# Patient Record
Sex: FEMALE | Race: BLACK OR AFRICAN AMERICAN | NOT HISPANIC OR LATINO | Employment: UNEMPLOYED | URBAN - METROPOLITAN AREA
[De-identification: names, ages, dates, MRNs, and addresses within clinical notes are randomized per-mention and may not be internally consistent; named-entity substitution may affect disease eponyms.]

---

## 2017-07-05 ENCOUNTER — APPOINTMENT (OUTPATIENT)
Dept: LAB | Facility: HOSPITAL | Age: 32
End: 2017-07-05
Attending: OBSTETRICS & GYNECOLOGY
Payer: COMMERCIAL

## 2017-07-05 ENCOUNTER — ALLSCRIPTS OFFICE VISIT (OUTPATIENT)
Dept: OTHER | Facility: OTHER | Age: 32
End: 2017-07-05

## 2017-07-05 DIAGNOSIS — N91.2 AMENORRHEA: ICD-10-CM

## 2017-07-05 DIAGNOSIS — Z34.90 ENCOUNTER FOR SUPERVISION OF NORMAL PREGNANCY: ICD-10-CM

## 2017-07-05 DIAGNOSIS — Z32.01 ENCOUNTER FOR PREGNANCY TEST, RESULT POSITIVE: ICD-10-CM

## 2017-07-05 DIAGNOSIS — Z33.1 PREGNANT STATE, INCIDENTAL: ICD-10-CM

## 2017-07-05 LAB
B-HCG SERPL-ACNC: 2793 MIU/ML
HCG, QUALITATIVE (HISTORICAL): POSITIVE
PROGEST SERPL-MCNC: 9.4 NG/ML
TSH SERPL DL<=0.05 MIU/L-ACNC: 1.32 UIU/ML (ref 0.36–3.74)

## 2017-07-05 PROCEDURE — 84144 ASSAY OF PROGESTERONE: CPT

## 2017-07-05 PROCEDURE — 84443 ASSAY THYROID STIM HORMONE: CPT

## 2017-07-05 PROCEDURE — 36415 COLL VENOUS BLD VENIPUNCTURE: CPT

## 2017-07-05 PROCEDURE — 84702 CHORIONIC GONADOTROPIN TEST: CPT

## 2017-07-07 ENCOUNTER — APPOINTMENT (OUTPATIENT)
Dept: LAB | Facility: HOSPITAL | Age: 32
End: 2017-07-07
Attending: OBSTETRICS & GYNECOLOGY
Payer: COMMERCIAL

## 2017-07-07 DIAGNOSIS — Z33.1 PREGNANT STATE, INCIDENTAL: ICD-10-CM

## 2017-07-07 LAB — B-HCG SERPL-ACNC: 7083.2 MIU/ML

## 2017-07-07 PROCEDURE — 36415 COLL VENOUS BLD VENIPUNCTURE: CPT

## 2017-07-07 PROCEDURE — 84702 CHORIONIC GONADOTROPIN TEST: CPT

## 2017-07-13 ENCOUNTER — ALLSCRIPTS OFFICE VISIT (OUTPATIENT)
Dept: OTHER | Facility: OTHER | Age: 32
End: 2017-07-13

## 2017-07-26 ENCOUNTER — APPOINTMENT (OUTPATIENT)
Dept: LAB | Facility: HOSPITAL | Age: 32
End: 2017-07-26
Attending: OBSTETRICS & GYNECOLOGY
Payer: COMMERCIAL

## 2017-07-26 DIAGNOSIS — Z34.90 ENCOUNTER FOR SUPERVISION OF NORMAL PREGNANCY: ICD-10-CM

## 2017-07-26 LAB
ABO GROUP BLD: NORMAL
BACTERIA UR QL AUTO: NORMAL /HPF
BASOPHILS # BLD AUTO: 0.03 THOUSANDS/ΜL (ref 0–0.1)
BASOPHILS NFR BLD AUTO: 1 % (ref 0–1)
BILIRUB UR QL STRIP: ABNORMAL
BLD GP AB SCN SERPL QL: NEGATIVE
CLARITY UR: ABNORMAL
COLOR UR: ABNORMAL
EOSINOPHIL # BLD AUTO: 0.17 THOUSAND/ΜL (ref 0–0.61)
EOSINOPHIL NFR BLD AUTO: 3 % (ref 0–6)
ERYTHROCYTE [DISTWIDTH] IN BLOOD BY AUTOMATED COUNT: 18 % (ref 11.6–15.1)
GLUCOSE UR STRIP-MCNC: NEGATIVE MG/DL
HCT VFR BLD AUTO: 33 % (ref 34.8–46.1)
HGB BLD-MCNC: 10.6 G/DL (ref 11.5–15.4)
HGB UR QL STRIP.AUTO: NEGATIVE
KETONES UR STRIP-MCNC: ABNORMAL MG/DL
LEUKOCYTE ESTERASE UR QL STRIP: NEGATIVE
LYMPHOCYTES # BLD AUTO: 1.77 THOUSANDS/ΜL (ref 0.6–4.47)
LYMPHOCYTES NFR BLD AUTO: 27 % (ref 14–44)
MCH RBC QN AUTO: 21 PG (ref 26.8–34.3)
MCHC RBC AUTO-ENTMCNC: 32.1 G/DL (ref 31.4–37.4)
MCV RBC AUTO: 66 FL (ref 82–98)
MONOCYTES # BLD AUTO: 0.4 THOUSAND/ΜL (ref 0.17–1.22)
MONOCYTES NFR BLD AUTO: 6 % (ref 4–12)
NEUTROPHILS # BLD AUTO: 4.22 THOUSANDS/ΜL (ref 1.85–7.62)
NEUTS SEG NFR BLD AUTO: 63 % (ref 43–75)
NITRITE UR QL STRIP: NEGATIVE
NON-SQ EPI CELLS URNS QL MICRO: NORMAL /HPF
NRBC BLD AUTO-RTO: 0 /100 WBCS
PH UR STRIP.AUTO: 6.5 [PH] (ref 4.5–8)
PLATELET # BLD AUTO: 417 THOUSANDS/UL (ref 149–390)
PMV BLD AUTO: 10.3 FL (ref 8.9–12.7)
PROT UR STRIP-MCNC: ABNORMAL MG/DL
RBC # BLD AUTO: 5.04 MILLION/UL (ref 3.81–5.12)
RBC #/AREA URNS AUTO: NORMAL /HPF
RH BLD: POSITIVE
RUBV IGG SERPL IA-ACNC: 154.3 IU/ML
SP GR UR STRIP.AUTO: 1.03 (ref 1–1.03)
SPECIMEN EXPIRATION DATE: NORMAL
UROBILINOGEN UR QL STRIP.AUTO: 1 E.U./DL
WBC # BLD AUTO: 6.6 THOUSAND/UL (ref 4.31–10.16)
WBC #/AREA URNS AUTO: NORMAL /HPF

## 2017-07-26 PROCEDURE — 36415 COLL VENOUS BLD VENIPUNCTURE: CPT

## 2017-07-26 PROCEDURE — 81001 URINALYSIS AUTO W/SCOPE: CPT

## 2017-07-26 PROCEDURE — 81220 CFTR GENE COM VARIANTS: CPT

## 2017-07-26 PROCEDURE — 80081 OBSTETRIC PANEL INC HIV TSTG: CPT

## 2017-07-26 PROCEDURE — 87086 URINE CULTURE/COLONY COUNT: CPT

## 2017-07-27 LAB
HBV SURFACE AG SER QL: NORMAL
RPR SER QL: NORMAL

## 2017-07-28 LAB
BACTERIA UR CULT: NORMAL
HIV 1+2 AB+HIV1 P24 AG SERPL QL IA: NORMAL

## 2017-08-01 LAB
CF COMMENT: NORMAL
CFTR MUT ANL BLD/T: NORMAL

## 2017-08-09 ENCOUNTER — LAB REQUISITION (OUTPATIENT)
Dept: LAB | Facility: HOSPITAL | Age: 32
End: 2017-08-09
Payer: COMMERCIAL

## 2017-08-09 ENCOUNTER — APPOINTMENT (OUTPATIENT)
Dept: LAB | Facility: HOSPITAL | Age: 32
End: 2017-08-09
Attending: OBSTETRICS & GYNECOLOGY
Payer: COMMERCIAL

## 2017-08-09 ENCOUNTER — ALLSCRIPTS OFFICE VISIT (OUTPATIENT)
Dept: OTHER | Facility: OTHER | Age: 32
End: 2017-08-09

## 2017-08-09 DIAGNOSIS — O99.019 ANEMIA COMPLICATING PREGNANCY: ICD-10-CM

## 2017-08-09 DIAGNOSIS — O10.919 PRE-EXISTING HYPERTENSION COMPLICATING PREGNANCY: ICD-10-CM

## 2017-08-09 DIAGNOSIS — O99.211 OBESITY COMPLICATING PREGNANCY IN FIRST TRIMESTER: ICD-10-CM

## 2017-08-09 DIAGNOSIS — Z34.90 ENCOUNTER FOR SUPERVISION OF NORMAL PREGNANCY: ICD-10-CM

## 2017-08-09 DIAGNOSIS — O99.210 OBESITY COMPLICATING PREGNANCY: ICD-10-CM

## 2017-08-09 LAB
ALBUMIN SERPL BCP-MCNC: 3.3 G/DL (ref 3.5–5)
ALP SERPL-CCNC: 63 U/L (ref 46–116)
ALT SERPL W P-5'-P-CCNC: 17 U/L (ref 12–78)
ANION GAP SERPL CALCULATED.3IONS-SCNC: 7 MMOL/L (ref 4–13)
AST SERPL W P-5'-P-CCNC: 9 U/L (ref 5–45)
BASOPHILS # BLD AUTO: 0.02 THOUSANDS/ΜL (ref 0–0.1)
BASOPHILS NFR BLD AUTO: 0 % (ref 0–1)
BILIRUB SERPL-MCNC: 0.35 MG/DL (ref 0.2–1)
BUN SERPL-MCNC: 7 MG/DL (ref 5–25)
CALCIUM SERPL-MCNC: 9.4 MG/DL (ref 8.3–10.1)
CHLORIDE SERPL-SCNC: 103 MMOL/L (ref 100–108)
CO2 SERPL-SCNC: 27 MMOL/L (ref 21–32)
CREAT SERPL-MCNC: 0.93 MG/DL (ref 0.6–1.3)
EOSINOPHIL # BLD AUTO: 0.18 THOUSAND/ΜL (ref 0–0.61)
EOSINOPHIL NFR BLD AUTO: 3 % (ref 0–6)
ERYTHROCYTE [DISTWIDTH] IN BLOOD BY AUTOMATED COUNT: 18.3 % (ref 11.6–15.1)
GFR SERPL CREATININE-BSD FRML MDRD: 94 ML/MIN/1.73SQ M
GLUCOSE SERPL-MCNC: 99 MG/DL (ref 65–140)
HCT VFR BLD AUTO: 32.1 % (ref 34.8–46.1)
HGB BLD-MCNC: 10.3 G/DL (ref 11.5–15.4)
LYMPHOCYTES # BLD AUTO: 1.33 THOUSANDS/ΜL (ref 0.6–4.47)
LYMPHOCYTES NFR BLD AUTO: 19 % (ref 14–44)
MCH RBC QN AUTO: 21.3 PG (ref 26.8–34.3)
MCHC RBC AUTO-ENTMCNC: 32.1 G/DL (ref 31.4–37.4)
MCV RBC AUTO: 67 FL (ref 82–98)
MONOCYTES # BLD AUTO: 0.45 THOUSAND/ΜL (ref 0.17–1.22)
MONOCYTES NFR BLD AUTO: 6 % (ref 4–12)
NEUTROPHILS # BLD AUTO: 5.17 THOUSANDS/ΜL (ref 1.85–7.62)
NEUTS SEG NFR BLD AUTO: 72 % (ref 43–75)
NRBC BLD AUTO-RTO: 0 /100 WBCS
PLATELET # BLD AUTO: 347 THOUSANDS/UL (ref 149–390)
PMV BLD AUTO: 9.9 FL (ref 8.9–12.7)
POTASSIUM SERPL-SCNC: 3.3 MMOL/L (ref 3.5–5.3)
PROT SERPL-MCNC: 7.3 G/DL (ref 6.4–8.2)
RBC # BLD AUTO: 4.83 MILLION/UL (ref 3.81–5.12)
SODIUM SERPL-SCNC: 137 MMOL/L (ref 136–145)
WBC # BLD AUTO: 7.15 THOUSAND/UL (ref 4.31–10.16)

## 2017-08-09 PROCEDURE — 82575 CREATININE CLEARANCE TEST: CPT

## 2017-08-09 PROCEDURE — 87491 CHLMYD TRACH DNA AMP PROBE: CPT | Performed by: OBSTETRICS & GYNECOLOGY

## 2017-08-09 PROCEDURE — 82570 ASSAY OF URINE CREATININE: CPT

## 2017-08-09 PROCEDURE — 87591 N.GONORRHOEAE DNA AMP PROB: CPT | Performed by: OBSTETRICS & GYNECOLOGY

## 2017-08-09 PROCEDURE — 82565 ASSAY OF CREATININE: CPT

## 2017-08-09 PROCEDURE — 85025 COMPLETE CBC W/AUTO DIFF WBC: CPT

## 2017-08-09 PROCEDURE — 36415 COLL VENOUS BLD VENIPUNCTURE: CPT

## 2017-08-09 PROCEDURE — 84156 ASSAY OF PROTEIN URINE: CPT

## 2017-08-09 PROCEDURE — 80053 COMPREHEN METABOLIC PANEL: CPT

## 2017-08-09 PROCEDURE — G0145 SCR C/V CYTO,THINLAYER,RESCR: HCPCS | Performed by: OBSTETRICS & GYNECOLOGY

## 2017-08-14 ENCOUNTER — TRANSCRIBE ORDERS (OUTPATIENT)
Dept: ADMINISTRATIVE | Facility: HOSPITAL | Age: 32
End: 2017-08-14

## 2017-08-14 LAB
CHLAMYDIA DNA CVX QL NAA+PROBE: NORMAL
CREAT SERPL-MCNC: 0.89 MG/DL (ref 0.6–1.3)
CREAT UR-MCNC: 563 MG/DL
N GONORRHOEA DNA GENITAL QL NAA+PROBE: NORMAL
PROT UR-MCNC: 41 MG/DL
PROT/CREAT UR: 0.07 MG/G{CREAT} (ref 0–0.1)

## 2017-08-14 PROCEDURE — 36415 COLL VENOUS BLD VENIPUNCTURE: CPT

## 2017-08-15 LAB
CREAT 24H UR-MRATE: 2.2 G/24HR (ref 0.6–1.8)
CREAT CL 24H UR+SERPL-VRATE: 128.53 ML/MIN (ref 75–115)
CREAT UR-MCNC: 292 MG/DL
PROT 24H UR-MCNC: 165 MG/24 HRS (ref 40–150)
SPECIMEN VOL UR: 750 ML
SPECIMEN VOL UR: 750 ML

## 2017-08-18 LAB
LAB AP GYN PRIMARY INTERPRETATION: NORMAL
LAB AP LMP: NORMAL
Lab: NORMAL
PATH INTERP SPEC-IMP: NORMAL

## 2017-08-28 ENCOUNTER — ALLSCRIPTS OFFICE VISIT (OUTPATIENT)
Dept: PERINATAL CARE | Facility: CLINIC | Age: 32
End: 2017-08-28
Payer: COMMERCIAL

## 2017-08-28 ENCOUNTER — GENERIC CONVERSION - ENCOUNTER (OUTPATIENT)
Dept: OTHER | Facility: OTHER | Age: 32
End: 2017-08-28

## 2017-08-28 PROCEDURE — 76813 OB US NUCHAL MEAS 1 GEST: CPT | Performed by: OBSTETRICS & GYNECOLOGY

## 2017-09-01 ENCOUNTER — LAB CONVERSION - ENCOUNTER (OUTPATIENT)
Dept: OTHER | Facility: OTHER | Age: 32
End: 2017-09-01

## 2017-09-01 LAB
AGE RISK DOWN SYNDROME (HISTORICAL): NORMAL
CALC'D GESTATIONAL AGE (HISTORICAL): 13.4
COLLECTION DATE (HISTORICAL): NORMAL
CROWN RUMP LENGTH (HISTORICAL): 73 MM
CROWN RUMP LENGTH (HISTORICAL): NORMAL MM
DATE OF BIRTH (HISTORICAL): NORMAL
DONOR AGE; EGG RETRIEVAL (HISTORICAL): NORMAL
DONOR EGG (HISTORICAL): NO
EDD DETERMINED BY (HISTORICAL): NORMAL
ESTIMATED DELIVERY DATE (EDD) (HISTORICAL): NORMAL
HCG MOM (HISTORICAL): 0.52
HCG QUANTITATIVE (HISTORICAL): 30.7 IU/ML
HX OF NEURAL TUBE DEFECTS (HISTORICAL): NO
IF TWINS (HISTORICAL): NORMAL
INSULIN DEP. DIABETIC (HISTORICAL): NO
INTERPRETATION (HISTORICAL): NORMAL
MATERNAL WEIGHT (HISTORICAL): 256 LBS
MSS DOWN SYNDROME RISK (HISTORICAL): NORMAL
MSS3 TRISOMY 18 RISK (HISTORICAL): NORMAL
NASAL BONE (HISTORICAL): NORMAL
NASAL BONE (HISTORICAL): PRESENT
NT MOM (HISTORICAL): 1.06
NTQR LOCATION ID (HISTORICAL): NORMAL
NTQR READING PHYS ID (HISTORICAL): NORMAL
NUCHAL TRANSLUCENCY (HISTORICAL): 1.7 MM
NUCHAL TRANSLUCENCY (HISTORICAL): NORMAL MM
NUMBER OF FETUSES (HISTORICAL): 1
PAPP-A (HISTORICAL): 0.64
PAPP-A (HISTORICAL): 680.7 NG/ML
PREV PREGNANCY DOWN SYND (HISTORICAL): NO
RACE/ETHNIC ORIGIN (HISTORICAL): NORMAL
REFERRING PHYSICIAN (HISTORICAL): NORMAL
REFERRING PHYSICIAN NPI (HISTORICAL): NORMAL
REFERRING PHYSICIAN PHONE (HISTORICAL): NORMAL
REPEAT SPECIMEN (HISTORICAL): NO
ULTRASONOGRAPHER ID (HISTORICAL): NORMAL
ULTRASOUND DATE (HISTORICAL): NORMAL

## 2017-09-05 ENCOUNTER — GENERIC CONVERSION - ENCOUNTER (OUTPATIENT)
Dept: OTHER | Facility: OTHER | Age: 32
End: 2017-09-05

## 2017-09-06 ENCOUNTER — GENERIC CONVERSION - ENCOUNTER (OUTPATIENT)
Dept: OTHER | Facility: OTHER | Age: 32
End: 2017-09-06

## 2017-09-13 ENCOUNTER — GENERIC CONVERSION - ENCOUNTER (OUTPATIENT)
Dept: OTHER | Facility: OTHER | Age: 32
End: 2017-09-13

## 2017-09-13 DIAGNOSIS — Z34.90 ENCOUNTER FOR SUPERVISION OF NORMAL PREGNANCY: ICD-10-CM

## 2017-09-13 DIAGNOSIS — O10.919 PRE-EXISTING HYPERTENSION COMPLICATING PREGNANCY: ICD-10-CM

## 2017-09-13 DIAGNOSIS — O99.211 OBESITY COMPLICATING PREGNANCY IN FIRST TRIMESTER: ICD-10-CM

## 2017-09-27 ENCOUNTER — GENERIC CONVERSION - ENCOUNTER (OUTPATIENT)
Dept: OTHER | Facility: OTHER | Age: 32
End: 2017-09-27

## 2017-09-28 ENCOUNTER — APPOINTMENT (OUTPATIENT)
Dept: LAB | Facility: HOSPITAL | Age: 32
End: 2017-09-28
Attending: OBSTETRICS & GYNECOLOGY
Payer: COMMERCIAL

## 2017-09-28 DIAGNOSIS — O10.919 PRE-EXISTING HYPERTENSION COMPLICATING PREGNANCY: ICD-10-CM

## 2017-09-28 LAB
ALBUMIN SERPL BCP-MCNC: 3 G/DL (ref 3.5–5)
ALP SERPL-CCNC: 56 U/L (ref 46–116)
ALT SERPL W P-5'-P-CCNC: 29 U/L (ref 12–78)
ANION GAP SERPL CALCULATED.3IONS-SCNC: 10 MMOL/L (ref 4–13)
AST SERPL W P-5'-P-CCNC: 19 U/L (ref 5–45)
BILIRUB SERPL-MCNC: 0.27 MG/DL (ref 0.2–1)
BUN SERPL-MCNC: 4 MG/DL (ref 5–25)
CALCIUM SERPL-MCNC: 9.2 MG/DL (ref 8.3–10.1)
CHLORIDE SERPL-SCNC: 105 MMOL/L (ref 100–108)
CO2 SERPL-SCNC: 23 MMOL/L (ref 21–32)
CREAT SERPL-MCNC: 0.69 MG/DL (ref 0.6–1.3)
CREAT UR-MCNC: 430 MG/DL
ERYTHROCYTE [DISTWIDTH] IN BLOOD BY AUTOMATED COUNT: 16.4 % (ref 11.6–15.1)
GFR SERPL CREATININE-BSD FRML MDRD: 133 ML/MIN/1.73SQ M
GLUCOSE P FAST SERPL-MCNC: 85 MG/DL (ref 65–99)
HCT VFR BLD AUTO: 31.2 % (ref 34.8–46.1)
HGB BLD-MCNC: 9.9 G/DL (ref 11.5–15.4)
MCH RBC QN AUTO: 22 PG (ref 26.8–34.3)
MCHC RBC AUTO-ENTMCNC: 31.7 G/DL (ref 31.4–37.4)
MCV RBC AUTO: 69 FL (ref 82–98)
PLATELET # BLD AUTO: 305 THOUSANDS/UL (ref 149–390)
PMV BLD AUTO: 10.5 FL (ref 8.9–12.7)
POTASSIUM SERPL-SCNC: 3.4 MMOL/L (ref 3.5–5.3)
PROT SERPL-MCNC: 6.8 G/DL (ref 6.4–8.2)
PROT UR-MCNC: 38 MG/DL
PROT/CREAT UR: 0.09 MG/G{CREAT} (ref 0–0.1)
RBC # BLD AUTO: 4.5 MILLION/UL (ref 3.81–5.12)
SODIUM SERPL-SCNC: 138 MMOL/L (ref 136–145)
URATE SERPL-MCNC: 2.8 MG/DL (ref 2–6.8)
WBC # BLD AUTO: 8.12 THOUSAND/UL (ref 4.31–10.16)

## 2017-09-28 PROCEDURE — 82570 ASSAY OF URINE CREATININE: CPT

## 2017-09-28 PROCEDURE — 80053 COMPREHEN METABOLIC PANEL: CPT

## 2017-09-28 PROCEDURE — 84550 ASSAY OF BLOOD/URIC ACID: CPT

## 2017-09-28 PROCEDURE — 36415 COLL VENOUS BLD VENIPUNCTURE: CPT

## 2017-09-28 PROCEDURE — 84156 ASSAY OF PROTEIN URINE: CPT

## 2017-09-28 PROCEDURE — 85027 COMPLETE CBC AUTOMATED: CPT

## 2017-09-29 ENCOUNTER — APPOINTMENT (OUTPATIENT)
Dept: LAB | Facility: HOSPITAL | Age: 32
End: 2017-09-29
Attending: OBSTETRICS & GYNECOLOGY
Payer: COMMERCIAL

## 2017-09-29 DIAGNOSIS — O10.919 PRE-EXISTING HYPERTENSION COMPLICATING PREGNANCY: ICD-10-CM

## 2017-09-29 LAB
PROT 24H UR-MCNC: 127.6 MG/24 HRS (ref 40–150)
SPECIMEN VOL UR: 580 ML

## 2017-09-29 PROCEDURE — 84156 ASSAY OF PROTEIN URINE: CPT

## 2017-10-04 ENCOUNTER — GENERIC CONVERSION - ENCOUNTER (OUTPATIENT)
Dept: OTHER | Facility: OTHER | Age: 32
End: 2017-10-04

## 2017-10-12 ENCOUNTER — APPOINTMENT (OUTPATIENT)
Dept: PERINATAL CARE | Facility: CLINIC | Age: 32
End: 2017-10-12
Payer: COMMERCIAL

## 2017-10-12 PROCEDURE — G0108 DIAB MANAGE TRN  PER INDIV: HCPCS | Performed by: OBSTETRICS & GYNECOLOGY

## 2017-10-14 ENCOUNTER — HOSPITAL ENCOUNTER (OUTPATIENT)
Facility: HOSPITAL | Age: 32
Discharge: HOME | End: 2017-10-14
Attending: OBSTETRICS & GYNECOLOGY | Admitting: OBSTETRICS & GYNECOLOGY
Payer: COMMERCIAL

## 2017-10-14 ENCOUNTER — GENERIC CONVERSION - ENCOUNTER (OUTPATIENT)
Dept: OTHER | Facility: OTHER | Age: 32
End: 2017-10-14

## 2017-10-14 VITALS
HEART RATE: 105 BPM | DIASTOLIC BLOOD PRESSURE: 66 MMHG | RESPIRATION RATE: 18 BRPM | SYSTOLIC BLOOD PRESSURE: 143 MMHG | TEMPERATURE: 98.3 F | OXYGEN SATURATION: 98 %

## 2017-10-14 PROCEDURE — 99203 OFFICE O/P NEW LOW 30 MIN: CPT

## 2017-10-15 PROBLEM — Z3A.19 19 WEEKS GESTATION OF PREGNANCY: Status: ACTIVE | Noted: 2017-10-15

## 2017-10-15 NOTE — PROGRESS NOTES
Triage Note - OB  Kaye Siemens 27 y o  female MRN: [id number]  Unit/Bed#: L&D 329-02 Encounter: [id number]    Chief Complaint:  Decreased fetal movement   NANO: Estimated Date of Delivery: 3/7/18    HPI: Patient is a [id number] at 19w4d here complaining of decreased movement x3 hours  The patient states that she had felt baby move the last week however today the baby move loss  She wanted to come in and get checked because she was worried because she had 3 prior miscarriages  On review of records patient had 3 prior elective terminations  Since being here patient has felt the baby move  She denies any cramping, contractions, loss of fluid, vaginal bleeding  Vitals:   /66   Pulse 105   Temp 98 3 °F (36 8 °C) (Oral)   Resp 18   SpO2 98%   There is no height or weight on file to calculate BMI  Physical Exam  GEN:  No acute distress   ABD:  Soft, nontender, gravid    FHT:    appreciated 150 beats per minute  TOCO:   Contraction Frequency (minutes): x2  Contraction Duration (seconds): 40-50  Contraction Quality: Mild    Labs: No results found for this or any previous visit (from the past 24 hour(s))  Transabdominal ultrasound:  Positive fetal movement, posterior placenta, boy    Lab, Imaging and other studies: I have personally reviewed pertinent reports  A/P:  27year-old G6 P 203 2 at 19 weeks and 4 days with decreased fetal movement  1) decreased fetal movement:  Discussed with patient that she is too early in her pregnancy  We do not expect her to feel regular movement until 28 weeks at which time she will begin kick counts  2) discussed with patient to follow-up as outpatient  3) Discharge instructions given to patient and labor precautions reviewed    4) D/W Dr Gunner Aldana MD  10/15/2017  1:01 AM

## 2017-10-24 ENCOUNTER — GENERIC CONVERSION - ENCOUNTER (OUTPATIENT)
Dept: OTHER | Facility: OTHER | Age: 32
End: 2017-10-24

## 2017-11-02 ENCOUNTER — ALLSCRIPTS OFFICE VISIT (OUTPATIENT)
Dept: PERINATAL CARE | Facility: CLINIC | Age: 32
End: 2017-11-02
Payer: COMMERCIAL

## 2017-11-02 ENCOUNTER — GENERIC CONVERSION - ENCOUNTER (OUTPATIENT)
Dept: OTHER | Facility: OTHER | Age: 32
End: 2017-11-02

## 2017-11-02 PROCEDURE — 76811 OB US DETAILED SNGL FETUS: CPT | Performed by: OBSTETRICS & GYNECOLOGY

## 2017-11-02 PROCEDURE — 76817 TRANSVAGINAL US OBSTETRIC: CPT | Performed by: OBSTETRICS & GYNECOLOGY

## 2017-11-07 ENCOUNTER — LAB CONVERSION - ENCOUNTER (OUTPATIENT)
Dept: OTHER | Facility: OTHER | Age: 32
End: 2017-11-07

## 2017-11-07 LAB
AFP (HISTORICAL): 1.31
AFP (HISTORICAL): 83 NG/ML
AGE RISK DOWN SYNDROME (HISTORICAL): NORMAL
CALC'D GESTATIONAL AGE (HISTORICAL): 22.6
COLLECTION DATE (HISTORICAL): NORMAL
CROWN RUMP LENGTH (HISTORICAL): 73 MM
DATE OF BIRTH (HISTORICAL): NORMAL
ESTIMATED DELIVERY DATE (EDD) (HISTORICAL): NORMAL
ESTRADIOL, FREE (HISTORICAL): 1.55 NG/ML
ESTRIOL MOM (HISTORICAL): 0.82
HCG MOM (HISTORICAL): 0.6
HCG QUANTITATIVE (HISTORICAL): 8 IU/ML
HX OF NEURAL TUBE DEFECTS (HISTORICAL): NO
INHIBIN A (HISTORICAL): 161 PG/ML
INHIBIN A MOM (HISTORICAL): 0.94
INSULIN DEP. DIABETIC (HISTORICAL): NO
INTERPRETATION (HISTORICAL): NORMAL
MATERNAL WEIGHT (HISTORICAL): 254 LBS
MSAFP RISK OPEN NTD (HISTORICAL): NORMAL
MSS DOWN SYNDROME RISK (HISTORICAL): NORMAL
MSS3 TRISOMY 18 RISK (HISTORICAL): NORMAL
NASAL BONE (HISTORICAL): NORMAL
NASAL BONE (HISTORICAL): PRESENT
NT MOM (HISTORICAL): 1.06
NUCHAL TRANSLUCENCY (HISTORICAL): 1.7 MM
NUMBER OF FETUSES (HISTORICAL): 1
PAPP-A (HISTORICAL): 0.64
PAPP-A (HISTORICAL): 680.7 NG/ML
RACE/ETHNIC ORIGIN (HISTORICAL): NORMAL
REFERRING PHYSICIAN (HISTORICAL): NORMAL
REFERRING PHYSICIAN NPI (HISTORICAL): NORMAL
REFERRING PHYSICIAN PHONE (HISTORICAL): NORMAL
REPEAT SPECIMEN (HISTORICAL): NO
SPECIMEN: (HISTORICAL): NORMAL
ULTRASOUND DATE (HISTORICAL): NORMAL

## 2017-11-08 ENCOUNTER — GENERIC CONVERSION - ENCOUNTER (OUTPATIENT)
Dept: OTHER | Facility: OTHER | Age: 32
End: 2017-11-08

## 2017-11-13 ENCOUNTER — APPOINTMENT (OUTPATIENT)
Dept: PERINATAL CARE | Facility: CLINIC | Age: 32
End: 2017-11-13
Payer: COMMERCIAL

## 2017-11-13 PROCEDURE — G0109 DIAB MANAGE TRN IND/GROUP: HCPCS | Performed by: OBSTETRICS & GYNECOLOGY

## 2017-11-14 ENCOUNTER — GENERIC CONVERSION - ENCOUNTER (OUTPATIENT)
Dept: OTHER | Facility: OTHER | Age: 32
End: 2017-11-14

## 2017-11-14 DIAGNOSIS — O99.019 ANEMIA COMPLICATING PREGNANCY: ICD-10-CM

## 2017-11-14 DIAGNOSIS — Z34.92 ENCOUNTER FOR SUPERVISION OF NORMAL PREGNANCY IN SECOND TRIMESTER: ICD-10-CM

## 2017-11-22 ENCOUNTER — GENERIC CONVERSION - ENCOUNTER (OUTPATIENT)
Dept: OTHER | Facility: OTHER | Age: 32
End: 2017-11-22

## 2017-11-29 ENCOUNTER — GENERIC CONVERSION - ENCOUNTER (OUTPATIENT)
Dept: OTHER | Facility: OTHER | Age: 32
End: 2017-11-29

## 2017-11-29 ENCOUNTER — GENERIC CONVERSION - ENCOUNTER (OUTPATIENT)
Dept: OBGYN CLINIC | Facility: CLINIC | Age: 32
End: 2017-11-29

## 2017-11-30 ENCOUNTER — LAB REQUISITION (OUTPATIENT)
Dept: LAB | Facility: HOSPITAL | Age: 32
End: 2017-11-30
Payer: COMMERCIAL

## 2017-11-30 DIAGNOSIS — Z34.90 ENCOUNTER FOR SUPERVISION OF NORMAL PREGNANCY: ICD-10-CM

## 2017-11-30 LAB
HCT VFR BLD AUTO: 31.7 % (ref 34.8–46.1)
HGB BLD-MCNC: 9.7 G/DL (ref 11.5–15.4)

## 2017-11-30 PROCEDURE — 85014 HEMATOCRIT: CPT | Performed by: OBSTETRICS & GYNECOLOGY

## 2017-11-30 PROCEDURE — 86592 SYPHILIS TEST NON-TREP QUAL: CPT | Performed by: OBSTETRICS & GYNECOLOGY

## 2017-11-30 PROCEDURE — 85018 HEMOGLOBIN: CPT | Performed by: OBSTETRICS & GYNECOLOGY

## 2017-12-01 LAB — RPR SER QL: NORMAL

## 2017-12-20 ENCOUNTER — ALLSCRIPTS OFFICE VISIT (OUTPATIENT)
Dept: OTHER | Facility: OTHER | Age: 32
End: 2017-12-20

## 2017-12-20 ENCOUNTER — ALLSCRIPTS OFFICE VISIT (OUTPATIENT)
Dept: PERINATAL CARE | Facility: CLINIC | Age: 32
End: 2017-12-20
Payer: COMMERCIAL

## 2017-12-20 ENCOUNTER — GENERIC CONVERSION - ENCOUNTER (OUTPATIENT)
Dept: OTHER | Facility: OTHER | Age: 32
End: 2017-12-20

## 2017-12-20 PROCEDURE — 76816 OB US FOLLOW-UP PER FETUS: CPT | Performed by: OBSTETRICS & GYNECOLOGY

## 2017-12-20 PROCEDURE — 76819 FETAL BIOPHYS PROFIL W/O NST: CPT | Performed by: OBSTETRICS & GYNECOLOGY

## 2018-01-02 ENCOUNTER — GENERIC CONVERSION - ENCOUNTER (OUTPATIENT)
Dept: OTHER | Facility: OTHER | Age: 33
End: 2018-01-02

## 2018-01-09 NOTE — PROGRESS NOTES
AUG 28 2017         RE: Evens Akhtar                                  To: Facundo  GYN   MR#: [id number]                                   Madai Collier   : [address] [facility]                                  [address]   ENC: [id number]:ELLIOTT Juarez, [facility]    (Exam #: [id number])                           Fax: [phone]      The LMP of this 27year old,  G6, P2-0-3-2 patient was unknown, her   working NANO is MAR 7 2018 and the current gestational age is 16 weeks 5   days by [address] [place] [address]  A sonographic examination was performed on AUG   28 2017 using real time equipment  The ultrasound examination was   performed using abdominal technique  The patient has a BMI of 42 6  Her   blood pressure today was 132/91  Earliest ultrasound found in her record:17 6w1d  NANO 3/7/18 Multiple   longitudinal and transverse sections revealed a william intrauterine   pregnancy  The placenta is posterior in implantation  Cardiac motion was observed at 158 bpm       INDICATIONS      first trimester genetic screening   morbid obesity      Exam Types      Stepwise Sequential Screen      RESULTS      Fetus # 1 of 1   Fetal growth appeared normal      MEASUREMENTS (* Included In Average GA)      CRL              7 3 cm        13 weeks 1 day *   Nuchal Trans    1 70 mm      THE AVERAGE GESTATIONAL AGE is 13 weeks 1 day +/- 7 days  ANATOMY COMMENTS      Anatomic detail is limited at this gestational age  The yolk sac was not   noted  The fetal cranium appeared normal in shape and the nuchal   translucency was normal in size at 1 7 mm  The nasal bone appears to be   present  The intracranial anatomy was unremarkable  Evaluation of the   spine revealed no obvious evidence for a neural tube defect  Anatomy of   the fetal thorax appeared within normal limits  The cardiac rhythm was   regular    Within the abdomen, stomach & bladder were visualized and the   abdominal wall appeared intact  A three vessel cord appears to be present  Active movement of the fetal body & extremities was seen  There is no   suspicion of a subchorionic bleed  The placental cord insertion was   normal    There is no suspicion of a uterine myoma  Free fluid is not seen   in the posterior cul-de-sac  ADNEXA      The left ovary appeared normal and measured 3 2 x 3 0 x 3 0 cm with a   volume of 15 1 cc  The right ovary appeared normal and measured 3 0 x 3 5   x 1 9 cm with a volume of 10 4 cc  AMNIOTIC FLUID         Largest Vertical Pocket = 2 2 cm   Amniotic Fluid: Normal      IMPRESSION      Zazueta IUP   13 weeks and 1 day by this ultrasound  (NANO=MAR 4 2018)   12 weeks and 5 days by 1st Tri Sono  (NANO=MAR 7 2018)   Fetal growth appeared normal   Regular fetal heart rate of 158 bpm   Posterior placenta      CONSULT COMMENT      Thank you very much for your kind referral of Kaye Siemens to the   [facility], [facility]  in [facility] on 2017 for first trimester   ultrasound evaluation, genetic screening, and MFM consult  Leidy Cramer is a   19-year-old -American female  6 para 203 who is currently   at 12-5/7 weeks gestation by an estimated due date of 2018 which   is based upon earlier first trimester ultrasound dating  She is referred   for genetic screening today, with consultation performed for the   indications of morbid obesity and chronic hypertension  Her prenatal   course so far has been unremarkable  Leidy Cramer has no complaints  She denies   vaginal bleeding  She has not yet been screened for gestational diabetes   during this pregnancy      Leidy Cramer has a history of 2 prior vaginal deliveries at term in  and     Her pregnancy in  was apparently complicated by gestational   hypertension  She delivered appropriately grown babies, each currently   healthy  She also has a history of 3 elective abortions  She is morbidly   obese, with a BMI of 42 6   She was apparently diagnosed with chronic   hypertension following delivery of her second baby, and is currently   treated with 200 mg of labetalol twice a day  She also takes 81 mg of   aspirin a day given his history  A baseline 24-hour urine study obtained 2   weeks ago revealed 165 mg of protein, with a creatinine clearance of 128   mL/m  A comprehensive metabolic profile on that date was unremarkable  Blaises past medical history is otherwise unremarkable with the   exception of iron deficiency anemia  Her past surgical history is   negative  She currently takes no other medication with the exception of a   prenatal vitamin and iron supplementation on a daily basis and has no   known drug allergy  She denies tobacco, alcohol, or illicit drug use   during the pregnancy  James Albright has a twin sister with mental retardation of   uncertain underlying etiology  The family genetic history is otherwise   negative with respect to genetic abnormalities, birth defects, or mental   retardation  Her dad has diabetes  Her mom has hypertension  There is no   first degree relative with a diagnosis of venous thromboembolism, or   thyroid disease  James Albright has a second cousin with sickle cell anemia  She   does not believe that she has ever been evaluated for the sickle cell   trait  Today's ultrasound findings and suggested follow up were discussed in   detail  The Stepwise Sequential Screen was discussed in detail, including   the sensitivity for detection of Down syndrome  We discussed that   definitive prenatal diagnosis is possible only through genetic   amniocentesis or chorionic villus sampling  James Albright was given a   requisition for [facility] for a venous blood sample to complete   the first trimester component of the Stepwise Sequential Screen  The   second trimester component should be obtained between 16 and 18 weeks   gestation  Level II ultrasound evaluation will be performed at 22 weeks   gestation  Morbid obesity is associated with an increased risk for adverse pregnancy   outcomes, including gestational diabetes, fetal growth abnormalities   including macrosomia, fetal structural abnormalities, preeclampsia, venous   thromboembolism, stillbirth, and increased likelihood for    section  Early screening for gestational diabetes should be considered,   with repeat evaluation between 24 and 28 weeks gestation, if initially   negative  Serial fetal growth scans are recommended during the second half of the   pregnancy for the indications of chronic hypertension and morbid obesity,   each associated with an increased risk for fetal growth abnormalities  Twice per week nonstress testing is recommended for additional pregnancy   surveillance for the indication of chronic hypertension beginning at 32   weeks gestation, sooner if otherwise clinically indicated  The goal should   be to keep Twila's blood pressures in the range between 120-160/   during this pregnancy  Continuation of low-dose aspirin therapy is   recommended, which will significantly reduce her risk for the development   of superimposed preeclampsia  Harvey Matter should have screening for sickle cell trait with the CBC and   hemoglobin electrophoresis, if not previously done  The face to face time, in addition to time spent discussing ultrasound   results, was approximately 15 minutes, greater than 50% of which was spent   during counseling and coordination of care  PATSY Kuo M D     Maternal-Fetal Medicine   Electronically signed 17 18:30

## 2018-01-10 NOTE — PROGRESS NOTES
2017         RE: Michi Schroeder                                  To: Albino Curtis GYN   MR#: [id number]                                   [address] [facility]    : [address] [facility]                                  [address]   ENC: [id number]                             [place], [address]   (Exam #: [id number])                           Fax: [phone]      The LMP of this 27year old,  G6, P2-0-3-2 patient was unknown, her   working NANO is MAR 7 2018 and the current gestational age is 25 weeks 1   day by [address] [place] [address]  A sonographic examination was performed on 2017 using real time equipment  The ultrasound examination was performed   using abdominal & vaginal techniques  The patient has a BMI of 42 1  Her   blood pressure today was 128/84  Earliest ultrasound found in her record:17 6w1d  NANO 3/7/18      Mckayla Coulter has no complaints today  She reports fetal movement and denies   problems related to vaginal bleeding or  labor  She went to the lab   yesterday for the second trimester component of the Sequential Screen  Mckayla Coulter has not yet received the influenza vaccine during this pregnancy  She could not tolerate Glucola administration for gestational diabetes   screening  She has been referred to the Diabetes and Pregnancy program in   the [facility]  where nutrition modifications were discussed and   education regarding home blood glucose monitoring  was performed  Initial   blood glucose testing revealed frequent episodes of mild fasting   hyperglycemia, though she has not reported her most recent blood glucose   values for the past approximate 2 weeks  Mckayla Coulter is currently treated with   labetalol and low-dose aspirin for the indication of chronic hypertension        Cardiac motion was observed at 160 bpm       INDICATIONS      fetal anatomical survey   morbid obesity      Exam Types      LEVEL II   Transvaginal      RESULTS      Fetus # 1 of 1   Vertex presentation   Fetal growth appeared normal   Placenta Location = Posterior   No placenta previa   Placenta Grade = I      MEASUREMENTS (* Included In Average GA)      AC              17 7 cm        22 weeks 3 days* (56%)   BPD              5 5 cm        22 weeks 6 days* (68%)   HC              20 2 cm        22 weeks 1 day * (52%)   Femur            4 1 cm        23 weeks 4 days* (74%)      Nuchal Fold      4 0 mm   NBL              7 8 mm      Humerus          3 8 cm        23 weeks 4 days  (80%)      Cerebellum       2 6 cm        24 weeks 3 days   CisternaMagna    4 3 mm      HC/AC           1 14   FL/AC           0 23   FL/BPD          0 74   EFW (Ac/Fl/Hc)   539 grams - 1 lbs 3 oz                 (52%)      THE AVERAGE GESTATIONAL AGE is 22 weeks 6 days +/- 14 days  AMNIOTIC FLUID         Largest Vertical Pocket = 5 8 cm   Amniotic Fluid: Normal      CERVICAL EVALUATION      SUPINE      Cervical Length: 4 40 cm      OTHER TEST RESULTS           Funneling?: No             Dynamic Changes?: No        Resp  To TFP?: No                      Debris?: No      ANATOMY      Head                                    Normal   Face/Neck                               Normal   Th  Cav  Normal   Heart                                   See Details   Abd  Cav  Normal   Stomach                                 Normal   Right Kidney                            Normal   Left Kidney                             Normal   Bladder                                 Normal   Abd  Wall                               Normal   Spine                                   Normal   Extrems                                 Normal   Genitalia                               Normal   Placenta                                Normal   Umbl   Cord                              Normal   Uterus                                  Normal   PCI                                     Normal      ANATOMY DETAILS      Visualized Appearing Sonographically Normal:   HEAD: (Calvarium, BPD Level, Cavum, Lateral Ventricles, Choroid Plexus,   Cerebellum, Cisterna Magna);    FACE/NECK: (Neck, Profile, Orbits,   Nose/Lips, Palate, Face);    TH  CAV  : (Lungs, Diaphragm); HEART: (Four   Chamber View, Proximal Left Outflow, Proximal Right Outflow, 3VV, Short   [place] of Greater Vessels, Ductal Arch, Aortic Arch, Interventricular   Septum, Interatrial Septum, IVC, SVC, Cardiac Axis, Cardiac Position);      ABD  CAV : (Liver);    STOMACH, RIGHT KIDNEY, LEFT KIDNEY, BLADDER, ABD  WALL, SPINE: (Cervical Spine, Thoracic Spine, Lumbar Spine, Sacrum);      EXTREMS: (Lt Humerus, Rt Humerus, Lt Forearm, Rt Forearm, Lt Hand, Rt   Hand, Lt Femur, Rt Femur, Lt Low Leg, Rt Low Leg, Lt Foot, Rt Foot);      GENITALIA (Male), PLACENTA, UMBL  CORD, UTERUS, PCI      Suboptimally Visualized:   HEART: (3 Vessel Trachea)      ADNEXA      The left ovary appeared normal and measured 3 4 x 3 3 x 1 9 cm with a   volume of 11 1 cc  The right ovary appeared normal and measured 3 8 x 2 7   x 2 1 cm with a volume of 11 3 cc  IMPRESSION      Zazueta IUP   22 weeks and 6 days by this ultrasound  (NANO=MAR 2 2018)   22 weeks and 1 day by [facility] Tri Sono  (NANO=MAR 7 2018)   Vertex presentation   Fetal growth appeared normal   Regular fetal heart rate of 160 bpm   Posterior placenta   No placenta previa      GENERAL COMMENT      No fetal structural abnormality or ultrasound marker for aneuploidy is   identified on the Level II ultrasound study today  The cardiac 3 vessel   tracheal view is suboptimally imaged secondary to the constraints related   to maternal morbid obesity and unfavorable fetal position  Fetal growth   and amniotic fluid volume are normal   The placenta is normal in   appearance  The cervix is normal in appearance by transvaginal sonography  The   cervical length is normal   Cervical debris is not present    Cervical   funneling is not present  Neither provocative nor dynamic change is   appreciated  Today's ultrasound findings and suggested follow-up were discussed in   detail with Nerissa Blanco We discussed that prenatal ultrasound cannot rule out   all congenital abnormalities  Nerissa Blanco will return to the [facility]    in 6 weeks to assess fetal interval growth for the indications of chronic   hypertension and morbid obesity  Serial growth scans are then recommended   during the third trimester  Twice per week non stress testing is   recommended for additional pregnancy surveillance beginning at 32 weeks   gestation, sooner if otherwise clinically indicated  Continuation of   labetalol and low-dose aspirin therapy is recommended  Nerissa Blanco should   maintain contact with the Diabetes and Pregnancy program on a weekly basis   for review of her blood glucose values  We also discussed the importance   of receiving the influenza vaccine during the pregnancy  The face to face time, in addition to time spent discussing ultrasound   results, was approximately 10 minutes, greater than 50% of which was spent   during counseling and coordination of care  PATSY White M D     Maternal-Fetal Medicine   Electronically signed 11/03/17 18:25

## 2018-01-10 NOTE — MISCELLANEOUS
Provider Comments  Provider Comments:   Patient is a no-show for her 7:00 appointment today      Signatures   Electronically signed by : Deni Barron, Judy Kamara; May 24 2016  7:09PM EST                       (Author)    Electronically signed by : NEETU Villanueva ; May 25 2016  3:53PM EST

## 2018-01-10 NOTE — PROGRESS NOTES
Assessment    1  Chronic hypertension in pregnancy (642 00) (O10 919)   2  Nausea and vomiting in pregnancy (643 90) (O21 9)   3  Obesity in pregnancy (649 10) (O99 210)    Plan  Chronic hypertension in pregnancy    · Aspirin 81 MG Oral Tablet Chewable; CHEW AND SWALLOW 1 TABLET DAILY   · (1) CBC/PLT/DIFF; Status:Active; Requested for:09Aug2017;    · (1) COMPREHENSIVE METABOLIC PANEL; Status:Active; Requested for:09Aug2017;    · (1) CREATININE CLEARANCE 24 HOUR; Status:Active; Requested for:09Aug2017;    · (1) PROTEIN, URINE 24HR; Status:Active; Requested for:09Aug2017;    · (1) URINE PROTEIN CREATININE RATIO; Status:Active; Requested for:09Aug2017;   Nausea and vomiting in pregnancy    · Metoclopramide HCl - 10 MG Oral Tablet; TAKE 1 TABLET EVERY 6 HOURS AS  NEEDED  Obesity in pregnancy    · (1) GLUCOSE, 1HR PG (50gm Glu Challenge Preg-Pts); Status:Active; Requested  for:09Aug2017;   Pregnancy, obstetrical care    · (1) CHLAMYDIA/GC AMPLIFIED DNA, PCR; Source:Endocervical; Status: In Progress -  Specimen/Data Collected,Retrospective By Protocol Authorization;   Done: [id number]   · (1) THIN PREP PAP WITH IMAGING; Status: In Progress - Specimen/Data  Collected,Retrospective By Protocol Authorization;   Done: [id number]  Maturation index required? : No  : 05/10/2017  HPV? : if ASCUS    Active Problems    1  Anemia complicating pregnancy (541 62,135 0) (O99 019)   2  Chronic hypertension in pregnancy (642 00) (O10 919)   3  Eczema (692 9) (L30 9)   4  Low serum progesterone (790 6) (R79 89)   5  Nausea and vomiting in pregnancy (643 90) (O21 9)   6  Obesity in pregnancy (649 10) (O99 210)   7  Pregnancy, obstetrical care (V22 1) (Z34 90)   8  Prenatal care, antepartum (V22 1) (Z34 90)    Current Meds    1  Colace 100 MG Oral Capsule; TAKE 1 CAPSULE TWICE DAILY; Therapy: 53LDM3958 to (Evaluate:44Hun2144)  Requested for: 22MGT5758; Last   Rx:[id number] Ordered   2   Ferrous Sulfate 325 (65 Fe) MG Oral Tablet; TAKE 1 TABLET DAILY AS DIRECTED; Therapy: 10XJC1474 to (Evaluate:42Vko4211)  Requested for: 92BXQ1093; Last   Rx:[id number] Ordered    3  Progesterone Micronized 200 MG Oral Capsule; take 1 tab po daily; Therapy: 64MTQ9047 to (Last Rx:90Jbv1474)  Requested for: 97TJP2264 Ordered    4  Colace 100 MG Oral Capsule; TAKE 1 CAPSULE TWICE DAILY; Therapy: 19CLO5104 to (Evaluate:54Krh5179)  Requested for: 90WTO1393; Last   Rx:[id number] Ordered    5  FerrouSul 325 (65 Fe) MG Oral Tablet; Therapy: (Recorded:00Cbt7456) to Recorded   6  Labetalol HCl - 200 MG Oral Tablet; Therapy: (Recorded:2017) to Recorded    Allergies    1  No Known Drug Allergies    2  No Known Environmental Allergies   3  TOMATO    Results/Data  [address] Transvaginal Ultrasound OB [facility]:   Procedure: [id number] pregnant uterus real time with image documentation, fetal and maternal evaluation, first trimester (<14 weeks 0 days), transvaginal approach: single or first gestation  Indication: EDC gestational age 9 weeks  Exam indication: viability  Findings:   Number of gestational sacs and fetuses: 1  Gestational sac/fetal measurements appropriate for gestation: CRL 10 4/7 weeks  Impression:  bpm  CRL 10 4/7 weeks  viable IUP  Future Appointments    Date/Time Provider Specialty Site   2017 03:00 PM  earnestine, [address] [place] [address]   2017 03:15 PM NEETU Sawant   [address] [place] [address] OB/GYN     Signatures   Electronically signed by : NEETU Kebede ; Aug  9 2017  3:35PM EST                       (Author)

## 2018-01-10 NOTE — PROGRESS NOTES
Assessment    1  Missed  ([phone]) (O02 1)    Plan  Missed     · (1) CBC/ PLT (NO DIFF); Status:Active; Requested NQR:53VHA2825;    Perform:[facility]; CPR:25ZIL5566;ZAEDLUU; For:Missed ; Ordered By:Scarlet Clark;   · (1) HCG QUANT; Status:Active; Requested DCM:11RMW3317;    Perform:[facility]; WK17FTM3210;VKXWRQO; For:Missed ; Ordered By:Scarlet Clark;   · (1) PROGESTERONE; Status:Active; Requested GHD:10YAE9170;    Perform:[facility]; VJY:69VQJ4842;QZGIGMA; For:Missed ; Ordered By:Scarlet Clark;   · (1) TYPE & SCREEN; Status:Active; Requested CQO:09TSI4460;    Perform:[facility]; YAV:69DHL1520;ZGMCMWH; For:Missed ; Ordered By:Nicole Clark Sender;  the patient been transfused in the last 3 months? : No  number of units for surgical date : 0  of Surgery : 2016  the patient pregnant? : Yes   · Follow Up After Surgery Evaluation and Treatment  Follow-up  Status: Hold For -  Scheduling  Requested for: 99DIH0988   Ordered; For: Missed ; Ordered By: Adarsh Bynum Performed:  Due: 84KRE9055   · Schedule Surgery Treatment  Procedure  Status: Hold For - Scheduling  Requested for:  38JKO5228   Ordered; For: Missed ; Ordered By: Adarsh Bynum Performed:  Due: 04JJU7296    Discussion/Summary  Discussion Summary:   Embryonic demise at 9 weeks, missed   Discussed wit patient options for treatment including expectant management, medical therapy with misoprostol or surgical evacuation  Patient elects to proceed with suction curettage/surgical completion of missed   Discussed with pt risks and benefits and possible complications with surgery and pt wishes to proceed  Counseling Documentation With Imm: The patient was counseled regarding diagnostic results, instructions for management, impressions, risks and benefits of treatment options   total time of encounter was 30 minutes and 20 minutes was spent counseling  Chief Complaint  Chief Complaint Free Text Note Form: pt here to go over ultrasound results, pt states that she had some bleeding on Friday but she called and the office was closed  Pt states she was going to watch it and then it stopped and then it started back up its like a dark brown  Pt states she doesn't know if she over did      History of Present Illness  HPI: Patient was here today for dating US and was found to have a non-viable pregnancy at 7 1/7 weeks by CRL, with no fetal movement  Patient was supposed to be 9 4/7 weeks by LMP of 4/23/16  She had an ultrasound 12 days ago that showed a viable pregnancy at 6 6/7 weeks  Patient has some light bleeding but no cramping or pelvic pain, she denies passage of tissue  Review of Systems   Female ROS: nonmenstrual bleeding, missed the most recent period and thinking she may be pregnant  Focused-Female:   Constitutional: No fever, no chills, feels well, no tiredness, no recent weight gain or loss  ENT: no ear ache, no loss of hearing, no nosebleeds or nasal discharge, no sore throat or hoarseness  Cardiovascular: no complaints of slow or fast heart rate, no chest pain, no palpitations, no leg claudication or lower extremity edema  Respiratory: no complaints of shortness of breath, no wheezing, no dyspnea on exertion, no orthopnea or PND  Breasts: no complaints of breast pain, breast lump or nipple discharge  Gastrointestinal: no complaints of abdominal pain, no constipation, no nausea or diarrhea, no vomiting, no bloody stools  Genitourinary: as noted in HPI  Musculoskeletal: no complaints of arthralgia, no myalgia, no joint swelling or stiffness, no limb pain or swelling  Integumentary: no complaints of skin rash or lesion, no itching or dry skin, no skin wounds  Neurological: no complaints of headache, no confusion, no numbness or tingling, no dizziness or fainting  ROS Reviewed:   ROS reviewed        Active Problems    1  Eczema (692 9) (L30 9)    Past Medical History    1  History of 7 weeks gestation of pregnancy (V22 2) (Z3A 01)   2  History of Anemia of pregnancy (648 20,285 9) (O99 019)   3  History of Chronic hypertension in pregnancy (642 00) (O10 919)   4  History of eczema (V13 3) (Z87 2)   5  History of pregnancy (V13 29)   6  History of Obesity complicating pregnancy, childbirth, or puerperium, antepartum   (649 13,278 00) (O99 210,E66 9)   7  History of Pre-eclampsia or eclampsia superimposed on pre-existing hypertension   (642 70)  Active Problems And Past Medical History Reviewed: The active problems and past medical history were reviewed and updated today  Surgical History    1  Denied: History Of Prior Surgery  Surgical History Reviewed: The surgical history was reviewed and updated today  Family History  Mother    1  Family history of Hypertension  Father    2  Family history of Hypertension  Family History Reviewed: The family history was reviewed and updated today  Social History    · Denied: History of Alcohol use   · Caffeine use (V49 89) (F15 90)   · Former smoker (I35 51) (H05 814)   · Two children  Social History Reviewed: The social history was reviewed and updated today  The social history was reviewed and is unchanged  Current Meds   1  Aspirin 81 MG Oral Tablet Chewable; CHEW AND SWALLOW 1 TABLET DAILY; Therapy: 18DWD2373 to (Evaluate:13Jan2017)  Requested for: 45WTQ3623; Last   Rx:17Jun2016 Ordered   2  Betamethasone Valerate 0 1 % External Ointment; APPLY SPARINGLY TO AFFECTED   AREA(S) 3 TIMES A DAY; Therapy: 76Sbe1139 to (Evaluate:14Nov2015)  Requested for: 31Hxf4118; Last   Rx:86Zyb0405 Ordered   3  Colace 100 MG Oral Capsule; TAKE 1 CAPSULE TWICE DAILY; Therapy: 82TCD0008 to (Evaluate:16Jan2017)  Requested for: 20Jun2016; Last   Rx:20Jun2016 Ordered   4  Ferrous Sulfate 325 (65 Fe) MG Oral Tablet; TAKE 1 TABLET TWICE DAILY WITH MEALS;    Therapy: 03OXN9997 to (Evaluate:16Jan2017)  Requested for: 20Jun2016; Last   Rx:20Jun2016 Ordered   5  Ibuprofen 600 MG Oral Tablet; Therapy: 35Rjw0129 to Recorded   6  Iron TABS; Therapy: (Recorded:53Tbr2145) to Recorded   7  Metoclopramide HCl - 10 MG Oral Tablet; TAKE 1 TABLET EVERY 6 HOURS AS NEEDED; Therapy: 25JMX5756 to (Last Rx:17Jun2016)  Requested for: 49TRY0389 Ordered   8  Prenatal 27-1 MG Oral Tablet; take one tablet by mouth one time daily; Therapy: 60VBG7074 to (Evaluate:13Jan2017)  Requested for: 45ITY3328; Last   Rx:17Jun2016 Ordered   9  Prenatal Vitamins 0 8 MG Oral Tablet; Therapy: (Recorded:39Yfg0544) to Recorded   10  Procardia XL 30 MG Oral Tablet Extended Release 24 Hour; Therapy: (Recorded:28Ahy7848) to Recorded   11  Progesterone Micronized 100 MG Oral Capsule; Take one tablet po daily; Therapy: 38JHI6617 to (Last Rx:20Jun2016)  Requested for: 20Jun2016 Ordered   12  Tri-Sprintec 0 18/0 215/0 25 MG-35 MCG Oral Tablet; TAKE 1 TABLET DAILY AS    DIRECTED; Therapy: 78ZBW2389 to (Evaluate:13Oct2015)  Requested for: 89XCF7334; Last    Rx:23Jun2015 Ordered   13  Tylenol Extra Strength 500 MG Oral Tablet; Therapy: (Recorded:51Tfa5344) to Recorded  Medication List Reviewed: The medication list was reviewed and updated today  Allergies    1  No Known Drug Allergies    2  No Known Environmental Allergies   3  TOMATO    Vitals  Vital Signs [Data Includes: Current Encounter]    Recorded: 14CVF5331 14:64TU   Systolic 334, RUE, Sitting   Diastolic 80, RUE, Sitting   Height 5 ft 5 in   Weight 268 lb    BMI Calculated 44 6   BSA Calculated 2 24     Physical Exam    Constitutional   General appearance: No acute distress, well appearing and well nourished  Neck   Neck: Normal, supple, trachea midline, no masses  Pulmonary   Respiratory effort: No increased work of breathing or signs of respiratory distress      Cardiovascular   Auscultation of heart: Normal rate and rhythm, normal S1 and S2, no murmurs  Genitourinary   External genitalia: Normal and no lesions appreciated  Vagina: Normal, no lesions or dryness appreciated  small amount of bleeding  Urethra: Normal     Urethral meatus: Normal     Bladder: Normal, soft, non-tender and no prolapse or masses appreciated  Cervix: Normal, no palpable masses  closed  Uterus: Normal, non-tender, not enlarged, and no palpable masses  8 weeks  Adnexa/parametria: Normal, non-tender and no fullness or masses appreciated  Abdomen   Abdomen: Normal, non-tender, and no organomegaly noted  Skin   Skin and subcutaneous tissue: Normal skin turgor and no rashes  Palpation of skin and subcutaneous tissue: Normal     Psychiatric   Orientation to person, place, and time: Normal     Mood and affect: Normal        Results/Data  19643 Transvaginal Ultrasound OB [facility]:   Procedure: The study was done today in the office  Indication: EDC gestational age 9+wks weeks  Exam indication: Dating [place]  Findings:   Number of gestational sacs and fetuses: 1/1  Gestational sac/fetal measurements appropriate for gestation: Sac=9wks  AWW91af=8p0w  Survey of visible fetal and placental anatomic structure No FHTs seen today  Qualitative assessment of amniotic fluid volume/gestational sac shape: ??    Exam of maternal uterus and adnexa: UT=106 x 0 x 75 mm  RT OV=35 x 24 x 29 mm,LT OV=37 x 30 x 32 mm  Impression: Non viable IUP  Missed AB @ 7w1d   Results   (1) HCG QUANT 41RBM3161 10:56AM Soliz Self Order Number: [id number]   Order Number: [id number] Order Number: [id number]     Test Name Result Flag Reference   HCG QUANTITATIVE 69695 6 mIU/mL H <=6   15     Expected Ranges:     Approximate               Approximate HCG  Gestation age          Concentration ( mIU/mL)  _____________          ______________________   Reeda Ponto                      HCG values  0 2-1                       5-50  1-2   2-3                         100-5000  3-4                         500-26085  4-5                         1000-75897  5-6                         73666-199020  6-8                         45433-405972  8-12                        33165-585951     (1) PROGESTERONE 17Jun2016 10:56AM Marci Byrnes    Order Number: [id number]  TW Order Number: [id number]     Test Name Result Flag Reference   PROGESTERONE 6 40 ng/mL       (1) TSH 15DAN9023 10:56AM Hansmigel Fitz    Order Number: [id number]   Order Number: [id number] Order Number: [id number]     Test Name Result Flag Reference   TSH 0 976 uIU/mL  0 358-3 740   The recommended reference ranges for TSH during pregnancy are as follows:  First trimester 0 1 to 2 5 uIU/mL  Second trimester  0 2 to 3 0 uIU/mL  Third trimester 0 3 to 3 0 uIU/m     (1) CBC/ PLT (NO DIFF) 10QQO4330 10:56AM Hansmigel Fitz    Order Number: [id number]  TW Order Number: [id number]     Test Name Result Flag Reference   HEMATOCRIT 31 8 % L 34 8-46 1   HEMOGLOBIN 9 6 g/dL L 11 5-15 4   MCHC 30 2 g/dL L 31 4-37 4   MCH 20 2 pg L 26 8-34 3   MCV 67 fL L 82-98   PLATELET COUNT 646 Thousands/uL H 149-390   RBC COUNT 4 76 Million/uL  3 81-5 12   RDW 18 1 % H 11 6-15 1   WBC COUNT 8 46 Thousand/uL  4 31-10 16   MPV 10 0 fL  8 9-12 7     Future Appointments    Date/Time Provider Specialty Site   07/13/2016 01:45 PM NEETU Avilez   Obstetrics/Gynecology  [facility]     Signatures   Electronically signed by : NEETU Bates ; Jun 29 2016 12:11PM EST                       (Author)    Electronically signed by : NEETU Bates ; Jun 29 2016 12:12PM EST                       (Author)

## 2018-01-11 ENCOUNTER — GENERIC CONVERSION - ENCOUNTER (OUTPATIENT)
Dept: OTHER | Facility: OTHER | Age: 33
End: 2018-01-11

## 2018-01-14 VITALS
HEIGHT: 65 IN | WEIGHT: 259.05 LBS | SYSTOLIC BLOOD PRESSURE: 142 MMHG | DIASTOLIC BLOOD PRESSURE: 84 MMHG | BODY MASS INDEX: 43.16 KG/M2

## 2018-01-14 VITALS
HEIGHT: 65 IN | BODY MASS INDEX: 43.24 KG/M2 | SYSTOLIC BLOOD PRESSURE: 136 MMHG | DIASTOLIC BLOOD PRESSURE: 82 MMHG | WEIGHT: 259.5 LBS

## 2018-01-14 VITALS
WEIGHT: 254 LBS | HEIGHT: 65 IN | BODY MASS INDEX: 42.32 KG/M2 | SYSTOLIC BLOOD PRESSURE: 132 MMHG | DIASTOLIC BLOOD PRESSURE: 84 MMHG

## 2018-01-14 VITALS
WEIGHT: 256 LBS | SYSTOLIC BLOOD PRESSURE: 132 MMHG | DIASTOLIC BLOOD PRESSURE: 91 MMHG | BODY MASS INDEX: 42.65 KG/M2 | HEIGHT: 65 IN

## 2018-01-15 VITALS
DIASTOLIC BLOOD PRESSURE: 84 MMHG | BODY MASS INDEX: 42.15 KG/M2 | WEIGHT: 253 LBS | HEIGHT: 65 IN | SYSTOLIC BLOOD PRESSURE: 128 MMHG

## 2018-01-15 NOTE — PROCEDURES
Active Problems   1  Eczema (692 9) (L30 9)    Current Meds   1  Betamethasone Valerate 0 1 % External Ointment; APPLY SPARINGLY TO AFFECTED   AREA(S) 3 TIMES A DAY; Therapy: 96Xla0131 to (Evaluate:14Nov2015)  Requested for: 86Lzs9501; Last   Rx:82Scp8714 Ordered   2  Prenatal 27-1 MG Oral Tablet; take one tablet by mouth one time daily; Therapy: 79JQA0641 to (Evaluate:13Jan2017)  Requested for: 34ZNH3024; Last   Rx:17Jun2016 Ordered   3  Colace 100 MG Oral Capsule; TAKE 1 CAPSULE TWICE DAILY; Therapy: 87UVZ5106 to (Evaluate:16Jan2017)  Requested for: 20Jun2016; Last   Rx:20Jun2016 Ordered  4  Ferrous Sulfate 325 (65 Fe) MG Oral Tablet; TAKE 1 TABLET TWICE DAILY WITH MEALS; Therapy: 33IXF5850 to (Evaluate:16Jan2017)  Requested for: 20Jun2016; Last   Rx:20Jun2016 Ordered   5  Tri-Sprintec 0 18/0 215/0 25 MG-35 MCG Oral Tablet; TAKE 1 TABLET DAILY AS   DIRECTED; Therapy: 13KIM5861 to (Evaluate:13Oct2015)  Requested for: 07GQS6710; Last   IH:73MYK4633 Ordered   6  Aspirin 81 MG Oral Tablet Chewable; CHEW AND SWALLOW 1 TABLET DAILY; Therapy: 63NLQ9600 to (Evaluate:13Jan2017)  Requested for: 88WSB2386; Last   Rx:17Jun2016 Ordered   7  Progesterone Micronized 100 MG Oral Capsule; Take one tablet po daily; Therapy: 12DCM8283 to (Last Rx:20Jun2016)  Requested for: 20Jun2016 Ordered   8  Metoclopramide HCl - 10 MG Oral Tablet; TAKE 1 TABLET EVERY 6 HOURS AS NEEDED; Therapy: 48EFR1218 to (Last Rx:17Jun2016)  Requested for: 09QBA2938 Ordered   9  Ibuprofen 600 MG Oral Tablet; Therapy: 84Hnb5120 to Recorded  10  Iron TABS; Therapy: (Recorded:56Bvi8143) to Recorded  11  Prenatal Vitamins 0 8 MG Oral Tablet; Therapy: (Recorded:88Hre1885) to Recorded  12  Procardia XL 30 MG Oral Tablet Extended Release 24 Hour; Therapy: (Recorded:88Too1751) to Recorded  13  Tylenol Extra Strength 500 MG Oral Tablet; Therapy: (Recorded:62Wwh7328) to Recorded   14  Azithromycin 250 MG Oral Tablet;     Therapy: 29Zts6817 to Recorded    Allergies   1  No Known Drug Allergies   2  No Known Environmental Allergies  3  TOMATO    Results/Data  12171 Transvaginal Ultrasound OB [facility]:   Procedure: The study was done today in the office  Indication: EDC gestational age 9+wks weeks  Exam indication: Dating [place]  Findings:   Number of gestational sacs and fetuses: 1/1  Gestational sac/fetal measurements appropriate for gestation: Sac=9wks  SNW37ka=4i1z  Survey of visible fetal and placental anatomic structure No FHTs seen today  Qualitative assessment of amniotic fluid volume/gestational sac shape: ??    Exam of maternal uterus and adnexa: UT=106 x 0 x 75 mm  RT OV=35 x 24 x 29 mm,LT OV=37 x 30 x 32 mm  Impression: Non viable IUP  Missed AB @ 7w1d  Future Appointments    Date/Time Provider Specialty Site   07/13/2016 01:45 PM NEETU Sheth   [address] [place] [address] OB/GYN     Signatures   Electronically signed by : Tali Marquez, ; Jun 29 2016 10:29AM EST                       (Author)    Electronically signed by : NEETU Gloria ; Jun 29 2016  1:42PM EST                       (Author)    Electronically signed by : NEETU Gloria ; Jun 29 2016  1:42PM EST                       (Author)

## 2018-01-16 NOTE — RESULT NOTES
Verified Results  (Q) STEPWISE, PART 1 04Bkn3668 02:30PM Thuy Weir     Test Name Result Flag Reference   INTERPRETATION SEE NOTE     This patient's risk does not exceed the first trimester  cut-off for Down syndrome or trisomy 18  The integrated  screen calculation is awaiting the second trimester sample  NT WAS USED IN THE RISK CALCULATIONS  Thank you for submitting this patient's Part 1 specimen  These first trimester values will be incorporated with the  second trimester values as part of the integrated testing  process  Please submit the Part 2 specimen between   09/09/2017-11/03/2017 (15 0 and 22 9 weeks gestation) with   09/09/2017-09/22/2017 (15 0 - 16 9 weeks gestation) being  optimal  When submitting Part 2, please include the  following Specimen # from Part 1:  [id number]   AGE RISK DOWN SYNDROME 1:350     MEGAN DOWN SYNDROME RISK <1:5000  <1:50   RISK FOR TRISOMY 18 <1:5000  <1:100   CALCULATED GESTATIONAL$AGE 13 4     Crown rump length (CRL) was used to calculate gestational  age  NANO, if provided, was not used for gestational age  dating  STEPHANIE-A 680 7 ng/mL     This test was performed using a kit that has not been  cleared or approved by the FDA  The analytical performance  characteristics of this test have been determined by [facility]  This test  should not be used for diagnosis without confirmation by  other medically established means  STEPHANIE-A MOM 0 64     HCG 30 7 IU/mL     HCG MOM 0 52     NT MOM 1 06     The maternal serum screening results indicate a lower risk  of trisomy 21 in this pregnancy  The nasal bone was assessed  via ultrasound and was present  The combined risk is  therefore likely to be less than the calculated risk  Other  findings later in the pregnancy may change the risk  Nasal bone assessment is best accomplished through a fetal  ultrasound performed between 11 weeks 0 days through 13  weeks 6 days   In assessing the risk for aneuploidy, the  evaluation of the maternal serum markers plus the nuchal  thickness measurement is calculated first  Any potential  change to the patient's risk for aneuploidy depends on the  nuchal thickness, crown-rump length, and the ethnic origin,  and therefore the values generated by the algorithm itself  will not change  Additional information about the assessment  of the fetal nasal bone may be found on the [facility] website at  http://www  fetalmedicine com/fmf/training-certification/cert  gaavclmg-ih-socbu  tence/11-13-week-scan/assessment-of-the-nasal-bone/  Please note that the Sequential Integrated maternal serum  screen for Down syndrome risk assessment was designed by Dr Nevaeh Albert ([address], et al J Med Screen 2003 v10 p56-104)  to provide a high detection rate and low false positive rate  when a cutoff of 1:50 is used to identify high risk  pregnancies during the first trimester  Use of any other  cutoff for determination of risk in the first trimester will  result in a higher false positive rate for the two-part  screen  All patients whose risk is lower than 1:50 should  have a second sample submitted to complete the screen  This is a screening test, not a diagnostic test      This risk assessment is based on demographic data provided  by the ordering physician  Please notify the laboratory  promptly if any data are incorrect  If you have questions concerning this report: For clinical consultation, call [phone]; For technical questions, call [phone] ext [phone]; For recalculations, fax to [phone]  For additional information, please refer to  http://STYLIGHT/faq/FAQ85  (This link is provided for informational/educational  purposes only )   REFERRING PHYSICIAN NAME Renu Faith PHYSICIAN PHONE [phone]     REFERRING PHYSICIAN NPI [id number]     DATE OF BIRTH 1985     COLLECTION DATE 08/29/2017 ULTRASOUND DATE 08/28/2017     ULTRASONOGRAPHER'S NAME FLORESITA CHRISTIE     NTQR ULTRASONOGRAPHER ID# [id number]     NTQR LOCATION ID# NOT GIVEN     NTQR READING PHYS ID# [id number]     [facility] ULTRASONOGRAPHER ID# NOT GIVEN     CROWN RUMP LENGTH 73 mm     NUCHAL TRANSLUCENCY 1 7 mm     IF TWINS NOT GIVEN     TWIN B CRL NOT GIVEN mm     TWIN B NT NOT GIVEN mm     MATERNAL WEIGHT 256 lbs     EST'D DATE OF DELIVERY 03/07/2018     NANO DETERMINED BY ULTRASOUND     MOTHER'S ETHNIC ORIGIN      NUMBER OF FETUSES 1     INSULIN DEPEND DIABETIC NO     REPEAT SPECIMEN NO     HX OF NEURAL TUBE DEFECTS NO     PREV PREG DOWN SYND NO     DONOR EGG NO     DONOR EGG; EGG RETRIEVAL NOT GIVEN     Nasal Bone PRESENT     Twin B Nasal Bone NOT GIVEN

## 2018-01-16 NOTE — MISCELLANEOUS
Provider Comments  Provider Comments:   Patient was a no show for today  I left her a message on her cell phone stressing importance of coming into the office for a follow up   am      Signatures   Electronically signed by : NEETU Damian ; Jul 13 2016  3:59PM EST                       (Author)

## 2018-01-17 NOTE — CONSULTS
I had the pleasure of evaluating your patient, Yogesh Tracy  My full evaluation follows:      Chief Complaint  Here for ultrasound study      History of Present Illness  Please refer to the ultrasound report for additional information  Active Problems    1  1st trimester screening (V28 89) (Z36 9)   2  Abnormal glucose tolerance in pregnancy (648 80) (O99 810)   3  Anemia complicating pregnancy (320 42,385 9) (O99 019)   4  Chronic hypertension in pregnancy (642 00) (O10 919)   5  Diet controlled gestational diabetes mellitus (GDM) in second trimester (648 83)   (O24 410)   6  Eczema (692 9) (L30 9)   7  Low serum progesterone (790 6) (R79 89)   8  Maternal chronic hypertension, first trimester (642 03,401 9) (O10 911)   9  Maternal morbid obesity, antepartum, first trimester (649 13,278 01) (O99 211,E66 01)   10  Nausea and vomiting in pregnancy (643 90) (O21 9)   11  Obesity in pregnancy (649 10) (O99 210)   12  Pregnancy, obstetrical care (V22 1) (Z34 90)   13  Prenatal care, antepartum (V22 1) (Z34 90)    Past Medical History    · History of Early stage of pregnancy (V22 2) (Z34 90)   · History of amenorrhea (V13 29) (Z87 42)   · History of Positive urine pregnancy test (V72 42) (Z32 01)    Surgical History    · Denied: History Of Prior Surgery   · History of Surgical Treatment Of Missed  In First Trimester    Family History    · Family history of Hypertension    · Family history of Hypertension    Social History    · Denied: History of Alcohol use   · Caffeine use (V49 89) (F15 90)   · Former smoker (V15 82) (A09 216)   · Two children    Current Meds   1  Accu-Chek FastClix Lancets Miscellaneous; test 4 times daily, 1 box; 102 miscellaneous   box; Therapy: 76TPP1555 to (Evaluate:60Bcq2010)  Requested for: 2017; Last   Rx:2017 Ordered   2  Accu-Chek Guide In Vitro Strip; test 4 times daily;    Therapy: 98FQX3526 to 775 409 915)  Requested for: 750 817 977; Last   Rx:2017 Ordered   3  Aspirin 81 MG Oral Tablet Chewable; CHEW AND SWALLOW 1 TABLET DAILY; Therapy: 96NSY6533 to (Carla Waller)  Requested for: 70Sfa5029; Last   Rx:58Zpn9105 Ordered   4  Colace 100 MG Oral Capsule; TAKE 1 CAPSULE TWICE DAILY; Therapy: 14KMR0915 to (Evaluate:36Her6045)  Requested for: 15KQK0370; Last   Rx:30Smo6062 Ordered   5  Ferrous Sulfate 325 (65 Fe) MG Oral Tablet; TAKE 1 TABLET DAILY AS DIRECTED; Therapy: 14LDR6870 to (Evaluate:34Tql3640)  Requested for: 35UGS8108; Last   Rx:39Ifq3096 Ordered   6  FerrouSul 325 (65 Fe) MG Oral Tablet; Therapy: (Recorded:60Gaw7876) to Recorded   7  Labetalol HCl - 200 MG Oral Tablet; Therapy: (Recorded:67Nte5348) to Recorded    Allergies    1  No Known Drug Allergies    2  No Known Environmental Allergies   3  TOMATO    Vitals   Recorded: 82CVJ0415 37:82YN   Systolic 920   Diastolic 84   Height 5 ft 5 in   Weight 253 lb    BMI Calculated 42 1   BSA Calculated 2 19   Pain Scale 0     Results/Data  Exam description: level II obstetrical ultrasound, transvaginal obstetrical ultrasound  Findings: Please refer to the ultrasound report for additional information  Discussion/Summary    Please refer to the ultrasound report for additional information  The patient was counseled regarding diagnostic results, instructions for management, prognosis, impressions  Thank you very much for allowing me to participate in the care of this patient  If you have any questions, please do not hesitate to contact me        Future Appointments    Signatures   Electronically signed by : NEETU Crawford ; Nov  3 2017  6:16PM EST                       (Author)

## 2018-01-17 NOTE — MISCELLANEOUS
Provider Comments  Provider Comments:   Patient was a no show to today's appointment at [phone]        Signatures   Electronically signed by : INDERJIT Mcadams; Sep 27 2016  7:21PM EST                       (Author)

## 2018-01-18 ENCOUNTER — GENERIC CONVERSION - ENCOUNTER (OUTPATIENT)
Dept: OTHER | Facility: OTHER | Age: 33
End: 2018-01-18

## 2018-01-18 NOTE — RESULT NOTES
Message  Patient was seen on L&D, arriving at 19 3 weeks gestation with decreased fetal movement for 3 hours  She was seen at the labor floor by the resident with reassuring fetal heart tones  Precautions were reviewed, and she will follow up with [facility] as recommended by them        Signatures   Electronically signed by : NEETU Aquino ; Oct 16 2017  2:33PM EST                       (Author)

## 2018-01-18 NOTE — MISCELLANEOUS
Provider Comments  Provider Comments:   Patient was a no show to today's appointment at [address] [place] [address]        Signatures   Electronically signed by : INDERJIT Israel; Sep  6 2016  4:33PM EST                       (Author)

## 2018-01-22 VITALS
BODY MASS INDEX: 42.32 KG/M2 | DIASTOLIC BLOOD PRESSURE: 84 MMHG | SYSTOLIC BLOOD PRESSURE: 138 MMHG | WEIGHT: 254 LBS | HEIGHT: 65 IN

## 2018-01-22 VITALS
BODY MASS INDEX: 42.84 KG/M2 | SYSTOLIC BLOOD PRESSURE: 154 MMHG | WEIGHT: 257.13 LBS | HEIGHT: 65 IN | DIASTOLIC BLOOD PRESSURE: 92 MMHG

## 2018-01-22 VITALS
SYSTOLIC BLOOD PRESSURE: 166 MMHG | DIASTOLIC BLOOD PRESSURE: 110 MMHG | WEIGHT: 253.38 LBS | BODY MASS INDEX: 42.16 KG/M2

## 2018-01-22 VITALS
WEIGHT: 255.25 LBS | SYSTOLIC BLOOD PRESSURE: 144 MMHG | DIASTOLIC BLOOD PRESSURE: 86 MMHG | BODY MASS INDEX: 42.48 KG/M2

## 2018-01-22 VITALS — HEART RATE: 111 BPM

## 2018-01-22 VITALS
HEIGHT: 65 IN | SYSTOLIC BLOOD PRESSURE: 160 MMHG | DIASTOLIC BLOOD PRESSURE: 85 MMHG | WEIGHT: 256 LBS | BODY MASS INDEX: 42.65 KG/M2

## 2018-01-22 VITALS
BODY MASS INDEX: 42.31 KG/M2 | WEIGHT: 254.25 LBS | SYSTOLIC BLOOD PRESSURE: 164 MMHG | DIASTOLIC BLOOD PRESSURE: 104 MMHG

## 2018-01-23 VITALS
WEIGHT: 252 LBS | BODY MASS INDEX: 41.99 KG/M2 | HEIGHT: 65 IN | SYSTOLIC BLOOD PRESSURE: 141 MMHG | DIASTOLIC BLOOD PRESSURE: 87 MMHG

## 2018-01-23 VITALS
SYSTOLIC BLOOD PRESSURE: 122 MMHG | WEIGHT: 252 LBS | HEIGHT: 65 IN | BODY MASS INDEX: 41.99 KG/M2 | DIASTOLIC BLOOD PRESSURE: 85 MMHG | HEART RATE: 100 BPM

## 2018-01-23 NOTE — PROGRESS NOTES
Active Problems    1  1st trimester screening (V28 89) (Z36 9)   2  Abnormal glucose tolerance in pregnancy (648 80) (O99 810)   3  Anemia complicating pregnancy (802 59,963 3) (O99 019)   4  Chronic hypertension in pregnancy (642 00) (O10 919)   5  Diet controlled gestational diabetes mellitus (GDM) in second trimester (648 83)   (O24 410)   6  Eczema (692 9) (L30 9)   7  Encounter for  screening for cervical length (V28 89) (Z36 86)   8  Low serum progesterone (790 6) (R79 89)   9  Maternal chronic hypertension, first trimester (642 03,401 9) (O10 911)   10  Maternal morbid obesity in second trimester, antepartum (649 13,278 01)    (O99 212,E66 01)   11  Maternal morbid obesity, antepartum, first trimester (649 13,278 01) (O99 211,E66 01)   12  Nausea and vomiting in pregnancy (643 90) (O21 9)   13  Obesity in pregnancy (649 10) (O99 210)   14  Pregnancy, obstetrical care (V22 1) (Z34 90)   15  Prenatal care, antepartum (V22 1) (Z34 90)   16  Second trimester pregnancy (V22 2) (Z34 92)    Current Meds    1  Accu-Chek FastClix Lancets Miscellaneous; test 4 times daily; Therapy: 25YNP9117 to (Donzetta Dubin)  Requested for: 24BUY2202; Last   Rx:2017 Ordered    2  Colace 100 MG Oral Capsule; TAKE 1 CAPSULE TWICE DAILY; Therapy: 30NHT5483 to (Evaluate:01Jkn0582)  Requested for: 06UOE5101; Last   Rx:12Dcx6253 Ordered   3  Ferrous Sulfate 325 (65 Fe) MG Oral Tablet; TAKE 1 TABLET DAILY AS DIRECTED; Therapy: 20AIF2813 to (Evaluate:61Erz0325)  Requested for: 98JPN5076; Last   Rx:75Cln2591 Ordered    4  Aspirin 81 MG Oral Tablet Chewable; CHEW AND SWALLOW 1 TABLET DAILY; Therapy: 68QAN1694 to (Robby Casanova)  Requested for: 13Eng0177; Last   Rx:82Oyf4536 Ordered    5  Accu-Chek Guide In Vitro Strip; test 4 times daily; Therapy: 18SQF7080 to (Evaluate:2018)  Requested for: 56NSK9504; Last   Rx:2017 Ordered    6  FerrouSul 325 (65 Fe) MG Oral Tablet;    Therapy: (Recorded:72Kgl1110) to Recorded   7  Labetalol HCl - 200 MG Oral Tablet; Therapy: (Recorded:32Ane3908) to Recorded    Allergies    1  No Known Drug Allergies    2  No Known Environmental Allergies   3  TOMATO    Results/Data  [address] Abdominal Ultrasound OB [place]:   Procedure: 71598- Ultrasound pregnant uterus real time with image documentation, limited one or more fetuses  Indication: EDC gestational age 33 weeks  Exam indication: confirm FHR  Findings:   Fetal heart beat: 144 bpm    Fetal position: vertex  Impression: 144 bpm       Future Appointments    Date/Time Provider Specialty Site   2018 04:00 PM  [place], Schedule  ST [address] [place] [address] Rd   2018 04:30 PM  [place], Schedule  ST [address] [place] [address] Rd   2018 04:00 PM  [place], Schedule  ST [address] [place] [address] Rd   2018 04:30 PM  [place], Schedule  ST [address] [place] [address] Rd   2018 02:30 PM NEETU Naik   [address] [place] [address] OB/GYN     Signatures   Electronically signed by : NEETU Nielson ; Dec 20 2017  3:15PM EST                       (Author)

## 2018-01-23 NOTE — MISCELLANEOUS
Provider Comments  Provider Comments:   Aelx Campo did not show up for her scheduled level I ultrasound appointment and NST in the [facility]  in [facility] on Thursday afternoon, January 18, 2018, at 4 o'clock p m    Additionally, she did not call to reschedule this appointment          Signatures   Electronically signed by : NEETU Morataya ; Jan 18 2018  4:45PM EST                       (Author)

## 2018-01-23 NOTE — PROGRESS NOTES
DEC 20 2017         RE: Amie Harrellr                                  To: [facility]   MR#: [id number]                                   Homer Liu    : [address] [facility]                                  [address]   ENC: [id number]                             [place], [address]   (Exam #: [id number])                           Fax: [phone]      The LMP of this 34year old,  G6, P2-0-3-2 patient was unknown, her   working NANO is MAR 7 2018 and the current gestational age is 33 weeks 0   days by  [address] [place] [address]  A sonographic examination was performed on DEC   20 2017 using real time equipment  The ultrasound examination was   performed using abdominal technique  The patient has a BMI of 41 9  Her   blood pressure today was 141/87  Earliest ultrasound found in her record:17 6w1d  NANO 3/7/18      Cardiac motion was observed at 137 bpm       INDICATIONS      morbid obesity   missed anatomy follow up   chronic hypertension      Exam Types      Level I      RESULTS      Fetus # 1 of 1   Vertex presentation   Placenta Location = Posterior   Placenta Grade = II      MEASUREMENTS (* Included In Average GA)      AC              26 3 cm        30 weeks 3 days* (76%)   BPD              7 6 cm        30 weeks 2 days* (75%)   HC              29 4 cm        32 weeks 1 day * (94%)   Femur            5 8 cm        30 weeks 2 days* (71%)      HC/AC           1 12   FL/AC           0 22   FL/BPD          0 77   Ceph Index      0 70   EFW (Ac/Fl/Hc)  1629 grams - 3 lbs 9 oz                 (80%)      THE AVERAGE GESTATIONAL AGE is 30 weeks 6 days +/- 21 days  AMNIOTIC FLUID      Q1: 8 3      Q2: 7 1      Q3: 9 6      Q4: 5 2   REGAN Total = 30 2 cm   Amniotic Fluid: POLYHYDRAMNIOS      ANATOMY COMMENTS      The prior fetal anatomic survey was limited in the area of the 3VT  which   is still limited on todays ultrasound due to fetal position    Fetal   anatomy has been previously assessed and documented in our Center and no   anomalies were identified  No fetal structural abnormality is identified   on the Level I survey today which is limited by maternal morbid obesity  Follow up intracranial anatomy (calvarium, cavum, lateral ventricles,),   diaphragm, stomach, kidneys, and bladder appear normal  The cardiac   anatomy (4chamber, LVOT, RVOT, and 3VV) could not be evaluated due to    fetal position  BIOPHYSICAL PROFILE      The Biophysical Profile score was 8/8  Breathin  Movement: 2  Tone: 2  AFV: 2      IMPRESSION      Zazueta IUP   30 weeks and 6 days by this ultrasound  (NANO=2018)   29 weeks and 0 days by 1st Tri Sono  (NANO=MAR 7 2018)   Vertex presentation   Regular fetal heart rate of 137 bpm   Polyhydramnios   Posterior placenta      GENERAL COMMENT        On exam today, Mckayla Coulter appears well, in no acute distress, and denies   any complaints  Her abdomen is non-tender  There has been appropriate interval fetal growth  Good fetal movement and   tone are seen  The amniotic fluid volume appears consistent with   polyhydramnios  The patient was informed of today's findings and all of   her questions were answered  The limitations of ultrasound were reviewed   labor precautions and fetal kick counts were reviewed  We discussed follow-up in detail and I recommend the patient return in 2   weeks to initiate twice weekly  surveillance for the indication   of chronic hypertension and polyhydramnios  A fetal growth evaluation is   recommended to be performed in 4 weeks  Thank you very much for allowing us to participate in the care of this   very nice patient  Should you have any questions, please do not hesitate   to contact our office        Please note, in addition to the time spent discussing the results of the   ultrasound, I spent approximately 10 minutes of face-to-face time with the   patient, greater than 50% of which was spent in counseling and the   coordination of care for this patient  Portions of the record may have been created with voice recognition   software  Occasional wrong word or "sound a like" substitutions may have   occurred due to the inherent limitations of voice recognition software  Read the chart carefully and recognize, using context, where substitutions   have occurred  PATSY Samaniego M D     Electronically signed 12/21/17 19:16

## 2018-01-24 VITALS
HEART RATE: 108 BPM | HEIGHT: 65 IN | BODY MASS INDEX: 41.99 KG/M2 | DIASTOLIC BLOOD PRESSURE: 80 MMHG | WEIGHT: 252 LBS | SYSTOLIC BLOOD PRESSURE: 122 MMHG

## 2018-01-24 VITALS
HEIGHT: 65 IN | WEIGHT: 252 LBS | BODY MASS INDEX: 41.99 KG/M2 | DIASTOLIC BLOOD PRESSURE: 85 MMHG | SYSTOLIC BLOOD PRESSURE: 130 MMHG

## 2018-02-04 DIAGNOSIS — Z3A.34 34 WEEKS GESTATION OF PREGNANCY: Primary | ICD-10-CM

## 2018-02-05 RX ORDER — PNV,CALCIUM 72/IRON/FOLIC ACID 27 MG-1 MG
TABLET ORAL
Qty: 30 TABLET | Refills: 0 | Status: SHIPPED | OUTPATIENT
Start: 2018-02-05 | End: 2018-03-16 | Stop reason: SDUPTHER

## 2018-02-13 ENCOUNTER — ROUTINE PRENATAL (OUTPATIENT)
Dept: PERINATAL CARE | Facility: CLINIC | Age: 33
End: 2018-02-13
Payer: COMMERCIAL

## 2018-02-13 VITALS
WEIGHT: 248 LBS | BODY MASS INDEX: 42.34 KG/M2 | DIASTOLIC BLOOD PRESSURE: 84 MMHG | HEART RATE: 85 BPM | SYSTOLIC BLOOD PRESSURE: 126 MMHG | HEIGHT: 64 IN

## 2018-02-13 DIAGNOSIS — O10.919 CHRONIC HYPERTENSION IN PREGNANCY: ICD-10-CM

## 2018-02-13 DIAGNOSIS — Z3A.36 36 WEEKS GESTATION OF PREGNANCY: ICD-10-CM

## 2018-02-13 DIAGNOSIS — O24.410 DIET CONTROLLED GESTATIONAL DIABETES MELLITUS (GDM) IN THIRD TRIMESTER: Primary | ICD-10-CM

## 2018-02-13 DIAGNOSIS — E66.01 OBESITY, MORBID, BMI 40.0-49.9 (HCC): ICD-10-CM

## 2018-02-13 DIAGNOSIS — Z91.19 MEDICALLY NONCOMPLIANT: Primary | ICD-10-CM

## 2018-02-13 DIAGNOSIS — Z91.19 MEDICALLY NONCOMPLIANT: ICD-10-CM

## 2018-02-13 DIAGNOSIS — O24.410 DIET CONTROLLED GESTATIONAL DIABETES MELLITUS (GDM) IN THIRD TRIMESTER: ICD-10-CM

## 2018-02-13 PROBLEM — Z91.199 MEDICALLY NONCOMPLIANT: Status: ACTIVE | Noted: 2018-02-13

## 2018-02-13 PROCEDURE — 59025 FETAL NON-STRESS TEST: CPT | Performed by: OBSTETRICS & GYNECOLOGY

## 2018-02-13 PROCEDURE — 99214 OFFICE O/P EST MOD 30 MIN: CPT | Performed by: OBSTETRICS & GYNECOLOGY

## 2018-02-13 PROCEDURE — 76816 OB US FOLLOW-UP PER FETUS: CPT | Performed by: OBSTETRICS & GYNECOLOGY

## 2018-02-13 RX ORDER — LABETALOL 200 MG/1
200 TABLET, FILM COATED ORAL 2 TIMES DAILY
COMMUNITY
End: 2021-02-16

## 2018-02-13 RX ORDER — BLOOD SUGAR DIAGNOSTIC
1 STRIP MISCELLANEOUS 4 TIMES DAILY
Refills: 3 | COMMUNITY
Start: 2017-11-13 | End: 2018-02-25 | Stop reason: HOSPADM

## 2018-02-13 RX ORDER — DOCUSATE SODIUM 100 MG/1
1 CAPSULE, LIQUID FILLED ORAL DAILY
Refills: 6 | COMMUNITY
Start: 2018-01-14 | End: 2018-05-23 | Stop reason: SDUPTHER

## 2018-02-13 RX ORDER — LANCETS
1 EACH MISCELLANEOUS 4 TIMES DAILY
Refills: 3 | COMMUNITY
Start: 2017-11-14 | End: 2020-12-04

## 2018-02-13 RX ORDER — ASPIRIN 81 MG/1
81 TABLET ORAL DAILY
COMMUNITY
End: 2021-02-26 | Stop reason: SDUPTHER

## 2018-02-14 PROBLEM — O99.013 ANEMIA AFFECTING PREGNANCY IN THIRD TRIMESTER: Status: ACTIVE | Noted: 2018-02-14

## 2018-02-14 PROBLEM — Z3A.37 37 WEEKS GESTATION OF PREGNANCY: Status: ACTIVE | Noted: 2017-10-15

## 2018-02-16 ENCOUNTER — ROUTINE PRENATAL (OUTPATIENT)
Dept: OBGYN CLINIC | Facility: CLINIC | Age: 33
End: 2018-02-16
Payer: COMMERCIAL

## 2018-02-16 ENCOUNTER — APPOINTMENT (OUTPATIENT)
Dept: LAB | Facility: HOSPITAL | Age: 33
End: 2018-02-16
Attending: OBSTETRICS & GYNECOLOGY
Payer: COMMERCIAL

## 2018-02-16 VITALS
DIASTOLIC BLOOD PRESSURE: 85 MMHG | SYSTOLIC BLOOD PRESSURE: 120 MMHG | WEIGHT: 245 LBS | BODY MASS INDEX: 42.05 KG/M2 | HEART RATE: 106 BPM

## 2018-02-16 DIAGNOSIS — O99.013 ANEMIA AFFECTING PREGNANCY IN THIRD TRIMESTER: ICD-10-CM

## 2018-02-16 DIAGNOSIS — E66.01 OBESITY, MORBID, BMI 40.0-49.9 (HCC): ICD-10-CM

## 2018-02-16 DIAGNOSIS — O24.410 DIET CONTROLLED GESTATIONAL DIABETES MELLITUS (GDM) IN THIRD TRIMESTER: ICD-10-CM

## 2018-02-16 DIAGNOSIS — O10.919 CHRONIC HYPERTENSION IN PREGNANCY: ICD-10-CM

## 2018-02-16 DIAGNOSIS — Z3A.37 37 WEEKS GESTATION OF PREGNANCY: ICD-10-CM

## 2018-02-16 DIAGNOSIS — Z91.19 MEDICALLY NONCOMPLIANT: ICD-10-CM

## 2018-02-16 DIAGNOSIS — O10.919 CHRONIC HYPERTENSION IN PREGNANCY: Primary | ICD-10-CM

## 2018-02-16 LAB
ALBUMIN SERPL BCP-MCNC: 2.8 G/DL (ref 3.5–5)
ALP SERPL-CCNC: 108 U/L (ref 46–116)
ALT SERPL W P-5'-P-CCNC: 55 U/L (ref 12–78)
ANION GAP SERPL CALCULATED.3IONS-SCNC: 11 MMOL/L (ref 4–13)
AST SERPL W P-5'-P-CCNC: 37 U/L (ref 5–45)
BILIRUB SERPL-MCNC: 0.33 MG/DL (ref 0.2–1)
BUN SERPL-MCNC: 8 MG/DL (ref 5–25)
CALCIUM SERPL-MCNC: 9.3 MG/DL (ref 8.3–10.1)
CHLORIDE SERPL-SCNC: 106 MMOL/L (ref 100–108)
CO2 SERPL-SCNC: 23 MMOL/L (ref 21–32)
CREAT SERPL-MCNC: 0.7 MG/DL (ref 0.6–1.3)
CREAT UR-MCNC: 467 MG/DL
ERYTHROCYTE [DISTWIDTH] IN BLOOD BY AUTOMATED COUNT: 15.8 % (ref 11.6–15.1)
EST. AVERAGE GLUCOSE BLD GHB EST-MCNC: 91 MG/DL
GFR SERPL CREATININE-BSD FRML MDRD: 132 ML/MIN/1.73SQ M
GLUCOSE SERPL-MCNC: 82 MG/DL (ref 65–140)
HBA1C MFR BLD: 4.8 % (ref 4.2–6.3)
HCT VFR BLD AUTO: 33.1 % (ref 34.8–46.1)
HGB BLD-MCNC: 10.3 G/DL (ref 11.5–15.4)
MCH RBC QN AUTO: 21.9 PG (ref 26.8–34.3)
MCHC RBC AUTO-ENTMCNC: 31.1 G/DL (ref 31.4–37.4)
MCV RBC AUTO: 70 FL (ref 82–98)
PLATELET # BLD AUTO: 274 THOUSANDS/UL (ref 149–390)
POTASSIUM SERPL-SCNC: 3.7 MMOL/L (ref 3.5–5.3)
PROT SERPL-MCNC: 6.7 G/DL (ref 6.4–8.2)
PROT UR-MCNC: 58 MG/DL
PROT/CREAT UR: 0.12 MG/G{CREAT} (ref 0–0.1)
RBC # BLD AUTO: 4.71 MILLION/UL (ref 3.81–5.12)
SODIUM SERPL-SCNC: 140 MMOL/L (ref 136–145)
URATE SERPL-MCNC: 3.2 MG/DL (ref 2–6.8)
WBC # BLD AUTO: 6.49 THOUSAND/UL (ref 4.31–10.16)

## 2018-02-16 PROCEDURE — 84156 ASSAY OF PROTEIN URINE: CPT | Performed by: OBSTETRICS & GYNECOLOGY

## 2018-02-16 PROCEDURE — 36415 COLL VENOUS BLD VENIPUNCTURE: CPT

## 2018-02-16 PROCEDURE — 87653 STREP B DNA AMP PROBE: CPT | Performed by: OBSTETRICS & GYNECOLOGY

## 2018-02-16 PROCEDURE — 76815 OB US LIMITED FETUS(S): CPT | Performed by: OBSTETRICS & GYNECOLOGY

## 2018-02-16 PROCEDURE — 83036 HEMOGLOBIN GLYCOSYLATED A1C: CPT

## 2018-02-16 PROCEDURE — 82570 ASSAY OF URINE CREATININE: CPT | Performed by: OBSTETRICS & GYNECOLOGY

## 2018-02-16 PROCEDURE — 59025 FETAL NON-STRESS TEST: CPT | Performed by: OBSTETRICS & GYNECOLOGY

## 2018-02-16 PROCEDURE — 99213 OFFICE O/P EST LOW 20 MIN: CPT | Performed by: OBSTETRICS & GYNECOLOGY

## 2018-02-16 PROCEDURE — 84550 ASSAY OF BLOOD/URIC ACID: CPT

## 2018-02-16 PROCEDURE — 80053 COMPREHEN METABOLIC PANEL: CPT

## 2018-02-16 PROCEDURE — 85027 COMPLETE CBC AUTOMATED: CPT

## 2018-02-16 NOTE — LETTER
2018     [facility],   [address]  [address]  [place] [address]    Patient: Cheril Dakins   YOB: 1985   Date of Visit: 2018       Dear Dr Maddi Dickinson: Thank you for referring Cheril Dakins to me for evaluation  Below are my notes for this consultation  If you have questions, please do not hesitate to call me  I look forward to following your patient along with you  Sincerely,        Daniel Lim MD        CC: No Recipients  Daniel Lim MD  2018 11:29 AM  Sign at close encounter  Patient has not been to the office for at least 2 weeks  She has chronic hypertension and is on labetalol 200 mg p o  B i d  She also has gestational diabetes and has not reported her blood sugars in the past 2 week/s    She is supposed to have twice weekly  testing  Her blood pressure was rechecked and was  140/90 Her last ultrasound showed an estimated fetal weight of 88th percentile with abdominal circumference greater than 95th percentile  She is scheduled for another nonstress test and REGAN at the  Center on Tuesday  They are recommending and delivery at 39 weeks as long as she is compliant with twice weekly nonstress test and weekly AFIs and blood sugar testing  Earlier delivery may be recommended if her blood sugars were poorly controlled or she is not compliant with testing  GBS was collected today  She has no penicillin allergy  CBC/CMP/PC ratio ordered  Follow-up in 1 week

## 2018-02-16 NOTE — PROGRESS NOTES
Patient has not been to the office for at least 2 weeks  She has chronic hypertension and is on labetalol 200 mg p o  B i d  She also has gestational diabetes and has not reported her blood sugars in the past 2 week/s    She is supposed to have twice weekly  testing  Her blood pressure was rechecked and was  140/90 Her last ultrasound showed an estimated fetal weight of 88th percentile with abdominal circumference greater than 95th percentile  She is scheduled for another nonstress test and REGAN at the  Center on Tuesday  They are recommending and delivery at 39 weeks as long as she is compliant with twice weekly nonstress test and weekly AFIs and blood sugar testing  Earlier delivery may be recommended if her blood sugars were poorly controlled or she is not compliant with testing  GBS was collected today  She has no penicillin allergy  CBC/CMP/PC ratio ordered including A1c  Follow-up in 1 week

## 2018-02-19 ENCOUNTER — ROUTINE PRENATAL (OUTPATIENT)
Dept: PERINATAL CARE | Facility: CLINIC | Age: 33
End: 2018-02-19
Payer: COMMERCIAL

## 2018-02-19 VITALS
WEIGHT: 248.4 LBS | HEIGHT: 64 IN | SYSTOLIC BLOOD PRESSURE: 129 MMHG | BODY MASS INDEX: 42.41 KG/M2 | DIASTOLIC BLOOD PRESSURE: 86 MMHG | HEART RATE: 91 BPM

## 2018-02-19 DIAGNOSIS — O10.919 CHRONIC HYPERTENSION IN PREGNANCY: ICD-10-CM

## 2018-02-19 DIAGNOSIS — Z3A.37 37 WEEKS GESTATION OF PREGNANCY: ICD-10-CM

## 2018-02-19 DIAGNOSIS — O24.410 DIET CONTROLLED GESTATIONAL DIABETES MELLITUS (GDM) IN THIRD TRIMESTER: Primary | ICD-10-CM

## 2018-02-19 DIAGNOSIS — O10.919 CHRONIC HYPERTENSION IN PREGNANCY: Primary | ICD-10-CM

## 2018-02-19 DIAGNOSIS — E66.01 OBESITY, MORBID, BMI 40.0-49.9 (HCC): ICD-10-CM

## 2018-02-19 DIAGNOSIS — O24.410 DIET CONTROLLED GESTATIONAL DIABETES MELLITUS (GDM) IN THIRD TRIMESTER: ICD-10-CM

## 2018-02-19 DIAGNOSIS — Z91.19 MEDICALLY NONCOMPLIANT: ICD-10-CM

## 2018-02-19 LAB — GP B STREP DNA SPEC QL NAA+PROBE: NORMAL

## 2018-02-19 PROCEDURE — 59025 FETAL NON-STRESS TEST: CPT | Performed by: OBSTETRICS & GYNECOLOGY

## 2018-02-19 PROCEDURE — 76815 OB US LIMITED FETUS(S): CPT | Performed by: OBSTETRICS & GYNECOLOGY

## 2018-02-19 NOTE — PROGRESS NOTES
PATSY Chambers [address]: Ms Marcio Stanley was seen today at 37w5d for NST (found under the pregnancy episode) which I reviewed the RN assessment and agree  Please don't hesitate to contact our office ([phone]) with any concerns or questions    Magdaleno Vazquez MD

## 2018-02-19 NOTE — PROGRESS NOTES
[address] [facility] [address]: Ms Celia Antonio was seen today at 37w5d for fluid and position check ultrasound  See ultrasound report under "OB Procedures" tab  Please don't hesitate to contact our office ([phone]) with any concerns or questions    Katiana Avila MD

## 2018-02-19 NOTE — PATIENT INSTRUCTIONS
Preeclampsia   WHAT YOU NEED TO KNOW:   What is preeclampsia? Preeclampsia is a condition that can develop during week 20 or later of your pregnancy  Preeclampsia means you have high blood pressure and may have protein in your urine  Preeclampsia can cause mild to life-threatening health problems for you and your unborn baby  What are the signs and symptoms of preeclampsia? You may not have any symptoms  Severe preeclampsia may cause any of the following symptoms:  · Swollen face and hands    · A sudden weight gain of 5 pounds or more    · Headache    · Spotted or blurred vision     · Pain in your upper abdomen  What increases my risk for preeclampsia? · First pregnancy    · Pregnant with twins or multiples    · Personal or family history of preeclampsia or eclampsia    · Overweight    · Diabetes, high blood pressure, or kidney disease    · Age older than 40 years  How is preeclampsia diagnosed? · A blood pressure  of 140/90 mmHg or more for at least 2 readings may mean you have preeclampsia  Your blood pressure will need to be checked 1 to 2 times a week until your baby is born  · Blood tests  are done to check your liver and kidney function  You may need blood tests every week while you are pregnant  · Urine tests  are used to check for protein  You may need to give healthcare providers a urine sample at each visit  You may also need to collect your urine every time you urinate for 24 hours  How will my unborn baby be monitored? You may need to keep track of how often your baby moves or kicks over a certain amount of time  Ask your healthcare provider how to do kick counts and how often to do them  You may also need the following tests at each visit until your baby is born:  · A fetal biophysical profile  combines a nonstress test and an ultrasound of your unborn baby  The nonstress test measures changes in your baby's heartbeat when he moves   The ultrasound will show your baby's movement, growth, and how his breathing muscles are working  Healthcare providers can check the amount of fluid around your baby  The ultrasound will also show how well your baby's lungs are working  · An umbilical cord Doppler  checks blood flow through the umbilical cord  How is preeclampsia treated? · Medicines  may be given to lower your blood pressure, protect your organs, or prevent seizures  Low doses of aspirin after 12 weeks of pregnancy may be recommended if you are at high risk for preeclampsia  Aspirin may help prevent preeclampsia or problems that can happen from preeclampsia  Do not take aspirin unless directed by your healthcare provider  · Rest  as directed  Your healthcare provider may tell you to rest more often if you have mild symptoms of preeclampsia  Lie on your left side as often as you can  You may need complete bedrest if you have more severe symptoms  You may need to be in the hospital if your condition worsens  · Delivery  usually stops preeclampsia  Healthcare providers may deliver your baby right away if he is full-term (37 weeks or more)  He may need to be delivered early if you or the baby has life-threatening symptoms  What are the risks of preeclampsia? Your baby may not grow as he should and may need to be delivered early  Placental abruption can occur if the placenta pulls away from the uterus too soon  This condition is life-threatening for your baby  High blood pressure that is not controlled can lead to blood clots, kidney or liver failure, or stroke  Severe preeclampsia can cause seizures or coma  This condition is called eclampsia  Eclampsia is a life-threatening condition for you and your unborn baby  Call 911 for any of the following:   · You have a seizure  · You have severe abdominal pain with nausea and vomiting  When should I seek immediate care? · You develop a severe headache that does not go away      · You have blurred or spotted vision that does not go away     · You are bleeding from your vagina  · You have new or increased swelling in your face or hands  · You are urinating little or not at all  When should I contact my healthcare provider? · You are urinating less than usual      · You do not feel your baby's movements as often as usual     · You have questions or concerns about your condition or care  CARE AGREEMENT:   You have the right to help plan your care  Learn about your health condition and how it may be treated  Discuss treatment options with your caregivers to decide what care you want to receive  You always have the right to refuse treatment  The above information is an  only  It is not intended as medical advice for individual conditions or treatments  Talk to your doctor, nurse or pharmacist before following any medical regimen to see if it is safe and effective for you  © 2017 [address] [employer] Information is for End User's use only and may not be sold, redistributed or otherwise used for commercial purposes  All illustrations and images included in CareNotes® are the copyrighted property of A D A M , Inc  or Davon Marquez

## 2018-02-20 ENCOUNTER — TELEPHONE (OUTPATIENT)
Dept: OBGYN CLINIC | Facility: CLINIC | Age: 33
End: 2018-02-20

## 2018-02-20 DIAGNOSIS — O99.019 ANEMIA AFFECTING PREGNANCY, ANTEPARTUM: Primary | ICD-10-CM

## 2018-02-20 RX ORDER — QUINIDINE GLUCONATE 324 MG
240 TABLET, EXTENDED RELEASE ORAL 2 TIMES DAILY
Qty: 180 TABLET | Refills: 2 | Status: SHIPPED | OUTPATIENT
Start: 2018-02-20 | End: 2021-02-26 | Stop reason: SDUPTHER

## 2018-02-22 ENCOUNTER — ROUTINE PRENATAL (OUTPATIENT)
Dept: OBGYN CLINIC | Facility: CLINIC | Age: 33
End: 2018-02-22
Payer: COMMERCIAL

## 2018-02-22 ENCOUNTER — HOSPITAL ENCOUNTER (INPATIENT)
Facility: HOSPITAL | Age: 33
LOS: 3 days | Discharge: HOME | DRG: 560 | End: 2018-02-25
Attending: OBSTETRICS & GYNECOLOGY | Admitting: OBSTETRICS & GYNECOLOGY
Payer: COMMERCIAL

## 2018-02-22 VITALS
DIASTOLIC BLOOD PRESSURE: 85 MMHG | SYSTOLIC BLOOD PRESSURE: 130 MMHG | WEIGHT: 248 LBS | BODY MASS INDEX: 42.57 KG/M2 | HEART RATE: 95 BPM

## 2018-02-22 DIAGNOSIS — O24.410 DIET CONTROLLED GESTATIONAL DIABETES MELLITUS (GDM) IN THIRD TRIMESTER: ICD-10-CM

## 2018-02-22 DIAGNOSIS — O99.013 ANEMIA AFFECTING PREGNANCY IN THIRD TRIMESTER: ICD-10-CM

## 2018-02-22 DIAGNOSIS — E66.01 OBESITY, MORBID, BMI 40.0-49.9 (HCC): ICD-10-CM

## 2018-02-22 DIAGNOSIS — O10.919 CHRONIC HYPERTENSION AFFECTING PREGNANCY: ICD-10-CM

## 2018-02-22 DIAGNOSIS — O99.213 OBESITY AFFECTING PREGNANCY IN THIRD TRIMESTER: ICD-10-CM

## 2018-02-22 DIAGNOSIS — O10.919 CHRONIC HYPERTENSION IN PREGNANCY: ICD-10-CM

## 2018-02-22 DIAGNOSIS — O24.419 GESTATIONAL DIABETES MELLITUS (GDM) AFFECTING PREGNANCY: ICD-10-CM

## 2018-02-22 DIAGNOSIS — Z3A.38 38 WEEKS GESTATION OF PREGNANCY: Primary | ICD-10-CM

## 2018-02-22 DIAGNOSIS — Z3A.38 38 WEEKS GESTATION OF PREGNANCY: ICD-10-CM

## 2018-02-22 LAB
ABO GROUP BLD: NORMAL
ALBUMIN SERPL BCP-MCNC: 2.7 G/DL (ref 3.5–5)
ALP SERPL-CCNC: 111 U/L (ref 46–116)
ALT SERPL W P-5'-P-CCNC: 85 U/L (ref 12–78)
ANION GAP SERPL CALCULATED.3IONS-SCNC: 10 MMOL/L (ref 4–13)
AST SERPL W P-5'-P-CCNC: 38 U/L (ref 5–45)
BACTERIA UR QL AUTO: ABNORMAL /HPF
BASOPHILS # BLD AUTO: 0.01 THOUSANDS/ΜL (ref 0–0.1)
BASOPHILS NFR BLD AUTO: 0 % (ref 0–1)
BILIRUB SERPL-MCNC: 0.31 MG/DL (ref 0.2–1)
BILIRUB UR QL STRIP: NEGATIVE
BLD GP AB SCN SERPL QL: NEGATIVE
BUN SERPL-MCNC: 4 MG/DL (ref 5–25)
CALCIUM SERPL-MCNC: 9.2 MG/DL (ref 8.3–10.1)
CHLORIDE SERPL-SCNC: 104 MMOL/L (ref 100–108)
CLARITY UR: CLEAR
CO2 SERPL-SCNC: 23 MMOL/L (ref 21–32)
COLOR UR: YELLOW
CREAT SERPL-MCNC: 0.63 MG/DL (ref 0.6–1.3)
CREAT UR-MCNC: 263 MG/DL
EOSINOPHIL # BLD AUTO: 0.12 THOUSAND/ΜL (ref 0–0.61)
EOSINOPHIL NFR BLD AUTO: 2 % (ref 0–6)
ERYTHROCYTE [DISTWIDTH] IN BLOOD BY AUTOMATED COUNT: 15.8 % (ref 11.6–15.1)
GFR SERPL CREATININE-BSD FRML MDRD: 136 ML/MIN/1.73SQ M
GLUCOSE SERPL-MCNC: 71 MG/DL (ref 65–140)
GLUCOSE SERPL-MCNC: 74 MG/DL (ref 65–140)
GLUCOSE UR STRIP-MCNC: NEGATIVE MG/DL
HCT VFR BLD AUTO: 31.9 % (ref 34.8–46.1)
HGB BLD-MCNC: 10 G/DL (ref 11.5–15.4)
HGB UR QL STRIP.AUTO: NEGATIVE
KETONES UR STRIP-MCNC: NEGATIVE MG/DL
LEUKOCYTE ESTERASE UR QL STRIP: NEGATIVE
LYMPHOCYTES # BLD AUTO: 1.2 THOUSANDS/ΜL (ref 0.6–4.47)
LYMPHOCYTES NFR BLD AUTO: 23 % (ref 14–44)
MCH RBC QN AUTO: 21.9 PG (ref 26.8–34.3)
MCHC RBC AUTO-ENTMCNC: 31.3 G/DL (ref 31.4–37.4)
MCV RBC AUTO: 70 FL (ref 82–98)
MONOCYTES # BLD AUTO: 0.34 THOUSAND/ΜL (ref 0.17–1.22)
MONOCYTES NFR BLD AUTO: 7 % (ref 4–12)
NEUTROPHILS # BLD AUTO: 3.57 THOUSANDS/ΜL (ref 1.85–7.62)
NEUTS SEG NFR BLD AUTO: 68 % (ref 43–75)
NITRITE UR QL STRIP: NEGATIVE
NON-SQ EPI CELLS URNS QL MICRO: ABNORMAL /HPF
NRBC BLD AUTO-RTO: 0 /100 WBCS
PH UR STRIP.AUTO: 6 [PH] (ref 4.5–8)
PLATELET # BLD AUTO: 233 THOUSANDS/UL (ref 149–390)
POTASSIUM SERPL-SCNC: 4 MMOL/L (ref 3.5–5.3)
PROT SERPL-MCNC: 6.5 G/DL (ref 6.4–8.2)
PROT UR STRIP-MCNC: ABNORMAL MG/DL
PROT UR-MCNC: 37 MG/DL
PROT/CREAT UR: 0.14 MG/G{CREAT} (ref 0–0.1)
RBC # BLD AUTO: 4.56 MILLION/UL (ref 3.81–5.12)
RBC #/AREA URNS AUTO: ABNORMAL /HPF
RH BLD: POSITIVE
SODIUM SERPL-SCNC: 137 MMOL/L (ref 136–145)
SP GR UR STRIP.AUTO: 1.02 (ref 1–1.03)
SPECIMEN EXPIRATION DATE: NORMAL
UROBILINOGEN UR QL STRIP.AUTO: 0.2 E.U./DL
WBC # BLD AUTO: 5.24 THOUSAND/UL (ref 4.31–10.16)
WBC #/AREA URNS AUTO: ABNORMAL /HPF

## 2018-02-22 PROCEDURE — 82570 ASSAY OF URINE CREATININE: CPT | Performed by: OBSTETRICS & GYNECOLOGY

## 2018-02-22 PROCEDURE — 85025 COMPLETE CBC W/AUTO DIFF WBC: CPT | Performed by: OBSTETRICS & GYNECOLOGY

## 2018-02-22 PROCEDURE — 99212 OFFICE O/P EST SF 10 MIN: CPT

## 2018-02-22 PROCEDURE — 84156 ASSAY OF PROTEIN URINE: CPT | Performed by: OBSTETRICS & GYNECOLOGY

## 2018-02-22 PROCEDURE — 86901 BLOOD TYPING SEROLOGIC RH(D): CPT | Performed by: OBSTETRICS & GYNECOLOGY

## 2018-02-22 PROCEDURE — 82948 REAGENT STRIP/BLOOD GLUCOSE: CPT

## 2018-02-22 PROCEDURE — 80053 COMPREHEN METABOLIC PANEL: CPT | Performed by: OBSTETRICS & GYNECOLOGY

## 2018-02-22 PROCEDURE — 86850 RBC ANTIBODY SCREEN: CPT | Performed by: OBSTETRICS & GYNECOLOGY

## 2018-02-22 PROCEDURE — 59025 FETAL NON-STRESS TEST: CPT | Performed by: OBSTETRICS & GYNECOLOGY

## 2018-02-22 PROCEDURE — 81001 URINALYSIS AUTO W/SCOPE: CPT | Performed by: OBSTETRICS & GYNECOLOGY

## 2018-02-22 PROCEDURE — 86592 SYPHILIS TEST NON-TREP QUAL: CPT | Performed by: OBSTETRICS & GYNECOLOGY

## 2018-02-22 PROCEDURE — 86900 BLOOD TYPING SEROLOGIC ABO: CPT | Performed by: OBSTETRICS & GYNECOLOGY

## 2018-02-22 PROCEDURE — 99213 OFFICE O/P EST LOW 20 MIN: CPT | Performed by: OBSTETRICS & GYNECOLOGY

## 2018-02-22 RX ORDER — ONDANSETRON 2 MG/ML
4 INJECTION INTRAMUSCULAR; INTRAVENOUS EVERY 6 HOURS PRN
Status: DISCONTINUED | OUTPATIENT
Start: 2018-02-22 | End: 2018-02-23

## 2018-02-22 RX ORDER — SODIUM CHLORIDE 9 MG/ML
125 INJECTION, SOLUTION INTRAVENOUS CONTINUOUS
Status: DISCONTINUED | OUTPATIENT
Start: 2018-02-22 | End: 2018-02-23

## 2018-02-22 RX ORDER — SODIUM CHLORIDE, SODIUM LACTATE, POTASSIUM CHLORIDE, CALCIUM CHLORIDE 600; 310; 30; 20 MG/100ML; MG/100ML; MG/100ML; MG/100ML
125 INJECTION, SOLUTION INTRAVENOUS CONTINUOUS
Status: DISCONTINUED | OUTPATIENT
Start: 2018-02-22 | End: 2018-02-22

## 2018-02-22 RX ORDER — LABETALOL HYDROCHLORIDE 5 MG/ML
20 INJECTION, SOLUTION INTRAVENOUS ONCE
Status: DISCONTINUED | OUTPATIENT
Start: 2018-02-22 | End: 2018-02-23

## 2018-02-22 RX ADMIN — SODIUM CHLORIDE 1000 ML: 0.9 INJECTION, SOLUTION INTRAVENOUS at 22:45

## 2018-02-22 RX ADMIN — MISOPROSTOL 25 MCG: 100 TABLET ORAL at 21:27

## 2018-02-22 NOTE — PROGRESS NOTES
IUP at 38/1 here for routine prenatal visit complains of pelvic pressure  Here for nonstress test history of chronic hypertension on labetalol 200 mg twice daily  Patient also with diet-controlled gestational diabetes her fasting blood sugar this morning was 84  Repeat blood pressure this afternoon was 160/100 in the sitting position  Nonstress test was reactive  Will send to labor and delivery for extended monitoring, laboratory studies and consideration for induction of labor this evening if indicated

## 2018-02-23 LAB
ALBUMIN SERPL BCP-MCNC: 2.4 G/DL (ref 3.5–5)
ALP SERPL-CCNC: 111 U/L (ref 46–116)
ALT SERPL W P-5'-P-CCNC: 68 U/L (ref 12–78)
ANION GAP SERPL CALCULATED.3IONS-SCNC: 14 MMOL/L (ref 4–13)
AST SERPL W P-5'-P-CCNC: 34 U/L (ref 5–45)
BASE EXCESS BLDCOA CALC-SCNC: -5.8 MMOL/L (ref 3–11)
BASE EXCESS BLDCOV CALC-SCNC: -3.8 MMOL/L (ref 1–9)
BASOPHILS # BLD MANUAL: 0 THOUSAND/UL (ref 0–0.1)
BASOPHILS NFR MAR MANUAL: 0 % (ref 0–1)
BILIRUB SERPL-MCNC: 0.67 MG/DL (ref 0.2–1)
BUN SERPL-MCNC: 5 MG/DL (ref 5–25)
CALCIUM SERPL-MCNC: 8.8 MG/DL (ref 8.3–10.1)
CHLORIDE SERPL-SCNC: 106 MMOL/L (ref 100–108)
CO2 SERPL-SCNC: 19 MMOL/L (ref 21–32)
CREAT SERPL-MCNC: 0.63 MG/DL (ref 0.6–1.3)
EOSINOPHIL # BLD MANUAL: 0 THOUSAND/UL (ref 0–0.4)
EOSINOPHIL NFR BLD MANUAL: 0 % (ref 0–6)
ERYTHROCYTE [DISTWIDTH] IN BLOOD BY AUTOMATED COUNT: 15.8 % (ref 11.6–15.1)
GFR SERPL CREATININE-BSD FRML MDRD: 136 ML/MIN/1.73SQ M
GLUCOSE SERPL-MCNC: 125 MG/DL (ref 65–140)
GLUCOSE SERPL-MCNC: 65 MG/DL (ref 65–140)
GLUCOSE SERPL-MCNC: 68 MG/DL (ref 65–140)
GLUCOSE SERPL-MCNC: 69 MG/DL (ref 65–140)
GLUCOSE SERPL-MCNC: 77 MG/DL (ref 65–140)
HCO3 BLDCOA-SCNC: 21.1 MMOL/L (ref 17.3–27.3)
HCO3 BLDCOV-SCNC: 22.7 MMOL/L (ref 12.2–28.6)
HCT VFR BLD AUTO: 32.9 % (ref 34.8–46.1)
HGB BLD-MCNC: 10.5 G/DL (ref 11.5–15.4)
LYMPHOCYTES # BLD AUTO: 1.04 THOUSAND/UL (ref 0.6–4.47)
LYMPHOCYTES # BLD AUTO: 7 % (ref 14–44)
MCH RBC QN AUTO: 22.4 PG (ref 26.8–34.3)
MCHC RBC AUTO-ENTMCNC: 31.9 G/DL (ref 31.4–37.4)
MCV RBC AUTO: 70 FL (ref 82–98)
MICROCYTES BLD QL AUTO: PRESENT
MONOCYTES # BLD AUTO: 0.59 THOUSAND/UL (ref 0–1.22)
MONOCYTES NFR BLD: 4 % (ref 4–12)
NEUTROPHILS # BLD MANUAL: 13.2 THOUSAND/UL (ref 1.85–7.62)
NEUTS SEG NFR BLD AUTO: 89 % (ref 43–75)
NRBC BLD AUTO-RTO: 0 /100 WBCS
O2 CT VFR BLDCOA CALC: 11.6 ML/DL
OXYHGB MFR BLDCOA: 52.7 %
OXYHGB MFR BLDCOV: 55.8 %
PCO2 BLDCOA: 46.3 MM[HG] (ref 30–60)
PCO2 BLDCOV: 46.1 MM HG (ref 27–43)
PH BLDCOA: 7.28 [PH] (ref 7.23–7.43)
PH BLDCOV: 7.31 [PH] (ref 7.19–7.49)
PLATELET # BLD AUTO: 228 THOUSANDS/UL (ref 149–390)
PLATELET BLD QL SMEAR: ADEQUATE
PO2 BLDCOA: 22.7 MM HG (ref 5–25)
PO2 BLDCOV: 23.5 MM HG (ref 15–45)
POTASSIUM SERPL-SCNC: 3.5 MMOL/L (ref 3.5–5.3)
PROT SERPL-MCNC: 6 G/DL (ref 6.4–8.2)
RBC # BLD AUTO: 4.68 MILLION/UL (ref 3.81–5.12)
RPR SER QL: NORMAL
SAO2 % BLDCOV: 12.4 ML/DL
SODIUM SERPL-SCNC: 139 MMOL/L (ref 136–145)
TOTAL CELLS COUNTED SPEC: 100
WBC # BLD AUTO: 14.83 THOUSAND/UL (ref 4.31–10.16)

## 2018-02-23 PROCEDURE — 3E0P7VZ INTRODUCTION OF HORMONE INTO FEMALE REPRODUCTIVE, VIA NATURAL OR ARTIFICIAL OPENING: ICD-10-PCS | Performed by: OBSTETRICS & GYNECOLOGY

## 2018-02-23 PROCEDURE — 85027 COMPLETE CBC AUTOMATED: CPT | Performed by: STUDENT IN AN ORGANIZED HEALTH CARE EDUCATION/TRAINING PROGRAM

## 2018-02-23 PROCEDURE — 0KQM0ZZ REPAIR PERINEUM MUSCLE, OPEN APPROACH: ICD-10-PCS | Performed by: OBSTETRICS & GYNECOLOGY

## 2018-02-23 PROCEDURE — 59409 OBSTETRICAL CARE: CPT | Performed by: OBSTETRICS & GYNECOLOGY

## 2018-02-23 PROCEDURE — 80053 COMPREHEN METABOLIC PANEL: CPT | Performed by: STUDENT IN AN ORGANIZED HEALTH CARE EDUCATION/TRAINING PROGRAM

## 2018-02-23 PROCEDURE — 82948 REAGENT STRIP/BLOOD GLUCOSE: CPT

## 2018-02-23 PROCEDURE — 85007 BL SMEAR W/DIFF WBC COUNT: CPT | Performed by: STUDENT IN AN ORGANIZED HEALTH CARE EDUCATION/TRAINING PROGRAM

## 2018-02-23 PROCEDURE — 82805 BLOOD GASES W/O2 SATURATION: CPT | Performed by: OBSTETRICS & GYNECOLOGY

## 2018-02-23 RX ORDER — LABETALOL HYDROCHLORIDE 5 MG/ML
20 INJECTION, SOLUTION INTRAVENOUS ONCE
Status: DISCONTINUED | OUTPATIENT
Start: 2018-02-23 | End: 2018-02-23

## 2018-02-23 RX ORDER — ONDANSETRON 2 MG/ML
4 INJECTION INTRAMUSCULAR; INTRAVENOUS EVERY 8 HOURS PRN
Status: DISCONTINUED | OUTPATIENT
Start: 2018-02-23 | End: 2018-02-25 | Stop reason: HOSPADM

## 2018-02-23 RX ORDER — LABETALOL HYDROCHLORIDE 5 MG/ML
80 INJECTION, SOLUTION INTRAVENOUS ONCE
Status: COMPLETED | OUTPATIENT
Start: 2018-02-23 | End: 2018-02-23

## 2018-02-23 RX ORDER — BUTORPHANOL TARTRATE 1 MG/ML
1 INJECTION, SOLUTION INTRAMUSCULAR; INTRAVENOUS
Status: DISCONTINUED | OUTPATIENT
Start: 2018-02-23 | End: 2018-02-23

## 2018-02-23 RX ORDER — LABETALOL HYDROCHLORIDE 5 MG/ML
20 INJECTION, SOLUTION INTRAVENOUS ONCE
Status: COMPLETED | OUTPATIENT
Start: 2018-02-23 | End: 2018-02-23

## 2018-02-23 RX ORDER — OXYCODONE HYDROCHLORIDE AND ACETAMINOPHEN 5; 325 MG/1; MG/1
2 TABLET ORAL EVERY 4 HOURS PRN
Status: DISCONTINUED | OUTPATIENT
Start: 2018-02-23 | End: 2018-02-25 | Stop reason: HOSPADM

## 2018-02-23 RX ORDER — DIAPER,BRIEF,INFANT-TODD,DISP
1 EACH MISCELLANEOUS AS NEEDED
Status: DISCONTINUED | OUTPATIENT
Start: 2018-02-23 | End: 2018-02-25 | Stop reason: HOSPADM

## 2018-02-23 RX ORDER — DIPHENHYDRAMINE HCL 25 MG
25 TABLET ORAL EVERY 6 HOURS PRN
Status: DISCONTINUED | OUTPATIENT
Start: 2018-02-23 | End: 2018-02-25 | Stop reason: HOSPADM

## 2018-02-23 RX ORDER — LABETALOL 200 MG/1
200 TABLET, FILM COATED ORAL EVERY 12 HOURS SCHEDULED
Status: DISCONTINUED | OUTPATIENT
Start: 2018-02-23 | End: 2018-02-25 | Stop reason: HOSPADM

## 2018-02-23 RX ORDER — LABETALOL HYDROCHLORIDE 5 MG/ML
40 INJECTION, SOLUTION INTRAVENOUS ONCE
Status: COMPLETED | OUTPATIENT
Start: 2018-02-23 | End: 2018-02-23

## 2018-02-23 RX ORDER — BUTORPHANOL TARTRATE 1 MG/ML
1 INJECTION, SOLUTION INTRAMUSCULAR; INTRAVENOUS ONCE
Status: COMPLETED | OUTPATIENT
Start: 2018-02-23 | End: 2018-02-23

## 2018-02-23 RX ORDER — BUTORPHANOL TARTRATE 1 MG/ML
1 INJECTION, SOLUTION INTRAMUSCULAR; INTRAVENOUS ONCE
Status: DISCONTINUED | OUTPATIENT
Start: 2018-02-23 | End: 2018-02-23

## 2018-02-23 RX ORDER — OXYTOCIN/RINGER'S LACTATE 30/500 ML
250 PLASTIC BAG, INJECTION (ML) INTRAVENOUS CONTINUOUS
Status: ACTIVE | OUTPATIENT
Start: 2018-02-23 | End: 2018-02-23

## 2018-02-23 RX ORDER — KETOROLAC TROMETHAMINE 30 MG/ML
15 INJECTION, SOLUTION INTRAMUSCULAR; INTRAVENOUS ONCE
Status: COMPLETED | OUTPATIENT
Start: 2018-02-23 | End: 2018-02-23

## 2018-02-23 RX ORDER — OXYCODONE HYDROCHLORIDE AND ACETAMINOPHEN 5; 325 MG/1; MG/1
1 TABLET ORAL EVERY 4 HOURS PRN
Status: DISCONTINUED | OUTPATIENT
Start: 2018-02-23 | End: 2018-02-25 | Stop reason: HOSPADM

## 2018-02-23 RX ORDER — OXYTOCIN/RINGER'S LACTATE 30/500 ML
1-30 PLASTIC BAG, INJECTION (ML) INTRAVENOUS
Status: DISCONTINUED | OUTPATIENT
Start: 2018-02-23 | End: 2018-02-23

## 2018-02-23 RX ORDER — SIMETHICONE 80 MG
80 TABLET,CHEWABLE ORAL 4 TIMES DAILY PRN
Status: DISCONTINUED | OUTPATIENT
Start: 2018-02-23 | End: 2018-02-25 | Stop reason: HOSPADM

## 2018-02-23 RX ORDER — HYDRALAZINE HYDROCHLORIDE 20 MG/ML
10 INJECTION INTRAMUSCULAR; INTRAVENOUS ONCE
Status: COMPLETED | OUTPATIENT
Start: 2018-02-23 | End: 2018-02-23

## 2018-02-23 RX ORDER — DOCUSATE SODIUM 100 MG/1
100 CAPSULE, LIQUID FILLED ORAL 2 TIMES DAILY
Status: DISCONTINUED | OUTPATIENT
Start: 2018-02-23 | End: 2018-02-25 | Stop reason: HOSPADM

## 2018-02-23 RX ORDER — MAGNESIUM HYDROXIDE/ALUMINUM HYDROXICE/SIMETHICONE 120; 1200; 1200 MG/30ML; MG/30ML; MG/30ML
15 SUSPENSION ORAL EVERY 6 HOURS PRN
Status: DISCONTINUED | OUTPATIENT
Start: 2018-02-23 | End: 2018-02-25 | Stop reason: HOSPADM

## 2018-02-23 RX ORDER — MAGNESIUM SULFATE IN WATER 40 MG/ML
6 INJECTION, SOLUTION INTRAVENOUS ONCE
Status: COMPLETED | OUTPATIENT
Start: 2018-02-23 | End: 2018-02-25

## 2018-02-23 RX ORDER — SODIUM CHLORIDE, SODIUM LACTATE, POTASSIUM CHLORIDE, CALCIUM CHLORIDE 600; 310; 30; 20 MG/100ML; MG/100ML; MG/100ML; MG/100ML
50 INJECTION, SOLUTION INTRAVENOUS CONTINUOUS
Status: DISCONTINUED | OUTPATIENT
Start: 2018-02-23 | End: 2018-02-25 | Stop reason: HOSPADM

## 2018-02-23 RX ORDER — ACETAMINOPHEN 325 MG/1
650 TABLET ORAL EVERY 4 HOURS PRN
Status: DISCONTINUED | OUTPATIENT
Start: 2018-02-23 | End: 2018-02-25 | Stop reason: HOSPADM

## 2018-02-23 RX ORDER — LABETALOL 200 MG/1
200 TABLET, FILM COATED ORAL 2 TIMES DAILY
Status: DISCONTINUED | OUTPATIENT
Start: 2018-02-23 | End: 2018-02-23

## 2018-02-23 RX ORDER — BUPIVACAINE HYDROCHLORIDE 2.5 MG/ML
INJECTION, SOLUTION EPIDURAL; INFILTRATION; INTRACAUDAL
Status: DISPENSED
Start: 2018-02-23 | End: 2018-02-24

## 2018-02-23 RX ORDER — CALCIUM CARBONATE 200(500)MG
1000 TABLET,CHEWABLE ORAL DAILY PRN
Status: DISCONTINUED | OUTPATIENT
Start: 2018-02-23 | End: 2018-02-25 | Stop reason: HOSPADM

## 2018-02-23 RX ADMIN — Medication 2 MILLI-UNITS/MIN: at 06:50

## 2018-02-23 RX ADMIN — MISOPROSTOL 25 MCG: 100 TABLET ORAL at 02:00

## 2018-02-23 RX ADMIN — BUTORPHANOL TARTRATE 1 MG: 1 INJECTION, SOLUTION INTRAMUSCULAR; INTRAVENOUS at 10:20

## 2018-02-23 RX ADMIN — HYDRALAZINE HYDROCHLORIDE 10 MG: 20 INJECTION INTRAMUSCULAR; INTRAVENOUS at 19:07

## 2018-02-23 RX ADMIN — LABETALOL HYDROCHLORIDE 200 MG: 200 TABLET, FILM COATED ORAL at 21:04

## 2018-02-23 RX ADMIN — OXYCODONE HYDROCHLORIDE AND ACETAMINOPHEN 1 TABLET: 5; 325 TABLET ORAL at 22:46

## 2018-02-23 RX ADMIN — KETOROLAC TROMETHAMINE 15 MG: 30 INJECTION, SOLUTION INTRAMUSCULAR at 15:21

## 2018-02-23 RX ADMIN — BENZOCAINE AND MENTHOL: 20; .5 SPRAY TOPICAL at 17:51

## 2018-02-23 RX ADMIN — MAGNESIUM SULFATE HEPTAHYDRATE 2 G/HR: 500 INJECTION, SOLUTION INTRAMUSCULAR; INTRAVENOUS at 20:06

## 2018-02-23 RX ADMIN — LABETALOL HYDROCHLORIDE 200 MG: 200 TABLET, FILM COATED ORAL at 09:07

## 2018-02-23 RX ADMIN — LABETALOL HYDROCHLORIDE 40 MG: 5 INJECTION, SOLUTION INTRAVENOUS at 17:34

## 2018-02-23 RX ADMIN — LABETALOL 20 MG/4 ML (5 MG/ML) INTRAVENOUS SYRINGE 20 MG: at 15:50

## 2018-02-23 RX ADMIN — DOCUSATE SODIUM 100 MG: 100 CAPSULE, LIQUID FILLED ORAL at 17:51

## 2018-02-23 RX ADMIN — SODIUM CHLORIDE 125 ML/HR: 0.9 INJECTION, SOLUTION INTRAVENOUS at 00:49

## 2018-02-23 RX ADMIN — SODIUM CHLORIDE 125 ML/HR: 0.9 INJECTION, SOLUTION INTRAVENOUS at 09:03

## 2018-02-23 RX ADMIN — LABETALOL HYDROCHLORIDE 20 MG: 5 INJECTION, SOLUTION INTRAVENOUS at 16:57

## 2018-02-23 RX ADMIN — Medication 6 G: at 19:29

## 2018-02-23 RX ADMIN — LABETALOL HYDROCHLORIDE 20 MG: 5 INJECTION, SOLUTION INTRAVENOUS at 13:57

## 2018-02-23 RX ADMIN — WITCH HAZEL 1 PAD: 500 SOLUTION RECTAL; TOPICAL at 17:51

## 2018-02-23 RX ADMIN — BUTORPHANOL TARTRATE 1 MG: 1 INJECTION, SOLUTION INTRAMUSCULAR; INTRAVENOUS at 07:17

## 2018-02-23 RX ADMIN — HYDROCORTISONE 1 APPLICATION: 1 CREAM TOPICAL at 17:51

## 2018-02-23 RX ADMIN — LABETALOL HYDROCHLORIDE 80 MG: 5 INJECTION, SOLUTION INTRAVENOUS at 18:09

## 2018-02-23 RX ADMIN — BUTORPHANOL TARTRATE 1 MG: 1 INJECTION, SOLUTION INTRAMUSCULAR; INTRAVENOUS at 02:30

## 2018-02-23 NOTE — OB LABOR/OXYTOCIN SAFETY PROGRESS
Oxytocin Safety Progress Check Note - Ayla Ruvalcaba 34 y o  female MRN: [id number]    Unit/Bed#: L&D 323-01 Encounter: [id number]    Obstetric History       T2      L2     SAB1   TAB3   Ectopic0   Multiple0   Live Births2      Gestational Age: 38w2d  Dose (adriane-units/min) Oxytocin: 8 adriane-units/min  Contraction Frequency (minutes): 2-3  Contraction Quality: Moderate  Tachysystole: No   Dilation: 4        Effacement (%): 50  Station: -2  Baseline Rate: 130 bpm  Fetal Heart Rate: 130 BPM  FHR Category: Category II           Notes/comments:   Patient is uncomfortable with contraction  FHT is category 2 with intermittent variables with moderate variability  Cervix is still unchanged  Will internalize patient with IUPC and FSE  Recheck in two hours      Will discuss with Dr La Nena Mark MD 2018 1:22 PM

## 2018-02-23 NOTE — OB LABOR/OXYTOCIN SAFETY PROGRESS
Oxytocin Safety Progress Check Note - Anahy Snowden 34 y o  female MRN: [id number]    Unit/Bed#: L&D 323-01 Encounter: [id number]    Obstetric History       T2      L2     SAB1   TAB3   Ectopic0   Multiple0   Live Births2      Gestational Age: 36w4d      Contraction Frequency (mins): 2-3  Dilation (cm): 4  Effacement (%): 50  Station: -2    Rate Oxytocin (millunits/hour): 8    Baseline Rate (bpm): 130  FHR Tracing: Category 2    ~~~ FHR tracing:    reviewed;    in the trailing 30 mins:     moderate variability    accelerations to 15 bpm x 15 secs present    no decelerations;    intermittent episodes of variable as well as late decelerations that resolved with appropriate interventions  ~~~ FHR interventions:    Maternal position change   SVE    ~~~ Labor course: Patients labor progressing ; cervical dilation continuing ;     ~~~ Pain management: patient is comfortable and does not request further intervention at this time     ~~~ A1GDM: most recent FSGs    Results from last 7 days  Lab Units 18  1133 18  0919 18  0729 18  0156 18  1813   POC GLUCOSE mg/dl 68 69 77 65 71         Vitals:    18 1003 18 1018 18 1034 18 1050   BP: 148/70 147/72 144/77 134/64   BP Location: Right arm Right arm Right arm Right arm   Pulse: 80 78 72 72   Resp:     Temp:       TempSrc:       Weight:       Height:             Labs:  Recent Results (from the past 3 hour(s))   Fingerstick Glucose (POCT)    Collection Time: 18  9:19 AM   Result Value Ref Range    POC Glucose 69 65 - 140 mg/dl   Fingerstick Glucose (POCT)    Collection Time: 18 11:33 AM   Result Value Ref Range    POC Glucose 68 65 - 140 mg/dl     Plan: will continue to monitor cervical dilation progress, FHR tracing, external tocometry, and blood pressures           [facility]

## 2018-02-23 NOTE — OB LABOR/OXYTOCIN SAFETY PROGRESS
Oxytocin Safety Progress Check Note - Lavern Londono 34 y o  female MRN: [id number]    Unit/Bed#: L&D 323-01 Encounter: [id number]    Obstetric History       T2      L2     SAB1   TAB3   Ectopic0   Multiple0   Live Births2      Gestational Age: 36w4d  Contraction Frequency (mins): 1-3  Dilation (cm): 1  Effacement (%): 50  Station: -3    Rate Oxytocin (millunits/hour): 6    Baseline Rate (bpm): 130  FHR Tracing: Category 1    ~~~ FHR tracing:    reviewed;    in the trailing 30 mins:     moderate variability    accelerations to 15 bpm x 15 secs present    no decelerations;         ~~~ Labor course: Minimal change since admission; Mechanical cervical dilation via Carrion Catheter insertion:    Description of Procedure: The 16F 30 cc balloon Carrion was advanced through the cervix past the internal os  The balloon was inflated with 60 ml of NSS  There was no bleeding or SROM  The catheter was placed under traction  ~~~ Pain management: patient is comfortable and does not request further intervention at this time     ~~~ A1GDM: most recent FSGs    Results from last 7 days  Lab Units 18  0729 18  0156 18  1813   POC GLUCOSE mg/dl 77 65 71       ~~~ CHTN:    Denies H/A, Visual changes consistent with retinal vasospasm, RUQ/epigastric pain, N&V, seizures   Will cont to monitor BPs   Started home labetalol 200 bid now    Vitals:    18 0718 18 0733 18 0748 18 0804   BP: 147/82 150/84 156/83 155/76   Pulse: 72 78 70 71   Resp:       Temp:       TempSrc:       Weight:       Height:           Labs:    Results from last 7 days  Lab Units 18  1809 18  1201   WBC Thousand/uL 5 24 6 49   HEMOGLOBIN g/dL 10 0* 10 3*   MCV fL 70* 70*   PLATELETS Thousands/uL 233 274         Plan: will continue to monitor cervical dilation progress, FHR tracing, external tocometry, and blood pressures       Discussed with Dr Harlan Gramajo

## 2018-02-23 NOTE — DISCHARGE SUMMARY
Discharge Summary - Boo Ricardo 34 y o  female MRN: [id number]    Unit/Bed#: L&D 323-01 Encounter: [id number]    Admission Date: 2018     Discharge Date: 2018    Admitting Diagnosis:   1  Pregnancy at 38w2d  2  Induction of labor  3  Chronic hypertension  4  A1GDM  5  Poor prenatal care  6  Morbid obesity  7  Single fetus    Discharge Diagnosis: same, delivered    Procedures: spontaneous vaginal delivery , repair of 2nd degree laceration    Attending: Oralia Bustillo MD    Hospital Course:     Boo Ricardo is a 34 y o  [id number] at 38w2d wks who was initially admitted for induction of labor of chronic hypertension  Her pregnancy was complicated by chronic hypertension, A1GDM, poor prenatal care, morbid obesity  On exam in triage she was noted to be fingertip/0/-3  She was given two doses of cytotec  She was then given a mixon balloon and pitocin, which fell out an hour later  She SROMed  She then made progress to complete and began to push  She delivered a viable male  on 18 at [phone]  Weight 7lbs 13oz via spontaneous vaginal delivery  Apgars were 9 (1 min) and 9 (5 min)   was transferred to  nursery  Patient tolerated the procedure well and was transferred to recovery in stable condition  Her postpartum course was complicated by elevated blood pressures with 1x in severe range- Procardia XL 30 mg PO daily was added to her regimen of Labetalol 200 mg PO BID  Preeclamptic precautions were given and she is to follow in her doctor office in 1 week for blood pressure check    Her postpartum pain was well controlled with oral analgesics  On day of discharge, she was ambulating and able to reasonably perform all ADLs  She was voiding and had appropriate bowel function  Pain was well controlled  She was discharged home on post-partum day #2 without complications   Patient was instructed to follow up with her OB as an outpatient and was given appropriate warnings to call provider if she develops signs of infection or uncontrolled pain  Complications: none apparent    Condition at discharge: stable     Discharge instructions/Information to patient and family:   See after visit summary for information provided to patient and family  Provisions for Follow-Up Care:  See after visit summary for information related to follow-up care and any pertinent home health orders        Disposition: Home    Planned Readmission: No    Isabel Spears MD

## 2018-02-23 NOTE — OB LABOR/OXYTOCIN SAFETY PROGRESS
Labor Progress Note - Geovani Flores 34 y o  female MRN: [id number]    Unit/Bed#: L&D 323-01 Encounter: [id number]    Obstetric History       T2      L2     SAB1   TAB3   Ectopic0   Multiple0   Live Births2      Gestational Age: 36w4d     Contraction Frequency (minutes): 2-3  Contraction Quality: Mild  Tachysystole: No   Dilation: 1        Effacement (%): 0  Station: -3  Baseline Rate: 130 bpm  Fetal Heart Rate: 130 BPM  FHR Category: Category I          Notes/comments:   Patient jo too often for more cytotec  Will start pitocin             Gissel Wills MD 2018 6:05 AM

## 2018-02-23 NOTE — OB LABOR/OXYTOCIN SAFETY PROGRESS
Oxytocin Safety Progress Check Note - Lavern Londono 34 y o  female MRN: [id number]    Unit/Bed#: L&D 323-01 Encounter: [id number]    Obstetric History       T2      L2     SAB1   TAB3   Ectopic0   Multiple0   Live Births2      Gestational Age: 38w2d  Dose (adriane-units/min) Oxytocin: 10 adriane-units/min  Contraction Frequency (minutes): 2-3  Contraction Quality: Moderate  Tachysystole: No   Dilation: 7        Effacement (%): 90  Station: -1  Baseline Rate: 140 bpm  Fetal Heart Rate: 130 BPM  FHR Category: Category II          Notes/comments:   Patient is very uncomfortable  FHT is category 2 with intermittent variables  She has made significant change  Will recheck when patient feels pressure    D/W Dr Sybil Rodrigues MD 2018 2:09 PM

## 2018-02-23 NOTE — PROGRESS NOTES
Patient assessed at bedside for severe range blood pressures ranging from 160s-170s/70s- 80s  Labetalol 20/40 mg IV already given  Patient denies any headaches, visual disturbance, right upper quadrant/epigastric pain, or shortness of breath  States she did have some mild chest pain before which has since subsided  PE:  General:  Appears common acute distress  Cardio:  Regular rate and rhythm, S1-S2, no murmur  Resp:  Good air entry in all lung fields, clear to auscultation bilaterally  Neuro:  DTRs +1, no clonus    Plan:   Will give 80 mg IV now with repeat blood pressure in 10 minutes  Will send repeat CBC and CMP

## 2018-02-23 NOTE — OB LABOR/OXYTOCIN SAFETY PROGRESS
Labor Progress Note - Lou Ebbing 34 y o  female MRN: [id number]     LATE ENTRY    Unit/Bed#: L&D 323-01 Encounter: [id number]    Obstetric History       T2      L2     SAB1   TAB3   Ectopic0   Multiple0   Live Births2      Gestational Age: 36w4d     Contraction Frequency (minutes): 3-4  Contraction Quality: Mild  Tachysystole: No   Dilation: 1        Effacement (%): 0  Station: -3  Baseline Rate: 125 bpm  Fetal Heart Rate: 130 BPM  FHR Category: Category I          Notes/comments:   1st dose of cytotec placed without difficulty, 25 mcg in posterior fornix          Samanta Harrison MD 2018 2:04 AM

## 2018-02-23 NOTE — H&P
H&P Exam - Obstetrics   Bernadette Clement 34 y o  female MRN: [id number]  Unit/Bed#: L&D 323-01 Encounter: [id number]      History of Present Illness     Chief Complaint: Induction of labor    HPI:  Bernadette Clement is a 34 y o  [id number] female with an NANO of 3/7/2018, by Ultrasound at 38w1d weeks gestation who is being admitted for induction of labor for chronic hypertension  Patient is on labetalol 200 b i d  She had 2 severe range blood pressures back-to-back while in the hospital today  Her blood pressures in clinic were 160/100  She was not treated with labetalol for her hospital blood pressures because she subsequently had a follow-up blood pressure that was in the 140s  Patient denies any headache, blurry vision, right upper quadrant pain      Contractions:  None  Vaginal Bleeding:  None  Loss of Fluid:  None  Fetal Movement:  Present    PREGNANCY COMPLICATIONS:   Chronic hypertension:  Currently on labetalol 200 b i d , and baby aspirin  A1 GDM:  Controlled with diet  Poor prenatal care  Morbid obesity    OB History    Para Term  AB Living   7 2 2 0 4 2   SAB TAB Ectopic Multiple Live Births   1 3     2      # Outcome Date GA Lbr Harshad/2nd Weight Sex Delivery Anes PTL Lv   7 Current            6 SAB 16 7w0d          5 Term 04/27/15   3572 g (7 lb 14 oz) F Vag-Spont None  CASI      Complications: Gestational hypertension   4 TAB            3 Term 07 39w0d  2948 g (6 lb 8 oz) F Vag-Spont None N CASI   2 TAB            1 TAB                   Baby complications/comments:   NoneBaby complications/comments:    Review of Systems   Constitutional: Negative  Respiratory: Negative  Cardiovascular: Negative  Gastrointestinal: Negative  Genitourinary: Negative          Historical Information   Past Medical History:   Diagnosis Date    Amenorrhea     17    Early stage of pregnancy 2017    Preeclampsia      Past Surgical History:   Procedure Laterality Date    INDUCED       [place] SURG RX MISSED ABORTN,1ST TRI N/A 2016    Procedure: DILATATION AND EVACUATION (D&E); Surgeon: Andre Zepeda MD;  Location: [facility];  Service: Gynecology     Social History   History   Alcohol Use No     History   Drug Use No     History   Smoking Status    Never Smoker   Smokeless Tobacco    Not on file     Comment: Former smoker per [employer]     Family History: non-contributory    Meds/Allergies    {  Prescriptions Prior to Admission   Medication    ACCU-CHEK FASTCLIX LANCETS MISC    ACCU-CHEK GUIDE test strip    aspirin (ECOTRIN LOW STRENGTH) 81 mg EC tablet     MG capsule    ferrous gluconate (FERGON) 240 (27 FE) MG tablet    labetalol (NORMODYNE) 200 mg tablet    Prenatal Vit-Fe Fumarate-FA (PREPLUS) 27-1 MG TABS        Allergies   Allergen Reactions    Tomato        OBJECTIVE:    Vitals:   /73   Pulse 86   There is no height or weight on file to calculate BMI  Physical Exam   Constitutional: She is oriented to person, place, and time  She appears well-developed and well-nourished  No distress  HENT:   Head: Normocephalic and atraumatic  Cardiovascular: Normal rate and regular rhythm  Pulmonary/Chest: Effort normal and breath sounds normal  No respiratory distress  Abdominal: Soft  She exhibits no distension  There is no tenderness  There is no rebound and no guarding  Neurological: She is alert and oriented to person, place, and time  Vitals reviewed      SVE: Dilation: Fingertip  Effacement (%): 0  Cervical Characteristics: Mid-position, Soft  Station: -3  Presentation: Vertex  Method: Manual  OB Examiner: Niño    FHT:  Baseline Rate: 120 bpm  Variability: Moderate 6-25 bpm  Accelerations: 15 x 15 or greater, With fetal movment  Decelerations: None  TOCO:   Contraction Frequency (minutes): 0      Prenatal Labs:   Blood type: A+  Antigen Screen: negative  Rubella: Immune  HIV: Negative  RPR: NR  Hep B: negative  Diabetes Screen: unable to tolerate glucose test, was dx via at home glucose monitoring  GBS: negative      Invasive Devices          No matching active lines, drains, or airways            Assessment/Plan     ASSESSMENT:  IUP at 38w1d weeks gestation here for IOL for chronic hypertension    PLAN:  1) Admit  2) CBC, RPR, Blood Type  3) Analgesia and/or epidural at patient request  4) Anticipate   5) Cytotec for induction of labor  6) Chronic hypertension: will continue to monitor blood pressures  Preeclamptic labs WNL  Will treat severe range blood pressures  7) A1GDM: Will check blood sugar at 2 AM  When titrating pitocin will check blood sugars Q2 hours  8) GBS negative  9) Plan discussed with Dr Dioni Morton        This patient will be an INPATIENT  and I certify the anticipated length of stay is >2 Midnights        Julianne Vazquez MD  2018  8:42 PM

## 2018-02-23 NOTE — L&D DELIVERY NOTE
Vaginal Delivery Summary - OB/GYN   Maggie Leiva 34 y o  female MRN: [id number]  Unit/Bed#: L&D 323-01 Encounter: [id number]          Predelivery Diagnosis:  1  Pregnancy at 38w2d    2  Induction of labor  3  Chronic hypertension on labetalol 200 bid, baby aspirin  4  A1GDM  5  Poor prenatal care  6  Morbid obesity  7  Single fetus    Postdelivery Diagnosis:  1  Same as above  2  Delivery of term     Procedure: Spontaneous Vaginal Delivery    Attending: Philip Cantu    Assistant: Jaime Beck    Anesthesia: None    EBL: 260ZG    Complications: none apparent    Specimens: cord blood, arterial and venous cord blood gasses, placenta to storage    Findings:   1  Viable male at 5, with APGARS of 9 and 9 at 1 and 5 minutes respectively,  2  Spontaneous delivery of intact placenta at 1442  3  2 degree laceration repaired with 3-0 vicryl  Left periurethral laceration which was hemostatic on exam  4  Arterial pH: 7 276, BE: -5 8; Venous pH: 7 310 , BE: -3 8    Disposition:  Patient tolerated the procedure well and was recovering in labor and delivery room     Brief history and labor course:  Ms Maggie Leiva is a 34 y o  [id number] at [id number]  She was admitted to L&D for induction of labor of chronic hypertension  Her pregnancy was complicated by chronic hypertension, A1GDM, poor prenatal care, morbid obesity  On exam in triage she was noted to be fingertip/0/-3  She was given two doses of cytotec  She was then given a mixon balloon and pitocin, which fell out an hour later  She SROMed  She then made progress to complete and began to push  Description of procedure    After pushing for 1 minutes, at 1431 patient delivered a viable male , wt pending, apgars of 9 (1 min) and 9 (5 min)  The fetal vertex delivered spontaneously  Baby was checked for nuchal  No nuchal was visualized  The anterior shoulder delivered atraumatically with maternal expulsive forces and the assistance of downward traction   The posterior shoulder delivered with maternal expulsive forces and the assistance of upward traction  The remainder of the fetus delivered spontaneously  Upon delivery, the infant was placed on the mothers abdomen and the cord was clamped and cut  Delayed cord clamping was 30 seconds  The infant was noted to cry spontaneously and was moving all extremities appropriately  There was no evidence for injury  Awaiting nurse resuscitators evaluated the   Arterial and venous cord blood gases and cord blood was collected for analysis  These were promptly sent to the lab  In the immediate post-partum, 30 units of IV pitocin was administered, and the uterus was noted to contract down well with massage and pitocin  The placenta delivered spontaneously at 1442 and was noted to have a centrally inserted 3 vessel cord  The vagina, cervix, perineum, and rectum were inspected and there was noted to be a left 2nd degree laceration which was repaired with 3-0 vicryl  There was a left periurethral laceration which was found to be hemostatic and did not require repair  At the conclusion of the procedure, all needle, sponge, and instrument counts were noted to be correct  Patient tolerated the procedure well and was allowed to recover in labor and delivery room with family and  before being transferred to the post-partum floor  The attending was present and participated in all key portions of the case      Marcus eGrman  2018  3:21 PM

## 2018-02-23 NOTE — OB LABOR/OXYTOCIN SAFETY PROGRESS
Labor Progress Note - Lavern Londono 34 y o  female MRN: [id number]    Unit/Bed#: L&D 323-01 Encounter: [id number]    Obstetric History       T2      L2     SAB1   TAB3   Ectopic0   Multiple0   Live Births2      Gestational Age: 36w4d     Contraction Frequency (minutes): 3-4  Contraction Quality: Mild  Tachysystole: No   Dilation: 1        Effacement (%): 0  Station: -3  Baseline Rate: 125 bpm  Fetal Heart Rate: 130 BPM  FHR Category: Category I          Notes/comments:   Patient requesting something for pain, will give 1 dose of stadol  2nd dose of cytotec placed in posterior fornix without difficulty          Charles Cooper MD 2018 2:03 AM

## 2018-02-23 NOTE — OB LABOR/OXYTOCIN SAFETY PROGRESS
Oxytocin Safety Progress Check Note - Laith Hidalgo 34 y o  female MRN: [id number]    Unit/Bed#: L&D 323-01 Encounter: [id number]    Obstetric History       T2      L2     SAB1   TAB3   Ectopic0   Multiple0   Live Births2      Gestational Age: 38w2d  Dose (adriane-units/min) Oxytocin: 10 adriane-units/min  Contraction Frequency (minutes): 2-3  Contraction Quality: Moderate  Tachysystole: No   Dilation: 4        Effacement (%): 50  Station: -2  Baseline Rate: 140 bpm  Fetal Heart Rate: 130 BPM  FHR Category: Category I          Notes/comments:   Patient is refusing IUPC and FSE for now; she will think about it later  She had one back-to back blood pressure, and is now being given labetalol 20  Will recheck bp in 10 minutes    Will d/w Nate Wong MD 2018 1:52 PM

## 2018-02-23 NOTE — LACTATION NOTE
This note was copied from a baby's chart  CONSULT - LACTATION  Baby Boy  Abelino Viramontes) Marcio Jaden 0 days male MRN: [id number]    [address] NURSERY Room / Bed: L&D 323(N)/L&D 323(N) Encounter: [id number]    Maternal Information     MOTHER:  Twila Bolaños  Maternal Age: 34 y o    OB History: #: 1, Date: , Sex: None, Weight: None, GA: None, Delivery: None, Apgar1: None, Apgar5: None, Living: None, Birth Comments: None    #: 2, Date: , Sex: None, Weight: None, GA: None, Delivery: None, Apgar1: None, Apgar5: None, Living: None, Birth Comments: None    #: 3, Date: 07, Sex: Female, Weight: 2948 g (6 lb 8 oz), GA: 39w0d, Delivery: Vaginal, Spontaneous Delivery, Apgar1: None, Apgar5: None, Living: Living, Birth Comments: None    #: 4, Date: , Sex: None, Weight: None, GA: None, Delivery: None, Apgar1: None, Apgar5: None, Living: None, Birth Comments: None    #: 5, Date: 04/27/15, Sex: Female, Weight: 3572 g (7 lb 14 oz), GA: None, Delivery: Vaginal, Spontaneous Delivery, Apgar1: None, Apgar5: None, Living: Living, Birth Comments: None    #: 6, Date: 16, Sex: None, Weight: None, GA: 7w0d, Delivery: None, Apgar1: None, Apgar5: None, Living: None, Birth Comments: None    #: 7, Date: 18, Sex: Male, Weight: 3544 g (7 lb 13 oz), GA: 38w2d, Delivery: Vaginal, Spontaneous Delivery, Apgar1: 9, Apgar5: 9, Living: Living, Birth Comments: None   Previouse breast reduction surgery? No    Lactation history:   Has patient previously breast fed: Yes   How long had patient previously breast fed:     Previous breast feeding complications:       Past Surgical History:   Procedure Laterality Date    INDUCED       [place] SURG RX MISSED ABORTN,1ST TRI N/A 2016    Procedure: DILATATION AND EVACUATION (D&E);   Surgeon: Sahara Miguel MD;  Location: [facility];  Service: Gynecology       Birth information:  YOB: 2019   Time of birth: 2:31 PM   Sex: male   Delivery type: Vaginal, Spontaneous Delivery   Birth Weight: 3544 g (7 lb 13 oz)   Percent of Weight Change: 0%     Gestational Age: 36w4d   [unfilled]    Assessment     Breast and nipple assessment: did not assess at this time     Assessment: did not assess at this time    Feeding assessment: feeding well  LATCH:  Latch: Audible Swallowing:     Type of Nipple:     Comfort (Breast/Nipple):     Hold (Positioning):     LATCH Score:            Feeding recommendations:  breast feed on demand     Breastfeeding booklets given and reviewed with mother  Mother verbalized breastfeeding is going well  Enc to call for assistance as needed,phone # given      Savannah Yarbrough RN 2018 6:55 PM

## 2018-02-24 RX ADMIN — DOCUSATE SODIUM 100 MG: 100 CAPSULE, LIQUID FILLED ORAL at 18:14

## 2018-02-24 RX ADMIN — OXYCODONE HYDROCHLORIDE AND ACETAMINOPHEN 1 TABLET: 5; 325 TABLET ORAL at 03:47

## 2018-02-24 RX ADMIN — OXYCODONE HYDROCHLORIDE AND ACETAMINOPHEN 1 TABLET: 5; 325 TABLET ORAL at 18:14

## 2018-02-24 RX ADMIN — OXYCODONE HYDROCHLORIDE AND ACETAMINOPHEN 2 TABLET: 5; 325 TABLET ORAL at 23:45

## 2018-02-24 RX ADMIN — LABETALOL HYDROCHLORIDE 200 MG: 200 TABLET, FILM COATED ORAL at 21:27

## 2018-02-24 RX ADMIN — OXYCODONE HYDROCHLORIDE AND ACETAMINOPHEN 1 TABLET: 5; 325 TABLET ORAL at 08:01

## 2018-02-24 RX ADMIN — SODIUM CHLORIDE, SODIUM LACTATE, POTASSIUM CHLORIDE, AND CALCIUM CHLORIDE 50 ML/HR: .6; .31; .03; .02 INJECTION, SOLUTION INTRAVENOUS at 00:09

## 2018-02-24 RX ADMIN — OXYCODONE HYDROCHLORIDE AND ACETAMINOPHEN 1 TABLET: 5; 325 TABLET ORAL at 13:37

## 2018-02-24 RX ADMIN — DOCUSATE SODIUM 100 MG: 100 CAPSULE, LIQUID FILLED ORAL at 08:00

## 2018-02-24 RX ADMIN — LABETALOL HYDROCHLORIDE 200 MG: 200 TABLET, FILM COATED ORAL at 08:00

## 2018-02-24 RX ADMIN — MAGNESIUM SULFATE HEPTAHYDRATE 2 G/HR: 500 INJECTION, SOLUTION INTRAMUSCULAR; INTRAVENOUS at 19:08

## 2018-02-24 NOTE — SOCIAL WORK
Medela breast pump requested; referral sent to [facility] and breast pump delivered to [facility] by [employer]  CM following for further d/c needs

## 2018-02-24 NOTE — PROGRESS NOTES
S: Pt seen at bedside for multiple severe range BPs    Denies H/A, Visual changes consistent with retinal vasospasm, RUQ/epigastric pain, N&V, seizures    ~~~ Monitor for hyperMg++ side effects at time of Mag start:   diaphoresis: no   flushing: no   warmth: no   nausea & vomiting: no   headache: no   muscle weakness: no   visual disturbances: no   dyspnea: no   chest pain and heart palpitations: no   Will monitor respiratory rate to ensure RR >12   Will monitor DTRs for hyporeflexia   Will monitor serum Cr to ensure <1 0 mg/dL           O:     Vitals:    02/23/18 1649 02/23/18 1713 02/23/18 1752 02/23/18 1835   BP: (!) 172/82 (!) 178/82 169/81 169/77   BP Location:  Right arm Right arm    Pulse: 70 66 64 67   Resp:  18 18    Temp:       TempSrc:       Weight:       Height:               ~~~ Pain management: patient is comfortable and does not request further intervention at this time           Vitals:    02/23/18 1649 02/23/18 1713 02/23/18 1752 02/23/18 1835   BP: (!) 172/82 (!) 178/82 169/81 169/77   BP Location:  Right arm Right arm    Pulse: 70 66 64 67   Resp:  18 18    Temp:       TempSrc:       Weight:       Height:             Assessment: 33 yo PPD #0 with CHTN and severe range BPs  Plan: Will cont to monitor blood pressures  Start mag++ infusion  Switch drug class to hydralazine and give 10 mg  Draw CBC, CMP        Discussed with Dr Reagan Dalton & Dr Blanka Sparks

## 2018-02-25 VITALS
HEART RATE: 89 BPM | WEIGHT: 248 LBS | RESPIRATION RATE: 18 BRPM | DIASTOLIC BLOOD PRESSURE: 87 MMHG | BODY MASS INDEX: 42.34 KG/M2 | HEIGHT: 64 IN | OXYGEN SATURATION: 99 % | SYSTOLIC BLOOD PRESSURE: 144 MMHG | TEMPERATURE: 98.5 F

## 2018-02-25 RX ORDER — NIFEDIPINE 30 MG/1
30 TABLET, FILM COATED, EXTENDED RELEASE ORAL DAILY
Qty: 15 TABLET | Refills: 0 | Status: SHIPPED | OUTPATIENT
Start: 2018-02-26 | End: 2020-11-04

## 2018-02-25 RX ORDER — ACETAMINOPHEN 325 MG/1
TABLET ORAL
Qty: 30 TABLET | Refills: 0 | Status: SHIPPED | OUTPATIENT
Start: 2018-02-25 | End: 2020-11-04

## 2018-02-25 RX ORDER — NIFEDIPINE 30 MG/1
30 TABLET, EXTENDED RELEASE ORAL DAILY
Status: DISCONTINUED | OUTPATIENT
Start: 2018-02-25 | End: 2018-02-25 | Stop reason: HOSPADM

## 2018-02-25 RX ADMIN — NIFEDIPINE 30 MG: 30 TABLET, FILM COATED, EXTENDED RELEASE ORAL at 11:18

## 2018-02-25 RX ADMIN — OXYCODONE HYDROCHLORIDE AND ACETAMINOPHEN 2 TABLET: 5; 325 TABLET ORAL at 07:43

## 2018-02-25 RX ADMIN — DOCUSATE SODIUM 100 MG: 100 CAPSULE, LIQUID FILLED ORAL at 08:21

## 2018-02-25 RX ADMIN — OXYCODONE HYDROCHLORIDE AND ACETAMINOPHEN 2 TABLET: 5; 325 TABLET ORAL at 17:02

## 2018-02-25 RX ADMIN — OXYCODONE HYDROCHLORIDE AND ACETAMINOPHEN 2 TABLET: 5; 325 TABLET ORAL at 13:28

## 2018-02-25 RX ADMIN — OXYCODONE HYDROCHLORIDE AND ACETAMINOPHEN 1 TABLET: 5; 325 TABLET ORAL at 03:47

## 2018-02-25 RX ADMIN — LABETALOL HYDROCHLORIDE 200 MG: 200 TABLET, FILM COATED ORAL at 08:21

## 2018-02-25 NOTE — PROGRESS NOTES
Postpartum Progress Note - OB/GYN   Fabricio Briggs 34 y o  female MRN: [id number]  Unit/Bed#: L&D 307-01 Encounter: [id number]    ASSESSMENT:  Fabricio Briggs 34 y o  [id number] s/p Spontaneous Vaginal Delivery Postpartum day  2  Pt is well appearing and with no current complaints  Chronic hypertensive with severe range blood pressures requiring labetalol 200 mg b i d  P  O  During hospital stay patient required IV labetalol 20, 20, 40, 80, 80, hydralazine 10 mg; received magnesium x 24 hours  Patient was noted to have severe range pressure at 23:30 last night with non severe repeat  PLAN:  1) Continue routine postpartum care  2) Encourage ambulation  3) Pain control as needed  4) Advance diet as tolerated  5) Dispo: stable and comfortable, possible d/c today pending stable blood pressures    Subjective/Objective     SUBJECTIVE:  Pain: no  Tolerating Oral Intake: yes   Voiding: yes  Flatus: yes  Bowel Movement: yes  Ambulating: yes  Breastfeeding: yes  Chest Pain: no  Shortness of Breath: no  Leg Pain/Discomfort: no  Lochia: minimal    OBJECTIVE:     Vitals: Blood pressure 147/88, pulse 87, temperature 98 4 °F (36 9 °C), temperature source Oral, resp  rate 18, height 5' 4" (1 626 m), weight 112 kg (248 lb), SpO2 99 %, currently breastfeeding       General: appears well, alert and oriented x 3, pleasant and cooperative  Cardiovascular: RRR, normal S1/S2, no GRM  Lungs:  clear to auscultation bilaterally; no WRR, non-labored breathing   Abdomen: normal bowel sounds, soft, no tenderness, no distention  : Uterine fundus firm: -2 cm below the umbilicus  Lower Extremities: Non-tender, peripheral pulses normal; no clubbing, cyanosis, or edema    Labs:   Lab Results   Component Value Date    WBC 14 83 (H) 02/23/2018    HGB 10 5 (L) 02/23/2018    HCT 32 9 (L) 02/23/2018    MCV 70 (L) 02/23/2018     02/23/2018       Medications:   Current Facility-Administered Medications   Medication Dose Route Frequency    acetaminophen (TYLENOL) tablet 650 mg  650 mg Oral Q4H PRN    aluminum-magnesium hydroxide-simethicone (MYLANTA) 200-200-20 mg/5 mL oral suspension 15 mL  15 mL Oral Q6H PRN    benzocaine-menthol-lanolin-aloe (DERMOPLAST) 20-0 5 % topical spray   Topical 4x Daily PRN    calcium carbonate (TUMS) chewable tablet 1,000 mg  1,000 mg Oral Daily PRN    diphenhydrAMINE (BENADRYL) tablet 25 mg  25 mg Oral Q6H PRN    docusate sodium (COLACE) capsule 100 mg  100 mg Oral BID    hydrocortisone 1 % cream 1 application  1 application Topical PRN    labetalol (NORMODYNE) tablet 200 mg  200 mg Oral Q12H ALICJA    lactated ringers infusion  50 mL/hr Intravenous Continuous    ondansetron (ZOFRAN) injection 4 mg  4 mg Intravenous Q8H PRN    oxyCODONE-acetaminophen (PERCOCET) 5-325 mg per tablet 1 tablet  1 tablet Oral Q4H PRN    oxyCODONE-acetaminophen (PERCOCET) 5-325 mg per tablet 2 tablet  2 tablet Oral Q4H PRN    simethicone (MYLICON) chewable tablet 80 mg  80 mg Oral 4x Daily PRN    witch hazel-glycerin (TUCKS) topical pad 1 pad  1 pad Topical PRN     Invasive Devices     Peripheral Intravenous Line            Peripheral IV 02/22/18 Left Wrist 2 days                     Jayshree Flores MD PGY-2   2/25/2018 6:30 AM

## 2018-02-25 NOTE — PROGRESS NOTES
Progress Note - OB/GYN   Cliff Mullen 34 y o  female MRN: [id number]  Unit/Bed#: L&D 307-01 Encounter: [id number]    Subjective/Objective   Chief Complaint: Postpartum    Subjective: The patient is not having any pain  Eating a regular diet without difficulty  Flatus yes  Bowel movements absent    Voiding without difficulty  Ambulating yes Breastfeeding yes     Objective:    Vitals: Blood pressure 150/95, pulse 89, temperature 98 5 °F (36 9 °C), temperature source Oral, resp  rate 18, height 5' 4" (1 626 m), weight 112 kg (248 lb), SpO2 99 %, currently breastfeeding  ,Body mass index is 42 57 kg/m²  Intake/Output Summary (Last 24 hours) at 02/25/18 3420  Last data filed at 02/24/18 1800   Gross per 24 hour   Intake             1400 ml   Output              825 ml   Net              575 ml       Invasive Devices     Peripheral Intravenous Line            Peripheral IV 02/22/18 Left Wrist 2 days                Physical Exam:  GEN: Cliff Mullen appears well, alert and oriented x 3, pleasant and cooperative      ABDOMEN: normal bowel sounds, soft, no tenderness, no distention, Fundus Firm and non-tender,  :Lochia minimal   EXTREMITIES: peripheral pulses normal; no clubbing, cyanosis, or edema  Negative Bernadine's sign        Labs:   Admission on 02/22/2018   Component Date Value    WBC 02/22/2018 5 24     RBC 02/22/2018 4 56     Hemoglobin 02/22/2018 10 0*    Hematocrit 02/22/2018 31 9*    MCV 02/22/2018 70*    MCH 02/22/2018 21 9*    MCHC 02/22/2018 31 3*    RDW 02/22/2018 15 8*    Platelets 20/51/6283 233     nRBC 02/22/2018 0     Neutrophils Relative 02/22/2018 68     Lymphocytes Relative 02/22/2018 23     Monocytes Relative 02/22/2018 7     Eosinophils Relative 02/22/2018 2     Basophils Relative 02/22/2018 0     Neutrophils Absolute 02/22/2018 3 57     Lymphocytes Absolute 02/22/2018 1 20     Monocytes Absolute 02/22/2018 0 34     Eosinophils Absolute 02/22/2018 0 12     Basophils Absolute 02/22/2018 0 01     Sodium 02/22/2018 137     Potassium 02/22/2018 4 0     Chloride 02/22/2018 104     CO2 02/22/2018 23     Anion Gap 02/22/2018 10     BUN 02/22/2018 4*    Creatinine 02/22/2018 0 63     Glucose 02/22/2018 74     Calcium 02/22/2018 9 2     AST 02/22/2018 38     ALT 02/22/2018 85*    Alkaline Phosphatase 02/22/2018 111     Total Protein 02/22/2018 6 5     Albumin 02/22/2018 2 7*    Total Bilirubin 02/22/2018 0 31     eGFR 02/22/2018 136     Creatinine, Ur 02/22/2018 263 0     Protein Urine Random 02/22/2018 37     Prot/Creat Ratio, Ur 02/22/2018 0 14*    Color, UA 02/22/2018 Yellow     Clarity, UA 02/22/2018 Clear     Specific Gravity, UA 02/22/2018 1 025     pH, UA 02/22/2018 6 0     Leukocytes, UA 02/22/2018 Negative     Nitrite, UA 02/22/2018 Negative     Protein, UA 02/22/2018 Trace*    Glucose, UA 02/22/2018 Negative     Ketones, UA 02/22/2018 Negative     Urobilinogen, UA 02/22/2018 0 2     Bilirubin, UA 02/22/2018 Negative     Blood, UA 02/22/2018 Negative     RBC, UA 02/22/2018 None Seen     WBC, UA 02/22/2018 None Seen     Epithelial Cells 02/22/2018 Moderate*    Bacteria, UA 02/22/2018 Occasional     POC Glucose 02/22/2018 71     RPR 02/22/2018 Non-Reactive     ABO Grouping 02/22/2018 A     Rh Factor 02/22/2018 Positive     Antibody Screen 02/22/2018 Negative     Specimen Expiration Date 02/22/2018 [id number]     POC Glucose 02/23/2018 65     POC Glucose 02/23/2018 77     POC Glucose 02/23/2018 69     POC Glucose 02/23/2018 68     pH, Cord Art 02/23/2018 7 276     pCO2, Cord Art 02/23/2018 46 3     pO2, Cord Art 02/23/2018 22 7     HCO3, Cord Art 02/23/2018 21 1     Base Exc, Cord Art 02/23/2018 -5 8*    O2 Content, Cord Art 02/23/2018 11 6     O2 Hgb, Arterial Cord 02/23/2018 52 7     pH, Cord Mic 02/23/2018 7 310     pCO2, Cord Mic 02/23/2018 46 1*    pO2, Cord Mic 02/23/2018 23 5     HCO3, Cord Mic 02/23/2018 22 7    [facility] Exc, Cord Mic 02/23/2018 -3 8*    O2 Cont, Cord Mic 02/23/2018 12 4     O2 HGB,VENOUS CORD 02/23/2018 55 8     WBC 02/23/2018 14 83*    RBC 02/23/2018 4 68     Hemoglobin 02/23/2018 10 5*    Hematocrit 02/23/2018 32 9*    MCV 02/23/2018 70*    MCH 02/23/2018 22 4*    MCHC 02/23/2018 31 9     RDW 02/23/2018 15 8*    Platelets 07/10/8503 228     nRBC 02/23/2018 0     Sodium 02/23/2018 139     Potassium 02/23/2018 3 5     Chloride 02/23/2018 106     CO2 02/23/2018 19*    Anion Gap 02/23/2018 14*    BUN 02/23/2018 5     Creatinine 02/23/2018 0 63     Glucose 02/23/2018 125     Calcium 02/23/2018 8 8     AST 02/23/2018 34     ALT 02/23/2018 68     Alkaline Phosphatase 02/23/2018 111     Total Protein 02/23/2018 6 0*    Albumin 02/23/2018 2 4*    Total Bilirubin 02/23/2018 0 67     eGFR 02/23/2018 136     Segmented % 02/23/2018 89*    Lymphocytes % 02/23/2018 7*    Monocytes % 02/23/2018 4     Eosinophils % 02/23/2018 0     Basophils % 02/23/2018 0     Absolute Neutrophils 02/23/2018 13 20*    Lymphocytes Absolute 02/23/2018 1 04     Monocytes Absolute 02/23/2018 0 59     Eosinophils Absolute 02/23/2018 0 00     Basophils Absolute 02/23/2018 0 00     Total Counted 02/23/2018 100     Microcytes 02/23/2018 Present     Platelet Estimate 29/51/9503 Adequate        Lab, Imaging and other studies: I have personally reviewed pertinent reports      MEDS:   Current Facility-Administered Medications   Medication Dose Route Frequency    acetaminophen (TYLENOL) tablet 650 mg  650 mg Oral Q4H PRN    aluminum-magnesium hydroxide-simethicone (MYLANTA) 200-200-20 mg/5 mL oral suspension 15 mL  15 mL Oral Q6H PRN    benzocaine-menthol-lanolin-aloe (DERMOPLAST) 20-0 5 % topical spray   Topical 4x Daily PRN    calcium carbonate (TUMS) chewable tablet 1,000 mg  1,000 mg Oral Daily PRN    diphenhydrAMINE (BENADRYL) tablet 25 mg  25 mg Oral Q6H PRN    docusate sodium (COLACE) capsule 100 mg  100 mg Oral BID    hydrocortisone 1 % cream 1 application  1 application Topical PRN    labetalol (NORMODYNE) tablet 200 mg  200 mg Oral Q12H ALICJA    lactated ringers infusion  50 mL/hr Intravenous Continuous    ondansetron (ZOFRAN) injection 4 mg  4 mg Intravenous Q8H PRN    oxyCODONE-acetaminophen (PERCOCET) 5-325 mg per tablet 1 tablet  1 tablet Oral Q4H PRN    oxyCODONE-acetaminophen (PERCOCET) 5-325 mg per tablet 2 tablet  2 tablet Oral Q4H PRN    simethicone (MYLICON) chewable tablet 80 mg  80 mg Oral 4x Daily PRN    witch hazel-glycerin (TUCKS) topical pad 1 pad  1 pad Topical PRN         Assessment:   (normal spontaneous vaginal delivery)     Plan:  PPD#1  normal diet as tolerated  Plan stop magnesium at 24 hours, just after dinner tonight  Follow blood pressures closely    Otherwise, routine postpartum care

## 2018-02-25 NOTE — DISCHARGE INSTRUCTIONS
Vaginal Delivery   WHAT YOU SHOULD KNOW:   A vaginal delivery is the birth of your baby through your vagina (birth canal)  AFTER YOU LEAVE:   Medicines:  · NSAIDs  help decrease swelling and pain or fever  This medicine is available with or without a doctor's order  NSAIDs can cause stomach bleeding or kidney problems in certain people  If you take blood thinner medicine, always ask your healthcare provider if NSAIDs are safe for you  Always read the medicine label and follow directions  · Take your medicine as directed  Call your healthcare provider if you think your medicine is not helping or if you have side effects  Tell him if you are allergic to any medicine  Keep a list of the medicines, vitamins, and herbs you take  Include the amounts, and when and why you take them  Bring the list or the pill bottles to follow-up visits  Carry your medicine list with you in case of an emergency  Follow up with your primary healthcare provider:  Most women need to return 6 weeks after a vaginal delivery  Ask about how to care for your wounds or stitches  Write down your questions so you remember to ask them during your visits  Activity:  Rest as much as possible  Try to keep all activities short  You may be able to do some exercise soon after you have your baby  Talk with your primary healthcare provider before you start exercising  If you work outside the home, ask when you can return to your job  Kegel exercises:  Kegel exercises may help your vaginal and rectal muscles heal faster  You can do Kegel exercises by tightening and relaxing the muscles around your vagina  Kegel exercises help make the muscles stronger  Breast care:  When your milk comes in, your breasts may feel full and hard  Ask how to care for your breasts, even if you are not breastfeeding  Constipation:  Do not try to push the bowel movement out if it is too hard   High-fiber foods, extra liquids, and regular exercise can help you prevent constipation  Examples of high-fiber foods are fruit and bran  Prune juice and water are good liquids to drink  Regular exercise helps your digestive system work  You may also be told to take over-the-counter fiber and stool softener medicines  Take these items as directed  Hemorrhoids:  Pregnancy can cause severe hemorrhoids  You may have rectal pain because of the hemorrhoids  Ask how to prevent or treat hemorrhoids  Perineum care: Your perineum is the area between your vagina and anus  Keep the area clean and dry to help it heal and to prevent infection  Wash the area gently with soap and water when you bathe or shower  Rinse your perineum with warm water when you use the toilet  Your primary healthcare provider may suggest you use a warm sitz bath to help decrease pain  A sitz bath is a bathtub or basin filled to hip level  Stay in the sitz bath for 20 to 30 minutes, or as directed  Vaginal discharge: You will have vaginal discharge, called lochia, after your delivery  The lochia is bright red the first day or two after the birth  By the fourth day, the amount decreases, and it turns red-brown  Use a sanitary pad rather than a tampon to prevent a vaginal infection  It is normal to have lochia up to 8 weeks after your baby is born  Monthly periods: Your period may start again within 7 to 12 weeks after your baby is born  If you are breastfeeding, it may take longer for your period to start again  You can still get pregnant again even though you do not have your monthly period  Talk with your primary healthcare provider about a birth control method that will be good for you if you do not want to get pregnant  Mood changes: Many new mothers have some kind of mood changes after delivery  Some of these changes occur because of lack of sleep, hormone changes, and caring for a new baby  Some mood changes can be more serious, such as postpartum depression   Talk with your primary healthcare provider if you feel unable to care for yourself or your baby  Sexual activity:  You may need to avoid sex for 6 to 7 weeks after you have your baby  You may notice you have a decreased desire for sex, or sex may be painful  You may need to use a vaginal lubricant (gel) to help make sex more comfortable  Contact your primary healthcare provider if:   · You have heavy vaginal bleeding that fills 1 or more sanitary pads in 1 hour  · You have a fever  · Your pain does not go away, or gets worse  · The skin between your vagina and rectum is swollen, warm, or red  · You have swollen, hard, or painful breasts  · You feel very sad or depressed  · You feel more tired than usual      · You have questions or concerns about your condition or care  Seek care immediately or call 911 if:   · You have pus or yellow drainage coming from your vagina or wound  · You are urinating very little, or not at all  · Your arm or leg feels warm, tender, and painful  It may look swollen and red  · You feel lightheaded, have sudden and worsening chest pain, or trouble breathing  You may have more pain when you take deep breaths or cough, or you may cough up blood  © 2014 [address] is for End User's use only and may not be sold, redistributed or otherwise used for commercial purposes  All illustrations and images included in CareNotes® are the copyrighted property of [employer]  or [address]  The above information is an  only  It is not intended as medical advice for individual conditions or treatments  Talk to your doctor, nurse or pharmacist before following any medical regimen to see if it is safe and effective for you  Labetalol (By mouth)   Labetalol (eq-NDVA-j-lol)  Treats high blood pressure  This medicine is a beta blocker  Brand Name(s): Trandate   There may be other brand names for this medicine    When This Medicine Should Not Be Used: You should not use this medicine if you have had an allergic reaction to any type of beta blocker medicine (such as atenolol, metoprolol, propranolol, Corgard®, Inderal®, Lopressor®, Toprol®, Tenormin®), or if you have asthma or certain heart problems  Talk with your doctor about what these heart problems are  How to Use This Medicine:   Tablet  · Your doctor will tell you how much of this medicine to take and how often  Do not take more medicine or take it more often than your doctor tells you to  · It is best to take this medicine with food  If a dose is missed:   · If you miss a dose or forget to take your medicine, take it as soon as you can  If your next dose is less than 8 hours away, wait until then to take the medicine and skip the missed dose  · Do not use extra medicine to make up for a missed dose  How to Store and Dispose of This Medicine:   · Store the medicine at room temperature, away from heat, moisture, and direct light  · Keep all medicine out of the reach of children and never share your medicine with anyone  Drugs and Foods to Avoid:   Ask your doctor or pharmacist before using any other medicine, including over-the-counter medicines, vitamins, and herbal products  · Some drugs should not be taken together because they can interact  A drug interaction may cause mild to very serious medical problems  It can also make one of the drugs not work properly or make it too strong  · There are many drugs that can interact with labetalol  This especially includes medicines for asthma, diabetes, chest pain (angina), or heart rhythm problems, or epinephrine to treat severe allergic reactions  Tell your doctor if you are using any of these medicines, or if you are using other medicines to treat your high blood pressure    Warnings While Using This Medicine:   · Make sure your doctor knows if you are pregnant or breastfeeding, or if you have emphysema, bronchitis, liver disease, overactive thyroid, diabetes, pheochromocytoma, or severe allergic reactions  · Do not stop using this medicine suddenly without asking your doctor, or you may develop life-threatening heart problems  You may need to slowly decrease your dose before stopping it completely  · This medicine may raise or lower your blood sugar, and it may cover up symptoms of very low blood sugar (hypoglycemia)  · This medicine may make you dizzy  Avoid driving, using machines, or doing anything else that could be dangerous if you are not alert  · Make sure any doctor or dentist who treats you knows that you are using this medicine  · If you stop using this medicine, your blood pressure may go up  High blood pressure usually has no symptoms  Even if you feel well, do not stop using the medicine without asking your doctor  Possible Side Effects While Using This Medicine:   Call your doctor right away if you notice any of these side effects:  · Chest pain (may be related to your disease and not a side effect)  · Fainting or severe dizziness  · Slow, fast, or irregular heartbeat  · Swelling of your feet or ankles  · Unusual bleeding or bruising  · Wheezing or trouble breathing  · Yellow eyes or skin  If you notice these less serious side effects, talk with your doctor:   · Cold hands and feet  · Feeling dizzy, drowsy, or depressed  · Trouble having sex  · Trouble sleeping  · Unusual tiredness or weakness  If you notice other side effects that you think are caused by this medicine, tell your doctor  Call your doctor for medical advice about side effects  You may report side effects to FDA at 0-313-FDA-1013  © 2017 [facility] Information is for End User's use only and may not be sold, redistributed or otherwise used for commercial purposes  The above information is an  only  It is not intended as medical advice for individual conditions or treatments   Talk to your doctor, nurse or pharmacist before following any medical regimen to see if it is safe and effective for you  Preeclampsia   WHAT YOU NEED TO KNOW:   Preeclampsia is a condition that can develop during week 20 or later of your pregnancy  Preeclampsia means you have high blood pressure and may have protein in your urine  Preeclampsia can cause mild to life-threatening health problems for you and your unborn baby  DISCHARGE INSTRUCTIONS:   Call 911 for any of the following:   · You have a seizure  · You have severe abdominal pain with nausea and vomiting  Seek care immediately if:   · You develop a severe headache that does not go away  · You have blurred or spotted vision that does not go away  · You are bleeding from your vagina  · You have new or increased swelling in your face or hands  · You are urinating little or not at all  Contact your obstetrician if:   · You are urinating less than usual      · You do not feel your baby's movement as often as usual      · You have questions or concerns about your condition or care  Medicines:   · Blood pressure medicine  helps lower your blood pressure and protects your heart, lungs, brain, and kidneys  Take your blood pressure medicine exactly as directed  · Low doses of aspirin  may be recommended after 12 weeks of pregnancy if you are at high risk for preeclampsia  Aspirin may help prevent preeclampsia or problems that can happen from preeclampsia  Do not take aspirin unless directed by your healthcare provider  · Take your medicine as directed  Contact your healthcare provider if you think your medicine is not helping or if you have side effects  Tell him or her if you are allergic to any medicine  Keep a list of the medicines, vitamins, and herbs you take  Include the amounts, and when and why you take them  Bring the list or the pill bottles to follow-up visits  Carry your medicine list with you in case of an emergency  Follow up with your obstetrician as directed:   You will need tests 1 to 2 times a week to check your condition  Tests include blood pressure checks, urine and blood tests, and fetal monitoring  Write down your questions so you remember to ask them during your visits  Steps to take if you have a seizure: You may have seizures if you develop eclampsia  You may feel confused, tense, or aggressive when the seizure ends  Tell your family, friends, or coworkers to do the following if you have a seizure:  · Clear the area to help prevent injuries from falls    · Place you on your left side so you do not choke    · Give oxygen if it is available    · Call 911     · Stay with you until medical help arrives  Blood pressure checks: You may need to check your blood pressure each day  Your obstetrician will teach you how to check your blood pressure at home  Measure your blood pressure on the same arm and in the same position each time  Write down the date and time you take your blood pressure, and bring your notes to your prenatal visits  Kick counts: You may need to keep track of how often your baby moves or kicks over a certain amount of time  Ask your obstetrician how to do kick counts and how often to do them  Daily weight:  Weigh yourself every day before breakfast  Weight gain can be a sign of extra fluid in your body  Call your obstetrician if you have gained 2 or more pounds in a week  Rest:  Your obstetrician may tell you to rest more often if you have mild symptoms of preeclampsia  You may need total bedrest for more severe symptoms  Try to lie on your left side  © 2017 [facility] Information is for End User's use only and may not be sold, redistributed or otherwise used for commercial purposes  All illustrations and images included in CareNotes® are the copyrighted property of [employer] , [employer]  or Davon Marquez  The above information is an  only  It is not intended as medical advice for individual conditions or treatments   Talk to your doctor, nurse or pharmacist before following any medical regimen to see if it is safe and effective for you  Nifedipine (By mouth)   Nifedipine (xxz-OLE-m-peen)  Treats high blood pressure and angina (chest pain)  This medicine is a calcium channel blocker  Brand Name(s): Adalat CC, Afeditab CR, Nifedical XL, Procardia, Procardia XL   There may be other brand names for this medicine  When This Medicine Should Not Be Used: This medicine is not right for everyone  Do not use it if you had an allergic reaction to nifedipine  How to Use This Medicine:   Capsule, Liquid Filled Capsule, Long Acting Tablet  · Take your medicine as directed  Your dose may need to be changed several times to find what works best for you  · It is best to take this medicine on an empty stomach  · Swallow the capsule or tablet whole  Do not break, crush, or chew it  · If you take the extended-release tablet, part of the tablet may pass into your stools  This is normal and is nothing to worry about  · Missed dose: Take a dose as soon as you remember  If it is almost time for your next dose, wait until then and take a regular dose  Do not take extra medicine to make up for a missed dose  · Store the medicine in a closed container at room temperature, away from heat, moisture, and direct light  Drugs and Foods to Avoid:   Ask your doctor or pharmacist before using any other medicine, including over-the-counter medicines, vitamins, and herbal products  · Some foods and medicines can affect how nifedipine works  Tell your doctor if you are using the following:   ¨ Cimetidine, clarithromycin, digoxin, erythromycin, fentanyl, fluconazole, fluoxetine, indinavir, itraconazole, nefazodone, nelfinavir, phenytoin, quinidine, ranitidine, saquinavir  ¨ Beta-blockers  ¨ Blood thinner (such as warfarin)  · Do not eat grapefruit or drink grapefruit juice while you are using this medicine    Warnings While Using This Medicine: · Tell your doctor if you are pregnant or breastfeeding, or if you have stomach problems, heart failure, coronary artery disease, or recently had a heart attack  · This medicine may cause the following problems:   ¨ Heart failure  ¨ Worsening chest pain or increased risk of heart attack  ¨ Stomach or bowel blockage or ulcers  · This medicine could lower your blood pressure too much, especially when you first use it or if you are dehydrated  Stand or sit up slowly if you feel lightheaded or dizzy  · Do not stop using this medicine without asking your doctor, even if you feel well  This medicine will not cure high blood pressure, but it will help keep it in normal range  You may have to take blood pressure medicine for the rest of your life  · Tell any doctor or dentist who treats you that you are using this medicine  You may need to stop using this medicine several days before you have surgery or medical tests  · Tell any doctor or dentist who treats you that you are using this medicine  This medicine may affect certain medical test results  · Your doctor will do lab tests at regular visits to check on the effects of this medicine  Keep all appointments  · Keep all medicine out of the reach of children  Never share your medicine with anyone    Possible Side Effects While Using This Medicine:   Call your doctor right away if you notice any of these side effects:  · Allergic reaction: Itching or hives, swelling in your face or hands, swelling or tingling in your mouth or throat, chest tightness, trouble breathing  · Blistering, peeling, red skin rash  · Chest pain that may spread, trouble breathing, nausea, unusual sweating, fainting  · Fast, pounding, or uneven heartbeat  · Lightheadedness, dizziness, or fainting  · Rapid weight gain, swelling in your hands, ankles, or feet  · Stomach pain or bloating, nausea, vomiting, weight loss  If you notice these less serious side effects, talk with your doctor: · Headache  · Muscle cramps or tremors  · Warmth or redness in your face, neck, arms, or upper chest  If you notice other side effects that you think are caused by this medicine, tell your doctor  Call your doctor for medical advice about side effects  You may report side effects to FDA at 2-493-FDA-3783  © 2017 [address] Information is for End User's use only and may not be sold, redistributed or otherwise used for commercial purposes  The above information is an  only  It is not intended as medical advice for individual conditions or treatments  Talk to your doctor, nurse or pharmacist before following any medical regimen to see if it is safe and effective for you

## 2018-02-25 NOTE — PLAN OF CARE
INFECTION - ADULT     Absence or prevention of progression during hospitalization Progressing        PAIN - ADULT     Verbalizes/displays adequate comfort level or baseline comfort level Progressing        POSTPARTUM     Experiences normal postpartum course Progressing     Appropriate maternal -  bonding Progressing     Establishment of infant feeding pattern Progressing     Incision(s), wounds(s) or drain site(s) healing without S/S of infection Progressing        SAFETY ADULT     Patient will remain free of falls Progressing

## 2018-02-26 ENCOUNTER — TELEPHONE (OUTPATIENT)
Dept: OBGYN CLINIC | Facility: CLINIC | Age: 33
End: 2018-02-26

## 2018-02-26 DIAGNOSIS — O10.919 CHRONIC HYPERTENSION IN PREGNANCY: ICD-10-CM

## 2018-02-26 RX ORDER — NIFEDIPINE 30 MG/1
TABLET, FILM COATED, EXTENDED RELEASE ORAL
Qty: 90 TABLET | Refills: 0 | OUTPATIENT
Start: 2018-02-26

## 2018-02-26 NOTE — TELEPHONE ENCOUNTER
Bill, as you know this patient delivered with induction for chronic hypertension  Ultimately, she had severe range blood pressures after delivery and was placed on magnesium for 24 hours  She was continuing to have intermittently elevated blood pressures and was ultimately discharged on her continue labetalol 200 mg twice daily along with Procardia XL 30 mg daily  She was instructed to follow up in 1-2 weeks time for blood pressure check with you

## 2018-02-26 NOTE — CASE MANAGEMENT
Notification of Maternity Inpatient Admission/Maternity Inpatient Authorization Request  This is a Notification of Maternity Inpatient Admission/Maternity Inpatient Authorization Request to our facility [facility]  Please be advised that this patient is currently in our facility under Inpatient Status  Below you will find the Birth/[place] Summary, Attending Physician and Facilitys information including NPI# and contact for the Utilization  assigned to the [facility] where the patient is receiving services  Please feel free to contact the Utilization Review Department with any questions  Mothers Information:  Damien Sterling  MRN: [id number]  YOB: 1985  Admission Date: 2018  5:20 PM  Discharge Date: 2018  5:06 PM  Disposition: Home/Self Care  Admitting Diagnosis:   O80 VAGINAL DELIVERY  Hypertension affecting pregnancy in third trimester [O16 3]  [place]z Information:  Estimated Date of Delivery: 3/7/18  Information for the patient's :  Carolyn Divya [[id number]]      Delivery Information:  Sex: male  Delivered 2018 2:31 PM by Vaginal, Spontaneous Delivery; Gestational Age: 36w4d    [place]z Measurements:  Weight: 7 lb 13 oz (3544 g); Height: 20"    APGAR 1 minute 5 minutes 10 minutes   Totals: 9 9      OB History      Para Term  AB Living    7 3 3 0 4 3    SAB TAB Ectopic Multiple Live Births    1 3   0 3        Attending Physician:  NAIF Mina    Specialty- Obstetrics and Gynecology  [facility] ID- [id number]  [address] [place] [address]  [facility] [address]   Phone 1: [phone]  Fax: [phone]    Facility: [place] [address] [address] [facility]  [phone]  Tax ID: [id number]  NPI: [id number]    [facility] in the [facility] by Davon Marquez for 2017  Network Utilization Review Department  Phone: [phone]; Fax [phone]  ATTENTION: The Network Utilization Review Department is now centralized for our 7 Facilities  Make a note that we have a new phone and fax numbers for our Department  Please call with any questions or concerns to [phone] and carefully follow the prompts so that you are directed to the right person  All voicemails are confidential  Fax any determinations, approvals, denials, and requests for initial or continue stay review clinical to [phone]  **Due to HIGH CALL volume, it would be easier if you could please send daily logs  This will expedite your requests and in part, help us provide discharge notifications faster   **

## 2018-02-26 NOTE — TELEPHONE ENCOUNTER
Patient requesting 90 day supply of Procardia XL 30 mg that was added to her regimen for chronic hypertension management in hospital  Given 2 week supply as she is to follow up in office in 1 week post discharge for blood pressure check to see if she will require to continue this medication long term or return to her previous regimen of labetalol 200mg BID      Donny Story MD  2/26/2018

## 2018-03-01 LAB — PLACENTA IN STORAGE: NORMAL

## 2018-03-07 ENCOUNTER — GENERIC CONVERSION - ENCOUNTER (OUTPATIENT)
Dept: OBGYN CLINIC | Facility: CLINIC | Age: 33
End: 2018-03-07

## 2018-03-14 ENCOUNTER — TELEPHONE (OUTPATIENT)
Dept: OBGYN CLINIC | Facility: CLINIC | Age: 33
End: 2018-03-14

## 2018-03-14 NOTE — TELEPHONE ENCOUNTER
All scheduled appointments here are important  Patient does need a follow-up visit at her earliest convenience to recheck her blood pressure

## 2018-03-14 NOTE — TELEPHONE ENCOUNTER
Patient called and cancelled appointment for today,stating she had to take her son some place, patient said her appointment here wasn't important

## 2018-03-16 DIAGNOSIS — Z3A.34 34 WEEKS GESTATION OF PREGNANCY: ICD-10-CM

## 2018-03-16 RX ORDER — PNV,CALCIUM 72/IRON/FOLIC ACID 27 MG-1 MG
TABLET ORAL
Qty: 30 TABLET | Refills: 0 | Status: SHIPPED | OUTPATIENT
Start: 2018-03-16 | End: 2018-05-23 | Stop reason: SDUPTHER

## 2018-05-23 DIAGNOSIS — Z3A.34 34 WEEKS GESTATION OF PREGNANCY: ICD-10-CM

## 2018-05-23 RX ORDER — ASPIRIN 81 MG/1
TABLET, CHEWABLE ORAL
Qty: 30 TABLET | Refills: 0 | Status: SHIPPED | OUTPATIENT
Start: 2018-05-23 | End: 2018-08-01 | Stop reason: SDUPTHER

## 2018-05-23 RX ORDER — PNV,CALCIUM 72/IRON/FOLIC ACID 27 MG-1 MG
TABLET ORAL
Qty: 30 TABLET | Refills: 0 | Status: SHIPPED | OUTPATIENT
Start: 2018-05-23 | End: 2018-08-01 | Stop reason: SDUPTHER

## 2018-05-23 RX ORDER — DOCUSATE SODIUM 100 MG/1
CAPSULE, LIQUID FILLED ORAL
Qty: 60 CAPSULE | Refills: 0 | Status: SHIPPED | OUTPATIENT
Start: 2018-05-23 | End: 2021-02-26 | Stop reason: SDUPTHER

## 2018-07-03 ENCOUNTER — APPOINTMENT (EMERGENCY)
Dept: CT IMAGING | Facility: HOSPITAL | Age: 33
End: 2018-07-03
Payer: COMMERCIAL

## 2018-07-03 ENCOUNTER — HOSPITAL ENCOUNTER (EMERGENCY)
Facility: HOSPITAL | Age: 33
Discharge: HOME | End: 2018-07-03
Attending: EMERGENCY MEDICINE | Admitting: EMERGENCY MEDICINE
Payer: COMMERCIAL

## 2018-07-03 VITALS
TEMPERATURE: 98.6 F | BODY MASS INDEX: 40.17 KG/M2 | WEIGHT: 234 LBS | SYSTOLIC BLOOD PRESSURE: 168 MMHG | HEART RATE: 82 BPM | DIASTOLIC BLOOD PRESSURE: 76 MMHG | OXYGEN SATURATION: 97 % | RESPIRATION RATE: 16 BRPM

## 2018-07-03 DIAGNOSIS — R10.12 COLICKY LUQ ABDOMINAL PAIN: Primary | ICD-10-CM

## 2018-07-03 DIAGNOSIS — R19.7 DIARRHEA: ICD-10-CM

## 2018-07-03 LAB
ALBUMIN SERPL BCP-MCNC: 3.7 G/DL (ref 3.5–5)
ALP SERPL-CCNC: 67 U/L (ref 46–116)
ALT SERPL W P-5'-P-CCNC: 20 U/L (ref 12–78)
AMORPH URATE CRY URNS QL MICRO: ABNORMAL /HPF
ANION GAP SERPL CALCULATED.3IONS-SCNC: 8 MMOL/L (ref 4–13)
AST SERPL W P-5'-P-CCNC: 12 U/L (ref 5–45)
BACTERIA UR QL AUTO: ABNORMAL /HPF
BASOPHILS # BLD AUTO: 0.03 THOUSANDS/ΜL (ref 0–0.1)
BASOPHILS NFR BLD AUTO: 0 % (ref 0–1)
BILIRUB DIRECT SERPL-MCNC: 0.09 MG/DL (ref 0–0.2)
BILIRUB SERPL-MCNC: 0.28 MG/DL (ref 0.2–1)
BILIRUB UR QL STRIP: NEGATIVE
BUN SERPL-MCNC: 8 MG/DL (ref 5–25)
CALCIUM SERPL-MCNC: 9.2 MG/DL (ref 8.3–10.1)
CHLORIDE SERPL-SCNC: 104 MMOL/L (ref 100–108)
CLARITY UR: CLEAR
CLARITY, POC: CLEAR
CO2 SERPL-SCNC: 28 MMOL/L (ref 21–32)
COLOR UR: YELLOW
COLOR, POC: YELLOW
CREAT SERPL-MCNC: 0.95 MG/DL (ref 0.6–1.3)
EOSINOPHIL # BLD AUTO: 0.28 THOUSAND/ΜL (ref 0–0.61)
EOSINOPHIL NFR BLD AUTO: 4 % (ref 0–6)
ERYTHROCYTE [DISTWIDTH] IN BLOOD BY AUTOMATED COUNT: 15.3 % (ref 11.6–15.1)
EXT PREG TEST URINE: NEGATIVE
GFR SERPL CREATININE-BSD FRML MDRD: 91 ML/MIN/1.73SQ M
GLUCOSE SERPL-MCNC: 106 MG/DL (ref 65–140)
GLUCOSE UR STRIP-MCNC: NEGATIVE MG/DL
HCT VFR BLD AUTO: 35.5 % (ref 34.8–46.1)
HGB BLD-MCNC: 10.9 G/DL (ref 11.5–15.4)
HGB UR QL STRIP.AUTO: NEGATIVE
KETONES UR STRIP-MCNC: NEGATIVE MG/DL
LACTATE SERPL-SCNC: 0.8 MMOL/L (ref 0.5–2)
LEUKOCYTE ESTERASE UR QL STRIP: ABNORMAL
LYMPHOCYTES # BLD AUTO: 1.6 THOUSANDS/ΜL (ref 0.6–4.47)
LYMPHOCYTES NFR BLD AUTO: 24 % (ref 14–44)
MCH RBC QN AUTO: 21.4 PG (ref 26.8–34.3)
MCHC RBC AUTO-ENTMCNC: 30.7 G/DL (ref 31.4–37.4)
MCV RBC AUTO: 70 FL (ref 82–98)
MONOCYTES # BLD AUTO: 0.4 THOUSAND/ΜL (ref 0.17–1.22)
MONOCYTES NFR BLD AUTO: 6 % (ref 4–12)
MUCOUS THREADS UR QL AUTO: ABNORMAL
NEUTROPHILS # BLD AUTO: 4.49 THOUSANDS/ΜL (ref 1.85–7.62)
NEUTS SEG NFR BLD AUTO: 66 % (ref 43–75)
NITRITE UR QL STRIP: NEGATIVE
NON-SQ EPI CELLS URNS QL MICRO: ABNORMAL /HPF
NRBC BLD AUTO-RTO: 0 /100 WBCS
PH UR STRIP.AUTO: 6.5 [PH] (ref 4.5–8)
PLATELET # BLD AUTO: 373 THOUSANDS/UL (ref 149–390)
PMV BLD AUTO: 10.6 FL (ref 8.9–12.7)
POTASSIUM SERPL-SCNC: 3.5 MMOL/L (ref 3.5–5.3)
PROT SERPL-MCNC: 7.1 G/DL (ref 6.4–8.2)
PROT UR STRIP-MCNC: ABNORMAL MG/DL
RBC # BLD AUTO: 5.1 MILLION/UL (ref 3.81–5.12)
RBC #/AREA URNS AUTO: ABNORMAL /HPF
SODIUM SERPL-SCNC: 140 MMOL/L (ref 136–145)
SP GR UR STRIP.AUTO: >=1.03 (ref 1–1.03)
UROBILINOGEN UR QL STRIP.AUTO: 0.2 E.U./DL
WBC # BLD AUTO: 6.8 THOUSAND/UL (ref 4.31–10.16)
WBC #/AREA URNS AUTO: ABNORMAL /HPF

## 2018-07-03 PROCEDURE — 85025 COMPLETE CBC W/AUTO DIFF WBC: CPT | Performed by: EMERGENCY MEDICINE

## 2018-07-03 PROCEDURE — 83605 ASSAY OF LACTIC ACID: CPT | Performed by: EMERGENCY MEDICINE

## 2018-07-03 PROCEDURE — 96361 HYDRATE IV INFUSION ADD-ON: CPT

## 2018-07-03 PROCEDURE — 81025 URINE PREGNANCY TEST: CPT | Performed by: EMERGENCY MEDICINE

## 2018-07-03 PROCEDURE — 81001 URINALYSIS AUTO W/SCOPE: CPT

## 2018-07-03 PROCEDURE — 99284 EMERGENCY DEPT VISIT MOD MDM: CPT

## 2018-07-03 PROCEDURE — 96374 THER/PROPH/DIAG INJ IV PUSH: CPT

## 2018-07-03 PROCEDURE — 80048 BASIC METABOLIC PNL TOTAL CA: CPT | Performed by: EMERGENCY MEDICINE

## 2018-07-03 PROCEDURE — 96375 TX/PRO/DX INJ NEW DRUG ADDON: CPT

## 2018-07-03 PROCEDURE — 80076 HEPATIC FUNCTION PANEL: CPT | Performed by: EMERGENCY MEDICINE

## 2018-07-03 PROCEDURE — 36415 COLL VENOUS BLD VENIPUNCTURE: CPT | Performed by: EMERGENCY MEDICINE

## 2018-07-03 PROCEDURE — 74177 CT ABD & PELVIS W/CONTRAST: CPT

## 2018-07-03 RX ORDER — DICYCLOMINE HCL 20 MG
20 TABLET ORAL EVERY 6 HOURS PRN
Qty: 20 TABLET | Refills: 0 | Status: SHIPPED | OUTPATIENT
Start: 2018-07-03 | End: 2020-11-04

## 2018-07-03 RX ORDER — FENTANYL CITRATE 50 UG/ML
50 INJECTION, SOLUTION INTRAMUSCULAR; INTRAVENOUS ONCE
Status: COMPLETED | OUTPATIENT
Start: 2018-07-03 | End: 2018-07-03

## 2018-07-03 RX ORDER — LABETALOL 100 MG/1
200 TABLET, FILM COATED ORAL ONCE
Status: COMPLETED | OUTPATIENT
Start: 2018-07-03 | End: 2018-07-03

## 2018-07-03 RX ORDER — KETOROLAC TROMETHAMINE 30 MG/ML
30 INJECTION, SOLUTION INTRAMUSCULAR; INTRAVENOUS ONCE
Status: COMPLETED | OUTPATIENT
Start: 2018-07-03 | End: 2018-07-03

## 2018-07-03 RX ADMIN — IOHEXOL 100 ML: 350 INJECTION, SOLUTION INTRAVENOUS at 04:42

## 2018-07-03 RX ADMIN — KETOROLAC TROMETHAMINE 30 MG: 30 INJECTION, SOLUTION INTRAMUSCULAR at 03:56

## 2018-07-03 RX ADMIN — SODIUM CHLORIDE 1000 ML: 0.9 INJECTION, SOLUTION INTRAVENOUS at 03:54

## 2018-07-03 RX ADMIN — LABETALOL HYDROCHLORIDE 200 MG: 100 TABLET, FILM COATED ORAL at 04:33

## 2018-07-03 RX ADMIN — FENTANYL CITRATE 50 MCG: 50 INJECTION, SOLUTION INTRAMUSCULAR; INTRAVENOUS at 03:57

## 2018-07-03 NOTE — DISCHARGE INSTRUCTIONS
Acute Diarrhea   WHAT YOU NEED TO KNOW:   What is acute diarrhea? Acute diarrhea starts quickly and lasts a short time, usually 1 to 3 days  It can last up to 2 weeks  What causes acute diarrhea? · Bacteria, such as E coli or salmonella    · Viruses, such as rotavirus and norovirus    · A parasite, such as giardia    · Medicines, such as laxatives, antacids, or antibiotics    · An allergy to lactose, soy, or gluten    · Eating food or drinking water that contains germs    · Medical treatments, such as chemotherapy or radiation  What other signs and symptoms might I have with acute diarrhea? You may have 3 or more episodes of diarrhea  It may be hard to control your diarrhea  You may also have any of the following:  · Fever and chills    · Headache or abdominal pain    · Nausea and vomiting    · Symptoms of dehydration such as thirst, decreased urination, dry skin, sunken eyes, or fast, pounding heartbeat  What does my healthcare provider need to know about my acute diarrhea? Your healthcare provider will ask about your symptoms  He or she will ask what you have recently eaten and if you have traveled to other countries  Tell the provider what medicines you use or if you have been around anyone who is sick  Your healthcare provider may check you for signs of dehydration  How is acute diarrhea treated? Acute diarrhea usually gets better without treatment  You may need any of the following if your diarrhea is severe or lasts longer than a few days:  · Diarrhea medicine  is an over-the-counter medicine that helps slow or stop your diarrhea  · Antibiotics  may be given to help treat an infection caused by bacteria  · Parasite medicine  may be given to treat an infection caused by parasites  How can acute diarrhea be managed? · Drink liquids as directed  Liquids will help prevent dehydration caused by diarrhea   Ask your healthcare provider how much liquid to drink each day and which liquids are best for you  You may need to drink an oral rehydration solution (ORS)  An ORS has the right amounts of water, salts, and sugar you need to replace body fluids  You can buy an ORS at most grocery stores and pharmacies  · Eat foods that are easy to digest   Examples include rice, lentils, cereal, bananas, potatoes, and bread  It also includes some fruits (bananas, melon), well-cooked vegetables, and lean meats  Avoid foods high in fiber, fat, and sugar  Also avoid caffeine, alcohol, dairy, and red meat until your diarrhea is gone  How can acute diarrhea be prevented? · Wash your hands often  Use soap and water  Wash your hands before you eat or prepare food  Also wash your hands after you use the bathroom  Use an alcohol-based hand gel when soap and water are not available  · Keep bathroom surfaces clean  This helps prevent the spread of germs that cause acute diarrhea  · Wash fruits and vegetables well before you eat them  This can help remove germs that cause diarrhea  If possible, remove the skin from fruits and vegetables, or cook them well before you eat them  · Cook meat as directed  ¨ Cook ground meat  to 160°F      ¨ Cook ground poultry, whole poultry, or cuts of poultry  to at least 165°F  Remove the meat from heat  Let it stand for 3 minutes before you eat it  ¨ Cook whole cuts of meat other than poultry  to at least 145°F  Remove the meat from heat  Let it stand for 3 minutes before you eat it  · Wash dishes that have touched raw meat with hot water and soap  This includes cutting boards, utensils, dishes, and serving containers  · Place raw or cooked meat in the refrigerator as soon as possible  Bacteria can grow in meat that is left at room temperature too long  · Do not eat raw or undercooked oysters, clams, or mussels  These foods may be contaminated and cause infection  · Drink filtered or treated water only when you travel    Do not put ice in your drinks  Drink bottled water whenever possible  When should I seek immediate care? · You feel confused  · Your heartbeat is faster than normal      · Your eyes look deeply sunken, or you have no tears when you cry  · You urinate less than usual, or your urine is dark yellow  · You have blood or mucus in your stools  · You have severe abdominal pain  · You are unable to drink any liquids  When should I contact my healthcare provider? · Your symptoms do not get better with treatment  · You have a fever higher than 101 3°F (38 5°C)  · You have trouble eating and drinking because you are vomiting  · You are thirsty or have a dry mouth  · Your diarrhea does not get better in 7 days  · You have questions or concerns about your condition or care  CARE AGREEMENT:   You have the right to help plan your care  Learn about your health condition and how it may be treated  Discuss treatment options with your caregivers to decide what care you want to receive  You always have the right to refuse treatment  The above information is an  only  It is not intended as medical advice for individual conditions or treatments  Talk to your doctor, nurse or pharmacist before following any medical regimen to see if it is safe and effective for you  © 2017 [employer]  Information is for End User's use only and may not be sold, redistributed or otherwise used for commercial purposes  All illustrations and images included in CareNotes® are the copyrighted property of [employer]  or [employer]quez  Acute Abdominal Pain   WHAT YOU NEED TO KNOW:   The cause of your abdominal pain may not be found  If a cause is found, treatment will depend on what the cause is  DISCHARGE INSTRUCTIONS:   Seek care immediately if:   · You vomit blood or cannot stop vomiting  · You have blood in your bowel movement or it looks like tar       · You have bleeding from your rectum  · Your abdomen is larger than usual, more painful, and hard  · You have severe pain in your abdomen  · You stop passing gas and having bowel movements  · You feel weak, dizzy, or faint  Contact your healthcare provider if:   · You have a fever  · You have new signs and symptoms  · Your symptoms do not get better with treatment  · You have questions or concerns about your condition or care  Medicines  may be given to decrease pain, treat an infection, and manage your symptoms  Take your medicine as directed  Call your healthcare provider if you think your medicine is not helping or if you have side effects  Tell him if you are allergic to any medicine  Keep a list of the medicines, vitamins, and herbs you take  Include the amounts, and when and why you take them  Bring the list or the pill bottles to follow-up visits  Carry your medicine list with you in case of an emergency  Manage your symptoms:   · Apply heat  on your abdomen for 20 to 30 minutes every 2 hours for as many days as directed  Heat helps decrease pain and muscle spasms  · Manage your stress  Stress may cause abdominal pain  Your healthcare provider may recommend relaxation techniques and deep breathing exercises to help decrease your stress  Your healthcare provider may recommend you talk to someone about your stress or anxiety, such as a counselor or a trusted friend  Get plenty of sleep and exercise regularly  · Limit or do not drink alcohol  Alcohol can make your abdominal pain worse  Ask your healthcare provider if it is safe for you to drink alcohol  Also ask how much is safe for you to drink  · Do not smoke  Nicotine and other chemicals in cigarettes can damage your esophagus and stomach  Ask your healthcare provider for information if you currently smoke and need help to quit  E-cigarettes or smokeless tobacco still contain nicotine   Talk to your healthcare provider before you use these products  Make changes to the food you eat as directed:  Do not eat foods that cause abdominal pain or other symptoms  Eat small meals more often  · Eat more high-fiber foods if you are constipated  High-fiber foods include fruits, vegetables, whole-grain foods, and legumes  · Do not eat foods that cause gas if you have bloating  Examples include broccoli, cabbage, and cauliflower  Do not drink soda or carbonated drinks, because these may also cause gas  · Do not eat foods or drinks that contain sorbitol or fructose if you have diarrhea and bloating  Some examples are fruit juices, candy, jelly, and sugar-free gum  · Do not eat high-fat foods, such as fried foods, cheeseburgers, hot dogs, and desserts  · Limit or do not drink caffeine  Caffeine may make symptoms, such as heart burn or nausea, worse  · Drink plenty of liquids to prevent dehydration from diarrhea or vomiting  Ask your healthcare provider how much liquid to drink each day and which liquids are best for you  Follow up with your healthcare provider as directed:  Write down your questions so you remember to ask them during your visits  © 2017 [employer]  Information is for End User's use only and may not be sold, redistributed or otherwise used for commercial purposes  All illustrations and images included in CareNotes® are the copyrighted property of [employer]  or [employer] Marquez  The above information is an  only  It is not intended as medical advice for individual conditions or treatments  Talk to your doctor, nurse or pharmacist before following any medical regimen to see if it is safe and effective for you

## 2018-07-03 NOTE — ED PROVIDER NOTES
History  Chief Complaint   Patient presents with    Abdominal Pain     Pt reports LUQ pain started 1 hour ago,reports nausea  Pt also c/o right arm pain     33 yo female who began about 1 hour PTA with LUQ abd pain  Has had some diarrhea in recent days "I think its all the BBQ food we've been eating" but denies N/V, fever or chills  No associated SOB/CP or cough  History provided by:  Patient   used: No    Abdominal Pain   Pain location:  LUQ  Pain quality: aching    Pain radiates to:  Does not radiate  Pain severity:  Severe  Onset quality:  Sudden  Duration:  1 hour  Timing:  Constant  Progression:  Unchanged  Chronicity:  New  Relieved by:  Nothing  Worsened by:  Nothing  Ineffective treatments:  None tried  Associated symptoms: diarrhea    Associated symptoms: no chest pain, no chills, no dysuria, no fatigue, no fever, no nausea, no shortness of breath, no sore throat, no vaginal bleeding, no vaginal discharge and no vomiting        Prior to Admission Medications   Prescriptions Last Dose Informant Patient Reported? Taking?    ACCU-CHEK FASTCLIX LANCETS MISC   Yes No   Si Stick 4 (four) times a day Test    MG capsule   No No   Sig: TAKE 1 CAPSULE BY MOUTH TWICE DAILY   NIFEdipine (ADALAT CC) 30 MG 24 hr tablet   No No   Sig: Take 1 tablet (30 mg total) by mouth daily   Prenatal Vit-Fe Fumarate-FA (PREPLUS) 27-1 MG TABS   No No   Sig: TAKE 1 TABLET BY MOUTH EVERY DAY   acetaminophen (TYLENOL) 325 mg tablet   No No   Sig: headache   aspirin (ECOTRIN LOW STRENGTH) 81 mg EC tablet  Self Yes No   Sig: Take 81 mg by mouth daily   aspirin 81 mg chewable tablet   No No   Sig: CHEW AND SWALLOW 1 TABLET BY MOUTH EVERY DAY   benzocaine-menthol-lanolin-aloe (DERMOPLAST) 20-0 5 % topical spray   No No   Sig: Apply 1 application topically 4 (four) times a day as needed for mild pain   ferrous gluconate (FERGON) 240 (27 FE) MG tablet   No No   Sig: Take 1 tablet (240 mg total) by mouth 2 (two) times a day for 90 days   labetalol (NORMODYNE) 200 mg tablet  Self Yes No   Sig: Take 200 mg by mouth 2 (two) times a day   witch hazel-glycerin (TUCKS) topical pad   No No   Sig: Apply 1 pad topically as needed for irritation      Facility-Administered Medications: None       Past Medical History:   Diagnosis Date    Amenorrhea     16    Early stage of pregnancy 2017    Preeclampsia        Past Surgical History:   Procedure Laterality Date    INDUCED       CA SURG RX MISSED ABORTN,1ST TRI N/A 2016    Procedure: DILATATION AND EVACUATION (D&E); Surgeon: Sam Connolly MD;  Location: [facility] OR;  Service: Gynecology       Family History   Problem Relation Age of Onset    Hypertension Mother     Hypertension Father      I have reviewed and agree with the history as documented  Social History   Substance Use Topics    Smoking status: Former Smoker     Types: Cigarettes     Quit date:     Smokeless tobacco: Never Used      Comment: Former smoker per [employer]    Alcohol use No        Review of Systems   Constitutional: Negative for appetite change, chills, fatigue and fever  HENT: Negative for congestion and sore throat  Eyes: Negative for visual disturbance  Respiratory: Negative for shortness of breath  Cardiovascular: Negative for chest pain  Gastrointestinal: Positive for abdominal pain (LUQ) and diarrhea  Negative for nausea and vomiting  Genitourinary: Negative for difficulty urinating, dysuria, frequency, vaginal bleeding and vaginal discharge  Musculoskeletal: Positive for arthralgias (R arm pain past few days - now gone)  Negative for back pain, neck pain and neck stiffness  Skin: Negative for pallor and rash  Allergic/Immunologic: Negative for immunocompromised state  Neurological: Negative for light-headedness and headaches  Psychiatric/Behavioral: Negative for confusion     All other systems reviewed and are negative  Physical Exam  Physical Exam   Constitutional: She is oriented to person, place, and time  She appears well-developed and well-nourished  No distress  HENT:   Head: Normocephalic and atraumatic  Mouth/Throat: Oropharynx is clear and moist    Eyes: EOM are normal  Pupils are equal, round, and reactive to light  Neck: Normal range of motion  Neck supple  Cardiovascular: Normal rate and regular rhythm  No murmur heard  Pulmonary/Chest: Effort normal and breath sounds normal  No respiratory distress  Abdominal: Soft  Bowel sounds are normal  There is tenderness (moderate LUQ tenderness, no fullness or masses felt, no splenomegaly)  Musculoskeletal: Normal range of motion  Neurological: She is alert and oriented to person, place, and time  Skin: Skin is warm  Capillary refill takes less than 2 seconds  No rash noted  No pallor  Psychiatric: She has a normal mood and affect  Her behavior is normal    Nursing note and vitals reviewed        Vital Signs  ED Triage Vitals [07/03/18 0326]   Temperature Pulse Respirations Blood Pressure SpO2   98 6 °F (37 °C) 87 16 (!) 187/105 100 %      Temp Source Heart Rate Source Patient Position - Orthostatic VS BP Location FiO2 (%)   Temporal Monitor Sitting Left arm --      Pain Score       8           Vitals:    07/03/18 0326 07/03/18 0429   BP: (!) 187/105 (!) 180/98   Pulse: 87 86   Patient Position - Orthostatic VS: Sitting        Visual Acuity      ED Medications  Medications   sodium chloride 0 9 % bolus 1,000 mL (0 mL Intravenous Stopped 7/3/18 0516)   ketorolac (TORADOL) injection 30 mg (30 mg Intravenous Given 7/3/18 0356)   fentanyl citrate (PF) 100 MCG/2ML 50 mcg (50 mcg Intravenous Given 7/3/18 0357)   labetalol (NORMODYNE) tablet 200 mg (200 mg Oral Given 7/3/18 1083)   iohexol (OMNIPAQUE) 350 MG/ML injection (SINGLE-DOSE) 100 mL (100 mL Intravenous Given 7/3/18 6502)       Diagnostic Studies  Results Reviewed     Procedure Component Value Units Date/Time    Lactic acid, plasma [[id number]]  (Normal) Collected:  07/03/18 0353    Lab Status:  Final result Specimen:  Blood from Arm, Left Updated:  07/03/18 0426     LACTIC ACID 0 8 mmol/L     Narrative:         Result may be elevated if tourniquet was used during collection  Basic metabolic panel [[id number]] Collected:  07/03/18 0353    Lab Status:  Final result Specimen:  Blood from Arm, Left Updated:  07/03/18 0423     Sodium 140 mmol/L      Potassium 3 5 mmol/L      Chloride 104 mmol/L      CO2 28 mmol/L      Anion Gap 8 mmol/L      BUN 8 mg/dL      Creatinine 0 95 mg/dL      Glucose 106 mg/dL      Calcium 9 2 mg/dL      eGFR 91 ml/min/1 73sq m     Narrative:         National Kidney Disease Education Program recommendations are as follows:  GFR calculation is accurate only with a steady state creatinine  Chronic Kidney disease less than 60 ml/min/1 73 sq  meters  Kidney failure less than 15 ml/min/1 73 sq  meters      Hepatic function panel [[id number]]  (Normal) Collected:  07/03/18 0353    Lab Status:  Final result Specimen:  Blood from Arm, Left Updated:  07/03/18 0423     Total Bilirubin 0 28 mg/dL      Bilirubin, Direct 0 09 mg/dL      Alkaline Phosphatase 67 U/L      AST 12 U/L      ALT 20 U/L      Total Protein 7 1 g/dL      Albumin 3 7 g/dL     Urine Microscopic [[id number]]  (Abnormal) Collected:  07/03/18 0342    Lab Status:  Final result Specimen:  Urine from Urine, Clean Catch Updated:  07/03/18 0406     RBC, UA None Seen /hpf      WBC, UA 2-4 (A) /hpf      Epithelial Cells Moderate (A) /hpf      Bacteria, UA Innumerable (A) /hpf      AMORPH URATES Moderate /hpf      MUCOUS THREADS Occasional (A)    CBC and differential [[id number]]  (Abnormal) Collected:  07/03/18 0353    Lab Status:  Final result Specimen:  Blood from Arm, Left Updated:  07/03/18 0405     WBC 6 80 Thousand/uL      RBC 5 10 Million/uL      Hemoglobin 10 9 (L) g/dL      Hematocrit 35 5 %      MCV 70 (L) fL      MCH 21 4 (L) pg      MCHC 30 7 (L) g/dL      RDW 15 3 (H) %      MPV 10 6 fL      Platelets 816 Thousands/uL      nRBC 0 /100 WBCs      Neutrophils Relative 66 %      Lymphocytes Relative 24 %      Monocytes Relative 6 %      Eosinophils Relative 4 %      Basophils Relative 0 %      Neutrophils Absolute 4 49 Thousands/µL      Lymphocytes Absolute 1 60 Thousands/µL      Monocytes Absolute 0 40 Thousand/µL      Eosinophils Absolute 0 28 Thousand/µL      Basophils Absolute 0 03 Thousands/µL     POCT pregnancy, urine [[id number]]  (Normal) Resulted:  07/03/18 0338    Lab Status:  Final result Updated:  07/03/18 0338     EXT PREG TEST UR (Ref: Negative) negative    POCT urinalysis dipstick [[id number]]  (Normal) Resulted:  07/03/18 6752    Lab Status:  Final result Specimen:  Urine from Urine, Other Updated:  07/03/18 0338     Color, UA yellow     Clarity, UA clear    ED Urine Macroscopic [[id number]]  (Abnormal) Collected:  07/03/18 0342    Lab Status:  Final result Specimen:  Urine Updated:  07/03/18 0337     Color, UA Yellow     Clarity, UA Clear     pH, UA 6 5     Leukocytes, UA Small (A)     Nitrite, UA Negative     Protein, UA 30 (1+) (A) mg/dl      Glucose, UA Negative mg/dl      Ketones, UA Negative mg/dl      Urobilinogen, UA 0 2 E U /dl      Bilirubin, UA Negative     Blood, UA Negative     Specific Gravity, UA >=1 030    Narrative:       CL[facility] RESULT                 CT abdomen pelvis with contrast   Final Result by Miller Stockton MD (07/03 7981)      No acute inflammatory process identified within the abdomen or pelvis  Workstation performed: [id number]                    Procedures  Procedures       Phone Contacts  ED Phone Contact    ED Course  ED Course as of Jul 03 0517   Tue Jul 03, 2018   0329 Pt seen and examined  35 yo female who began about 1 hour PTA with LUQ abd pain  Has had some diarrhea in recent days "I think its all the BBQ food we've been eating" but denies N/V, fever or chills    No associated SOB/CP or cough  Will dip urine and check labs, give IVF, fentanyl and toradol and CT a/p      0433 Labs all WNL  5486 CT a/p - No acute inflammatory process identified within the abdomen or pelvis  Pt feels great, likely gas related  Will d/c home for outpt follow up as needed  [facility]  CritCare Time    Disposition  Final diagnoses:   Colicky LUQ abdominal pain   Diarrhea     Time reflects when diagnosis was documented in both MDM as applicable and the Disposition within this note     Time User Action Codes Description Comment    7/3/2018  5:12 AM Dalton DE ANDA Add [P02 74] Colicky LUQ abdominal pain     7/3/2018  5:12 AM Padma Benjamin Add [R19 7] Diarrhea       ED Disposition     ED Disposition Condition Comment    Discharge  Bobetta Min discharge to home/self care  Condition at discharge: Stable        Follow-up Information     Follow up With Specialties Details Why Contact Info    Carlito Vaughan MD Internal Medicine Call As needed [address] [place] [address]  [phone]            Patient's Medications   Discharge Prescriptions    DICYCLOMINE (BENTYL) 20 MG TABLET    Take 1 tablet (20 mg total) by mouth every 6 (six) hours as needed (abd cramping) for up to 5 days       Start Date: 7/3/2018  End Date: 7/8/2018       Order Dose: 20 mg       Quantity: 20 tablet    Refills: 0     No discharge procedures on file      ED Provider  Electronically Signed by           Sheridan Jordan DO  07/03/18 9292

## 2018-08-01 DIAGNOSIS — Z3A.34 34 WEEKS GESTATION OF PREGNANCY: ICD-10-CM

## 2018-08-02 RX ORDER — PNV,CALCIUM 72/IRON/FOLIC ACID 27 MG-1 MG
TABLET ORAL
Qty: 30 TABLET | Refills: 0 | Status: SHIPPED | OUTPATIENT
Start: 2018-08-02 | End: 2020-11-04

## 2018-08-02 RX ORDER — ASPIRIN 81 MG/1
TABLET, CHEWABLE ORAL
Qty: 30 TABLET | Refills: 0 | Status: SHIPPED | OUTPATIENT
Start: 2018-08-02 | End: 2019-06-03 | Stop reason: SDUPTHER

## 2019-02-21 DIAGNOSIS — D64.9 ANEMIA, UNSPECIFIED TYPE: Primary | ICD-10-CM

## 2019-02-21 RX ORDER — FERROUS SULFATE 325(65) MG
TABLET ORAL
Qty: 60 TABLET | Refills: 0 | Status: SHIPPED | OUTPATIENT
Start: 2019-02-21 | End: 2019-11-01 | Stop reason: SDUPTHER

## 2019-06-03 RX ORDER — ASPIRIN 81 MG/1
TABLET, CHEWABLE ORAL
Qty: 30 TABLET | Refills: 0 | Status: SHIPPED | OUTPATIENT
Start: 2019-06-03 | End: 2020-11-04 | Stop reason: SDUPTHER

## 2019-08-05 DIAGNOSIS — Z3A.34 34 WEEKS GESTATION OF PREGNANCY: ICD-10-CM

## 2019-08-05 RX ORDER — PNV,CALCIUM 72/IRON/FOLIC ACID 27 MG-1 MG
TABLET ORAL
Qty: 30 TABLET | Refills: 0 | Status: SHIPPED | OUTPATIENT
Start: 2019-08-05 | End: 2019-09-19 | Stop reason: SDUPTHER

## 2019-09-19 DIAGNOSIS — Z3A.34 34 WEEKS GESTATION OF PREGNANCY: ICD-10-CM

## 2019-09-19 RX ORDER — PNV,CALCIUM 72/IRON/FOLIC ACID 27 MG-1 MG
TABLET ORAL
Qty: 30 TABLET | Refills: 0 | Status: SHIPPED | OUTPATIENT
Start: 2019-09-19 | End: 2019-11-01 | Stop reason: SDUPTHER

## 2019-10-10 PROBLEM — Z91.19 MEDICALLY NONCOMPLIANT: Status: RESOLVED | Noted: 2018-02-13 | Resolved: 2019-10-10

## 2019-10-10 PROBLEM — O99.013 ANEMIA AFFECTING PREGNANCY IN THIRD TRIMESTER: Status: RESOLVED | Noted: 2018-02-14 | Resolved: 2019-10-10

## 2019-10-10 PROBLEM — Z91.199 MEDICALLY NONCOMPLIANT: Status: RESOLVED | Noted: 2018-02-13 | Resolved: 2019-10-10

## 2019-10-10 PROBLEM — O24.410 DIET CONTROLLED GESTATIONAL DIABETES MELLITUS (GDM) IN THIRD TRIMESTER: Status: RESOLVED | Noted: 2017-10-19 | Resolved: 2019-10-10

## 2019-10-10 PROBLEM — O10.919 CHRONIC HYPERTENSION IN PREGNANCY: Status: RESOLVED | Noted: 2017-08-09 | Resolved: 2019-10-10

## 2019-10-10 PROBLEM — Z3A.38 38 WEEKS GESTATION OF PREGNANCY: Status: RESOLVED | Noted: 2017-10-15 | Resolved: 2019-10-10

## 2019-11-01 DIAGNOSIS — D64.9 ANEMIA, UNSPECIFIED TYPE: ICD-10-CM

## 2019-11-01 DIAGNOSIS — Z3A.34 34 WEEKS GESTATION OF PREGNANCY: ICD-10-CM

## 2019-11-01 RX ORDER — FERROUS SULFATE 325(65) MG
TABLET ORAL
Qty: 60 TABLET | Refills: 0 | Status: SHIPPED | OUTPATIENT
Start: 2019-11-01 | End: 2020-03-14 | Stop reason: SDUPTHER

## 2019-11-01 RX ORDER — PNV,CALCIUM 72/IRON/FOLIC ACID 27 MG-1 MG
TABLET ORAL
Qty: 30 TABLET | Refills: 0 | Status: SHIPPED | OUTPATIENT
Start: 2019-11-01 | End: 2020-03-14 | Stop reason: SDUPTHER

## 2020-01-06 DIAGNOSIS — Z3A.34 34 WEEKS GESTATION OF PREGNANCY: ICD-10-CM

## 2020-01-06 DIAGNOSIS — D64.9 ANEMIA, UNSPECIFIED TYPE: ICD-10-CM

## 2020-01-07 RX ORDER — FERROUS SULFATE 325(65) MG
TABLET ORAL
Qty: 60 TABLET | Refills: 0 | OUTPATIENT
Start: 2020-01-07

## 2020-01-07 RX ORDER — PNV,CALCIUM 72/IRON/FOLIC ACID 27 MG-1 MG
TABLET ORAL
Qty: 30 TABLET | Refills: 0 | OUTPATIENT
Start: 2020-01-07

## 2020-01-26 ENCOUNTER — HOSPITAL ENCOUNTER (EMERGENCY)
Facility: HOSPITAL | Age: 35
Discharge: HOME | End: 2020-01-26
Attending: EMERGENCY MEDICINE
Payer: COMMERCIAL

## 2020-01-26 VITALS
TEMPERATURE: 98.1 F | HEART RATE: 101 BPM | BODY MASS INDEX: 44.27 KG/M2 | OXYGEN SATURATION: 100 % | DIASTOLIC BLOOD PRESSURE: 97 MMHG | WEIGHT: 257.94 LBS | RESPIRATION RATE: 18 BRPM | SYSTOLIC BLOOD PRESSURE: 179 MMHG

## 2020-01-26 DIAGNOSIS — F41.9 ANXIETY: Primary | ICD-10-CM

## 2020-01-26 PROCEDURE — 99283 EMERGENCY DEPT VISIT LOW MDM: CPT

## 2020-01-26 PROCEDURE — 99284 EMERGENCY DEPT VISIT MOD MDM: CPT | Performed by: EMERGENCY MEDICINE

## 2020-01-26 RX ORDER — HYDROXYZINE HYDROCHLORIDE 25 MG/1
25 TABLET, FILM COATED ORAL EVERY 6 HOURS
Qty: 12 TABLET | Refills: 0 | Status: SHIPPED | OUTPATIENT
Start: 2020-01-26 | End: 2020-11-04

## 2020-01-27 NOTE — ED PROVIDER NOTES
History  Chief Complaint   Patient presents with    Headache     Headaches, anxiety, depression x 3 weeks  Denies SI     Anxiety     27 y o  F p/w anxiety  Pt states she's been under a lot of stress  Unable to sleep  Feels anxious  Went to her PCP who she reports told her to "just relax "  She reports she was not given any meds or follow up  History provided by:  Patient   used: No    Anxiety   Presenting symptoms: no agitation, no hallucinations, no self-mutilation and no suicidal thoughts    Timing:  Constant  Progression:  Unchanged  Chronicity:  New  Treatment compliance:  Untreated  Relieved by:  None tried  Worsened by:  Nothing  Ineffective treatments:  None tried  Associated symptoms: anxiety    Associated symptoms: no abdominal pain, no chest pain and no headaches        Prior to Admission Medications   Prescriptions Last Dose Informant Patient Reported? Taking?    ACCU-CHEK FASTCLIX LANCETS MISC   Yes No   Si Stick 4 (four) times a day Test    MG capsule   No No   Sig: TAKE 1 CAPSULE BY MOUTH TWICE DAILY   FEROSUL 325 (65 Fe) MG tablet   No No   Sig: TAKE 1 TABLET BY MOUTH TWICE DAILY   NIFEdipine (ADALAT CC) 30 MG 24 hr tablet   No No   Sig: Take 1 tablet (30 mg total) by mouth daily   Prenatal Vit-Fe Fumarate-FA (PREPLUS) 27-1 MG TABS   No No   Sig: TAKE 1 TABLET BY MOUTH EVERY DAY   Prenatal Vit-Fe Fumarate-FA (PREPLUS) 27-1 MG TABS   No No   Sig: TAKE 1 TABLET BY MOUTH EVERY DAY   acetaminophen (TYLENOL) 325 mg tablet   No No   Sig: headache   aspirin (ECOTRIN LOW STRENGTH) 81 mg EC tablet  Self Yes No   Sig: Take 81 mg by mouth daily   aspirin 81 mg chewable tablet   No No   Sig: CHEW AND SWALLOW 1 TABLET BY MOUTH EVERY DAY   benzocaine-menthol-lanolin-aloe (DERMOPLAST) 20-0 5 % topical spray   No No   Sig: Apply 1 application topically 4 (four) times a day as needed for mild pain   dicyclomine (BENTYL) 20 mg tablet   No No   Sig: Take 1 tablet (20 mg total) by mouth every 6 (six) hours as needed (abd cramping) for up to 5 days   ferrous gluconate (FERGON) 240 (27 FE) MG tablet   No No   Sig: Take 1 tablet (240 mg total) by mouth 2 (two) times a day for 90 days   labetalol (NORMODYNE) 200 mg tablet  Self Yes No   Sig: Take 200 mg by mouth 2 (two) times a day   witch hazel-glycerin (TUCKS) topical pad   No No   Sig: Apply 1 pad topically as needed for irritation      Facility-Administered Medications: None       Past Medical History:   Diagnosis Date    Amenorrhea     16    Early stage of pregnancy 2017    Preeclampsia        Past Surgical History:   Procedure Laterality Date    INDUCED       [facility] SURG RX MISSED ABORTN,1ST TRI N/A 2016    Procedure: DILATATION AND EVACUATION (D&E); Surgeon: Juan Antonio Yeboah MD;  Location: [facility];  Service: Gynecology       Family History   Problem Relation Age of Onset    Hypertension Mother     Hypertension Father      I have reviewed and agree with the history as documented  Social History     Tobacco Use    Smoking status: Former Smoker     Types: Cigarettes     Last attempt to quit: 2010     Years since quitting: 10 0    Smokeless tobacco: Never Used    Tobacco comment: Former smoker per [employer]   Substance Use Topics    Alcohol use: No    Drug use: No        Review of Systems   Constitutional: Negative for chills and fever  Eyes: Negative for visual disturbance  Cardiovascular: Negative for chest pain  Gastrointestinal: Negative for abdominal pain, diarrhea, nausea and vomiting  Skin: Negative for rash  Neurological: Negative for tremors, facial asymmetry, speech difficulty, weakness, numbness and headaches  Psychiatric/Behavioral: Negative for agitation, behavioral problems, confusion, decreased concentration, dysphoric mood, hallucinations, self-injury, sleep disturbance and suicidal ideas  The patient is nervous/anxious  The patient is not hyperactive      All other systems reviewed and are negative  Physical Exam  Physical Exam   Constitutional: She is oriented to person, place, and time  She appears well-developed and well-nourished  No distress  HENT:   Head: Normocephalic  Eyes: EOM are normal    Neck: Normal range of motion  Neck supple  Cardiovascular: Normal rate, regular rhythm, normal heart sounds and intact distal pulses  Exam reveals no gallop and no friction rub  No murmur heard  Pulmonary/Chest: Effort normal and breath sounds normal  No respiratory distress  She has no wheezes  She has no rales  Abdominal: Soft  Bowel sounds are normal  She exhibits no distension  There is no tenderness  There is no rebound and no guarding  Neurological: She is alert and oriented to person, place, and time  Skin: Skin is warm and dry  No pallor  Psychiatric: She has a normal mood and affect  Her speech is not rapid and/or pressured and not slurred  She is not agitated, not aggressive and not hyperactive  She expresses no homicidal and no suicidal ideation  She is communicative  Nursing note and vitals reviewed  Vital Signs  ED Triage Vitals [01/26/20 2333]   Temperature Pulse Respirations Blood Pressure SpO2   98 1 °F (36 7 °C) 101 18 (!) 179/97 100 %      Temp Source Heart Rate Source Patient Position - Orthostatic VS BP Location FiO2 (%)   Temporal Monitor Sitting Right arm --      Pain Score       6           Vitals:    01/26/20 2333   BP: (!) 179/97   Pulse: 101   Patient Position - Orthostatic VS: Sitting         Visual Acuity      ED Medications  Medications - No data to display    Diagnostic Studies  Results Reviewed     None                 No orders to display              Procedures  Procedures         ED Course                               MDM  Number of Diagnoses or Management Options  Anxiety:   Diagnosis management comments: Pt provided with outpt resource list for mental health and rx for anxiety          Disposition  Final diagnoses:   Anxiety Time reflects when diagnosis was documented in both MDM as applicable and the Disposition within this note     Time User Action Codes Description Comment    1/26/2020 11:43 PM Saeed Paidlla 47 [F41 9] Anxiety       ED Disposition     ED Disposition Condition Date/Time Comment    Discharge Stable Sun Jan 26, 2020 11:43 PM Gal Cramer discharge to home/self care              Follow-up Information    None         Discharge Medication List as of 1/26/2020 11:43 PM      START taking these medications    Details   hydrOXYzine HCL (ATARAX) 25 mg tablet Take 1 tablet (25 mg total) by mouth every 6 (six) hours, Starting Sun 1/26/2020, Print         CONTINUE these medications which have NOT CHANGED    Details   ACCU-CHEK FASTCLIX LANCETS MISC 1 Stick 4 (four) times a day Test, Starting Tue 11/14/2017, Historical Med      acetaminophen (TYLENOL) 325 mg tablet headache, Print      aspirin (ECOTRIN LOW STRENGTH) 81 mg EC tablet Take 81 mg by mouth daily, Historical Med      aspirin 81 mg chewable tablet CHEW AND SWALLOW 1 TABLET BY MOUTH EVERY DAY, Normal      benzocaine-menthol-lanolin-aloe (DERMOPLAST) 20-0 5 % topical spray Apply 1 application topically 4 (four) times a day as needed for mild pain, Starting Sun 2/25/2018, Print      dicyclomine (BENTYL) 20 mg tablet Take 1 tablet (20 mg total) by mouth every 6 (six) hours as needed (abd cramping) for up to 5 days, Starting Tue 7/3/2018, Until Sun 7/8/2018, Print       MG capsule TAKE 1 CAPSULE BY MOUTH TWICE DAILY, Normal      FEROSUL 325 (65 Fe) MG tablet TAKE 1 TABLET BY MOUTH TWICE DAILY, Normal      ferrous gluconate (FERGON) 240 (27 FE) MG tablet Take 1 tablet (240 mg total) by mouth 2 (two) times a day for 90 days, Starting Tue 2/20/2018, Until Mon 5/21/2018, Normal      labetalol (NORMODYNE) 200 mg tablet Take 200 mg by mouth 2 (two) times a day, Historical Med      NIFEdipine (ADALAT CC) 30 MG 24 hr tablet Take 1 tablet (30 mg total) by mouth daily, Starting Mon 2/26/2018, Print      !! Prenatal Vit-Fe Fumarate-FA (PREPLUS) 27-1 MG TABS TAKE 1 TABLET BY MOUTH EVERY DAY, Normal      !! Prenatal Vit-Fe Fumarate-FA (PREPLUS) 27-1 MG TABS TAKE 1 TABLET BY MOUTH EVERY DAY, Normal      witch hazel-glycerin (TUCKS) topical pad Apply 1 pad topically as needed for irritation, Starting Sun 2/25/2018, Print       !! - Potential duplicate medications found  Please discuss with provider  No discharge procedures on file      ED Provider  Electronically Signed by           DO Huy  01/26/20 5427

## 2020-03-14 DIAGNOSIS — Z3A.34 34 WEEKS GESTATION OF PREGNANCY: ICD-10-CM

## 2020-03-14 DIAGNOSIS — D64.9 ANEMIA, UNSPECIFIED TYPE: ICD-10-CM

## 2020-03-14 RX ORDER — PNV,CALCIUM 72/IRON/FOLIC ACID 27 MG-1 MG
TABLET ORAL
Qty: 30 TABLET | Refills: 0 | Status: SHIPPED | OUTPATIENT
Start: 2020-03-14 | End: 2020-11-04

## 2020-03-14 RX ORDER — FERROUS SULFATE 325(65) MG
TABLET ORAL
Qty: 60 TABLET | Refills: 0 | Status: SHIPPED | OUTPATIENT
Start: 2020-03-14 | End: 2020-06-12

## 2020-05-09 DIAGNOSIS — D64.9 ANEMIA, UNSPECIFIED TYPE: ICD-10-CM

## 2020-05-11 RX ORDER — FERROUS SULFATE 325(65) MG
TABLET ORAL
Qty: 60 TABLET | Refills: 0 | OUTPATIENT
Start: 2020-05-11

## 2020-06-12 DIAGNOSIS — D64.9 ANEMIA, UNSPECIFIED TYPE: ICD-10-CM

## 2020-06-12 RX ORDER — FERROUS SULFATE 325(65) MG
TABLET ORAL
Qty: 60 TABLET | Refills: 0 | Status: SHIPPED | OUTPATIENT
Start: 2020-06-12 | End: 2020-07-03 | Stop reason: SDUPTHER

## 2020-07-03 DIAGNOSIS — D64.9 ANEMIA, UNSPECIFIED TYPE: ICD-10-CM

## 2020-07-03 RX ORDER — FERROUS SULFATE 325(65) MG
TABLET ORAL
Qty: 60 TABLET | Refills: 0 | Status: SHIPPED | OUTPATIENT
Start: 2020-07-03 | End: 2021-02-26 | Stop reason: SDUPTHER

## 2020-10-01 DIAGNOSIS — D64.9 ANEMIA, UNSPECIFIED TYPE: ICD-10-CM

## 2020-10-01 RX ORDER — FERROUS SULFATE 325(65) MG
TABLET ORAL
Qty: 60 TABLET | Refills: 0 | OUTPATIENT
Start: 2020-10-01

## 2020-11-04 ENCOUNTER — OFFICE VISIT (OUTPATIENT)
Dept: OBGYN CLINIC | Facility: CLINIC | Age: 35
End: 2020-11-04
Payer: COMMERCIAL

## 2020-11-04 VITALS
SYSTOLIC BLOOD PRESSURE: 132 MMHG | WEIGHT: 255.6 LBS | HEIGHT: 64 IN | TEMPERATURE: 90 F | DIASTOLIC BLOOD PRESSURE: 84 MMHG | BODY MASS INDEX: 43.64 KG/M2

## 2020-11-04 DIAGNOSIS — Z32.00 ENCOUNTER FOR CONFIRMATION OF PREGNANCY TEST RESULT WITH PHYSICAL EXAMINATION: ICD-10-CM

## 2020-11-04 DIAGNOSIS — O10.919 HTN IN PREGNANCY, CHRONIC: ICD-10-CM

## 2020-11-04 DIAGNOSIS — N91.2 AMENORRHEA: Primary | ICD-10-CM

## 2020-11-04 LAB — SL AMB POCT URINE HCG: POSITIVE

## 2020-11-04 PROCEDURE — 76817 TRANSVAGINAL US OBSTETRIC: CPT | Performed by: OBSTETRICS & GYNECOLOGY

## 2020-11-04 PROCEDURE — 99214 OFFICE O/P EST MOD 30 MIN: CPT | Performed by: OBSTETRICS & GYNECOLOGY

## 2020-11-04 PROCEDURE — 81025 URINE PREGNANCY TEST: CPT | Performed by: OBSTETRICS & GYNECOLOGY

## 2020-11-04 RX ORDER — ASPIRIN 81 MG/1
81 TABLET, CHEWABLE ORAL DAILY
Qty: 60 TABLET | Refills: 6 | Status: SHIPPED | OUTPATIENT
Start: 2020-11-04 | End: 2021-02-26 | Stop reason: SDUPTHER

## 2020-11-04 RX ORDER — .ALPHA.-TOCOPHEROL ACETATE, DL-, ASCORBIC ACID, CHOLECALCIFEROL, CYANOCOBALAMIN, FOLIC ACID, FERROUS FUMARATE, CALCIUM PHOSPHATE, DIBASIC, ANHYDROUS, NIACINAMIDE, PYRIDOXINE HYDROCHLORIDE, RIBOFLAVIN, THIAMINE MONONITRATE, AND VITAMIN A ACETATE 15; 60; 400; 4.5; 1; 27; 50; 13.5; 1.05; 1.2; 1.05; 25 [IU]/1; MG/1; [IU]/1; UG/1; MG/1; MG/1; MG/1; MG/1; MG/1; MG/1; MG/1; [IU]/1
1 TABLET ORAL DAILY
Qty: 30 TABLET | Refills: 8 | Status: SHIPPED | OUTPATIENT
Start: 2020-11-04 | End: 2021-12-14

## 2020-11-05 ENCOUNTER — LAB (OUTPATIENT)
Dept: LAB | Facility: HOSPITAL | Age: 35
End: 2020-11-05
Attending: OBSTETRICS & GYNECOLOGY
Payer: COMMERCIAL

## 2020-11-05 DIAGNOSIS — O10.919 HTN IN PREGNANCY, CHRONIC: ICD-10-CM

## 2020-11-05 DIAGNOSIS — N91.2 AMENORRHEA: ICD-10-CM

## 2020-11-05 DIAGNOSIS — Z32.00 ENCOUNTER FOR CONFIRMATION OF PREGNANCY TEST RESULT WITH PHYSICAL EXAMINATION: ICD-10-CM

## 2020-11-05 LAB
ABO GROUP BLD: NORMAL
ALBUMIN SERPL BCP-MCNC: 3.4 G/DL (ref 3.5–5)
ALP SERPL-CCNC: 60 U/L (ref 46–116)
ALT SERPL W P-5'-P-CCNC: 12 U/L (ref 12–78)
ANION GAP SERPL CALCULATED.3IONS-SCNC: 8 MMOL/L (ref 4–13)
AST SERPL W P-5'-P-CCNC: 5 U/L (ref 5–45)
B-HCG SERPL-ACNC: ABNORMAL MIU/ML
BASOPHILS # BLD AUTO: 0.02 THOUSANDS/ΜL (ref 0–0.1)
BASOPHILS NFR BLD AUTO: 0 % (ref 0–1)
BILIRUB SERPL-MCNC: 0.32 MG/DL (ref 0.2–1)
BILIRUB UR QL STRIP: NEGATIVE
BLD GP AB SCN SERPL QL: NEGATIVE
BUN SERPL-MCNC: 6 MG/DL (ref 5–25)
CALCIUM ALBUM COR SERPL-MCNC: 9.6 MG/DL (ref 8.3–10.1)
CALCIUM SERPL-MCNC: 9.1 MG/DL (ref 8.3–10.1)
CHLORIDE SERPL-SCNC: 105 MMOL/L (ref 100–108)
CLARITY UR: CLEAR
CO2 SERPL-SCNC: 26 MMOL/L (ref 21–32)
COLOR UR: YELLOW
CREAT SERPL-MCNC: 0.76 MG/DL (ref 0.6–1.3)
CREAT UR-MCNC: 363.4 MG/DL
EOSINOPHIL # BLD AUTO: 0.17 THOUSAND/ΜL (ref 0–0.61)
EOSINOPHIL NFR BLD AUTO: 3 % (ref 0–6)
ERYTHROCYTE [DISTWIDTH] IN BLOOD BY AUTOMATED COUNT: 16.4 % (ref 11.6–15.1)
GFR SERPL CREATININE-BSD FRML MDRD: 118 ML/MIN/1.73SQ M
GLUCOSE SERPL-MCNC: 102 MG/DL (ref 65–140)
GLUCOSE UR STRIP-MCNC: NEGATIVE MG/DL
HBV SURFACE AG SER QL: NORMAL
HCT VFR BLD AUTO: 33.8 % (ref 34.8–46.1)
HGB BLD-MCNC: 10 G/DL (ref 11.5–15.4)
HGB UR QL STRIP.AUTO: NEGATIVE
IMM GRANULOCYTES # BLD AUTO: 0.01 THOUSAND/UL (ref 0–0.2)
IMM GRANULOCYTES NFR BLD AUTO: 0 % (ref 0–2)
KETONES UR STRIP-MCNC: NEGATIVE MG/DL
LEUKOCYTE ESTERASE UR QL STRIP: NEGATIVE
LYMPHOCYTES # BLD AUTO: 1.59 THOUSANDS/ΜL (ref 0.6–4.47)
LYMPHOCYTES NFR BLD AUTO: 24 % (ref 14–44)
MCH RBC QN AUTO: 20.9 PG (ref 26.8–34.3)
MCHC RBC AUTO-ENTMCNC: 29.6 G/DL (ref 31.4–37.4)
MCV RBC AUTO: 71 FL (ref 82–98)
MONOCYTES # BLD AUTO: 0.43 THOUSAND/ΜL (ref 0.17–1.22)
MONOCYTES NFR BLD AUTO: 6 % (ref 4–12)
NEUTROPHILS # BLD AUTO: 4.55 THOUSANDS/ΜL (ref 1.85–7.62)
NEUTS SEG NFR BLD AUTO: 67 % (ref 43–75)
NITRITE UR QL STRIP: NEGATIVE
NRBC BLD AUTO-RTO: 0 /100 WBCS
PH UR STRIP.AUTO: 6 [PH]
PLATELET # BLD AUTO: 389 THOUSANDS/UL (ref 149–390)
PMV BLD AUTO: 10.5 FL (ref 8.9–12.7)
POTASSIUM SERPL-SCNC: 3.5 MMOL/L (ref 3.5–5.3)
PROGEST SERPL-MCNC: 14 NG/ML
PROT SERPL-MCNC: 7.2 G/DL (ref 6.4–8.2)
PROT UR STRIP-MCNC: NEGATIVE MG/DL
PROT UR-MCNC: 24 MG/DL
PROT/CREAT UR: 0.07 MG/G{CREAT} (ref 0–0.1)
RBC # BLD AUTO: 4.78 MILLION/UL (ref 3.81–5.12)
RH BLD: POSITIVE
RUBV IGG SERPL IA-ACNC: >175 IU/ML
SODIUM SERPL-SCNC: 139 MMOL/L (ref 136–145)
SP GR UR STRIP.AUTO: >=1.03 (ref 1–1.03)
SPECIMEN EXPIRATION DATE: NORMAL
TSH SERPL DL<=0.05 MIU/L-ACNC: 1.24 UIU/ML (ref 0.36–3.74)
UROBILINOGEN UR QL STRIP.AUTO: 0.2 E.U./DL
WBC # BLD AUTO: 6.77 THOUSAND/UL (ref 4.31–10.16)

## 2020-11-05 PROCEDURE — 82570 ASSAY OF URINE CREATININE: CPT | Performed by: OBSTETRICS & GYNECOLOGY

## 2020-11-05 PROCEDURE — 80053 COMPREHEN METABOLIC PANEL: CPT

## 2020-11-05 PROCEDURE — 81003 URINALYSIS AUTO W/O SCOPE: CPT | Performed by: OBSTETRICS & GYNECOLOGY

## 2020-11-05 PROCEDURE — 84443 ASSAY THYROID STIM HORMONE: CPT

## 2020-11-05 PROCEDURE — 84156 ASSAY OF PROTEIN URINE: CPT | Performed by: OBSTETRICS & GYNECOLOGY

## 2020-11-05 PROCEDURE — 80081 OBSTETRIC PANEL INC HIV TSTG: CPT | Performed by: OBSTETRICS & GYNECOLOGY

## 2020-11-05 PROCEDURE — 84144 ASSAY OF PROGESTERONE: CPT

## 2020-11-05 PROCEDURE — 84702 CHORIONIC GONADOTROPIN TEST: CPT

## 2020-11-05 PROCEDURE — 87086 URINE CULTURE/COLONY COUNT: CPT | Performed by: OBSTETRICS & GYNECOLOGY

## 2020-11-05 PROCEDURE — 36415 COLL VENOUS BLD VENIPUNCTURE: CPT

## 2020-11-06 LAB
BACTERIA UR CULT: NORMAL
HIV 1+2 AB+HIV1 P24 AG SERPL QL IA: NORMAL
RPR SER QL: NORMAL

## 2020-11-09 ENCOUNTER — TELEPHONE (OUTPATIENT)
Dept: OBGYN CLINIC | Facility: CLINIC | Age: 35
End: 2020-11-09

## 2020-11-09 DIAGNOSIS — R79.89 LOW SERUM PROGESTERONE: Primary | ICD-10-CM

## 2020-11-09 DIAGNOSIS — R79.89 LOW SERUM PROGESTERONE: ICD-10-CM

## 2020-11-11 ENCOUNTER — HOSPITAL ENCOUNTER (OUTPATIENT)
Dept: ULTRASOUND IMAGING | Facility: HOSPITAL | Age: 35
Discharge: HOME | End: 2020-11-11
Attending: OBSTETRICS & GYNECOLOGY
Payer: COMMERCIAL

## 2020-11-11 DIAGNOSIS — Z32.00 ENCOUNTER FOR CONFIRMATION OF PREGNANCY TEST RESULT WITH PHYSICAL EXAMINATION: ICD-10-CM

## 2020-11-11 DIAGNOSIS — N91.2 AMENORRHEA: ICD-10-CM

## 2020-11-11 PROCEDURE — 76801 OB US < 14 WKS SINGLE FETUS: CPT

## 2020-11-19 ENCOUNTER — OB ABSTRACT (OUTPATIENT)
Dept: OBGYN CLINIC | Facility: CLINIC | Age: 35
End: 2020-11-19

## 2020-11-23 ENCOUNTER — OB ABSTRACT (OUTPATIENT)
Dept: OBGYN CLINIC | Facility: CLINIC | Age: 35
End: 2020-11-23

## 2020-11-25 PROBLEM — O26.20 PREVIOUS RECURRENT MISCARRIAGES AFFECTING PREGNANCY, ANTEPARTUM: Status: ACTIVE | Noted: 2020-11-25

## 2020-11-25 PROBLEM — O09.529 SUPERVISION OF MULTIGRAVIDA OF ADVANCED MATERNAL AGE: Status: ACTIVE | Noted: 2020-11-25

## 2020-11-25 PROBLEM — Z87.59 HISTORY OF PREGNANCY INDUCED HYPERTENSION: Status: ACTIVE | Noted: 2020-11-25

## 2020-11-25 PROBLEM — O99.210 MATERNAL MORBID OBESITY, ANTEPARTUM (HCC): Status: ACTIVE | Noted: 2020-11-25

## 2020-11-25 PROBLEM — E66.01 MATERNAL MORBID OBESITY, ANTEPARTUM (HCC): Status: ACTIVE | Noted: 2020-11-25

## 2020-11-25 PROBLEM — O16.9 HYPERTENSION COMPLICATING PREGNANCY: Status: ACTIVE | Noted: 2020-11-25

## 2020-11-30 PROBLEM — O99.019 MATERNAL ANEMIA, ANTEPARTUM: Status: ACTIVE | Noted: 2020-11-30

## 2020-12-03 PROBLEM — Z11.3 SCREENING EXAMINATION FOR STD (SEXUALLY TRANSMITTED DISEASE): Status: ACTIVE | Noted: 2020-12-03

## 2020-12-03 PROBLEM — Z12.4 CERVICAL CANCER SCREENING: Status: ACTIVE | Noted: 2020-12-03

## 2020-12-04 ENCOUNTER — INITIAL PRENATAL (OUTPATIENT)
Dept: OBGYN CLINIC | Facility: CLINIC | Age: 35
End: 2020-12-04
Payer: COMMERCIAL

## 2020-12-04 VITALS
TEMPERATURE: 97.3 F | DIASTOLIC BLOOD PRESSURE: 88 MMHG | HEART RATE: 94 BPM | BODY MASS INDEX: 43.7 KG/M2 | WEIGHT: 254.6 LBS | SYSTOLIC BLOOD PRESSURE: 142 MMHG

## 2020-12-04 DIAGNOSIS — O99.210 MATERNAL MORBID OBESITY, ANTEPARTUM (HCC): ICD-10-CM

## 2020-12-04 DIAGNOSIS — O09.529 HIGH-RISK PREGNANCY, MULTIGRAVIDA OF ADVANCED MATERNAL AGE, ANTEPARTUM: ICD-10-CM

## 2020-12-04 DIAGNOSIS — Z3A.09 9 WEEKS GESTATION OF PREGNANCY: Primary | ICD-10-CM

## 2020-12-04 DIAGNOSIS — N64.52 BLOODY DISCHARGE FROM RIGHT NIPPLE: ICD-10-CM

## 2020-12-04 DIAGNOSIS — E66.01 MATERNAL MORBID OBESITY, ANTEPARTUM (HCC): ICD-10-CM

## 2020-12-04 DIAGNOSIS — Z12.4 CERVICAL CANCER SCREENING: ICD-10-CM

## 2020-12-04 DIAGNOSIS — O26.20 PREVIOUS RECURRENT MISCARRIAGES AFFECTING PREGNANCY, ANTEPARTUM: ICD-10-CM

## 2020-12-04 DIAGNOSIS — O99.019 MATERNAL ANEMIA, ANTEPARTUM: ICD-10-CM

## 2020-12-04 DIAGNOSIS — Z86.32 HX GESTATIONAL DIABETES: ICD-10-CM

## 2020-12-04 DIAGNOSIS — Z36.89 CONFIRM FETAL CARDIAC ACTIVITY USING ULTRASOUND: ICD-10-CM

## 2020-12-04 DIAGNOSIS — O16.1 HYPERTENSION AFFECTING PREGNANCY IN FIRST TRIMESTER: ICD-10-CM

## 2020-12-04 DIAGNOSIS — O99.019 ANEMIA DURING PREGNANCY: ICD-10-CM

## 2020-12-04 DIAGNOSIS — Z11.3 SCREENING EXAMINATION FOR STD (SEXUALLY TRANSMITTED DISEASE): ICD-10-CM

## 2020-12-04 LAB
SL AMB  POCT GLUCOSE, UA: NORMAL
SL AMB POCT URINE PROTEIN: ABNORMAL

## 2020-12-04 PROCEDURE — T1001 NURSING ASSESSMENT/EVALUATN: HCPCS | Performed by: OBSTETRICS & GYNECOLOGY

## 2020-12-04 PROCEDURE — 99215 OFFICE O/P EST HI 40 MIN: CPT | Performed by: OBSTETRICS & GYNECOLOGY

## 2020-12-04 PROCEDURE — 81002 URINALYSIS NONAUTO W/O SCOPE: CPT | Performed by: OBSTETRICS & GYNECOLOGY

## 2020-12-04 PROCEDURE — G0145 SCR C/V CYTO,THINLAYER,RESCR: HCPCS | Performed by: OBSTETRICS & GYNECOLOGY

## 2020-12-04 PROCEDURE — 76817 TRANSVAGINAL US OBSTETRIC: CPT | Performed by: OBSTETRICS & GYNECOLOGY

## 2020-12-04 PROCEDURE — 87624 HPV HI-RISK TYP POOLED RSLT: CPT | Performed by: OBSTETRICS & GYNECOLOGY

## 2020-12-04 RX ORDER — ASPIRIN 81 MG/1
162 TABLET ORAL DAILY
Qty: 60 TABLET | Refills: 6 | Status: SHIPPED | OUTPATIENT
Start: 2020-12-04 | End: 2021-12-14

## 2020-12-04 RX ORDER — DOCUSATE SODIUM 100 MG/1
100 CAPSULE, LIQUID FILLED ORAL 2 TIMES DAILY
Qty: 60 CAPSULE | Refills: 6 | Status: SHIPPED | OUTPATIENT
Start: 2020-12-04 | End: 2021-06-16 | Stop reason: SDUPTHER

## 2020-12-04 RX ORDER — FERROUS SULFATE TAB EC 324 MG (65 MG FE EQUIVALENT) 324 (65 FE) MG
325 TABLET DELAYED RESPONSE ORAL
Qty: 60 TABLET | Refills: 6 | Status: SHIPPED | OUTPATIENT
Start: 2020-12-04 | End: 2022-03-09

## 2020-12-04 RX ORDER — ACETAMINOPHEN 500 MG
500 TABLET ORAL EVERY 6 HOURS PRN
COMMUNITY

## 2020-12-08 LAB
LAB AP GYN PRIMARY INTERPRETATION: NORMAL
Lab: NORMAL
PATH INTERP SPEC-IMP: NORMAL

## 2020-12-09 LAB
HPV HR 12 DNA CVX QL NAA+PROBE: NEGATIVE
HPV16 DNA CVX QL NAA+PROBE: NEGATIVE
HPV18 DNA CVX QL NAA+PROBE: NEGATIVE

## 2020-12-11 ENCOUNTER — TELEPHONE (OUTPATIENT)
Dept: PERINATAL CARE | Facility: CLINIC | Age: 35
End: 2020-12-11

## 2020-12-12 LAB
C TRACH DNA SPEC QL NAA+PROBE: ABNORMAL
N GONORRHOEA DNA SPEC QL NAA+PROBE: ABNORMAL

## 2020-12-24 ENCOUNTER — TELEPHONE (OUTPATIENT)
Dept: PERINATAL CARE | Facility: OTHER | Age: 35
End: 2020-12-24

## 2020-12-27 PROBLEM — O10.919 CHRONIC HYPERTENSION AFFECTING PREGNANCY: Status: ACTIVE | Noted: 2020-12-27

## 2020-12-28 ENCOUNTER — ROUTINE PRENATAL (OUTPATIENT)
Dept: PERINATAL CARE | Facility: OTHER | Age: 35
End: 2020-12-28
Payer: COMMERCIAL

## 2020-12-28 ENCOUNTER — TELEPHONE (OUTPATIENT)
Dept: PERINATAL CARE | Facility: CLINIC | Age: 35
End: 2020-12-28

## 2020-12-28 VITALS
SYSTOLIC BLOOD PRESSURE: 160 MMHG | BODY MASS INDEX: 44.46 KG/M2 | WEIGHT: 260.4 LBS | HEIGHT: 64 IN | HEART RATE: 91 BPM | DIASTOLIC BLOOD PRESSURE: 94 MMHG

## 2020-12-28 DIAGNOSIS — O16.1 HYPERTENSION AFFECTING PREGNANCY IN FIRST TRIMESTER: ICD-10-CM

## 2020-12-28 DIAGNOSIS — Z3A.09 9 WEEKS GESTATION OF PREGNANCY: ICD-10-CM

## 2020-12-28 DIAGNOSIS — O99.210 MATERNAL MORBID OBESITY, ANTEPARTUM (HCC): ICD-10-CM

## 2020-12-28 DIAGNOSIS — Z36.82 ENCOUNTER FOR ANTENATAL SCREENING FOR NUCHAL TRANSLUCENCY: Primary | ICD-10-CM

## 2020-12-28 DIAGNOSIS — E66.01 MATERNAL MORBID OBESITY, ANTEPARTUM (HCC): ICD-10-CM

## 2020-12-28 DIAGNOSIS — O10.919 CHRONIC HYPERTENSION AFFECTING PREGNANCY: ICD-10-CM

## 2020-12-28 DIAGNOSIS — O09.529 HIGH-RISK PREGNANCY, MULTIGRAVIDA OF ADVANCED MATERNAL AGE, ANTEPARTUM: ICD-10-CM

## 2020-12-28 PROCEDURE — 99242 OFF/OP CONSLTJ NEW/EST SF 20: CPT | Performed by: OBSTETRICS & GYNECOLOGY

## 2020-12-28 PROCEDURE — 76813 OB US NUCHAL MEAS 1 GEST: CPT | Performed by: OBSTETRICS & GYNECOLOGY

## 2020-12-29 ENCOUNTER — TELEPHONE (OUTPATIENT)
Dept: PERINATAL CARE | Facility: OTHER | Age: 35
End: 2020-12-29

## 2020-12-31 ENCOUNTER — TELEPHONE (OUTPATIENT)
Dept: PERINATAL CARE | Facility: OTHER | Age: 35
End: 2020-12-31

## 2021-01-07 ENCOUNTER — HOSPITAL ENCOUNTER (OUTPATIENT)
Dept: ULTRASOUND IMAGING | Facility: CLINIC | Age: 36
Discharge: HOME | End: 2021-01-07
Payer: COMMERCIAL

## 2021-01-07 VITALS — HEIGHT: 64 IN | WEIGHT: 260 LBS | BODY MASS INDEX: 44.39 KG/M2

## 2021-01-07 DIAGNOSIS — N64.52 BLOODY DISCHARGE FROM RIGHT NIPPLE: ICD-10-CM

## 2021-01-07 PROCEDURE — 76642 ULTRASOUND BREAST LIMITED: CPT

## 2021-01-07 NOTE — PROGRESS NOTES
Met with patient and Dr Vy Orozco regarding recommendation for;    __X___ RIGHT ______LEFT      __X___Ultrasound guided  ______Stereotactic breast biopsy  __X___Verbalized understanding        Blood thinners:  ___X__yes _____no (ASA 81mg)    Date stopped: ___N/A____    Biopsy teaching sheet given:  __X___yes ____no    Pt given contact information and adv to call with any questions/needs

## 2021-01-08 PROBLEM — N64.52 NIPPLE DISCHARGE, BLOODY: Status: ACTIVE | Noted: 2021-01-08

## 2021-01-11 ENCOUNTER — TRANSCRIBE ORDERS (OUTPATIENT)
Dept: ADMINISTRATIVE | Facility: HOSPITAL | Age: 36
End: 2021-01-11

## 2021-01-11 ENCOUNTER — LAB (OUTPATIENT)
Dept: LAB | Facility: HOSPITAL | Age: 36
End: 2021-01-11
Attending: OBSTETRICS & GYNECOLOGY
Payer: COMMERCIAL

## 2021-01-11 DIAGNOSIS — O09.521 SUPERVISION OF ELDERLY MULTIGRAVIDA IN FIRST TRIMESTER: ICD-10-CM

## 2021-01-11 DIAGNOSIS — O09.521 SUPERVISION OF ELDERLY MULTIGRAVIDA IN FIRST TRIMESTER: Primary | ICD-10-CM

## 2021-01-11 PROCEDURE — 36415 COLL VENOUS BLD VENIPUNCTURE: CPT

## 2021-01-15 LAB — MISCELLANEOUS LAB TEST RESULT: NORMAL

## 2021-01-21 ENCOUNTER — TELEPHONE (OUTPATIENT)
Dept: PERINATAL CARE | Facility: OTHER | Age: 36
End: 2021-01-21

## 2021-01-21 NOTE — TELEPHONE ENCOUNTER
----- Message from Elisha Jay MD sent at 2021 10:51 AM EST -----  Hi  RN staff, I've reviewed this NIPT result which shows low residual risk for the conditions tested (trisomies 13, 18, and 21, and sex chromosome abnormality), can you call her to inform her of this result? Her OB office is to order the MSAFP  and I sent her a [employer] message as an FYI  Thank you    Elisha Jay MD

## 2021-01-21 NOTE — TELEPHONE ENCOUNTER
Pt called office and requested we contact her  with gender results of NIPT  States she saw the results on My Chart already  I contacted Barb Morse, whom is on communication consent, and provided the results after I confirmed pts name and   Receptive to information and declines questions at this time

## 2021-01-21 NOTE — TELEPHONE ENCOUNTER
----- Message from Beverly Kilgore MD sent at 2021 10:51 AM EST -----  Hi  RN staff, I've reviewed this NIPT result which shows low residual risk for the conditions tested (trisomies 13, 18, and 21, and sex chromosome abnormality), can you call her to inform her of this result? Her OB office is to order the MSAFP  and I sent her a [employer] message as an FYI  Thank you    Beverly Kilgore MD

## 2021-01-26 ENCOUNTER — HOSPITAL ENCOUNTER (OUTPATIENT)
Dept: ULTRASOUND IMAGING | Facility: CLINIC | Age: 36
Discharge: HOME | End: 2021-01-26

## 2021-01-26 RX ORDER — LIDOCAINE HYDROCHLORIDE 10 MG/ML
5 INJECTION, SOLUTION EPIDURAL; INFILTRATION; INTRACAUDAL; PERINEURAL ONCE
Status: DISCONTINUED | OUTPATIENT
Start: 2021-01-26 | End: 2021-01-29 | Stop reason: HOSPADM

## 2021-02-10 ENCOUNTER — HOSPITAL ENCOUNTER (OUTPATIENT)
Dept: MAMMOGRAPHY | Facility: CLINIC | Age: 36
Discharge: HOME | End: 2021-02-10
Payer: COMMERCIAL

## 2021-02-10 ENCOUNTER — TELEPHONE (OUTPATIENT)
Dept: OBGYN CLINIC | Facility: CLINIC | Age: 36
End: 2021-02-10

## 2021-02-10 ENCOUNTER — HOSPITAL ENCOUNTER (OUTPATIENT)
Dept: ULTRASOUND IMAGING | Facility: CLINIC | Age: 36
Discharge: HOME | End: 2021-02-10
Payer: COMMERCIAL

## 2021-02-10 VITALS — HEART RATE: 98 BPM | SYSTOLIC BLOOD PRESSURE: 115 MMHG | DIASTOLIC BLOOD PRESSURE: 93 MMHG

## 2021-02-10 DIAGNOSIS — R92.8 ABNORMAL ULTRASOUND OF BREAST: ICD-10-CM

## 2021-02-10 DIAGNOSIS — R93.89 ABNORMAL ULTRASOUND: ICD-10-CM

## 2021-02-10 PROCEDURE — 19083 BX BREAST 1ST LESION US IMAG: CPT

## 2021-02-10 PROCEDURE — 88341 IMHCHEM/IMCYTCHM EA ADD ANTB: CPT | Performed by: PATHOLOGY

## 2021-02-10 PROCEDURE — 88305 TISSUE EXAM BY PATHOLOGIST: CPT | Performed by: PATHOLOGY

## 2021-02-10 PROCEDURE — 88342 IMHCHEM/IMCYTCHM 1ST ANTB: CPT | Performed by: PATHOLOGY

## 2021-02-10 PROCEDURE — 19084 BX BREAST ADD LESION US IMAG: CPT

## 2021-02-10 RX ORDER — LIDOCAINE HYDROCHLORIDE 10 MG/ML
5 INJECTION, SOLUTION EPIDURAL; INFILTRATION; INTRACAUDAL; PERINEURAL ONCE
Status: COMPLETED | OUTPATIENT
Start: 2021-02-10 | End: 2021-02-10

## 2021-02-10 RX ADMIN — LIDOCAINE HYDROCHLORIDE 5 ML: 10 INJECTION, SOLUTION EPIDURAL; INFILTRATION; INTRACAUDAL; PERINEURAL at 15:46

## 2021-02-10 RX ADMIN — LIDOCAINE HYDROCHLORIDE 5 ML: 10 INJECTION, SOLUTION EPIDURAL; INFILTRATION; INTRACAUDAL; PERINEURAL at 15:43

## 2021-02-10 NOTE — PROGRESS NOTES
Procedure type:    __x___ultrasound guided _____stereotactic    Breast:    _____Left __x___Right    Location: 12 oclock 5 cmfn    Needle: 14g Marquee    # of passes: 3    Clip: Ultraclip Wing    Performed by: Dr Yris Blandon held for 5 minutes by: Marc Dias    Steri Strips:    __x___yes _____no    Band aid:    ___x__yes_____no    Tape and guaze:    _____yes __x___no    Tolerated procedure:    __x___yes _____no              Procedure type:    __x___ultrasound guided _____stereotactic    Breast:    _____Left __x___Right    Location: 6 oclock retro    Needle: 14g Tabby    # of passes: 2    Clip: Ultraclip Heart    Performed by: Dr Tonia Rowland    Pressure held for 5 minutes by: Marc Dias    Steri Strips:    __x___yes _____no    Band aid:    __x___yes_____no    Tape and guaze:    _____yes __x___no    Tolerated procedure:    __x___yes _____no

## 2021-02-10 NOTE — PROGRESS NOTES
Ice pack given:    __x___yes _____no    Discharge instructions signed by patient:    ___x__yes _____no    Discharge instructions given to patient:    __x___yes _____no    Discharged via:    __x___amulatory    _____wheelchair    _____stretcher    Stable on discharge:    __x___yes ____no

## 2021-02-10 NOTE — TELEPHONE ENCOUNTER
----- Message from Ilir Wells sent at 2/10/2021  8:00 AM EST -----  Please call pt to schedule OB appointment  Has not been seen since intake on 12/4/20     Thank you

## 2021-02-12 ENCOUNTER — TELEPHONE (OUTPATIENT)
Dept: MAMMOGRAPHY | Facility: CLINIC | Age: 36
End: 2021-02-12

## 2021-02-12 ENCOUNTER — TELEPHONE (OUTPATIENT)
Dept: HEMATOLOGY ONCOLOGY | Facility: CLINIC | Age: 36
End: 2021-02-12

## 2021-02-12 NOTE — TELEPHONE ENCOUNTER
New Patient Breast Form   Patient Details:     Tai Perez     1985     [id number]     Background Information:   [address] starts by opening a telephone encounter and gathering the following information   Who is calling to schedule and relationship? [facility] - Tommie Rasmussen   Referring Provider [facility]   To which speciality is the referral? Surgical Oncology   Reason for Visit? 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 Yes   Date of Tissue Diagnosis (If done outside of [facility] please obtain report and slides) 2/10/21   Is patient aware of diagnosis, and who made them aware? yes   (If no tissue diagnosis, please stop and discuss with Navigator prior to scheduling)  na   When was the diagnosis made? 2/12/21   Were outside slides requested (If biopsy done eternally, obtain reports and slides for internal review)  na   Have you had any imaging or labs done? Yes   If YES, when and  where was the blood work done? [facility]    (If outside of [facility] obtain records)     Was the patient told to bring a disk? na   Are records in EPIC? Yes   Is there a personal history of cancer? No    (If YES please list type and YR diagnosed)     If patient has a prior history of breast cancer were old records obtained? NA   Is there a family history of cancer? Yes    (If YES please list type) Breast   Does the patient smoke or Vape? no    If yes, how many packs or cartridges per day? Scheduling Information:   [facility]   Are there any days the patient cannot be seen? Miscellaneous:   After completing the above information, please route to finance, nurse navigation and clinical trials for review

## 2021-02-12 NOTE — TELEPHONE ENCOUNTER
Epic email sent to Dr Jennifer Hogan:     I just wanted to let you know that the above patient was given her negative breast biopsy results  The patient had no questions and was advised to have a diagnostic ultrasound in 6 months  An appointment was made for 8/10/21 at 4pm   She was also recommended to follow up with a surgeon  She has chosen Dr Gemma Carlson and a call has been placed to the [facility] for an appt  Once an appt is provided, pt will be notified  If you have any questions or need further assistance please let me know      Thank you,  Carina Collier, RN  Breast Nurse Navigator

## 2021-02-12 NOTE — TELEPHONE ENCOUNTER
Patient provided breast biopsy results, Aware of recommendation for 6 month follow up and scheduled for Tuesday 8/10/21 at 4pm   Pt choose Dr Nohemi Mccann for her clinical management follow up recommendation  Adv we will reach out to schedule and call her back with appt  Pt without restrictions to her schedule  Pt agreeable and appreciative of call

## 2021-02-12 NOTE — TELEPHONE ENCOUNTER
Patient was prescreened for potential Clinical Trials, however there are no intraductal papilloma trials open

## 2021-02-13 NOTE — PROGRESS NOTES
Post procedure call completed 2/12/21 at 1444     Bleeding: _____yes __X___no    Pain: _____yes ___X___no    Redness/Swelling: ______yes ___X___no    Band aid removed: __X___yes _____no    Steri-Strips intact: ___X___yes _____no (discussed with patient to remove steri strips on day 5 if they have not come off on their own)    Pt with no questions at this time, adv to call with any questions or concerns, has name/# for contact

## 2021-02-15 ENCOUNTER — TELEPHONE (OUTPATIENT)
Dept: HEMATOLOGY ONCOLOGY | Facility: CLINIC | Age: 36
End: 2021-02-15

## 2021-02-15 ENCOUNTER — TELEPHONE (OUTPATIENT)
Dept: PERINATAL CARE | Facility: CLINIC | Age: 36
End: 2021-02-15

## 2021-02-15 NOTE — TELEPHONE ENCOUNTER
Attempted to reach patient by phone and left voicemail to confirm appointment for MFM ultrasound  1 support person ( must be over the age of 15) may accompany you for your appointment  Please wear masks ( [place] [facility])  You and your support person will be screened upon arrival   IF not feeling well- cough, fever, shortness of breath or any flu like symptoms contact your primary care physician or [address] [place] [address]  ? Please call our office prior to entering the building  Check in and rooming questions will be done via phone  Inside office # provided:  ?    [place] line: [phone]    ? [facility] does not allow cell phone use, recording device or streaming during ultrasound     Any questions with these instructions please call Maternal Fetal Medicine nurse line today @ # [phone]

## 2021-02-15 NOTE — TELEPHONE ENCOUNTER
Contacted pt to inform her of her appt with Dr Emanuel Chau scheduled for May 12, 2021at 1:00  Asked that she call us back to confirm

## 2021-02-16 ENCOUNTER — ROUTINE PRENATAL (OUTPATIENT)
Dept: PERINATAL CARE | Facility: OTHER | Age: 36
End: 2021-02-16
Payer: COMMERCIAL

## 2021-02-16 VITALS
SYSTOLIC BLOOD PRESSURE: 162 MMHG | HEIGHT: 64 IN | BODY MASS INDEX: 44.28 KG/M2 | DIASTOLIC BLOOD PRESSURE: 90 MMHG | HEART RATE: 116 BPM | WEIGHT: 259.4 LBS

## 2021-02-16 DIAGNOSIS — Z36.86 ENCOUNTER FOR ANTENATAL SCREENING FOR CERVICAL LENGTH: ICD-10-CM

## 2021-02-16 DIAGNOSIS — O10.919 CHRONIC HYPERTENSION AFFECTING PREGNANCY: Primary | ICD-10-CM

## 2021-02-16 DIAGNOSIS — Z3A.20 20 WEEKS GESTATION OF PREGNANCY: ICD-10-CM

## 2021-02-16 DIAGNOSIS — O09.529 HIGH-RISK PREGNANCY, MULTIGRAVIDA OF ADVANCED MATERNAL AGE, ANTEPARTUM: ICD-10-CM

## 2021-02-16 PROBLEM — R06.01 ORTHOPNEA: Status: ACTIVE | Noted: 2021-02-16

## 2021-02-16 PROCEDURE — 76811 OB US DETAILED SNGL FETUS: CPT | Performed by: OBSTETRICS & GYNECOLOGY

## 2021-02-16 PROCEDURE — 99213 OFFICE O/P EST LOW 20 MIN: CPT | Performed by: OBSTETRICS & GYNECOLOGY

## 2021-02-16 PROCEDURE — 76817 TRANSVAGINAL US OBSTETRIC: CPT | Performed by: OBSTETRICS & GYNECOLOGY

## 2021-02-16 RX ORDER — LABETALOL 100 MG/1
300 TABLET, FILM COATED ORAL 2 TIMES DAILY
Start: 2021-02-16 | End: 2021-06-04 | Stop reason: HOSPADM

## 2021-02-16 RX ORDER — LABETALOL 200 MG/1
TABLET, FILM COATED ORAL
COMMUNITY
Start: 2020-12-28 | End: 2021-06-04 | Stop reason: HOSPADM

## 2021-02-16 NOTE — PROGRESS NOTES
The patient was seen today for an ultrasound  Please see ultrasound report (located under Ob Procedures) for additional details  Thank you very much for allowing us to participate in the care of this very nice patient  Should you have any questions, please do not hesitate to contact me  On exam today the patient appears well, in no acute distress, and denies any complaints other than orthopnea  Her abdomen is non-tender  She does not have any significant crackles or significant edema on extremity and lung exam     Patient states she overall feels well  She did have a slight headache this morning  Her blood pressures remain elevated approaching severe range  She indicates that she is currently taking labetalol 200 mg twice a day  The patient had noninvasive prenatal testing through [employer] [facility] test   Her results were normal, placing her in a very low risk category  The sensitivity of detecting Trisomy 21 with this test is 99 1% with a specificity of 99 9%  The sensitivity of detecting Trisomy 18 is >99 9% with a specificity of 99 6%  The sensitivity of detecting Trisomy 13 is 91 7% with a specificity of 99 7%  Her negative result is very reassuring that the likelihood of her having a fetus with the aforementioned Trisomies is very low  Today's ultrasound is limited by fetal position; therefore, the fetal anatomic survey could not be completed  No significant fetal abnormalities are appreciated or suspected  Good fetal movement and tone are seen  The amniotic fluid volume appears normal   A transvaginal ultrasound was performed to assess the cervix, which was not seen well transabdominally  The cervical length was 3 69 centimeters, which is normal for the current gestational age  There was no significant funneling or dynamic changes appreciated  She was informed of today's findings and all of her questions were answered   The limitations of ultrasound were reviewed  We discussed patient's hypertension  Given her blood pressure levels, I recommend she increase her labetalol to 300 mg twice a day  I provided her with a new prescription for this  We also discussed orthopnea and I recommended and ordered an echocardiogram to evaluate for cardiac function  We discussed follow-up in detail and I recommend she return in 2weeks to our Center in order to complete the fetal anatomic survey  Please note, moderate level decision-making based upon adjustment of medications, discussion of risks, and ordering of test     Thank you very much for allowing us to participate in the care of this very nice patient  Should you have any questions, please do not hesitate to contact me  MD Mireya Berryucah  Attending Physician, Tre

## 2021-02-16 NOTE — LETTER
February 16, 2021     MD Roel Bah [address]  [address] [facility]    Patient: Venus Hernandez   YOB: 1985   Date of Visit: 2/16/2021       Dear Dr Harry : Thank you for referring Anel Castellon to me for evaluation  Below are my notes for this consultation  If you have questions, please do not hesitate to call me  I look forward to following your patient along with you  Sincerely,        Kisha Garcia MD        CC: No Recipients  Kisha Garcia MD  2/16/2021  5:44 PM  Sign when Signing Visit  The patient was seen today for an ultrasound  Please see ultrasound report (located under Ob Procedures) for additional details  Thank you very much for allowing us to participate in the care of this very nice patient  Should you have any questions, please do not hesitate to contact me  On exam today the patient appears well, in no acute distress, and denies any complaints other than orthopnea  Her abdomen is non-tender  She does not have any significant crackles or significant edema on extremity and lung exam     Patient states she overall feels well  She did have a slight headache this morning  Her blood pressures remain elevated approaching severe range  She indicates that she is currently taking labetalol 200 mg twice a day  The patient had noninvasive prenatal testing through [employer] plus test   Her results were normal, placing her in a very low risk category  The sensitivity of detecting Trisomy 21 with this test is 99 1% with a specificity of 99 9%  The sensitivity of detecting Trisomy 18 is >99 9% with a specificity of 99 6%  The sensitivity of detecting Trisomy 13 is 91 7% with a specificity of 99 7%  Her negative result is very reassuring that the likelihood of her having a fetus with the aforementioned Trisomies is very low      Today's ultrasound is limited by fetal position; therefore, the fetal anatomic survey could not be completed  No significant fetal abnormalities are appreciated or suspected  Good fetal movement and tone are seen  The amniotic fluid volume appears normal   A transvaginal ultrasound was performed to assess the cervix, which was not seen well transabdominally  The cervical length was 3 69 centimeters, which is normal for the current gestational age  There was no significant funneling or dynamic changes appreciated  She was informed of today's findings and all of her questions were answered  The limitations of ultrasound were reviewed  We discussed patient's hypertension  Given her blood pressure levels, I recommend she increase her labetalol to 300 mg twice a day  I provided her with a new prescription for this  We also discussed orthopnea and I recommended and ordered an echocardiogram to evaluate for cardiac function  We discussed follow-up in detail and I recommend she return in 2weeks to our Center in order to complete the fetal anatomic survey  Please note, moderate level decision-making based upon adjustment of medications, discussion of risks, and ordering of test     Thank you very much for allowing us to participate in the care of this very nice patient  Should you have any questions, please do not hesitate to contact me  Phoenix Serrano MD [address] [facility]  Attending Physician, Tre

## 2021-02-22 ENCOUNTER — TELEPHONE (OUTPATIENT)
Dept: OBGYN CLINIC | Facility: CLINIC | Age: 36
End: 2021-02-22

## 2021-02-22 NOTE — TELEPHONE ENCOUNTER
Please contact pt  NO show for last 2 appts and hasn't been seen for pregnancy here in 7- 8 wks  No appts scheduled that I can see    Should have appt this week sometime

## 2021-02-26 PROBLEM — Z3A.09 9 WEEKS GESTATION OF PREGNANCY: Status: RESOLVED | Noted: 2020-12-04 | Resolved: 2021-02-26

## 2021-02-26 PROBLEM — O09.299 HX OF GESTATIONAL DIABETES IN PRIOR PREGNANCY, CURRENTLY PREGNANT: Status: ACTIVE | Noted: 2020-12-04

## 2021-02-26 PROBLEM — O10.919 CHRONIC HYPERTENSION AFFECTING PREGNANCY: Status: RESOLVED | Noted: 2020-12-27 | Resolved: 2021-02-26

## 2021-02-26 PROBLEM — Z11.3 SCREENING EXAMINATION FOR STD (SEXUALLY TRANSMITTED DISEASE): Status: RESOLVED | Noted: 2020-12-03 | Resolved: 2021-02-26

## 2021-02-26 PROBLEM — Z12.4 CERVICAL CANCER SCREENING: Status: RESOLVED | Noted: 2020-12-03 | Resolved: 2021-02-26

## 2021-03-24 PROBLEM — Z3A.25 25 WEEKS GESTATION OF PREGNANCY: Status: ACTIVE | Noted: 2021-03-24

## 2021-03-24 NOTE — PROGRESS NOTES
OB/GYN  PN Visit  Rigoberto Payne  [id number]  3/25/2021  2:13 PM  Dr Claudeen Rosin, MD    S: 38 y o  [id number] 25w5d here for PN visit  Chief Complaint   Patient presents with    Routine Prenatal Visit     Labs UTD and declines FLU vaccine  C/O difficulty breathing at times  OB complaints:  Contractions: no  Leakage: no  Bleeding: no  Fetal movement: yes      O:  /84 (BP Location: Left arm, Cuff Size: Large)   Pulse (!) 111   Temp (!) 97 3 °F (36 3 °C) (Temporal)   Wt 115 kg (253 lb 6 4 oz)   LMP 09/16/2020 (Exact Date)   BMI 43 50 kg/m²       Review of Systems   Constitutional: Negative  HENT: Negative  Eyes: Negative  Respiratory: Negative  Cardiovascular: Negative  Gastrointestinal: Negative  Endocrine: Negative  Genitourinary:        As noted in HPI   Musculoskeletal: Negative  Skin: Negative  Allergic/Immunologic: Negative  Neurological: Negative  Hematological: Negative  Psychiatric/Behavioral: Negative  Physical Exam  Constitutional:       General: She is not in acute distress  Appearance: She is well-developed  Cardiovascular:      Rate and Rhythm: Normal rate and regular rhythm  Pulses: Normal pulses  Heart sounds: Normal heart sounds  No murmur  Pulmonary:      Effort: Pulmonary effort is normal  No respiratory distress  Breath sounds: Normal breath sounds  No stridor  No wheezing or rhonchi  Abdominal:      Palpations: Abdomen is soft  Tenderness: There is no abdominal tenderness  There is no guarding  Neurological:      Mental Status: She is alert and oriented to person, place, and time  Skin:     General: Skin is warm and dry     Psychiatric:         Behavior: Behavior normal                          A/P:    Problem List Items Addressed This Visit        Cardiovascular and Mediastinum    Hypertension complicating pregnancy    Relevant Orders    Comprehensive metabolic panel    Protein / creatinine ratio, urine    RPR       Other    High-risk pregnancy, multigravida of advanced maternal age, antepartum    Maternal anemia, antepartum    Hx of gestational diabetes in prior pregnancy, currently pregnant    Relevant Orders    HEMOGLOBIN A1C W/ EAG ESTIMATION    Ambulatory referral to Diabetic Education    25 weeks gestation of pregnancy - Primary    Relevant Orders    POCT urine dip    CBC    HEMOGLOBIN A1C W/ EAG ESTIMATION    Transfer to other facility      Other Visit Diagnoses     Intraductal papilloma        Shortness of breath        Relevant Orders    Transfer to other facility    Screening examination for STD (sexually transmitted disease)        Relevant Orders    Chlamydia/GC amplified DNA by PCR        Last night she had some trouble breathing, mainly in certain positions  On labetalol 300 mg BID  ASPIRIN 162 mg daily    She had headaches the other day, advised Tylenol, if headache persistent, advised to call  She requests sterilization  Sign MA tubal next visit    Patient with history childhood asthma  Patient reports shortness of breath which has been increasing in severity  Exam is normal but patient advised to go to the ER to be evaluated  Advised EKG and at least a CT PE study to rule out PE in pregnancy  Continue low-dose aspirin and labetalol 300 mg b i d  Repeat preeclampsia labs were ordered today  Patient states she is unable to take the oral glucose tolerance test due to severe nausea after every time she has had a test   Patient with history of gestational diabetes  She was referred to gestational diabetes pregnancy program for glucose monitoring  Hemoglobin A1c was ordered for patient as well as fasting blood sugar  Patient had a gonorrhea and chlamydia test with her Pap which was invalid  Advised urine GC chlamydia  Follow-up in 2 weeks for blood pressure check      Growth scan at [facility] next week    Pt was unable to come to appt due to her dad being involved in a car accident, advised importance of follow-up    Elen Plummer MD  3/25/2021  2:13 PM

## 2021-03-25 ENCOUNTER — APPOINTMENT (EMERGENCY)
Dept: CT IMAGING | Facility: HOSPITAL | Age: 36
End: 2021-03-25
Payer: COMMERCIAL

## 2021-03-25 ENCOUNTER — HOSPITAL ENCOUNTER (EMERGENCY)
Facility: HOSPITAL | Age: 36
Discharge: HOME | End: 2021-03-25
Attending: EMERGENCY MEDICINE
Payer: COMMERCIAL

## 2021-03-25 ENCOUNTER — ROUTINE PRENATAL (OUTPATIENT)
Dept: OBGYN CLINIC | Facility: CLINIC | Age: 36
End: 2021-03-25
Payer: COMMERCIAL

## 2021-03-25 VITALS
SYSTOLIC BLOOD PRESSURE: 134 MMHG | TEMPERATURE: 97.3 F | HEART RATE: 111 BPM | WEIGHT: 253.4 LBS | DIASTOLIC BLOOD PRESSURE: 84 MMHG | BODY MASS INDEX: 43.5 KG/M2

## 2021-03-25 VITALS
RESPIRATION RATE: 20 BRPM | BODY MASS INDEX: 43.63 KG/M2 | DIASTOLIC BLOOD PRESSURE: 71 MMHG | TEMPERATURE: 98.6 F | HEART RATE: 97 BPM | WEIGHT: 254.19 LBS | SYSTOLIC BLOOD PRESSURE: 159 MMHG | OXYGEN SATURATION: 99 %

## 2021-03-25 DIAGNOSIS — O99.019 MATERNAL ANEMIA, ANTEPARTUM: ICD-10-CM

## 2021-03-25 DIAGNOSIS — Z11.3 SCREENING EXAMINATION FOR STD (SEXUALLY TRANSMITTED DISEASE): ICD-10-CM

## 2021-03-25 DIAGNOSIS — R91.1 PULMONARY NODULE: ICD-10-CM

## 2021-03-25 DIAGNOSIS — Z3A.25 25 WEEKS GESTATION OF PREGNANCY: Primary | ICD-10-CM

## 2021-03-25 DIAGNOSIS — R06.02 SHORTNESS OF BREATH: ICD-10-CM

## 2021-03-25 DIAGNOSIS — O09.299 HX OF GESTATIONAL DIABETES IN PRIOR PREGNANCY, CURRENTLY PREGNANT: ICD-10-CM

## 2021-03-25 DIAGNOSIS — Z86.32 HX OF GESTATIONAL DIABETES IN PRIOR PREGNANCY, CURRENTLY PREGNANT: ICD-10-CM

## 2021-03-25 DIAGNOSIS — D36.9 INTRADUCTAL PAPILLOMA: ICD-10-CM

## 2021-03-25 DIAGNOSIS — R06.00 DYSPNEA: Primary | ICD-10-CM

## 2021-03-25 DIAGNOSIS — O16.2 HYPERTENSION AFFECTING PREGNANCY IN SECOND TRIMESTER: ICD-10-CM

## 2021-03-25 DIAGNOSIS — O09.529 HIGH-RISK PREGNANCY, MULTIGRAVIDA OF ADVANCED MATERNAL AGE, ANTEPARTUM: ICD-10-CM

## 2021-03-25 DIAGNOSIS — Z34.92 SECOND TRIMESTER PREGNANCY: ICD-10-CM

## 2021-03-25 LAB
ALBUMIN SERPL BCP-MCNC: 2.8 G/DL (ref 3.5–5)
ALP SERPL-CCNC: 70 U/L (ref 46–116)
ALT SERPL W P-5'-P-CCNC: 16 U/L (ref 12–78)
ANION GAP SERPL CALCULATED.3IONS-SCNC: 12 MMOL/L (ref 4–13)
AST SERPL W P-5'-P-CCNC: 6 U/L (ref 5–45)
ATRIAL RATE: 89 BPM
BASOPHILS # BLD AUTO: 0.02 THOUSANDS/ΜL (ref 0–0.1)
BASOPHILS NFR BLD AUTO: 0 % (ref 0–1)
BILIRUB SERPL-MCNC: 0.19 MG/DL (ref 0.2–1)
BUN SERPL-MCNC: 5 MG/DL (ref 5–25)
CALCIUM ALBUM COR SERPL-MCNC: 9.9 MG/DL (ref 8.3–10.1)
CALCIUM SERPL-MCNC: 8.9 MG/DL (ref 8.3–10.1)
CHLORIDE SERPL-SCNC: 106 MMOL/L (ref 100–108)
CO2 SERPL-SCNC: 23 MMOL/L (ref 21–32)
CREAT SERPL-MCNC: 0.67 MG/DL (ref 0.6–1.3)
EOSINOPHIL # BLD AUTO: 0.15 THOUSAND/ΜL (ref 0–0.61)
EOSINOPHIL NFR BLD AUTO: 2 % (ref 0–6)
ERYTHROCYTE [DISTWIDTH] IN BLOOD BY AUTOMATED COUNT: 15.6 % (ref 11.6–15.1)
GFR SERPL CREATININE-BSD FRML MDRD: 131 ML/MIN/1.73SQ M
GLUCOSE SERPL-MCNC: 108 MG/DL (ref 65–140)
HCT VFR BLD AUTO: 30 % (ref 34.8–46.1)
HGB BLD-MCNC: 9 G/DL (ref 11.5–15.4)
IMM GRANULOCYTES # BLD AUTO: 0.04 THOUSAND/UL (ref 0–0.2)
IMM GRANULOCYTES NFR BLD AUTO: 1 % (ref 0–2)
LYMPHOCYTES # BLD AUTO: 1.15 THOUSANDS/ΜL (ref 0.6–4.47)
LYMPHOCYTES NFR BLD AUTO: 14 % (ref 14–44)
MCH RBC QN AUTO: 21.8 PG (ref 26.8–34.3)
MCHC RBC AUTO-ENTMCNC: 30 G/DL (ref 31.4–37.4)
MCV RBC AUTO: 73 FL (ref 82–98)
MONOCYTES # BLD AUTO: 0.35 THOUSAND/ΜL (ref 0.17–1.22)
MONOCYTES NFR BLD AUTO: 4 % (ref 4–12)
NEUTROPHILS # BLD AUTO: 6.25 THOUSANDS/ΜL (ref 1.85–7.62)
NEUTS SEG NFR BLD AUTO: 79 % (ref 43–75)
NRBC BLD AUTO-RTO: 0 /100 WBCS
NT-PROBNP SERPL-MCNC: 82 PG/ML
P AXIS: 56 DEGREES
PLATELET # BLD AUTO: 312 THOUSANDS/UL (ref 149–390)
PMV BLD AUTO: 11.7 FL (ref 8.9–12.7)
POTASSIUM SERPL-SCNC: 3.5 MMOL/L (ref 3.5–5.3)
PR INTERVAL: 162 MS
PROT SERPL-MCNC: 5.8 G/DL (ref 6.4–8.2)
QRS AXIS: 76 DEGREES
QRSD INTERVAL: 98 MS
QT INTERVAL: 336 MS
QTC INTERVAL: 408 MS
RBC # BLD AUTO: 4.12 MILLION/UL (ref 3.81–5.12)
SL AMB  POCT GLUCOSE, UA: NEGATIVE
SL AMB POCT URINE PROTEIN: 30
SODIUM SERPL-SCNC: 141 MMOL/L (ref 136–145)
T WAVE AXIS: 22 DEGREES
VENTRICULAR RATE: 89 BPM
WBC # BLD AUTO: 7.96 THOUSAND/UL (ref 4.31–10.16)

## 2021-03-25 PROCEDURE — 93005 ELECTROCARDIOGRAM TRACING: CPT

## 2021-03-25 PROCEDURE — 93010 ELECTROCARDIOGRAM REPORT: CPT | Performed by: INTERNAL MEDICINE

## 2021-03-25 PROCEDURE — 99285 EMERGENCY DEPT VISIT HI MDM: CPT

## 2021-03-25 PROCEDURE — 99285 EMERGENCY DEPT VISIT HI MDM: CPT | Performed by: EMERGENCY MEDICINE

## 2021-03-25 PROCEDURE — 99214 OFFICE O/P EST MOD 30 MIN: CPT | Performed by: OBSTETRICS & GYNECOLOGY

## 2021-03-25 PROCEDURE — 81002 URINALYSIS NONAUTO W/O SCOPE: CPT | Performed by: OBSTETRICS & GYNECOLOGY

## 2021-03-25 PROCEDURE — 80053 COMPREHEN METABOLIC PANEL: CPT | Performed by: EMERGENCY MEDICINE

## 2021-03-25 PROCEDURE — 71275 CT ANGIOGRAPHY CHEST: CPT

## 2021-03-25 PROCEDURE — 36415 COLL VENOUS BLD VENIPUNCTURE: CPT | Performed by: EMERGENCY MEDICINE

## 2021-03-25 PROCEDURE — 85025 COMPLETE CBC W/AUTO DIFF WBC: CPT | Performed by: EMERGENCY MEDICINE

## 2021-03-25 PROCEDURE — 83880 ASSAY OF NATRIURETIC PEPTIDE: CPT | Performed by: EMERGENCY MEDICINE

## 2021-03-25 RX ORDER — HYDROCHLOROTHIAZIDE 25 MG/1
TABLET ORAL
COMMUNITY
Start: 2021-02-11 | End: 2021-03-25

## 2021-03-25 RX ADMIN — IOHEXOL 85 ML: 350 INJECTION, SOLUTION INTRAVENOUS at 17:25

## 2021-03-25 NOTE — PATIENT INSTRUCTIONS
Pregnancy at 23 to 26 [address]:   You are now close to or at the beginning of the third trimester  The third trimester starts at 24 weeks and ends with delivery  As your baby gets larger, you may develop certain symptoms  These may include pain in your back or down the sides of your abdomen  You may also have stretch marks on your abdomen, breasts, thighs, or buttocks  You may also have constipation  DISCHARGE INSTRUCTIONS:   Return to the emergency department if:   · You develop a severe headache that does not go away  · You have new or increased vision changes, such as blurred or spotted vision  · You have new or increased swelling in your face or hands  · You have vaginal spotting or bleeding  · Your water broke or you feel warm water gushing or trickling from your vagina  Contact your healthcare provider if:   · You have abdominal cramps, pressure, or tightening  · You have a change in vaginal discharge  · You have light bleeding  · You have chills or a fever  · You have vaginal itching, burning, or pain  · You have yellow, green, white, or foul-smelling vaginal discharge  · You have pain or burning when you urinate, less urine than usual, or pink or bloody urine  · You have questions or concerns about your condition or care  How to care for yourself at this stage of your pregnancy:   · Eat a variety of healthy foods  Healthy foods include fruits, vegetables, whole-grain breads, low-fat dairy foods, beans, lean meats, and fish  Drink liquids as directed  Ask how much liquid to drink each day and which liquids are best for you  Limit caffeine to less than 200 milligrams each day  Limit your intake of fish to 2 servings each week  Choose fish low in mercury such as canned light tuna, shrimp, salmon, cod, or tilapia  Do not  eat fish high in mercury such as swordfish, tilefish, ian mackerel, and shark  · Manage back pain    Do not stand for long periods of time or lift heavy items  Use good posture while you stand, squat, or bend  Wear low-heeled shoes with good support  Rest may also help to relieve back pain  Ask your healthcare provider about exercises you can do to strengthen your back muscles  · Take prenatal vitamins as directed  Your need for certain vitamins and minerals, such as folic acid, increases during pregnancy  Prenatal vitamins provide some of the extra vitamins and minerals you need  Prenatal vitamins may also help to decrease the risk of certain birth defects  · Talk to your healthcare provider about exercise  Moderate exercise can help you stay fit  Your healthcare provider will help you plan an exercise program that is safe for you during pregnancy  · Do not smoke  Smoking increases your risk of a miscarriage and other health problems during your pregnancy  Smoking can cause your baby to be born too early or weigh less at birth  Ask your healthcare provider for information if you need help quitting  · Do not drink alcohol  Alcohol passes from your body to your baby through the placenta  It can affect your baby's brain development and cause fetal alcohol syndrome (FAS)  FAS is a group of conditions that causes mental, behavior, and growth problems  · Talk to your healthcare provider before you take any medicines  Many medicines may harm your baby if you take them when you are pregnant  Do not take any medicines, vitamins, herbs, or supplements without first talking to your healthcare provider  Never use illegal or street drugs (such as marijuana or cocaine) while you are pregnant  Safety tips:   · Avoid hot tubs and saunas  Do not use a hot tub or sauna while you are pregnant, especially during your first trimester  Hot tubs and saunas may raise your baby's temperature and increase the risk of birth defects  · Avoid toxoplasmosis    This is an infection caused by eating raw meat or being around infected cat feces  It can cause birth defects, miscarriages, and other problems  Wash your hands after you touch raw meat  Make sure any meat is well-cooked before you eat it  Avoid raw eggs and unpasteurized milk  Use gloves or ask someone else to clean your cat's litter box while you are pregnant  Changes that are happening with your baby:  By 26 weeks, your baby will weigh about 2 pounds  Your baby will be about 10 inches long from the top of the head to the rump (baby's bottom)  Your baby's movements are much stronger now  Your baby's eyes are almost completely formed and can partially open  Your baby also sleeps and wakes up  What you need to know about prenatal care: Your healthcare provider will check your blood pressure and weight  You may also need the following:  · A urine test  may also be done to check for sugar and protein  These can be signs of gestational diabetes or infection  Protein in your urine may also be a sign of preeclampsia  Preeclampsia is a condition that can develop during week 20 or later of your pregnancy  It causes high blood pressure, and it can cause problems with your kidneys and other organs  · Fundal height  is a measurement of your uterus to check your baby's growth  This number is usually the same as the number of weeks that you have been pregnant  · Your baby's heart rate  will be checked  © Copyright [address] Information is for End User's use only and may not be sold, redistributed or otherwise used for commercial purposes  All illustrations and images included in CareNotes® are the copyrighted property of A D A M , Inc  or [facility]barra   The above information is an  only  It is not intended as medical advice for individual conditions or treatments  Talk to your doctor, nurse or pharmacist before following any medical regimen to see if it is safe and effective for you

## 2021-03-25 NOTE — DISCHARGE INSTRUCTIONS
The radiologist said the small pulmonary nodule is likely not concerning and should be rechecked in 12 months only if you are considered high risk for lung cancer, which includes smoking or family history of lung cancer  Follow up for additional lab work as ordered by Dr Jairo Perze, and follow up  Please return to ED for sudden increasing shortness of breath, chest pain, bleeding, or other emergency concerns

## 2021-03-25 NOTE — ED PROVIDER NOTES
History  Chief Complaint   Patient presents with    Shortness of Breath     Pt reports shortness of breath on and off for a few months  Referred to ED by OB  Pt is 23 weeks pregnant  Also c/o vomiting  Denies chest pain  37 yo female at 22 weeks gestation, referred to ED by Dr Rob Sifuentes for evaluation of intermittent dyspnea, asking specifically for EKG and CT, since patient is being managed for hypertension during pregnancy  She is not currently experiencing any worsening at this time  History provided by:  Patient      Prior to Admission Medications   Prescriptions Last Dose Informant Patient Reported? Taking? Prenatal Vit-Fe Fumarate-FA (PNV Folic Acid + Iron) 94-8 MG TABS  Self No No   Sig: Take 1 tablet by mouth daily   acetaminophen (TYLENOL) 500 mg tablet  Self Yes No   Sig: Take 500 mg by mouth every 6 (six) hours as needed for mild pain   aspirin (ECOTRIN LOW STRENGTH) 81 mg EC tablet  Self No No   Sig: Take 2 tablets (162 mg total) by mouth daily   docusate sodium (COLACE) 100 mg capsule  Self No No   Sig: Take 1 capsule (100 mg total) by mouth 2 (two) times a day   ferrous sulfate 324 (65 Fe) mg  Self No No   Sig: Take 1 tablet (325 mg total) by mouth 2 (two) times a day before meals   labetalol (NORMODYNE) 100 mg tablet   No No   Sig: Take 3 tablets (300 mg total) by mouth 2 (two) times a day   labetalol (NORMODYNE) 200 mg tablet   Yes No   Sig: TAKE 2 TABLETS BY MOUTH TWICE DAILY      Facility-Administered Medications: None       Past Medical History:   Diagnosis Date    History of recurrent miscarriages     see OB hx       Past Surgical History:   Procedure Laterality Date    INDUCED       ID SURG RX MISSED ABORTN,1ST TRI N/A 2016    Procedure: DILATATION AND EVACUATION (D&E);   Surgeon: Bhumi Muir MD;  Location: [facility];  Service: Gynecology    US GUIDANCE BREAST BIOPSY RIGHT EACH ADDITIONAL Right 2/10/2021    US GUIDED BREAST BIOPSY RIGHT COMPLETE Right 2/10/2021 Family History   Problem Relation Age of Onset    Hypertension Mother     Breast cancer Mother 58    BRCA1 Negative Mother     BRCA2 Negative Mother     Hypertension Father     No Known Problems Sister     No Known Problems Daughter     Stomach cancer Maternal Grandmother 80    Cancer Paternal Grandmother         unsure of type    No Known Problems Sister     No Known Problems Sister     No Known Problems Sister     No Known Problems Sister     No Known Problems Son     No Known Problems Son     Breast cancer Maternal Aunt 59    Breast cancer Maternal Aunt     Breast cancer Maternal Aunt     Breast cancer Maternal Aunt     Breast cancer Maternal Aunt     Breast cancer Maternal Aunt     No Known Problems Paternal Aunt      I have reviewed and agree with the history as documented  E-Cigarette/Vaping     E-Cigarette/Vaping Substances     Social History     Tobacco Use    Smoking status: Former Smoker     Types: Cigarettes     Quit date:      Years since quittin 2    Smokeless tobacco: Never Used    Tobacco comment: Former smoker per [employer]   Substance Use Topics    Alcohol use: No    Drug use: No       Review of Systems   Constitutional: Negative for appetite change, chills and fever  HENT: Negative for sore throat  Respiratory: Positive for shortness of breath  Negative for cough, chest tightness and wheezing  Cardiovascular: Negative for chest pain and palpitations  Gastrointestinal: Negative for abdominal pain, diarrhea, nausea and vomiting  Genitourinary: Negative for dysuria and hematuria  Musculoskeletal: Negative for neck pain  Skin: Negative for rash  Neurological: Negative for dizziness, weakness and headaches  Psychiatric/Behavioral: Negative for suicidal ideas  All other systems reviewed and are negative  Physical Exam  Physical Exam  Vitals signs and nursing note reviewed  Constitutional:       Appearance: She is well-developed  She is obese  She is not toxic-appearing or diaphoretic  HENT:      Head: Normocephalic and atraumatic  Right Ear: Tympanic membrane and external ear normal       Left Ear: Tympanic membrane and external ear normal       Nose: Nose normal    Eyes:      Conjunctiva/sclera: Conjunctivae normal       Pupils: Pupils are equal, round, and reactive to light  Neck:      Musculoskeletal: Full passive range of motion without pain, normal range of motion and neck supple  Meningeal: Brudzinski's sign and Kernig's sign absent  Cardiovascular:      Rate and Rhythm: Normal rate and regular rhythm  Pulses: Normal pulses  Heart sounds: Normal heart sounds  No murmur  Pulmonary:      Effort: Pulmonary effort is normal  No tachypnea or respiratory distress  Breath sounds: Normal breath sounds  No wheezing  Abdominal:      General: Bowel sounds are normal  There is distension (gravid)  Palpations: Abdomen is soft  Abdomen is not rigid  Tenderness: There is no abdominal tenderness  There is no guarding or rebound  Musculoskeletal: Normal range of motion  Right lower leg: She exhibits no swelling  Left lower leg: She exhibits no swelling  Lymphadenopathy:      Cervical: No cervical adenopathy  Skin:     General: Skin is warm and dry  Coloration: Skin is not pale  Findings: No rash  Neurological:      Mental Status: She is alert and oriented to person, place, and time  GCS: GCS eye subscore is 4  GCS verbal subscore is 5  GCS motor subscore is 6  Cranial Nerves: No cranial nerve deficit  Sensory: No sensory deficit  Coordination: Coordination normal       Gait: Gait normal       Deep Tendon Reflexes: Reflexes are normal and symmetric  Psychiatric:         Speech: Speech normal          Behavior: Behavior normal          Thought Content:  Thought content normal          Judgment: Judgment normal          Vital Signs  ED Triage Vitals [03/25/21 1535]   Temperature Pulse Respirations Blood Pressure SpO2   98 6 °F (37 °C) 97 20 159/71 99 %      Temp Source Heart Rate Source Patient Position - Orthostatic VS BP Location FiO2 (%)   Oral Monitor Sitting Left arm --      Pain Score       --           Vitals:    03/25/21 1535   BP: 159/71   Pulse: 97   Patient Position - Orthostatic VS: Sitting         Visual Acuity      ED Medications  Medications   iohexol (OMNIPAQUE) 350 MG/ML injection (MULTI-DOSE) 85 mL (85 mL Intravenous Given 3/25/21 1725)       Diagnostic Studies  Results Reviewed     Procedure Component Value Units Date/Time    NT-BNP PRO [[id number]]  (Normal) Collected: 03/25/21 1631    Lab Status: Final result Specimen: Blood from Arm, Left Updated: 03/25/21 1713     NT-proBNP 82 pg/mL     Comprehensive metabolic panel [[id number]]  (Abnormal) Collected: 03/25/21 1631    Lab Status: Final result Specimen: Blood from Arm, Left Updated: 03/25/21 1707     Sodium 141 mmol/L      Potassium 3 5 mmol/L      Chloride 106 mmol/L      CO2 23 mmol/L      ANION GAP 12 mmol/L      BUN 5 mg/dL      Creatinine 0 67 mg/dL      Glucose 108 mg/dL      Calcium 8 9 mg/dL      Corrected Calcium 9 9 mg/dL      AST 6 U/L      ALT 16 U/L      Alkaline Phosphatase 70 U/L      Total Protein 5 8 g/dL      Albumin 2 8 g/dL      Total Bilirubin 0 19 mg/dL      eGFR 131 ml/min/1 73sq m     Narrative:      [place] guidelines for Chronic Kidney Disease (CKD):     Stage 1 with normal or high GFR (GFR > 90 mL/min/1 73 square meters)    Stage 2 Mild CKD (GFR = 60-89 mL/min/1 73 square meters)    Stage 3A Moderate CKD (GFR = 45-59 mL/min/1 73 square meters)    Stage 3B Moderate CKD (GFR = 30-44 mL/min/1 73 square meters)    Stage 4 Severe CKD (GFR = 15-29 mL/min/1 73 square meters)    Stage 5 End Stage CKD (GFR <15 mL/min/1 73 square meters)  Note: GFR calculation is accurate only with a steady state creatinine    CBC and differential [[id number]] (Abnormal) Collected: 03/25/21 1631    Lab Status: Final result Specimen: Blood from Arm, Left Updated: 03/25/21 1648     WBC 7 96 Thousand/uL      RBC 4 12 Million/uL      Hemoglobin 9 0 g/dL      Hematocrit 30 0 %      MCV 73 fL      MCH 21 8 pg      MCHC 30 0 g/dL      RDW 15 6 %      MPV 11 7 fL      Platelets 787 Thousands/uL      nRBC 0 /100 WBCs      Neutrophils Relative 79 %      Immat GRANS % 1 %      Lymphocytes Relative 14 %      Monocytes Relative 4 %      Eosinophils Relative 2 %      Basophils Relative 0 %      Neutrophils Absolute 6 25 Thousands/µL      Immature Grans Absolute 0 04 Thousand/uL      Lymphocytes Absolute 1 15 Thousands/µL      Monocytes Absolute 0 35 Thousand/µL      Eosinophils Absolute 0 15 Thousand/µL      Basophils Absolute 0 02 Thousands/µL                  CTA ED chest PE study   Final Result by Romel Mata MD (03/25 7658)      1  No pulmonary embolism  2   4 mm pulmonary nodule  Based on current Fleischner Society 2017 Guidelines on incidental pulmonary nodule, no routine follow-up is needed if the patient is considered low risk for lung cancer  If the patient is considered high risk for lung cancer,    12 month follow-up non-contrast chest CT is recommended  Workstation performed: [id number]                    Procedures  ECG 12 Lead Documentation Only    Date/Time: 3/25/2021 3:59 PM  Performed by: Loraine Reyes MD  Authorized by: Loraine Reyes MD     Rate:     ECG rate:  89  Rhythm:     Rhythm: sinus rhythm    Ectopy:     Ectopy: none    QRS:     QRS axis:  Normal    QRS intervals:  Normal  Conduction:     Conduction: normal    ST segments:     ST segments:  Normal  T waves:     T waves: normal               ED Course                             SBIRT 22yo+      Most Recent Value   SBIRT (22 yo +)   In order to provide better care to our patients, we are screening all of our patients for alcohol and drug use   Would it be okay to ask you these screening questions? No Filed at: 03/25/2021 4773          Wells' Criteria for PE      Most Recent Value   Wells' Criteria for PE   Clinical signs and symptoms of DVT  0 Filed at: 03/25/2021 1549   PE is primary diagnosis or equally likely  3 Filed at: 03/25/2021 1549   HR >100  1 5 Filed at: 03/25/2021 1549   Immobilization at least 3 days or Surgery in the previous 4 weeks  0 Filed at: 03/25/2021 1549   Previous, objectively diagnosed PE or DVT  0 Filed at: 03/25/2021 1549   Hemoptysis  0 Filed at: 03/25/2021 1549   Malignancy with treatment within 6 months or palliative  0 Filed at: 03/25/2021 1549   Wells' Criteria Total  4 5 Filed at: 03/25/2021 1549                MDM    Disposition  Final diagnoses:   Dyspnea   Second trimester pregnancy   Pulmonary nodule     Time reflects when diagnosis was documented in both MDM as applicable and the Disposition within this note     Time User Action Codes Description Comment    3/25/2021  4:48 PM Elihue Clarity Add [R06 00] Dyspnea     3/25/2021  5:45 PM Elihue Clarity Add [Z34 92] Second trimester pregnancy     3/25/2021  5:51 PM Elihue Clarity Add [R91 1] Pulmonary nodule       ED Disposition     ED Disposition Condition Date/Time Comment    Discharge Good Thu Mar 25, 2021  5:51 PM Jud Caba discharge to home/self care              Follow-up Information     Follow up With Specialties Details Why Riley Humphrey MD Obstetrics and Gynecology, Obstetrics, Gynecology Go to  For followup Roel Hernandez [address] [place] [address]  [phone]            Discharge Medication List as of 3/25/2021  5:54 PM      CONTINUE these medications which have NOT CHANGED    Details   acetaminophen (TYLENOL) 500 mg tablet Take 500 mg by mouth every 6 (six) hours as needed for mild pain, Historical Med      aspirin (ECOTRIN LOW STRENGTH) 81 mg EC tablet Take 2 tablets (162 mg total) by mouth daily, Starting Fri 12/4/2020, Until Wed 6/2/2021, Normal      docusate sodium (COLACE) 100 mg capsule Take 1 capsule (100 mg total) by mouth 2 (two) times a day, Starting Fri 12/4/2020, Normal      ferrous sulfate 324 (65 Fe) mg Take 1 tablet (325 mg total) by mouth 2 (two) times a day before meals, Starting Fri 12/4/2020, Normal      !! labetalol (NORMODYNE) 100 mg tablet Take 3 tablets (300 mg total) by mouth 2 (two) times a day, Starting Tue 2/16/2021, No Print      !! labetalol (NORMODYNE) 200 mg tablet TAKE 2 TABLETS BY MOUTH TWICE DAILY, Historical Med      Prenatal Vit-Fe Fumarate-FA (PNV Folic Acid + Iron) 29-6 MG TABS Take 1 tablet by mouth daily, Starting Wed 11/4/2020, Normal       !! - Potential duplicate medications found  Please discuss with provider  No discharge procedures on file      PDMP Review     None          ED Provider  Electronically Signed by           Joe Alejandro MD  03/26/21 1003

## 2021-04-09 ENCOUNTER — DOCUMENTATION (OUTPATIENT)
Dept: PERINATAL CARE | Facility: CLINIC | Age: 36
End: 2021-04-09

## 2021-04-09 ENCOUNTER — TELEMEDICINE (OUTPATIENT)
Dept: PERINATAL CARE | Facility: CLINIC | Age: 36
End: 2021-04-09
Payer: COMMERCIAL

## 2021-04-09 DIAGNOSIS — Z13.1 DIABETES MELLITUS SCREENING: ICD-10-CM

## 2021-04-09 DIAGNOSIS — Z86.32 HX OF GESTATIONAL DIABETES IN PRIOR PREGNANCY, CURRENTLY PREGNANT: Primary | ICD-10-CM

## 2021-04-09 DIAGNOSIS — O09.292 HISTORY OF GESTATIONAL DIABETES MELLITUS (GDM) IN PRIOR PREGNANCY, CURRENTLY PREGNANT IN SECOND TRIMESTER: Primary | ICD-10-CM

## 2021-04-09 DIAGNOSIS — O99.210 MATERNAL MORBID OBESITY, ANTEPARTUM (HCC): ICD-10-CM

## 2021-04-09 DIAGNOSIS — E66.01 MATERNAL MORBID OBESITY, ANTEPARTUM (HCC): ICD-10-CM

## 2021-04-09 DIAGNOSIS — Z3A.27 27 WEEKS GESTATION OF PREGNANCY: ICD-10-CM

## 2021-04-09 DIAGNOSIS — Z86.32 HISTORY OF GESTATIONAL DIABETES MELLITUS (GDM) IN PRIOR PREGNANCY, CURRENTLY PREGNANT IN SECOND TRIMESTER: Primary | ICD-10-CM

## 2021-04-09 DIAGNOSIS — O09.299 HX OF GESTATIONAL DIABETES IN PRIOR PREGNANCY, CURRENTLY PREGNANT: Primary | ICD-10-CM

## 2021-04-09 PROCEDURE — G0108 DIAB MANAGE TRN  PER INDIV: HCPCS | Performed by: DIETITIAN, REGISTERED

## 2021-04-09 RX ORDER — BLOOD SUGAR DIAGNOSTIC
STRIP MISCELLANEOUS
Qty: 50 EACH | Refills: 0 | Status: SHIPPED | OUTPATIENT
Start: 2021-04-09 | End: 2021-04-27 | Stop reason: SDUPTHER

## 2021-04-09 RX ORDER — LANCETS 33 GAUGE
EACH MISCELLANEOUS
Qty: 100 EACH | Refills: 0 | Status: SHIPPED | OUTPATIENT
Start: 2021-04-09 | End: 2021-04-27 | Stop reason: SDUPTHER

## 2021-04-09 RX ORDER — BLOOD-GLUCOSE METER
EACH MISCELLANEOUS
Qty: 1 KIT | Refills: 0 | Status: SHIPPED | OUTPATIENT
Start: 2021-04-09 | End: 2021-12-14

## 2021-04-09 NOTE — PROGRESS NOTES
Demographics:  Language: [place]  Ethnicity: Black/   Country of Origin: [place]    Education/Occupation:  Highest grade completed: [employer]  Occupation: Homemaker    Personal & Family History:  Personal history of diabetes? yes Diet controlled gestational diabetes  Family members with diabetes: Father & paternal grandfather    Pregnancy History:  How many total pregnancies have you had? 6  How many children do you have? 3  Have you had a baby weighing larger than 9 lbs? no  Are you having swelling or fluid retention? no  Have you been placed on bedrest? no    Physical Activity:  Do you exercise? yes  Type of Exercise: Walking with her kids after school  Frequency of Exercise: 5 x per week    Nutrition Questionnaire: How many meals do you eat daily? 3  List times of meals: Breakfast: 10-10:30 AM/ Lunch: 3 PM/ Dinner: 7-8 PM  How many snacks do you eat daily? Other eats if get hungry  What type of diet are you following at home? Regular  Do you have special or ethnic dietary preferences? yes Loves at least 1 regular soda daily  Do you have any food allergies or intolerances? yes Avoids pizza, cheese burgers & white rice now  Learning preferences: How do you learn best? Video & reading  How do you rate your health? Veda Boateng  Who is your primary support person? Spouse  How do you cope with stress? Cleaning, walks & talks with   Do you have any cultural or Buddhism beliefs we should be aware of? no  Do you receive WIC or food stamps?  yes Gets both

## 2021-04-09 NOTE — PROGRESS NOTES
Virtual Regular Visit      Assessment/Plan:    Problem List Items Addressed This Visit     None               Reason for visit is   Chief Complaint   Patient presents with    Virtual Regular Visit        Encounter provider Claire Russo    Provider located at [address] [place] [id number] [phone]      Recent Visits  No visits were found meeting these conditions  Showing recent visits within past 7 days and meeting all other requirements     Future Appointments  No visits were found meeting these conditions  Showing future appointments within next 150 days and meeting all other requirements        The patient was identified by name and date of birth  Giacomo Covington was informed that this is a telemedicine visit and that the visit is being conducted through [employer] and patient was informed that this is a secure, HIPAA-compliant platform  She agrees to proceed     My office door was closed  No one else was in the room  She acknowledged consent and understanding of privacy and security of the video platform  The patient has agreed to participate and understands they can discontinue the visit at any time  Patient is aware this is a billable service  Subjective  Twila Tobias is a 38 y o  female pregnant patient  HPI     Past Medical History:   Diagnosis Date    History of recurrent miscarriages     see OB hx       Past Surgical History:   Procedure Laterality Date    INDUCED       [place] SURG RX MISSED ABORTN,1ST TRI N/A 2016    Procedure: DILATATION AND EVACUATION (D&E);   Surgeon: Zaira Matos MD;  Location: [facility];  Service: Gynecology    US GUIDANCE BREAST BIOPSY RIGHT EACH ADDITIONAL Right 2/10/2021    US GUIDED BREAST BIOPSY RIGHT COMPLETE Right 2/10/2021       Current Outpatient Medications   Medication Sig Dispense Refill    acetaminophen (TYLENOL) 500 mg tablet Take 500 mg by mouth every 6 (six) hours as needed for mild pain      aspirin (ECOTRIN LOW STRENGTH) 81 mg EC tablet Take 2 tablets (162 mg total) by mouth daily 60 tablet 6    Blood Glucose Monitoring Suppl (OneTouch Verio Flex System) w/Device KIT Test 4 times for 1 week 1 kit 0    docusate sodium (COLACE) 100 mg capsule Take 1 capsule (100 mg total) by mouth 2 (two) times a day 60 capsule 6    ferrous sulfate 324 (65 Fe) mg Take 1 tablet (325 mg total) by mouth 2 (two) times a day before meals 60 tablet 6    glucose blood (OneTouch Verio) test strip Test 4 rimes daily for 1 week 50 each 0    labetalol (NORMODYNE) 100 mg tablet Take 3 tablets (300 mg total) by mouth 2 (two) times a day      labetalol (NORMODYNE) 200 mg tablet TAKE 2 TABLETS BY MOUTH TWICE DAILY      OneTouch Delica Lancets 38I MISC Test 4 times daily for 1 week 100 each 0    Prenatal Vit-Fe Fumarate-FA (PNV Folic Acid + Iron) 72-1 MG TABS Take 1 tablet by mouth daily 30 tablet 8     No current facility-administered medications for this visit  Allergies   Allergen Reactions    Other      bbq sauce    Tomato - Food Allergy        Review of Systems    Video Exam    There were no vitals filed for this visit  Physical Exam --not performed  Time spent with the patient: 30 minutes  VIRTUAL VISIT DISCLAIMER    Tierra Haque acknowledges that she has consented to an online visit or consultation  She understands that the online visit is based solely on information provided by her, and that, in the absence of a face-to-face physical evaluation by the physician, the diagnosis she receives is both limited and provisional in terms of accuracy and completeness  This is not intended to replace a full medical face-to-face evaluation by the physician  Tierra Haque understands and accepts these terms  DATE: 21   RE: Tierra Haque   : 1985  NANO: Estimated Date of Delivery: 21  EGA:  19O1U    Dear Dr Alexandria Schreiber:      Thank you for referring your patient to the [address] [place] [address]  The patient's pregnancy is complicated with a history of diet controlled gestational diabetes, nausea & vomiting & morbid obesity   The patient received the following education on blood glucose monitoring during pregnancy to rule-out gestational diabetes via virtual telemedicine:       Self-monitoring of blood glucose levels: fasting (goal 60mg/dl to 90mg/dl) and two hours after the start of the meal less (goal less than 120mg/dl)  The patient was provided with a One-Touch Verio blood glucose meter and supplies   Report blood glucose levels to [address] on Monday, 4/12/21 if her BG is increased or on Monday, 4/19/21  [address] o Phone: [phone]  If no response in 24 hours, call [phone]   o Fax: [phone]  o Email: [email or web address]   Follow up: As needed    Patient Stated Goal: "I will check my blood sugar 4 times each day, as directed by diabetes and pregnancy team"    Diabetes Self Management Support Plan outside of ongoing care: Spouse/Family    Thank you for the opportunity to participate in the care of this patient  I can be reached at [phone] should you have any questions  Time spent with patient 11-11:30 AM; time spent face to face counseling greater than 50% of the appointment      Sincerely,     Dena Castaneda  Diabetes Educator  Diabetes and Pregnancy Program

## 2021-04-13 NOTE — PROGRESS NOTES
Please refer to the [facility] ultrasound report in Ob Procedures for additional information regarding today's visit

## 2021-04-14 ENCOUNTER — ROUTINE PRENATAL (OUTPATIENT)
Dept: OBGYN CLINIC | Facility: CLINIC | Age: 36
End: 2021-04-14
Payer: COMMERCIAL

## 2021-04-14 ENCOUNTER — ULTRASOUND (OUTPATIENT)
Dept: PERINATAL CARE | Facility: OTHER | Age: 36
End: 2021-04-14
Payer: COMMERCIAL

## 2021-04-14 VITALS
WEIGHT: 254 LBS | DIASTOLIC BLOOD PRESSURE: 90 MMHG | BODY MASS INDEX: 43.6 KG/M2 | HEART RATE: 103 BPM | SYSTOLIC BLOOD PRESSURE: 140 MMHG | TEMPERATURE: 97.5 F

## 2021-04-14 VITALS
DIASTOLIC BLOOD PRESSURE: 88 MMHG | HEIGHT: 64 IN | BODY MASS INDEX: 43.54 KG/M2 | WEIGHT: 255 LBS | HEART RATE: 105 BPM | SYSTOLIC BLOOD PRESSURE: 138 MMHG

## 2021-04-14 DIAGNOSIS — Z86.32 HX OF GESTATIONAL DIABETES IN PRIOR PREGNANCY, CURRENTLY PREGNANT: ICD-10-CM

## 2021-04-14 DIAGNOSIS — Z3A.28 28 WEEKS GESTATION OF PREGNANCY: Primary | ICD-10-CM

## 2021-04-14 DIAGNOSIS — Z23 NEED FOR DIPHTHERIA-TETANUS-PERTUSSIS (TDAP) VACCINE: ICD-10-CM

## 2021-04-14 DIAGNOSIS — O10.913 MATERNAL CHRONIC HYPERTENSION, THIRD TRIMESTER: Primary | ICD-10-CM

## 2021-04-14 DIAGNOSIS — E66.01 MATERNAL MORBID OBESITY IN THIRD TRIMESTER, ANTEPARTUM (HCC): ICD-10-CM

## 2021-04-14 DIAGNOSIS — O09.529 HIGH-RISK PREGNANCY, MULTIGRAVIDA OF ADVANCED MATERNAL AGE, ANTEPARTUM: ICD-10-CM

## 2021-04-14 DIAGNOSIS — O99.213 MATERNAL MORBID OBESITY IN THIRD TRIMESTER, ANTEPARTUM (HCC): ICD-10-CM

## 2021-04-14 DIAGNOSIS — R06.01 ORTHOPNEA: ICD-10-CM

## 2021-04-14 DIAGNOSIS — R06.02 SHORTNESS OF BREATH: ICD-10-CM

## 2021-04-14 DIAGNOSIS — E66.01 MATERNAL MORBID OBESITY, ANTEPARTUM (HCC): ICD-10-CM

## 2021-04-14 DIAGNOSIS — O26.20 PREVIOUS RECURRENT MISCARRIAGES AFFECTING PREGNANCY, ANTEPARTUM: ICD-10-CM

## 2021-04-14 DIAGNOSIS — O99.210 MATERNAL MORBID OBESITY, ANTEPARTUM (HCC): ICD-10-CM

## 2021-04-14 DIAGNOSIS — O09.299 HX OF GESTATIONAL DIABETES IN PRIOR PREGNANCY, CURRENTLY PREGNANT: ICD-10-CM

## 2021-04-14 DIAGNOSIS — O99.019 MATERNAL ANEMIA, ANTEPARTUM: ICD-10-CM

## 2021-04-14 DIAGNOSIS — Z3A.28 28 WEEKS GESTATION OF PREGNANCY: ICD-10-CM

## 2021-04-14 DIAGNOSIS — N64.52 NIPPLE DISCHARGE, BLOODY: ICD-10-CM

## 2021-04-14 LAB
SL AMB  POCT GLUCOSE, UA: NEGATIVE
SL AMB POCT UROBILINOGEN: 30

## 2021-04-14 PROCEDURE — 76816 OB US FOLLOW-UP PER FETUS: CPT | Performed by: OBSTETRICS & GYNECOLOGY

## 2021-04-14 PROCEDURE — 90715 TDAP VACCINE 7 YRS/> IM: CPT | Performed by: OBSTETRICS & GYNECOLOGY

## 2021-04-14 PROCEDURE — 99213 OFFICE O/P EST LOW 20 MIN: CPT | Performed by: OBSTETRICS & GYNECOLOGY

## 2021-04-14 PROCEDURE — 90471 IMMUNIZATION ADMIN: CPT | Performed by: OBSTETRICS & GYNECOLOGY

## 2021-04-14 PROCEDURE — 99214 OFFICE O/P EST MOD 30 MIN: CPT | Performed by: OBSTETRICS & GYNECOLOGY

## 2021-04-14 NOTE — PATIENT INSTRUCTIONS
Kick Counts in Pregnancy   WHAT YOU NEED TO KNOW:   Kick counts measure how much your baby is moving in your womb  A kick from your baby can be felt as a twist, turn, swish, roll, or jab  It is common to feel your baby kicking at 26 to 28 weeks of pregnancy  You may feel your baby kick as early as 20 weeks of pregnancy  You may want to start counting at 28 weeks  DISCHARGE INSTRUCTIONS:   Contact your healthcare provider immediately if:   · You feel a change in the number of kicks or movements of your baby  · You feel fewer than 10 kicks within 2 hours  · You have questions or concerns about your baby's movements  Why measure kick counts:  Your baby's movement may provide information about your baby's health  He or she may move less, or not at all, if there are problems  Your baby may move less if he or she is not getting enough oxygen or nutrition from the placenta  Do not smoke while you are pregnant  Smoking decreases the amount of oxygen that gets to your baby  Talk to your healthcare provider if you need help to quit smoking  Tell your healthcare provider as soon as you feel a change in your baby's movements  When to measure kick counts:   · Measure kick counts at the same time every day  · Measure kick counts when your baby is awake and most active  Your baby may be most active in the evening  How to measure kick counts:  Check that your baby is awake before you measure kick counts  You can wake up your baby by lightly pushing on your belly, walking, or drinking something cold  Your healthcare provider may tell you different ways to measure kick counts  You may be told to do the following:  · Use a chart or clock to keep track of the time you start and finish counting  · Sit in a chair or lie on your left side  · Place your hands on the largest part of your belly  · Count until you reach 10 kicks  Write down how much time it takes to count 10 kicks       · It may take 30 minutes to 2 hours to count 10 kicks  It should not take more than 2 hours to count 10 kicks  Follow up with your healthcare provider as directed:  Write down your questions so you remember to ask them during your visits  © Copyright [address] Information is for End User's use only and may not be sold, redistributed or otherwise used for commercial purposes  All illustrations and images included in CareNotes® are the copyrighted property of A D A M , Inc  or [facility] Aneta Ibarra   The above information is an  only  It is not intended as medical advice for individual conditions or treatments  Talk to your doctor, nurse or pharmacist before following any medical regimen to see if it is safe and effective for you

## 2021-04-14 NOTE — PATIENT INSTRUCTIONS
Kick Counts in Pregnancy   WHAT YOU NEED TO KNOW:   Kick counts measure how much your baby is moving in your womb  A kick from your baby can be felt as a twist, turn, swish, roll, or jab  It is common to feel your baby kicking at 26 to 28 weeks of pregnancy  You may feel your baby kick as early as 20 weeks of pregnancy  You may want to start counting at 28 weeks  DISCHARGE INSTRUCTIONS:   Contact your healthcare provider immediately if:   · You feel a change in the number of kicks or movements of your baby  · You feel fewer than 10 kicks within 2 hours  · You have questions or concerns about your baby's movements  Why measure kick counts:  Your baby's movement may provide information about your baby's health  He or she may move less, or not at all, if there are problems  Your baby may move less if he or she is not getting enough oxygen or nutrition from the placenta  Do not smoke while you are pregnant  Smoking decreases the amount of oxygen that gets to your baby  Talk to your healthcare provider if you need help to quit smoking  Tell your healthcare provider as soon as you feel a change in your baby's movements  When to measure kick counts:   · Measure kick counts at the same time every day  · Measure kick counts when your baby is awake and most active  Your baby may be most active in the evening  How to measure kick counts:  Check that your baby is awake before you measure kick counts  You can wake up your baby by lightly pushing on your belly, walking, or drinking something cold  Your healthcare provider may tell you different ways to measure kick counts  You may be told to do the following:  · Use a chart or clock to keep track of the time you start and finish counting  · Sit in a chair or lie on your left side  · Place your hands on the largest part of your belly  · Count until you reach 10 kicks  Write down how much time it takes to count 10 kicks       · It may take 30 minutes to 2 hours to count 10 kicks  It should not take more than 2 hours to count 10 kicks  Follow up with your healthcare provider as directed:  Write down your questions so you remember to ask them during your visits  © Copyright [address] Information is for End User's use only and may not be sold, redistributed or otherwise used for commercial purposes  All illustrations and images included in CareNotes® are the copyrighted property of A D A M , Inc  or [facility] Aneta Ibarra   The above information is an  only  It is not intended as medical advice for individual conditions or treatments  Talk to your doctor, nurse or pharmacist before following any medical regimen to see if it is safe and effective for you

## 2021-04-14 NOTE — PROGRESS NOTES
OB/GYN  PN Visit  Yasmine Macdonald  [id number]  4/14/2021  3:05 PM  Dr Kenneth Gonzáles MD    S: 38 y o  [id number] 28w3d here for PN visit  Chief Complaint   Patient presents with    Routine Prenatal Visit     Due for TDAP  Labs UTD and declines FLU vaccine  Patient reports no concerns         OB complaints:  Contractions: no  Leakage: no  Bleeding: no  Fetal movement: yes      O:  /90 (BP Location: Right arm, Cuff Size: Large)   Pulse 103   Temp 97 5 °F (36 4 °C) (Temporal)   Wt 115 kg (254 lb)   LMP 09/16/2020 (Exact Date)   BMI 43 60 kg/m²       Review of Systems   Constitutional: Negative  HENT: Negative  Eyes: Negative  Respiratory: Negative  Cardiovascular: Negative  Gastrointestinal: Negative  Endocrine: Negative  Genitourinary:        As noted in HPI   Musculoskeletal: Negative  Skin: Negative  Allergic/Immunologic: Negative  Neurological: Negative  Hematological: Negative  Psychiatric/Behavioral: Negative  Physical Exam  Constitutional:       General: She is not in acute distress  Appearance: She is well-developed  Abdominal:      Palpations: Abdomen is soft  Tenderness: There is no abdominal tenderness  There is no guarding  Neurological:      Mental Status: She is alert and oriented to person, place, and time  Skin:     General: Skin is warm and dry     Psychiatric:         Behavior: Behavior normal               Fetal Heart Rate: 136          A/P:    Problem List Items Addressed This Visit        Other    Maternal morbid obesity, antepartum ([address] )    High-risk pregnancy, multigravida of advanced maternal age, antepartum    Previous recurrent miscarriages affecting pregnancy, antepartum    Maternal anemia, antepartum    Hx of gestational diabetes in prior pregnancy, currently pregnant    Nipple discharge, bloody    Orthopnea      Other Visit Diagnoses     28 weeks gestation of pregnancy    -  Primary    Relevant Orders    POCT urine dip (Completed)    TDAP Vaccine greater than or equal to 8yo    Need for diphtheria-tetanus-pertussis (Tdap) vaccine        Relevant Orders    TDAP Vaccine greater than or equal to 8yo    Shortness of breath            Chronic HTN  Labetalol 300 mg BID  Growth scan today - follow-up growth in 4 weeks including biweekly testing   Discussed timing of delivery based on BP control  Continue low dose aspirin    Obesity  Discussed appropriate weight gain    H/o A1GDM  Patient unable to tolerate glucola and is reporting blood sugars  FBS 91  Pp 140s    Intraductal papilloma  Patient has an appt w/  Surgical oncology 5/12/2021 for intraductal papilloma - she plans on breastfeeding    SOB  Patient had some dyspnea, resolved, CT PE study was negative for PE, advised maternal echo due to h/o HTN, obesity and symptoms    Follow-up in 2 weeks for prenatal visit, BP check    Counseled on kick counts, warning signs for preeclampsia        Kim Escobar MD  4/14/2021  3:05 PM

## 2021-04-14 NOTE — LETTER
April 14, 2021     MD Roel Cardoso [address]  [facility]    Patient: Richa Marcum   YOB: 1985   Date of Visit: 4/14/2021       Dear Dr Óscar Vargas: Thank you for referring Idalmis Gustafson to me for evaluation  Below are my notes for this consultation  If you have questions, please do not hesitate to call me  I look forward to following your patient along with you  Sincerely,        Cain Goldmann, MD        CC: No Recipients  Cain Goldmann, MD  4/13/2021  5:19 PM  Sign when Signing Visit   Please refer to the [facility] ultrasound report in Ob Procedures for additional information regarding today's visit

## 2021-04-21 ENCOUNTER — TELEPHONE (OUTPATIENT)
Dept: LABOR AND DELIVERY | Facility: HOSPITAL | Age: 36
End: 2021-04-21

## 2021-04-21 DIAGNOSIS — O24.419 GESTATIONAL DIABETES MELLITUS (GDM) IN THIRD TRIMESTER, GESTATIONAL DIABETES METHOD OF CONTROL UNSPECIFIED: Primary | ICD-10-CM

## 2021-04-21 NOTE — TELEPHONE ENCOUNTER
----- Message from Trinh Jj MA sent at 4/21/2021  8:40 AM EDT -----  Regarding: DX AND REFERRAL  Good Morning,    Patient called with her blood sugars please review and enter referral for GDM      Thank you     4/10  85 129 120   4/11  81 135  93   4/12  95 110 82 120   4/13  82 122 91  4/14  141 89 103    4/15  93  83 118 121 97  4/16  118 95 106 132  4/17  141 96 106 85 130   4/18  95 112 85   4/19  105  85 132

## 2021-04-27 ENCOUNTER — TELEMEDICINE (OUTPATIENT)
Dept: PERINATAL CARE | Facility: CLINIC | Age: 36
End: 2021-04-27
Payer: COMMERCIAL

## 2021-04-27 DIAGNOSIS — Z3A.30 30 WEEKS GESTATION OF PREGNANCY: ICD-10-CM

## 2021-04-27 DIAGNOSIS — O09.292 HISTORY OF GESTATIONAL DIABETES MELLITUS (GDM) IN PRIOR PREGNANCY, CURRENTLY PREGNANT IN SECOND TRIMESTER: ICD-10-CM

## 2021-04-27 DIAGNOSIS — O24.410 DIET CONTROLLED GESTATIONAL DIABETES MELLITUS (GDM) IN THIRD TRIMESTER: Primary | ICD-10-CM

## 2021-04-27 DIAGNOSIS — O99.210 MATERNAL MORBID OBESITY, ANTEPARTUM (HCC): ICD-10-CM

## 2021-04-27 DIAGNOSIS — Z86.32 HISTORY OF GESTATIONAL DIABETES MELLITUS (GDM) IN PRIOR PREGNANCY, CURRENTLY PREGNANT IN SECOND TRIMESTER: ICD-10-CM

## 2021-04-27 DIAGNOSIS — E66.01 MATERNAL MORBID OBESITY, ANTEPARTUM (HCC): ICD-10-CM

## 2021-04-27 PROCEDURE — G0108 DIAB MANAGE TRN  PER INDIV: HCPCS | Performed by: DIETITIAN, REGISTERED

## 2021-04-27 NOTE — PROGRESS NOTES
Virtual Regular Visit      Assessment/Plan:    Problem List Items Addressed This Visit     None      Visit Diagnoses     Gestational diabetes mellitus (GDM) in third trimester, gestational diabetes method of control unspecified                   Reason for visit is   Chief Complaint   Patient presents with    Virtual Regular Visit        Encounter provider Trey Terry    Provider located at [address] [place] [id number] [phone]      Recent Visits  No visits were found meeting these conditions  Showing recent visits within past 7 days and meeting all other requirements     Future Appointments  No visits were found meeting these conditions  Showing future appointments within next 150 days and meeting all other requirements        The patient was identified by name and date of birth  Ilsa Ship was informed that this is a telemedicine visit and that the visit is being conducted through [employer] and patient was informed that this is a secure, HIPAA-compliant platform  She agrees to proceed     My office door was closed  No one else was in the room  She acknowledged consent and understanding of privacy and security of the video platform  The patient has agreed to participate and understands they can discontinue the visit at any time  Patient is aware this is a billable service  Subjective  Twila Palmer is a 38 y o  female pregnant patient  HPI     Past Medical History:   Diagnosis Date    History of recurrent miscarriages     see OB hx       Past Surgical History:   Procedure Laterality Date    INDUCED       [place] SURG RX MISSED ABORTN,1ST TRI N/A 2016    Procedure: DILATATION AND EVACUATION (D&E);   Surgeon: Sahara Miguel MD;  Location: [facility];  Service: Gynecology    US GUIDANCE BREAST BIOPSY RIGHT EACH ADDITIONAL Right 2/10/2021    US GUIDED BREAST BIOPSY RIGHT COMPLETE Right 2/10/2021       Current Outpatient Medications   Medication Sig Dispense Refill    acetaminophen (TYLENOL) 500 mg tablet Take 500 mg by mouth every 6 (six) hours as needed for mild pain      aspirin (ECOTRIN LOW STRENGTH) 81 mg EC tablet Take 2 tablets (162 mg total) by mouth daily 60 tablet 6    Blood Glucose Monitoring Suppl (OneTouch Verio Flex System) w/Device KIT Test 4 times for 1 week 1 kit 0    docusate sodium (COLACE) 100 mg capsule Take 1 capsule (100 mg total) by mouth 2 (two) times a day 60 capsule 6    ferrous sulfate 324 (65 Fe) mg Take 1 tablet (325 mg total) by mouth 2 (two) times a day before meals 60 tablet 6    glucose blood (OneTouch Verio) test strip Test 4 rimes daily for 1 week 50 each 0    labetalol (NORMODYNE) 100 mg tablet Take 3 tablets (300 mg total) by mouth 2 (two) times a day      labetalol (NORMODYNE) 200 mg tablet TAKE 2 TABLETS BY MOUTH TWICE DAILY      OneTouch Delica Lancets 34J MISC Test 4 times daily for 1 week 100 each 0    Prenatal Vit-Fe Fumarate-FA (PNV Folic Acid + Iron) 21-0 MG TABS Take 1 tablet by mouth daily 30 tablet 8     No current facility-administered medications for this visit  Allergies   Allergen Reactions    Other      bbq sauce    Tomato - Food Allergy        Review of Systems    Video Exam    There were no vitals filed for this visit  Physical Exam --not performed  Time spent with the patient: 60 minutes   [facility]  acknowledges that she has consented to an online visit or consultation  She understands that the online visit is based solely on information provided by her, and that, in the absence of a face-to-face physical evaluation by the physician, the diagnosis she receives is both limited and provisional in terms of accuracy and completeness  This is not intended to replace a full medical face-to-face evaluation by the physician   Agnieszka King understands and accepts these terms     21  Demond Katz   1985  Estimated Date of Delivery: 21   EGA: 30w2d    Dear Dr Lidya Lynn: Thank you for referring your patient to the Diabetes and Pregnancy Program at [address] [place] [address]  The  patient's pregnancy is complicated with a history of diet controlled gestational diabetes in 2017 & morbid obesity  The patient attended Individual class 1 and patient received the following education via virtual telemedicine:     Pathophysiology of diabetes and pregnancy  This includes maternal-fetal complications such as fetal macrosomia,  hypoglycemia, polyhydramnios, increased incidence of  section, pre-term labor and in severe cases, fetal demise and stillbirth   Instruction on diet and glucometer use was provided  Self-monitoring of blood glucose levels: fasting (goal 60mg/dl to 90mg/dl) and two hours after the start of the meal less (goal less than 120mg/dl)  The patient is already testing with a One-Touch Verio  blood glucose meter and supplies  Had monitored BG to rule-out gestational diabetes  Fory-six per cent of the  BG results were increased & her OB physician diagnosed her with gestational diabetes   Medical Nutrition Therapy for diabetes and pregnancy  The patient was provided with a 2200 calorie meal plan and the following was reviewed:     o Basic review of macronutrients   o Meal pattern should consist of three small meals and three snacks daily  o Carbohydrate gram amounts per meal   o Instructions on how to read a food label  o Appropriate serving sizes for carbohydrates and proteins  o Incorporating protein at each meal and snack  o Maintain a three day food diary and bring to class 2    Report blood glucose levels to the [address] [place] [address] weekly or as directed:  o Phone : [phone]  If no response in 24 hours, call [phone]   o Fax: [phone]  o Email: [email or web address]  The patient will be scheduled to attend class 2 in 2 weeks  Additionally, fetal ultrasound evaluation by the Perinatologist has been scheduled to assure continuity of care  Patient Stated Goal: "I will check my blood sugar 4 times each day, as directed by diabetes and pregnancy team"     Diabetes Self Management Support Plan outside of ongoing care: Spouse/Family    Please contact the Diabetes and Pregnancy Program at [phone] if you have any questions  Time spent with patient 9:35-10:40 AM; time spent face to face counseling greater than 50% of the appointment      Sincerely,   Trey Terry  Diabetes Educator   Diabetes and Pregnancy Program

## 2021-04-28 ENCOUNTER — TELEPHONE (OUTPATIENT)
Dept: PERINATAL CARE | Facility: OTHER | Age: 36
End: 2021-04-28

## 2021-04-28 DIAGNOSIS — Z86.32 HISTORY OF GESTATIONAL DIABETES MELLITUS (GDM) IN PRIOR PREGNANCY, CURRENTLY PREGNANT IN SECOND TRIMESTER: ICD-10-CM

## 2021-04-28 DIAGNOSIS — O09.292 HISTORY OF GESTATIONAL DIABETES MELLITUS (GDM) IN PRIOR PREGNANCY, CURRENTLY PREGNANT IN SECOND TRIMESTER: ICD-10-CM

## 2021-04-28 RX ORDER — BLOOD SUGAR DIAGNOSTIC
STRIP MISCELLANEOUS
Qty: 100 EACH | Refills: 4 | Status: SHIPPED | OUTPATIENT
Start: 2021-04-28 | End: 2021-04-28

## 2021-04-28 RX ORDER — BLOOD SUGAR DIAGNOSTIC
STRIP MISCELLANEOUS
Qty: 400 EACH | Refills: 1 | Status: SHIPPED | OUTPATIENT
Start: 2021-04-28 | End: 2021-06-04 | Stop reason: HOSPADM

## 2021-04-28 RX ORDER — LANCETS 33 GAUGE
EACH MISCELLANEOUS
Qty: 100 EACH | Refills: 4 | Status: SHIPPED | OUTPATIENT
Start: 2021-04-28 | End: 2021-07-22

## 2021-04-28 NOTE — TELEPHONE ENCOUNTER
Lvm regarding scheduling her for class 2 , 5/12 @ 2pm with [facility]  Informed if she has to reschedule, to please call our office

## 2021-04-29 ENCOUNTER — ROUTINE PRENATAL (OUTPATIENT)
Dept: OBGYN CLINIC | Facility: CLINIC | Age: 36
End: 2021-04-29
Payer: COMMERCIAL

## 2021-04-29 VITALS
WEIGHT: 255.2 LBS | DIASTOLIC BLOOD PRESSURE: 82 MMHG | SYSTOLIC BLOOD PRESSURE: 142 MMHG | HEART RATE: 111 BPM | BODY MASS INDEX: 43.8 KG/M2 | TEMPERATURE: 97.5 F

## 2021-04-29 DIAGNOSIS — O99.210 MATERNAL MORBID OBESITY, ANTEPARTUM (HCC): ICD-10-CM

## 2021-04-29 DIAGNOSIS — Z3A.30 30 WEEKS GESTATION OF PREGNANCY: ICD-10-CM

## 2021-04-29 DIAGNOSIS — O09.529 HIGH-RISK PREGNANCY, MULTIGRAVIDA OF ADVANCED MATERNAL AGE, ANTEPARTUM: ICD-10-CM

## 2021-04-29 DIAGNOSIS — E66.01 MATERNAL MORBID OBESITY, ANTEPARTUM (HCC): ICD-10-CM

## 2021-04-29 DIAGNOSIS — Z34.93 THIRD TRIMESTER PREGNANCY: Primary | ICD-10-CM

## 2021-04-29 DIAGNOSIS — O99.019 MATERNAL ANEMIA, ANTEPARTUM: ICD-10-CM

## 2021-04-29 DIAGNOSIS — O16.3 HYPERTENSION AFFECTING PREGNANCY IN THIRD TRIMESTER: ICD-10-CM

## 2021-04-29 PROCEDURE — 99214 OFFICE O/P EST MOD 30 MIN: CPT | Performed by: OBSTETRICS & GYNECOLOGY

## 2021-04-29 NOTE — PROGRESS NOTES
OB/GYN  PN Visit  Suly Jalloh  [id number]  4/30/2021  11:17 PM  Dr Skye Rogers MD    S: 38 y o  [id number] 30w3d here for PN visit  Chief Complaint   Patient presents with    Routine Prenatal Visit     Labs UTD and declines FLU vaccine, received Tdap vaccine, needs delivery consent signed  Patient states no problems  OB complaints:  Contractions: no  Leakage: no  Bleeding: no  Fetal movement: yes      O:  /82 (BP Location: Left arm, Cuff Size: Large)   Pulse (!) 111   Temp 97 5 °F (36 4 °C) (Temporal)   Wt 116 kg (255 lb 3 2 oz)   LMP 09/16/2020 (Exact Date)   BMI 43 80 kg/m²       Review of Systems   Constitutional: Negative  HENT: Negative  Eyes: Negative  Respiratory: Negative  Cardiovascular: Negative  Gastrointestinal: Negative  Endocrine: Negative  Genitourinary:        As noted in HPI   Musculoskeletal: Negative  Skin: Negative  Allergic/Immunologic: Negative  Neurological: Negative  Hematological: Negative  Psychiatric/Behavioral: Negative  Physical Exam  Constitutional:       General: She is not in acute distress  Appearance: She is well-developed  Abdominal:      Palpations: Abdomen is soft  Tenderness: There is no abdominal tenderness  There is no guarding  Neurological:      Mental Status: She is alert and oriented to person, place, and time  Skin:     General: Skin is warm and dry     Psychiatric:         Behavior: Behavior normal            Fundal Height (cm): 32 cm  Fetal Heart Rate: 148          A/P:    Problem List Items Addressed This Visit        Cardiovascular and Mediastinum    Hypertension complicating pregnancy       Other    Maternal morbid obesity, antepartum ([address] )    High-risk pregnancy, multigravida of advanced maternal age, antepartum    Maternal anemia, antepartum    30 weeks gestation of pregnancy      Other Visit Diagnoses     Third trimester pregnancy    -  Primary          Patient doing well on current regimen of labetalol 400 mg b i d   Patient has a growth scan and NST scheduled in 2 weeks  She was advised to stop low-dose aspirin at 36 weeks  Delivery is recommended at 38 weeks due to chronic hypertension is well controlled on medications  She was recently was diagnosed with gestational diabetes in for now is diet controlled  She is advised to follow-up in 2 weeks for NST and prenatal visit in the office  Patient requests permanent sterilization  MA 31 was signed in the office                Christianne Bui MD  4/30/2021  11:17 PM

## 2021-04-29 NOTE — PATIENT INSTRUCTIONS
Pregnancy at 27 to 30 [address]:   You may notice new symptoms such as shortness of breath, heartburn, or swelling of your ankles and feet  You may also have trouble sleeping or contractions  DISCHARGE INSTRUCTIONS:   Return to the emergency department if:   · You develop a severe headache that does not go away  · You have new or increased vision changes, such as blurred or spotted vision  · You have new or increased swelling in your face or hands  · You have vaginal spotting or bleeding  · Your water broke or you feel warm water gushing or trickling from your vagina  Contact your healthcare provider if:   · You have more than 5 contractions in 1 hour  · You notice any changes in your baby's movements  · You have abdominal cramps, pressure, or tightening  · You have a change in vaginal discharge  · You have chills or a fever  · You have vaginal itching, burning, or pain  · You have yellow, green, white, or foul-smelling vaginal discharge  · You have pain or burning when you urinate, less urine than usual, or pink or bloody urine  · You have questions or concerns about your condition or care  How to care for yourself at this stage of your pregnancy:   · Eat a variety of healthy foods  Healthy foods include fruits, vegetables, whole-grain breads, low-fat dairy foods, beans, lean meats, and fish  Drink liquids as directed  Ask how much liquid to drink each day and which liquids are best for you  Limit caffeine to less than 200 milligrams each day  Limit your intake of fish to 2 servings each week  Choose fish low in mercury such as canned light tuna, shrimp, salmon, cod, or tilapia  Do not  eat fish high in mercury such as swordfish, tilefish, ian mackerel, and shark  · Manage heartburn  by eating 4 or 5 small meals each day instead of large meals  Avoid spicy food  · Manage swelling  by lying down and putting your feet up           · Take prenatal vitamins as directed  Your need for certain vitamins and minerals, such as folic acid, increases during pregnancy  Prenatal vitamins provide some of the extra vitamins and minerals you need  Prenatal vitamins may also help to decrease the risk of certain birth defects  · Talk to your healthcare provider about exercise  Moderate exercise can help you stay fit  Your healthcare provider will help you plan an exercise program that is safe for you during pregnancy  · Do not smoke  Smoking increases your risk of a miscarriage and other health problems during your pregnancy  Smoking can cause your baby to be born too early or weigh less at birth  Ask your healthcare provider for information if you need help quitting  · Do not drink alcohol  Alcohol passes from your body to your baby through the placenta  It can affect your baby's brain development and cause fetal alcohol syndrome (FAS)  FAS is a group of conditions that causes mental, behavior, and growth problems  · Talk to your healthcare provider before you take any medicines  Many medicines may harm your baby if you take them when you are pregnant  Do not take any medicines, vitamins, herbs, or supplements without first talking to your healthcare provider  Never use illegal or street drugs (such as marijuana or cocaine) while you are pregnant  Safety tips during pregnancy:   · Avoid hot tubs and saunas  Do not use a hot tub or sauna while you are pregnant, especially during your first trimester  Hot tubs and saunas may raise your baby's temperature and increase the risk of birth defects  · Avoid toxoplasmosis  This is an infection caused by eating raw meat or being around infected cat feces  It can cause birth defects, miscarriages, and other problems  Wash your hands after you touch raw meat  Make sure any meat is well-cooked before you eat it  Avoid raw eggs and unpasteurized milk   Use gloves or ask someone else to clean your cat's litter box while you are pregnant  Changes that are happening with your baby:  By 30 weeks, your baby may weigh more than 3 pounds  Your baby may be about 11 inches long from the top of the head to the rump (baby's bottom)  Your baby's eyes open and close now  Your baby's kicks and movements are more forceful at this time  What you need to know about prenatal care: Your healthcare provider will check your blood pressure and weight  You may also need the following:  · Blood tests  may be done to check for anemia or blood type  · A urine test  may also be done to check for sugar and protein  These can be signs of gestational diabetes or infection  Protein in your urine may also be a sign of preeclampsia  Preeclampsia is a condition that can develop during week 20 or later of your pregnancy  It causes high blood pressure, and it can cause problems with your kidneys and other organs  · A Tdap vaccine and flu vaccine  may be recommended by your healthcare provider  · A gestational diabetes screen  will be done using an oral glucose tolerance test (OGTT)  An OGTT starts with a blood sugar level check after you have not eaten for 8 hours  You are then given a glucose drink  Your blood sugar level is checked after 1 hour, 2 hours, and sometimes 3 hours  Healthcare providers look at how much your blood sugar level increases from the first check  · Fundal height  is a measurement of your uterus to check your baby's growth  This number is usually the same as the number of weeks that you have been pregnant  Your healthcare provider may also check your baby's position  · Your baby's heart rate  will be checked  © Copyright [address] Information is for End User's use only and may not be sold, redistributed or otherwise used for commercial purposes   All illustrations and images included in CareNotes® are the copyrighted property of A D A M [employer]  or [employer]  The above information is an  only  It is not intended as medical advice for individual conditions or treatments  Talk to your doctor, nurse or pharmacist before following any medical regimen to see if it is safe and effective for you

## 2021-05-10 ENCOUNTER — OFFICE VISIT (OUTPATIENT)
Dept: URGENT CARE | Facility: MEDICAL CENTER | Age: 36
End: 2021-05-10
Payer: COMMERCIAL

## 2021-05-10 VITALS
BODY MASS INDEX: 43.54 KG/M2 | SYSTOLIC BLOOD PRESSURE: 170 MMHG | DIASTOLIC BLOOD PRESSURE: 90 MMHG | HEIGHT: 64 IN | RESPIRATION RATE: 20 BRPM | OXYGEN SATURATION: 97 % | HEART RATE: 100 BPM | TEMPERATURE: 97.8 F | WEIGHT: 255 LBS

## 2021-05-10 DIAGNOSIS — I10 ESSENTIAL HYPERTENSION: ICD-10-CM

## 2021-05-10 DIAGNOSIS — Z20.822 ENCOUNTER FOR LABORATORY TESTING FOR COVID-19 VIRUS: Primary | ICD-10-CM

## 2021-05-10 DIAGNOSIS — B34.9 VIRAL SYNDROME: ICD-10-CM

## 2021-05-10 LAB — S PYO AG THROAT QL: NEGATIVE

## 2021-05-10 PROCEDURE — 87147 CULTURE TYPE IMMUNOLOGIC: CPT | Performed by: PHYSICIAN ASSISTANT

## 2021-05-10 PROCEDURE — U0005 INFEC AGEN DETEC AMPLI PROBE: HCPCS | Performed by: PHYSICIAN ASSISTANT

## 2021-05-10 PROCEDURE — U0003 INFECTIOUS AGENT DETECTION BY NUCLEIC ACID (DNA OR RNA); SEVERE ACUTE RESPIRATORY SYNDROME CORONAVIRUS 2 (SARS-COV-2) (CORONAVIRUS DISEASE [COVID-19]), AMPLIFIED PROBE TECHNIQUE, MAKING USE OF HIGH THROUGHPUT TECHNOLOGIES AS DESCRIBED BY CMS-2020-01-R: HCPCS | Performed by: PHYSICIAN ASSISTANT

## 2021-05-10 PROCEDURE — 99203 OFFICE O/P NEW LOW 30 MIN: CPT | Performed by: PHYSICIAN ASSISTANT

## 2021-05-10 PROCEDURE — 87070 CULTURE OTHR SPECIMN AEROBIC: CPT | Performed by: PHYSICIAN ASSISTANT

## 2021-05-11 ENCOUNTER — DOCUMENTATION (OUTPATIENT)
Dept: PERINATAL CARE | Facility: CLINIC | Age: 36
End: 2021-05-11

## 2021-05-11 ENCOUNTER — TELEPHONE (OUTPATIENT)
Dept: SURGICAL ONCOLOGY | Facility: CLINIC | Age: 36
End: 2021-05-11

## 2021-05-11 LAB — SARS-COV-2 RNA RESP QL NAA+PROBE: NEGATIVE

## 2021-05-11 NOTE — PROGRESS NOTES
Date:  21  RE: Terrence Talbert    : 1985  Estimated Date of Delivery: 21  EGA: 32w3d  OB/GYN: Lisa Vega  Diet controlled gestational diabetes  History of diet controlled gestational diabetes in 2017; admits to cnon-compliance with last GDM pregnancy        Date Fasting Post-  breakfast Post-  lunch Post-  dinner Before bedtime Comments   21 88     Ate a brownie at bedtime snack   21 95 118         flowsheet & reported today at [facility]  Current regimen:  2200 calories GDM diet with 3 meals & 3 snacks  Reported many foods cause nausea & keeps eating the same few foods over & over again as can tolerate them  Self-Blood Blood Glucose Monitoring 4 times daily with a One-Touch Verio glucose meter  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

## 2021-05-11 NOTE — PROGRESS NOTES
[address]        NAME: Eve Fernández is a 38 y o  female  : 1985    MRN: [id number]  DATE: May 10, 2021  TIME: 10:12 PM    /90   Pulse 100   Temp 97 8 °F (36 6 °C)   Resp 20   Ht 5' 4" (1 626 m)   Wt 116 kg (255 lb)   LMP 2020 (Exact Date)   SpO2 97%   BMI 43 77 kg/m²     Assessment and Plan   Encounter for laboratory testing for COVID-19 virus [Z20 822]  1  Encounter for laboratory testing for COVID-19 virus  POCT rapid strepA    Novel Coronavirus (Covid-19),PCR [facility] - Office Collection   2  Viral syndrome  Throat culture   3  Essential hypertension           Patient Instructions       Follow up with PCP in 3-5 days  Proceed to  ER if symptoms worsen  Chief Complaint     Chief Complaint   Patient presents with    Cold Like Symptoms     Patient state for two days she has a runny nose and cough  patient denies any fever         History of Present Illness       Pt with cough and congestion for several days       Review of Systems   Review of Systems   Constitutional: Negative  HENT: Positive for congestion  Eyes: Negative  Respiratory: Positive for cough  Cardiovascular: Negative  Gastrointestinal: Negative  Endocrine: Negative  Genitourinary: Negative  Musculoskeletal: Negative  Skin: Negative  Allergic/Immunologic: Negative  Neurological: Negative  Hematological: Negative  Psychiatric/Behavioral: Negative  All other systems reviewed and are negative          Current Medications       Current Outpatient Medications:     acetaminophen (TYLENOL) 500 mg tablet, Take 500 mg by mouth every 6 (six) hours as needed for mild pain, Disp: , Rfl:     aspirin (ECOTRIN LOW STRENGTH) 81 mg EC tablet, Take 2 tablets (162 mg total) by mouth daily, Disp: 60 tablet, Rfl: 6    Blood Glucose Monitoring Suppl (OneTouch Verio Flex System) w/Device KIT, Test 4 times for 1 week, Disp: 1 kit, Rfl: 0    docusate sodium (COLACE) 100 mg capsule, Take 1 capsule (100 mg total) by mouth 2 (two) times a day, Disp: 60 capsule, Rfl: 6    ferrous sulfate 324 (65 Fe) mg, Take 1 tablet (325 mg total) by mouth 2 (two) times a day before meals, Disp: 60 tablet, Rfl: 6    labetalol (NORMODYNE) 100 mg tablet, Take 3 tablets (300 mg total) by mouth 2 (two) times a day, Disp: , Rfl:     OneTouch Delica Lancets 63W MISC, Test 4 times daily  , Disp: 100 each, Rfl: 4    OneTouch Verio test strip, TEST FOUR TIMES DAILY  90 day supply equals 360 test strips  , Disp: 400 each, Rfl: 1    Prenatal Vit-Fe Fumarate-FA (PNV Folic Acid + Iron) 32-6 MG TABS, Take 1 tablet by mouth daily, Disp: 30 tablet, Rfl: 8    labetalol (NORMODYNE) 200 mg tablet, TAKE 2 TABLETS BY MOUTH TWICE DAILY, Disp: , Rfl:     Current Allergies     Allergies as of 05/10/2021 - Reviewed 05/10/2021   Allergen Reaction Noted    Other  2018    Tomato - food allergy              The following portions of the patient's history were reviewed and updated as appropriate: allergies, current medications, past family history, past medical history, past social history, past surgical history and problem list      Past Medical History:   Diagnosis Date    Diabetes mellitus ([address] )     History of recurrent miscarriages     see OB hx    Hypertension        Past Surgical History:   Procedure Laterality Date    INDUCED       RI SURG RX MISSED ABORTN,1ST TRI N/A 2016    Procedure: DILATATION AND EVACUATION (D&E);   Surgeon: Dennise Larsen MD;  [facility];  Service: Gynecology    US GUIDANCE BREAST BIOPSY RIGHT EACH ADDITIONAL Right 2/10/2021    US GUIDED BREAST BIOPSY RIGHT COMPLETE Right 2/10/2021       Family History   Problem Relation Age of Onset    Hypertension Mother     Breast cancer Mother 58    BRCA1 Negative Mother     BRCA2 Negative Mother     Hypertension Father     No Known Problems Sister     No Known Problems Daughter     Stomach cancer Maternal Grandmother 80    Cancer Paternal Grandmother         unsure of type    No Known Problems Sister     No Known Problems Sister     No Known Problems Sister     No Known Problems Sister     No Known Problems Son     No Known Problems Son     Breast cancer Maternal Aunt 59    Breast cancer Maternal Aunt     Breast cancer Maternal Aunt     Breast cancer Maternal Aunt     Breast cancer Maternal Aunt     Breast cancer Maternal Aunt     No Known Problems Paternal Aunt          Medications have been verified  Objective   /90   Pulse 100   Temp 97 8 °F (36 6 °C)   Resp 20   Ht 5' 4" (1 626 m)   Wt 116 kg (255 lb)   LMP 09/16/2020 (Exact Date)   SpO2 97%   BMI 43 77 kg/m²        Physical Exam     Physical Exam  Vitals signs and nursing note reviewed  Constitutional:       Appearance: Normal appearance  She is normal weight  Comments: Pt is 33 weeks pregnant no abdomen complaints    Mother has been having blood pressure problems during this  pregnancy     Pt declines urine evaluation she has f/u appt tomorrow will do urine eval there tomorrow and recheck bp also for pre-eclampsia check    HENT:      Head: Normocephalic and atraumatic  Right Ear: Tympanic membrane, ear canal and external ear normal       Left Ear: Tympanic membrane, ear canal and external ear normal       Nose: Congestion and rhinorrhea present  Comments: Clear       Mouth/Throat:      Mouth: Mucous membranes are moist       Pharynx: Oropharynx is clear  Eyes:      Extraocular Movements: Extraocular movements intact  Conjunctiva/sclera: Conjunctivae normal       Pupils: Pupils are equal, round, and reactive to light  Neck:      Musculoskeletal: Normal range of motion and neck supple  Cardiovascular:      Rate and Rhythm: Normal rate and regular rhythm  Pulses: Normal pulses  Heart sounds: Normal heart sounds  Pulmonary:      Effort: Pulmonary effort is normal       Breath sounds: Normal breath sounds  Abdominal:      General: Abdomen is flat  Bowel sounds are normal       Palpations: Abdomen is soft  Musculoskeletal: Normal range of motion  Skin:     General: Skin is warm  Capillary Refill: Capillary refill takes less than 2 seconds  Neurological:      General: No focal deficit present  Mental Status: She is alert and oriented to person, place, and time     Psychiatric:         Mood and Affect: Mood normal          Behavior: Behavior normal

## 2021-05-11 NOTE — PATIENT INSTRUCTIONS
[address]    Your healthcare provider and/or public health staff have evaluated you and have determined that you do not need to remain in the hospital at this time  At this time you can be isolated at home where you will be monitored by staff from your local or state health department  You should carefully follow the prevention and isolation steps below until a healthcare provider or local or state health department says that you can return to your normal activities  Stay home except to get medical care    People who are mildly ill with COVID-19 are able to isolate at home during their illness  You should restrict activities outside your home, except for getting medical care  Do not go to work, school, or public areas  Avoid using public transportation, ride-sharing, or taxis  Separate yourself from other people and animals in your home    People: As much as possible, you should stay in a specific room and away from other people in your home  Also, you should use a separate bathroom, if available  Animals: You should restrict contact with pets and other animals while you are sick with COVID-19, just like you would around other people  Although there have not been reports of pets or other animals becoming sick with COVID-19, it is still recommended that people sick with COVID-19 limit contact with animals until more information is known about the virus  When possible, have another member of your household care for your animals while you are sick  If you are sick with COVID-19, avoid contact with your pet, including petting, snuggling, being kissed or licked, and sharing food  If you must care for your pet or be around animals while you are sick, wash your hands before and after you interact with pets and wear a facemask  See COVID-19 and Animals for more information      Call ahead before visiting your doctor    If you have a medical appointment, call the healthcare provider and tell them that you have or may have COVID-19  This will help the healthcare providers office take steps to keep other people from getting infected or exposed  Wear a facemask    You should wear a facemask when you are around other people (e g , sharing a room or vehicle) or pets and before you enter a healthcare providers office  If you are not able to wear a facemask (for example, because it causes trouble breathing), then people who live with you should not stay in the same room with you, or they should wear a facemask if they enter your room  Cover your coughs and sneezes    Cover your mouth and nose with a tissue when you cough or sneeze  Throw used tissues in a lined trash can  Immediately wash your hands with soap and water for at least 20 seconds or, if soap and water are not available, clean your hands with an alcohol-based hand  that contains at least 60% alcohol  Clean your hands often    Wash your hands often with soap and water for at least 20 seconds, especially after blowing your nose, coughing, or sneezing; going to the bathroom; and before eating or preparing food  If soap and water are not readily available, use an alcohol-based hand  with at least 60% alcohol, covering all surfaces of your hands and rubbing them together until they feel dry  Soap and water are the best option if hands are visibly dirty  Avoid touching your eyes, nose, and mouth with unwashed hands  Avoid sharing personal household items    You should not share dishes, drinking glasses, cups, eating utensils, towels, or bedding with other people or pets in your home  After using these items, they should be washed thoroughly with soap and water  Clean all high-touch surfaces everyday    High touch surfaces include counters, tabletops, doorknobs, bathroom fixtures, toilets, phones, keyboards, tablets, and bedside tables  Also, clean any surfaces that may have blood, stool, or body fluids on them   Use a household cleaning spray or wipe, according to the label instructions  Labels contain instructions for safe and effective use of the cleaning product including precautions you should take when applying the product, such as wearing gloves and making sure you have good ventilation during use of the product  Monitor your symptoms    Seek prompt medical attention if your illness is worsening (e g , difficulty breathing)  Before seeking care, call your healthcare provider and tell them that you have, or are being evaluated for, COVID-19  Put on a facemask before you enter the facility  These steps will help the healthcare providers office to keep other people in the office or waiting room from getting infected or exposed  Ask your healthcare provider to call the local or [facility] health department  Persons who are placed under active monitoring or facilitated self-monitoring should follow instructions provided by their local health department or occupational health professionals, as appropriate  If you have a medical emergency and need to call 911, notify the dispatch personnel that you have, or are being evaluated for COVID-19  If possible, put on a facemask before emergency medical services arrive      Discontinuing home isolation    Patients with confirmed COVID-19 should remain under home isolation precautions until the following conditions are met:   - They have had no fever for at least 24 hours (that is one full day of no fever without the use medicine that reduces fevers)  AND  - other symptoms have improved (for example, when their cough or shortness of breath have improved)  AND  - If had mild or moderate illness, at least 10 days have passed since their symptoms first appeared or if severe illness (needed oxygen) or immunosuppressed, at least 20 days have passed since symptoms first appeared  Patients with confirmed COVID-19 should also notify close contacts (including their workplace) and ask that they self-quarantine  Currently, close contact is defined as being within 6 feet for 15 minutes or more from the period 24 hours starting 48 hours before symptom onset to the time at which the patient went into isolation  Close contacts of patients diagnosed with COVID-19 should be instructed by the patient to self-quarantine for 14 days from the last time of their last contact with the patient  Source: [employer]  Viral Syndrome   WHAT YOU NEED TO KNOW:   Viral syndrome is a term used for symptoms of an infection caused by a virus  Viruses are spread easily from person to person through the air and on shared items  DISCHARGE INSTRUCTIONS:   Call your local emergency number (911 in the [address] [place] [address],[address]) or have someone else call if:   · You have a seizure  · You cannot be woken  · You have chest pain or trouble breathing  Return to the emergency department if:   · You have a stiff neck, a bad headache, and sensitivity to light  · You feel weak, dizzy, or confused  · You stop urinating or urinate a lot less than usual     · You cough up blood or thick yellow or green mucus  · You have severe abdominal pain or your abdomen is larger than usual     Call your doctor if:   · Your symptoms do not get better with treatment or get worse after 3 days  · You have a rash or ear pain  · You have burning when you urinate  · You have questions or concerns about your condition or care  Medicines: You may  need any of the following:  · Acetaminophen  decreases pain and fever  It is available without a doctor's order  Ask how much to take and how often to take it  Follow directions  Read the labels of all other medicines you are using to see if they also contain acetaminophen, or ask your doctor or pharmacist  Acetaminophen can cause liver damage if not taken correctly  Do not use more than 4 grams (4,000 milligrams) total of acetaminophen in one day  · NSAIDs , such as ibuprofen, help decrease swelling, pain, and fever  NSAIDs can cause stomach bleeding or kidney problems in certain people  If you take blood thinner medicine, always ask your healthcare provider if NSAIDs are safe for you  Always read the medicine label and follow directions  · Cold medicine  helps decrease swelling, control a cough, and relieve chest or nasal congestion  · Saline nasal spray  helps decrease nasal congestion  · Take your medicine as directed  Contact your healthcare provider if you think your medicine is not helping or if you have side effects  Tell him of her if you are allergic to any medicine  Keep a list of the medicines, vitamins, and herbs you take  Include the amounts, and when and why you take them  Bring the list or the pill bottles to follow-up visits  Carry your medicine list with you in case of an emergency  Manage your symptoms:   · Drink liquids as directed to prevent dehydration  Ask how much liquid to drink each day and which liquids are best for you  Ask if you should drink an oral rehydration solution (ORS)  An ORS has the right amounts of water, salts, and sugar you need to replace body fluids  This may help prevent dehydration caused by vomiting or diarrhea  Do not drink liquids with caffeine  Liquids with caffeine can make dehydration worse  · Get plenty of rest to help your body heal   Take naps throughout the day  Ask your healthcare provider when you can return to work and your normal activities  · Use a cool mist humidifier to help you breathe easier  Ask your healthcare provider how to use a cool mist humidifier  · Eat honey or use cough drops for a sore throat  Cough drops are available without a doctor's order  Follow directions for taking cough drops  · Do not smoke or be close to anyone who is smoking  Nicotine and other chemicals in cigarettes and cigars can cause lung damage  Smoking can also delay healing   Ask your healthcare provider for information if you currently smoke and need help to quit  E-cigarettes or smokeless tobacco still contain nicotine  Talk to your healthcare provider before you use these products  Prevent the spread of germs:       · Wash your hands often  Wash your hands several times each day  Wash after you use the bathroom, change a child's diaper, and before you prepare or eat food  Use soap and water every time  Rub your soapy hands together, lacing your fingers  Wash the front and back of your hands, and in between your fingers  Use the fingers of one hand to scrub under the fingernails of the other hand  Wash for at least 20 seconds  Rinse with warm, running water for several seconds  Then dry your hands with a clean towel or paper towel  Use hand  that contains alcohol if soap and water are not available  Do not touch your eyes, nose, or mouth without washing your hands first          · Cover a sneeze or cough  Use a tissue that covers your mouth and nose  Throw the tissue away in a trash can right away  Use the bend of your arm if a tissue is not available  Wash your hands well with soap and water or use a hand   · Stay away from others while you are sick  Avoid crowds as much as possible  · Ask about vaccines you may need  Talk to your healthcare provider about your vaccine history  He or she will tell you which vaccines you need, and when to get them  ? Get the influenza (flu) vaccine as soon as recommended each year  The flu vaccine is available starting in September or October  Flu viruses change, so it is important to get a flu vaccine every year  ? Get the pneumonia vaccine if recommended  This vaccine is usually recommended every 5 years  Your provider will tell you when to get this vaccine, if needed  Follow up with your doctor as directed:  Write down your questions so you remember to ask them during your visits    © [address] [employer] Information is for End User's use only and may not be sold, redistributed or otherwise used for commercial purposes  All illustrations and images included in CareNotes® are the copyrighted property of A D A M , Inc  or Citlaly Collier  The above information is an  only  It is not intended as medical advice for individual conditions or treatments  Talk to your doctor, nurse or pharmacist before following any medical regimen to see if it is safe and effective for you

## 2021-05-11 NOTE — TELEPHONE ENCOUNTER
Called and lvm informing pt that her covid test is still pending so we have to cancel  Told pt that someone from the [facility] will be reaching out to reschedule

## 2021-05-12 ENCOUNTER — TELEMEDICINE (OUTPATIENT)
Dept: PERINATAL CARE | Facility: CLINIC | Age: 36
End: 2021-05-12
Payer: COMMERCIAL

## 2021-05-12 ENCOUNTER — TELEPHONE (OUTPATIENT)
Dept: URGENT CARE | Facility: MEDICAL CENTER | Age: 36
End: 2021-05-12

## 2021-05-12 ENCOUNTER — TELEPHONE (OUTPATIENT)
Dept: SURGICAL ONCOLOGY | Facility: CLINIC | Age: 36
End: 2021-05-12

## 2021-05-12 DIAGNOSIS — E66.01 MATERNAL MORBID OBESITY, ANTEPARTUM (HCC): ICD-10-CM

## 2021-05-12 DIAGNOSIS — O99.210 MATERNAL MORBID OBESITY, ANTEPARTUM (HCC): ICD-10-CM

## 2021-05-12 DIAGNOSIS — O24.410 DIET CONTROLLED GESTATIONAL DIABETES MELLITUS (GDM) IN THIRD TRIMESTER: Primary | ICD-10-CM

## 2021-05-12 DIAGNOSIS — Z3A.32 32 WEEKS GESTATION OF PREGNANCY: ICD-10-CM

## 2021-05-12 DIAGNOSIS — J02.9 ACUTE PHARYNGITIS, UNSPECIFIED ETIOLOGY: Primary | ICD-10-CM

## 2021-05-12 LAB — BACTERIA THROAT CULT: ABNORMAL

## 2021-05-12 PROCEDURE — G0109 DIAB MANAGE TRN IND/GROUP: HCPCS | Performed by: DIETITIAN, REGISTERED

## 2021-05-12 RX ORDER — AMOXICILLIN 500 MG/1
500 CAPSULE ORAL EVERY 8 HOURS SCHEDULED
Qty: 30 CAPSULE | Refills: 0 | Status: SHIPPED | OUTPATIENT
Start: 2021-05-12 | End: 2021-05-22

## 2021-05-12 NOTE — PROGRESS NOTES
Virtual Regular Visit      Assessment/Plan:    Problem List Items Addressed This Visit     None               Reason for visit is   Chief Complaint   Patient presents with    Virtual Regular Visit        Encounter provider Jasmyn Núñez    Provider located at [address] [place] [id number] [phone]      Recent Visits  No visits were found meeting these conditions  Showing recent visits within past 7 days and meeting all other requirements     Future Appointments  No visits were found meeting these conditions  Showing future appointments within next 150 days and meeting all other requirements        The patient was identified by name and date of birth  Lavern Londono was informed that this is a telemedicine visit and that the visit is being conducted through [employer] and patient was informed that this is a secure, HIPAA-compliant platform  She agrees to proceed     My office door was closed  No one else was in the room  She acknowledged consent and understanding of privacy and security of the video platform  The patient has agreed to participate and understands they can discontinue the visit at any time  Patient is aware this is a billable service  Subjective  Lavern Londono is a 38 y o  female pregnant patient  HPI     Past Medical History:   Diagnosis Date    Diabetes mellitus ([facility] [address] )     History of recurrent miscarriages     see OB hx    Hypertension        Past Surgical History:   Procedure Laterality Date    INDUCED       [place] SURG RX MISSED ABORTN,1ST TRI N/A 2016    Procedure: DILATATION AND EVACUATION (D&E);   Surgeon: Carlos Jiménez MD;  Location: [facility];  Service: Gynecology    US GUIDANCE BREAST BIOPSY RIGHT EACH ADDITIONAL Right 2/10/2021    US GUIDED BREAST BIOPSY RIGHT COMPLETE Right 2/10/2021       Current Outpatient Medications   Medication Sig Dispense Refill    acetaminophen (TYLENOL) 500 mg tablet Take 500 mg by mouth every 6 (six) hours as needed for mild pain      amoxicillin (AMOXIL) 500 mg capsule Take 1 capsule (500 mg total) by mouth every 8 (eight) hours for 10 days 30 capsule 0    aspirin (ECOTRIN LOW STRENGTH) 81 mg EC tablet Take 2 tablets (162 mg total) by mouth daily 60 tablet 6    Blood Glucose Monitoring Suppl (OneTouch Verio Flex System) w/Device KIT Test 4 times for 1 week 1 kit 0    docusate sodium (COLACE) 100 mg capsule Take 1 capsule (100 mg total) by mouth 2 (two) times a day 60 capsule 6    ferrous sulfate 324 (65 Fe) mg Take 1 tablet (325 mg total) by mouth 2 (two) times a day before meals 60 tablet 6    labetalol (NORMODYNE) 100 mg tablet Take 3 tablets (300 mg total) by mouth 2 (two) times a day      labetalol (NORMODYNE) 200 mg tablet TAKE 2 TABLETS BY MOUTH TWICE DAILY      OneTouch Delica Lancets 62H MISC Test 4 times daily  100 each 4    OneTouch Verio test strip TEST FOUR TIMES DAILY  90 day supply equals 360 test strips  400 each 1    Prenatal Vit-Fe Fumarate-FA (PNV Folic Acid + Iron) 91-4 MG TABS Take 1 tablet by mouth daily 30 tablet 8     No current facility-administered medications for this visit  Allergies   Allergen Reactions    Other      bbq sauce    Tomato - Food Allergy        Review of Systems    Video Exam    There were no vitals filed for this visit  Physical Exam --not performed  Time spent with the patient: 60 minutes  VIRTUAL VISIT DISCLAIMER    David Mchugh acknowledges that she has consented to an online visit or consultation  She understands that the online visit is based solely on information provided by her, and that, in the absence of a face-to-face physical evaluation by the physician, the diagnosis she receives is both limited and provisional in terms of accuracy and completeness  This is not intended to replace a full medical face-to-face evaluation by the physician   David Mchugh understands and accepts these terms  DATE:  21  RE: Idalmis Gustafson    : 1985    NANO: Estimated Date of Delivery: 21    EGA: 22Z5J    Dear Dr Óscar Vargas: Thank you for referring your patient to the Diabetes and Pregnancy Program at [address] [place] [address]  The patient attended Group [facility] 2 received the following education via virtual telemedicine:    Weight gain during in pregnancy  Based on the patients height of 64   inches, pre-pregnancy weight of 113 kg (249 lb) pounds (BMI- 42 720, we would recommend a total weight gain of 11-20 pounds for the pregnancy   The patients current weight is 255  pounds, and her weight gain to date is 6 pounds  Based on this, we recommend a weight gain of 14 pounds for the remainder of the pregnancy   Medical Nutrition Therapy for diabetes and pregnancy  The patients 24 hour diet recall was reviewed and discussed  The patient was instructed on the following:  o Individualized meal plan  Diet recall indicates that she is following the meal plan closely  Stated she eats the same foods over & over again as many foods make her nauseous  o Use of food diary to maintain a meal plan    o Importance of protein as it relates to blood glucose control   Review of blood glucose log  Reinforcement of blood glucose goals and reporting guidelines  Has high FBS; advised patient to change bedtime sqnack to 1 CHO serving (15 gms) & increase to 2-3 oz protein   Ultrasounds every four weeks in the [address] to evaluate fetal growth   Exercise Guidelines:   o Walking up to thirty minutes daily can reduce blood glucose levels  o Monitor for greater than four contractions per hour     o The patient has been instructed not to begin physical activity if she has been instructed not to exercise by your office   Sick day guidelines and hypoglycemia with treatment     Post-partum guidelines:  o Completion of a 75 gram glucose tolerance test at 6 weeks post-partum to check for type 2 diabetes  o 20% weight loss and 30 minutes of exercise 5 times per week reduces the risk of type 2 diabetes   Breastfeeding guidelines   Report blood glucose levels to [address] weekly or as directed  o Phone: [phone]  If no response in 24 hours, call [phone]    o Fax: [phone]  o Email: [email or web address]    Diabetes Self Management Support Plan outside of ongoing care: Spouse/Family    Please contact the Diabetes and Pregnancy Program at [phone] if you have questions  Time spent with patient 2-3 PM; time spent face to face counseling greater than 50% of the appointment      Sincerely,     Mandi Moe  Diabetes Educator  Diabetes [facility] Pregnancy Program

## 2021-05-12 NOTE — TELEPHONE ENCOUNTER
Contacted pt   Discussed +strep  Amoxil 500mg tid  X 10 days called into [facility] [address]   Pt will go to drug store

## 2021-05-13 ENCOUNTER — ROUTINE PRENATAL (OUTPATIENT)
Dept: PERINATAL CARE | Facility: OTHER | Age: 36
End: 2021-05-13
Payer: COMMERCIAL

## 2021-05-13 ENCOUNTER — ULTRASOUND (OUTPATIENT)
Dept: PERINATAL CARE | Facility: OTHER | Age: 36
End: 2021-05-13
Payer: COMMERCIAL

## 2021-05-13 VITALS
WEIGHT: 250 LBS | DIASTOLIC BLOOD PRESSURE: 84 MMHG | HEART RATE: 103 BPM | BODY MASS INDEX: 42.91 KG/M2 | SYSTOLIC BLOOD PRESSURE: 150 MMHG

## 2021-05-13 DIAGNOSIS — Z3A.32 32 WEEKS GESTATION OF PREGNANCY: ICD-10-CM

## 2021-05-13 DIAGNOSIS — O16.3 HYPERTENSION AFFECTING PREGNANCY IN THIRD TRIMESTER: Primary | ICD-10-CM

## 2021-05-13 PROCEDURE — 59025 FETAL NON-STRESS TEST: CPT | Performed by: OBSTETRICS & GYNECOLOGY

## 2021-05-13 PROCEDURE — 76816 OB US FOLLOW-UP PER FETUS: CPT | Performed by: OBSTETRICS & GYNECOLOGY

## 2021-05-13 PROCEDURE — 99213 OFFICE O/P EST LOW 20 MIN: CPT | Performed by: OBSTETRICS & GYNECOLOGY

## 2021-05-13 PROCEDURE — NC001 PR NO CHARGE: Performed by: OBSTETRICS & GYNECOLOGY

## 2021-05-13 NOTE — PROGRESS NOTES
NST procedure and expected outcome explained to patient  Daily fetal kick count discussed with handout given  Patient verbalized understanding of all and was receptive      Paulina Mccarthy RN

## 2021-05-13 NOTE — PATIENT INSTRUCTIONS
Nonstress Test for Pregnancy   WHAT YOU NEED TO KNOW:   What do I need to know about a nonstress test?  A nonstress test measures your baby's heart rate and movements  Nonstress means that no stress will be placed on your baby during the test    How do I prepare for a nonstress test?  Your healthcare provider will talk to you about how to prepare for this test  He may tell you to eat and drink plenty of fluids before your test  If you smoke, you may be asked not to smoke within 2 hours before the test  He will also tell you what medicines to take or not take on the day of your test    What will happen during a nonstress test?  You may be asked to lie down or recline back for the test on a bed  One or two belts with sensors will be placed around your abdomen  Your baby's heart rate will be recorded with a machine  If your baby does not move, your baby may be asleep  Your healthcare provider may make a noise near your abdomen to try to wake your baby  The test usually takes about 20 minutes, but can take longer if your baby needs to be awakened  What do I need to know about the test results? Your baby will be expected to move at least twice for a certain amount of time  Your baby's heart rate will be expected to go up by a certain number of beats per minute during movement  If your baby does not move as expected, the test may need to be repeated or you may need other tests  CARE AGREEMENT:   You have the right to help plan your care  Learn about your health condition and how it may be treated  Discuss treatment options with your healthcare providers to decide what care you want to receive  You always have the right to refuse treatment  The above information is an  only  It is not intended as medical advice for individual conditions or treatments  Talk to your doctor, nurse or pharmacist before following any medical regimen to see if it is safe and effective for you    © Copyright [address] [place] [address] Drive Information is for End User's use only and may not be sold, redistributed or otherwise used for commercial purposes  All illustrations and images included in CareNotes® are the copyrighted property of A D A M , Inc  or [address] [facility] [address] lay COVID-19 Vaccine in Pregnancy Decision Aid, go to https://[employer] org/COVIDvacPregnancy/    The ABM (Academy of Breastfeeding Medicine) Statement on Considerations for COVID-19 Vaccination in Lactation is available at: [employer] tn  Finally, the CDC statement on Vaccination Consideration for People who are Pregnant or Breastfeeding is available at:  [employer] be      Here are the images (12/28/20) for the decision aid:

## 2021-05-20 ENCOUNTER — ULTRASOUND (OUTPATIENT)
Dept: PERINATAL CARE | Facility: OTHER | Age: 36
End: 2021-05-20
Payer: COMMERCIAL

## 2021-05-20 VITALS
DIASTOLIC BLOOD PRESSURE: 87 MMHG | BODY MASS INDEX: 43.13 KG/M2 | HEIGHT: 64 IN | HEART RATE: 104 BPM | SYSTOLIC BLOOD PRESSURE: 137 MMHG | WEIGHT: 252.6 LBS

## 2021-05-20 DIAGNOSIS — O10.913 MATERNAL CHRONIC HYPERTENSION, THIRD TRIMESTER: ICD-10-CM

## 2021-05-20 DIAGNOSIS — Z3A.33 33 WEEKS GESTATION OF PREGNANCY: Primary | ICD-10-CM

## 2021-05-20 PROCEDURE — 76815 OB US LIMITED FETUS(S): CPT | Performed by: OBSTETRICS & GYNECOLOGY

## 2021-05-20 PROCEDURE — 59025 FETAL NON-STRESS TEST: CPT | Performed by: OBSTETRICS & GYNECOLOGY

## 2021-05-20 NOTE — PATIENT INSTRUCTIONS
Kick Counts in Pregnancy   AMBULATORY CARE:   Kick counts  measure how much your baby is moving in your womb  A kick from your baby can be felt as a twist, turn, swish, roll, or jab  It is common to feel your baby kicking at 26 to 28 weeks of pregnancy  You may feel your baby kick as early as 20 weeks of pregnancy  You may want to start counting at 28 weeks  Contact your healthcare provider immediately if:   · You feel a change in the number of kicks or movements of your baby  · You feel fewer than 10 kicks within 2 hours  · You have questions or concerns about your baby's movements  Why measure kick counts:  Your baby's movement may provide information about your baby's health  He or she may move less, or not at all, if there are problems  Your baby may move less if he or she is not getting enough oxygen or nutrition from the placenta  Do not smoke while you are pregnant  Smoking decreases the amount of oxygen that gets to your baby  Talk to your healthcare provider if you need help to quit smoking  Tell your healthcare provider as soon as you feel a change in your baby's movements  When to measure kick counts:   · Measure kick counts at the same time every day  · Measure kick counts when your baby is awake and most active  Your baby may be most active in the evening  How to measure kick counts:  Check that your baby is awake before you measure kick counts  You can wake up your baby by lightly pushing on your belly, walking, or drinking something cold  Your healthcare provider may tell you different ways to measure kick counts  You may be told to do the following:  · Use a chart or clock to keep track of the time you start and finish counting  · Sit in a chair or lie on your left side  · Place your hands on the largest part of your belly  · Count until you reach 10 kicks  Write down how much time it takes to count 10 kicks  · It may take 30 minutes to 2 hours to count 10 kicks  It should not take more than 2 hours to count 10 kicks  Follow up with your healthcare provider as directed:  Write down your questions so you remember to ask them during your visits  © Copyright [address] Information is for End User's use only and may not be sold, redistributed or otherwise used for commercial purposes  All illustrations and images included in CareNotes® are the copyrighted property of A D A M , Inc  or [facility] Aneta Ibarra   The above information is an  only  It is not intended as medical advice for individual conditions or treatments  Talk to your doctor, nurse or pharmacist before following any medical regimen to see if it is safe and effective for you

## 2021-05-20 NOTE — LETTER
May 20, 2021     MD Roel Akers [id number]  [facility]    Patient: Damien Sterling   YOB: 1985   Date of Visit: 5/20/2021       Dear Dr Lidya Lynn: Thank you for referring Damien Sterling to me for evaluation  Below are my notes for this consultation  If you have questions, please do not hesitate to call me  I look forward to following your patient along with you  Sincerely,        Yuval Armenta MD        CC: No Recipients  Yuval Armenta MD  5/19/2021  4:29 PM  Sign when Signing Visit   Please refer to the [facility] ultrasound report in Ob Procedures for additional information regarding today's visit

## 2021-05-24 PROBLEM — O24.410 DIET CONTROLLED GESTATIONAL DIABETES MELLITUS (GDM) IN THIRD TRIMESTER: Status: ACTIVE | Noted: 2021-05-24

## 2021-05-25 PROBLEM — Z3A.34 34 WEEKS GESTATION OF PREGNANCY: Status: ACTIVE | Noted: 2021-03-24

## 2021-05-26 ENCOUNTER — ULTRASOUND (OUTPATIENT)
Dept: PERINATAL CARE | Facility: OTHER | Age: 36
End: 2021-05-26
Payer: COMMERCIAL

## 2021-05-26 VITALS
SYSTOLIC BLOOD PRESSURE: 150 MMHG | DIASTOLIC BLOOD PRESSURE: 86 MMHG | HEART RATE: 99 BPM | WEIGHT: 252.8 LBS | BODY MASS INDEX: 43.16 KG/M2 | HEIGHT: 64 IN

## 2021-05-26 DIAGNOSIS — Z3A.34 34 WEEKS GESTATION OF PREGNANCY: Primary | ICD-10-CM

## 2021-05-26 DIAGNOSIS — O10.913 MATERNAL CHRONIC HYPERTENSION, THIRD TRIMESTER: ICD-10-CM

## 2021-05-26 PROCEDURE — 76815 OB US LIMITED FETUS(S): CPT | Performed by: OBSTETRICS & GYNECOLOGY

## 2021-05-26 PROCEDURE — 59025 FETAL NON-STRESS TEST: CPT | Performed by: OBSTETRICS & GYNECOLOGY

## 2021-05-26 NOTE — PATIENT INSTRUCTIONS
Kick Counts in Pregnancy   AMBULATORY CARE:   Kick counts  measure how much your baby is moving in your womb  A kick from your baby can be felt as a twist, turn, swish, roll, or jab  It is common to feel your baby kicking at 26 to 28 weeks of pregnancy  You may feel your baby kick as early as 20 weeks of pregnancy  You may want to start counting at 28 weeks  Contact your healthcare provider immediately if:   · You feel a change in the number of kicks or movements of your baby  · You feel fewer than 10 kicks within 2 hours  · You have questions or concerns about your baby's movements  Why measure kick counts:  Your baby's movement may provide information about your baby's health  He or she may move less, or not at all, if there are problems  Your baby may move less if he or she is not getting enough oxygen or nutrition from the placenta  Do not smoke while you are pregnant  Smoking decreases the amount of oxygen that gets to your baby  Talk to your healthcare provider if you need help to quit smoking  Tell your healthcare provider as soon as you feel a change in your baby's movements  When to measure kick counts:   · Measure kick counts at the same time every day  · Measure kick counts when your baby is awake and most active  Your baby may be most active in the evening  How to measure kick counts:  Check that your baby is awake before you measure kick counts  You can wake up your baby by lightly pushing on your belly, walking, or drinking something cold  Your healthcare provider may tell you different ways to measure kick counts  You may be told to do the following:  · Use a chart or clock to keep track of the time you start and finish counting  · Sit in a chair or lie on your left side  · Place your hands on the largest part of your belly  · Count until you reach 10 kicks  Write down how much time it takes to count 10 kicks  · It may take 30 minutes to 2 hours to count 10 kicks  It should not take more than 2 hours to count 10 kicks  Follow up with your healthcare provider as directed:  Write down your questions so you remember to ask them during your visits  © Copyright [address] Information is for End User's use only and may not be sold, redistributed or otherwise used for commercial purposes  All illustrations and images included in CareNotes® are the copyrighted property of A D A M , Inc  or [facility] Aneta Ibarra   The above information is an  only  It is not intended as medical advice for individual conditions or treatments  Talk to your doctor, nurse or pharmacist before following any medical regimen to see if it is safe and effective for you

## 2021-05-26 NOTE — LETTER
May 26, 2021     MD Roel Rain [address] [facility]    Patient: Lavern Londono   YOB: 1985   Date of Visit: 5/26/2021       Dear Dr Melo Welch: Thank you for referring Lavern Londono to me for evaluation  Below are my notes for this consultation  If you have questions, please do not hesitate to call me  I look forward to following your patient along with you  Sincerely,        Екатерина Scott MD        CC: No Recipients  Екатерина Scott MD  5/26/2021  2:27 PM  Sign when Signing Visit   Please refer to the [facility] ultrasound report in Ob Procedures for additional information regarding today's visit

## 2021-05-26 NOTE — PROGRESS NOTES
Please refer to the [facility] ultrasound report in Ob Procedures for additional information regarding today's visit

## 2021-05-28 ENCOUNTER — HOSPITAL ENCOUNTER (INPATIENT)
Facility: HOSPITAL | Age: 36
LOS: 6 days | Discharge: HOME | DRG: 560 | End: 2021-06-04
Attending: OBSTETRICS & GYNECOLOGY | Admitting: STUDENT IN AN ORGANIZED HEALTH CARE EDUCATION/TRAINING PROGRAM
Payer: COMMERCIAL

## 2021-05-28 ENCOUNTER — ROUTINE PRENATAL (OUTPATIENT)
Dept: OBGYN CLINIC | Facility: CLINIC | Age: 36
End: 2021-05-28
Payer: COMMERCIAL

## 2021-05-28 VITALS
SYSTOLIC BLOOD PRESSURE: 148 MMHG | BODY MASS INDEX: 43.56 KG/M2 | WEIGHT: 253.8 LBS | DIASTOLIC BLOOD PRESSURE: 100 MMHG | HEART RATE: 108 BPM | TEMPERATURE: 97.3 F

## 2021-05-28 DIAGNOSIS — O11.9 CHRONIC HYPERTENSION WITH SUPERIMPOSED PREECLAMPSIA: ICD-10-CM

## 2021-05-28 DIAGNOSIS — O09.529 HIGH-RISK PREGNANCY, MULTIGRAVIDA OF ADVANCED MATERNAL AGE, ANTEPARTUM: ICD-10-CM

## 2021-05-28 DIAGNOSIS — E66.01 MATERNAL MORBID OBESITY, ANTEPARTUM (HCC): ICD-10-CM

## 2021-05-28 DIAGNOSIS — Z3A.35 35 WEEKS GESTATION OF PREGNANCY: ICD-10-CM

## 2021-05-28 DIAGNOSIS — O24.410 DIET CONTROLLED GESTATIONAL DIABETES MELLITUS (GDM) IN THIRD TRIMESTER: Primary | ICD-10-CM

## 2021-05-28 DIAGNOSIS — O99.019 MATERNAL ANEMIA, ANTEPARTUM: ICD-10-CM

## 2021-05-28 DIAGNOSIS — R35.0 URINARY FREQUENCY: ICD-10-CM

## 2021-05-28 DIAGNOSIS — Z3A.34 34 WEEKS GESTATION OF PREGNANCY: ICD-10-CM

## 2021-05-28 DIAGNOSIS — O16.3 HYPERTENSION AFFECTING PREGNANCY IN THIRD TRIMESTER: ICD-10-CM

## 2021-05-28 DIAGNOSIS — O99.210 MATERNAL MORBID OBESITY, ANTEPARTUM (HCC): ICD-10-CM

## 2021-05-28 LAB
ABO GROUP BLD: NORMAL
ALBUMIN SERPL BCP-MCNC: 2.7 G/DL (ref 3.5–5)
ALP SERPL-CCNC: 94 U/L (ref 46–116)
ALT SERPL W P-5'-P-CCNC: 20 U/L (ref 12–78)
ANION GAP SERPL CALCULATED.3IONS-SCNC: 10 MMOL/L (ref 4–13)
AST SERPL W P-5'-P-CCNC: 22 U/L (ref 5–45)
BILIRUB SERPL-MCNC: 0.3 MG/DL (ref 0.2–1)
BLD GP AB SCN SERPL QL: NEGATIVE
BUN SERPL-MCNC: 4 MG/DL (ref 5–25)
CALCIUM ALBUM COR SERPL-MCNC: 9.8 MG/DL (ref 8.3–10.1)
CALCIUM SERPL-MCNC: 8.8 MG/DL (ref 8.3–10.1)
CHLORIDE SERPL-SCNC: 106 MMOL/L (ref 100–108)
CO2 SERPL-SCNC: 24 MMOL/L (ref 21–32)
CREAT SERPL-MCNC: 0.66 MG/DL (ref 0.6–1.3)
CREAT UR-MCNC: 335.5 MG/DL
ERYTHROCYTE [DISTWIDTH] IN BLOOD BY AUTOMATED COUNT: 16.1 % (ref 11.6–15.1)
GFR SERPL CREATININE-BSD FRML MDRD: 131 ML/MIN/1.73SQ M
GLUCOSE SERPL-MCNC: 117 MG/DL (ref 65–140)
GLUCOSE SERPL-MCNC: 87 MG/DL (ref 65–140)
HCT VFR BLD AUTO: 32.7 % (ref 34.8–46.1)
HGB BLD-MCNC: 9.4 G/DL (ref 11.5–15.4)
MCH RBC QN AUTO: 21.3 PG (ref 26.8–34.3)
MCHC RBC AUTO-ENTMCNC: 28.7 G/DL (ref 31.4–37.4)
MCV RBC AUTO: 74 FL (ref 82–98)
PLATELET # BLD AUTO: 273 THOUSANDS/UL (ref 149–390)
POTASSIUM SERPL-SCNC: 3.9 MMOL/L (ref 3.5–5.3)
PROT SERPL-MCNC: 6.4 G/DL (ref 6.4–8.2)
PROT UR-MCNC: 53 MG/DL
PROT/CREAT UR: 0.16 MG/G{CREAT} (ref 0–0.1)
RBC # BLD AUTO: 4.41 MILLION/UL (ref 3.81–5.12)
RH BLD: POSITIVE
SL AMB  POCT GLUCOSE, UA: ABNORMAL
SL AMB LEUKOCYTE ESTERASE,UA: ABNORMAL
SL AMB POCT BILIRUBIN,UA: ABNORMAL
SL AMB POCT BLOOD,UA: ABNORMAL
SL AMB POCT CLARITY,UA: CLEAR
SL AMB POCT COLOR,UA: ABNORMAL
SL AMB POCT KETONES,UA: 5
SL AMB POCT NITRITE,UA: ABNORMAL
SL AMB POCT PH,UA: 6
SL AMB POCT SPECIFIC GRAVITY,UA: 1.02
SL AMB POCT URINE PROTEIN: 30
SL AMB POCT UROBILINOGEN: 0.2
SODIUM SERPL-SCNC: 140 MMOL/L (ref 136–145)
SPECIMEN EXPIRATION DATE: NORMAL
WBC # BLD AUTO: 7.5 THOUSAND/UL (ref 4.31–10.16)

## 2021-05-28 PROCEDURE — 82728 ASSAY OF FERRITIN: CPT | Performed by: OBSTETRICS & GYNECOLOGY

## 2021-05-28 PROCEDURE — 99218 PR INITIAL OBSERVATION CARE/DAY 30 MINUTES: CPT | Performed by: OBSTETRICS & GYNECOLOGY

## 2021-05-28 PROCEDURE — 86850 RBC ANTIBODY SCREEN: CPT | Performed by: OBSTETRICS & GYNECOLOGY

## 2021-05-28 PROCEDURE — 87150 DNA/RNA AMPLIFIED PROBE: CPT | Performed by: OBSTETRICS & GYNECOLOGY

## 2021-05-28 PROCEDURE — 84156 ASSAY OF PROTEIN URINE: CPT | Performed by: OBSTETRICS & GYNECOLOGY

## 2021-05-28 PROCEDURE — 86901 BLOOD TYPING SEROLOGIC RH(D): CPT | Performed by: OBSTETRICS & GYNECOLOGY

## 2021-05-28 PROCEDURE — 99214 OFFICE O/P EST MOD 30 MIN: CPT | Performed by: CLINICAL NURSE SPECIALIST

## 2021-05-28 PROCEDURE — 87086 URINE CULTURE/COLONY COUNT: CPT | Performed by: CLINICAL NURSE SPECIALIST

## 2021-05-28 PROCEDURE — G0379 DIRECT REFER HOSPITAL OBSERV: HCPCS

## 2021-05-28 PROCEDURE — 82570 ASSAY OF URINE CREATININE: CPT | Performed by: OBSTETRICS & GYNECOLOGY

## 2021-05-28 PROCEDURE — 82948 REAGENT STRIP/BLOOD GLUCOSE: CPT

## 2021-05-28 PROCEDURE — 80053 COMPREHEN METABOLIC PANEL: CPT | Performed by: OBSTETRICS & GYNECOLOGY

## 2021-05-28 PROCEDURE — NC001 PR NO CHARGE: Performed by: OBSTETRICS & GYNECOLOGY

## 2021-05-28 PROCEDURE — 85027 COMPLETE CBC AUTOMATED: CPT | Performed by: OBSTETRICS & GYNECOLOGY

## 2021-05-28 PROCEDURE — 86900 BLOOD TYPING SEROLOGIC ABO: CPT | Performed by: OBSTETRICS & GYNECOLOGY

## 2021-05-28 PROCEDURE — 81002 URINALYSIS NONAUTO W/O SCOPE: CPT | Performed by: CLINICAL NURSE SPECIALIST

## 2021-05-28 RX ORDER — METOCLOPRAMIDE HYDROCHLORIDE 5 MG/ML
10 INJECTION INTRAMUSCULAR; INTRAVENOUS ONCE
Status: COMPLETED | OUTPATIENT
Start: 2021-05-28 | End: 2021-05-28

## 2021-05-28 RX ORDER — LABETALOL 20 MG/4 ML (5 MG/ML) INTRAVENOUS SYRINGE
20 ONCE
Status: DISCONTINUED | OUTPATIENT
Start: 2021-05-28 | End: 2021-05-28

## 2021-05-28 RX ORDER — MAGNESIUM SULFATE HEPTAHYDRATE 40 MG/ML
2 INJECTION, SOLUTION INTRAVENOUS ONCE
Status: COMPLETED | OUTPATIENT
Start: 2021-05-28 | End: 2021-05-28

## 2021-05-28 RX ORDER — ACETAMINOPHEN 325 MG/1
975 TABLET ORAL EVERY 6 HOURS PRN
Status: DISCONTINUED | OUTPATIENT
Start: 2021-05-28 | End: 2021-06-01

## 2021-05-28 RX ORDER — ONDANSETRON 2 MG/ML
4 INJECTION INTRAMUSCULAR; INTRAVENOUS EVERY 8 HOURS PRN
Status: DISCONTINUED | OUTPATIENT
Start: 2021-05-28 | End: 2021-06-01

## 2021-05-28 RX ORDER — LABETALOL 20 MG/4 ML (5 MG/ML) INTRAVENOUS SYRINGE
40 ONCE
Status: COMPLETED | OUTPATIENT
Start: 2021-05-28 | End: 2021-05-28

## 2021-05-28 RX ORDER — CALCIUM CARBONATE 200(500)MG
1000 TABLET,CHEWABLE ORAL DAILY PRN
Status: DISCONTINUED | OUTPATIENT
Start: 2021-05-28 | End: 2021-06-01

## 2021-05-28 RX ORDER — SODIUM CHLORIDE 9 MG/ML
50 INJECTION, SOLUTION INTRAVENOUS CONTINUOUS
Status: DISCONTINUED | OUTPATIENT
Start: 2021-05-28 | End: 2021-06-04 | Stop reason: HOSPADM

## 2021-05-28 RX ORDER — DOCUSATE SODIUM 100 MG/1
100 CAPSULE, LIQUID FILLED ORAL 2 TIMES DAILY
Status: DISCONTINUED | OUTPATIENT
Start: 2021-05-28 | End: 2021-06-01

## 2021-05-28 RX ORDER — FERROUS SULFATE 325(65) MG
325 TABLET ORAL
Status: DISCONTINUED | OUTPATIENT
Start: 2021-05-29 | End: 2021-06-04 | Stop reason: HOSPADM

## 2021-05-28 RX ADMIN — LABETALOL 20 MG/4 ML (5 MG/ML) INTRAVENOUS SYRINGE 20 MG: at 15:05

## 2021-05-28 RX ADMIN — ACETAMINOPHEN 975 MG: 325 TABLET, FILM COATED ORAL at 14:11

## 2021-05-28 RX ADMIN — LABETALOL HYDROCHLORIDE 300 MG: 200 TABLET, FILM COATED ORAL at 14:29

## 2021-05-28 RX ADMIN — MAGNESIUM SULFATE IN WATER 2 G: 40 INJECTION, SOLUTION INTRAVENOUS at 23:19

## 2021-05-28 RX ADMIN — LABETALOL 20 MG/4 ML (5 MG/ML) INTRAVENOUS SYRINGE 40 MG: at 15:35

## 2021-05-28 RX ADMIN — SODIUM CHLORIDE 1000 ML: 0.9 INJECTION, SOLUTION INTRAVENOUS at 16:38

## 2021-05-28 RX ADMIN — LABETALOL HYDROCHLORIDE 300 MG: 200 TABLET, FILM COATED ORAL at 21:14

## 2021-05-28 RX ADMIN — ACETAMINOPHEN 975 MG: 325 TABLET, FILM COATED ORAL at 21:17

## 2021-05-28 RX ADMIN — SODIUM CHLORIDE 125 ML/HR: 0.9 INJECTION, SOLUTION INTRAVENOUS at 23:06

## 2021-05-28 RX ADMIN — DOCUSATE SODIUM 100 MG: 100 CAPSULE, LIQUID FILLED ORAL at 21:13

## 2021-05-28 RX ADMIN — METOCLOPRAMIDE 10 MG: 5 INJECTION, SOLUTION INTRAMUSCULAR; INTRAVENOUS at 23:07

## 2021-05-28 NOTE — QUICK NOTE
Late charting secondary to patient care  Hypertensive urgency ongoing, received labetalol 300 mg PO at 1429   Now with persistent hypertensive urgency, giving labetalol 20 mg IV  Plan to recheck blood pressure in 10 minutes      Sunil Been, DO

## 2021-05-28 NOTE — PROGRESS NOTES
Ross Driscoll is a 38 y o  [id number] 34w5d here for Routine Prenatal Visit (and NST  Due for labs, already had tdap vaccine  Patient with c/o of bad headaches yesterday)    Prenatal Visit  Subjective:   Denies unusual vaginal discharge, LOF, VB, or ctx  Reports Fetus is active  She does note more voiding frequency, but no dysuria  Going > 1/hr  No change in d/c    Also C/O more headaches for the past 3 days on and off  Initially was more severe and was forced to rest which helped slightly  Since has been waxing/waning  Still present today  Has not taken any OTC meds today  Denies vision changes or RUQ pain  CHTN On 300 mg BID,  Due for NST today    Also GDM and reporting BG borderline high and associated with improper food choices  Does not have log and hasn't sent log to [facility] since 5/10/21  Has not checked it today at all  Objective:    Pregravid Weight/BMI: 113 kg (249 lb) (BMI 42 72)  Current Weight: 115 kg (253 lb 12 8 oz)   Total Weight Gain: 2 177 kg (4 lb 12 8 oz)   Pre-Ana Vitals      Most Recent Value   Prenatal Assessment   Prenatal Vitals   Blood Pressure  148/100   Weight - Scale  115 kg (253 lb 12 8 oz)   Urine Albumin/Glucose   Dilation/Effacement/Station   Vaginal Drainage   Edema         OBGyn Exam  Physical Exam  General: Not in acute distress and appearing well nourished and well groomed  Genitourinary:          Pelvic exam exam deferred   Cardiovascular:   Rate and Rhythm: Normal rate  Pulmonary:    Effort: normal, not labored  Abdominal:  Abdomen is soft and gravid    Musculoskeletal: Active movement of all extremities, no gross limitations of ROM     Edema: Trace  Neurological:   Mental Status: She is alert and oriented to person, place, and time  Skin: General: Skin is warm and dry  Psychiatric:    Mood and Affect: Mood normal       Behavior: Behavior normal  Assessment & Plan:  [id number], 34w5d  Overall pt doing well    Reviewed common mood changes towards end of pregnancy and s/s Postpartum depression to be aware of  Educated on GBS culture which will be completed at the 36 wk visit  We again reviewed the S/S PTL and importance of consistent/regular FM  Reviewed process for [facility] and encouraged pt to call with any decreases in fetal movement      1  Diet controlled gestational diabetes mellitus (GDM) in third trimester  Assessment & Plan:  Non-compliant  Not checking BG regularly and what she does check is  suboptimal  Some mild elevations both fasting and PP per pt  Overdue to send log into [facility]  Pt reports elevation occurring with poor food choices  Stressed importance of good BG control as risk for adverse outcomes increased in 3rd trimester with poor control  Encouraged to send log it ASAP      2  High-risk pregnancy, multigravida of advanced maternal age, antepartum    3  Hypertension affecting pregnancy in third trimester  Assessment & Plan:  HTN not well controlled today   initially 148/100  Repeat 154/96  Also with new c/o HA- denies vision changes or RUQ pain  +1 Proteinuria  Sent to L&D triage for further w/u  Doc on call Nita Laureano) notified and unit aware of pending arrival   NST not completed today as pt being sent to triage and will be completed there          4  Maternal morbid obesity, antepartum ([facility] [address] )    5  Urinary frequency  Comments:  UC sent for CC specimen in office  Orders:  -     Urine culture    6  Maternal anemia, antepartum  Assessment & Plan:  Overdue for repeat labs to reassess anemia- being sent to triage for other purpose, but cbc can be done there       Orders:  -     POCT urine dip    7  34 weeks gestation of pregnancy      HAYDEN Boone  5/28/2021

## 2021-05-28 NOTE — QUICK NOTE
Hypertensive urgency persisting, giving labetalol 40 mg IV now  Recheck blood pressure in 10 minutes      Lashonda Fuller, DO

## 2021-05-28 NOTE — PROGRESS NOTES
Vitals:    05/28/21 1521 05/28/21 1536 05/28/21 1546 05/28/21 1551   BP: 161/77 158/76 152/71 154/77   Pulse: 89 89 92 103   Weight:       Height:         Twila reports that her headache is mildly better but has not resolved  She was able to give a urine sample  We reviewed her criteria for discharge:  Patient's is not have any further hours severe range blood pressures, urine protein to creatinine ratio should be normal, and her headache should resolve  Reviewed with patient that if any of the following do not occur, she may need to be observed in the hospital overnight verses  Possible delivery if the diagnosis of preeclampsia with severe features is made  Will continue to monitor and reassess as indicated  Discussed with Dr Rosita Priest MD  OB/GYN  5/28/2021  4:29 PM

## 2021-05-28 NOTE — H&P
History & Physical - OB/GYN   Cheril Dakins 38 y o  female MRN: [id number]  Unit/Bed#: L&D 320-01 Encounter: [id number]    Cheril Dakins is a patient of Dr David Fontaine  Chief complaint:  "headache"    HPI:  Cheril Dakins is a 38 y o  [id number] female with an NANO of 2021, by Ultrasound at 34w5d weeks gestation who is being admitted for observation due to severely elevated blood pressures in the setting of known chronic hypertension  Please see triage note for HPI  Pregnancy Complications:  Chronic hypertension  History of preeclampsia with severe features  A1 GDM  Obesity in pregnancy  AMA    PMH:  Past Medical History:   Diagnosis Date    Diabetes mellitus ([address] )     History of recurrent miscarriages     see OB hx    Hypertension        PSH:  Past Surgical History:   Procedure Laterality Date    INDUCED       [place] SURG RX MISSED ABORTN,1ST TRI N/A 2016    Procedure: DILATATION AND EVACUATION (D&E); Surgeon: Daniel Lim MD;  Location: [facility];  Service: Gynecology    US GUIDANCE BREAST BIOPSY RIGHT EACH ADDITIONAL Right 2/10/2021    US GUIDED BREAST BIOPSY RIGHT COMPLETE Right 2/10/2021       OB Hx:  OB History    Para Term  AB Living   8 3 3 0 4 3   SAB TAB Ectopic Multiple Live Births   1 3 0 0 3      # Outcome Date GA Lbr Harshad/2nd Weight Sex Delivery Anes PTL Lv   8 Current            7 Term 18 38w2d 00:19 / 00:03 3544 g (7 lb 13 oz) M Vag-Spont None N CASI      Name: Valeriano Larson  ([address])      Apgar1: 9  Apgar5: 9   6 SAB 16 7w0d          5 Term 04/27/15   3572 g (7 lb 14 oz) F Vag-Spont None  CASI      Complications: Gestational hypertension   4 TAB            3 Term 07 39w0d  2948 g (6 lb 8 oz) F Vag-Spont None N CASI   2 TAB            1 TAB                Meds:  No current facility-administered medications on file prior to encounter        Current Outpatient Medications on File Prior to Encounter   Medication Sig Dispense Refill    aspirin (ECOTRIN LOW STRENGTH) 81 mg EC tablet Take 2 tablets (162 mg total) by mouth daily 60 tablet 6    docusate sodium (COLACE) 100 mg capsule Take 1 capsule (100 mg total) by mouth 2 (two) times a day 60 capsule 6    ferrous sulfate 324 (65 Fe) mg Take 1 tablet (325 mg total) by mouth 2 (two) times a day before meals 60 tablet 6    labetalol (NORMODYNE) 100 mg tablet Take 3 tablets (300 mg total) by mouth 2 (two) times a day      labetalol (NORMODYNE) 200 mg tablet TAKE 2 TABLETS BY MOUTH TWICE DAILY      Prenatal Vit-Fe Fumarate-FA (PNV Folic Acid + Iron) 27-8 MG TABS Take 1 tablet by mouth daily 30 tablet 8    acetaminophen (TYLENOL) 500 mg tablet Take 500 mg by mouth every 6 (six) hours as needed for mild pain      Blood Glucose Monitoring Suppl (OneTouch Verio Flex System) w/Device KIT Test 4 times for 1 week 1 kit 0    OneTouch Delica Lancets 97K MISC Test 4 times daily  100 each 4    OneTouch Verio test strip TEST FOUR TIMES DAILY  90 day supply equals 360 test strips  400 each 1       Allergies: Allergies   Allergen Reactions    Other      bbq sauce    Tomato - Food Allergy        Physical Exam  Constitutional:       General: She is not in acute distress  Appearance: She is well-developed  She is not diaphoretic  HENT:      Head: Normocephalic and atraumatic  Cardiovascular:      Rate and Rhythm: Normal rate and regular rhythm  Heart sounds: Normal heart sounds  No murmur  No friction rub  No gallop  Pulmonary:      Effort: Pulmonary effort is normal  No respiratory distress  Breath sounds: Normal breath sounds  No wheezing or rales  Abdominal:      General: There is no distension  Palpations: Abdomen is soft  Tenderness: There is no abdominal tenderness  There is no guarding or rebound  Genitourinary:     Comments: Gravid uterus, nontender  Skin:     General: Skin is warm and dry     Neurological:      Mental Status: She is alert and oriented to person, place, and time  Psychiatric:         Behavior: Behavior normal          Thought Content: Thought content normal          Judgment: Judgment normal        Fetal heart rate:    130/ moderate/ accelerations/ no decelerations    [place]:    none    Labs:  Hemoglobin: 9 4  Blood type: A+  Antibody: negative  Group B strep: unknown  HIV: negative  Hepatitis B: negative  RPR: non-reactive   Rubella: Immune  Varicella Immune  1 hour Glucose: abnormal    Assessment:   38 y o  [id number] at 34w5d with exacerbation of her chronic hypertension with concern for super imposed preeclampsia with severe features, normal labs, and headache     Plan:   1  cHTN  - Systolic (53UBX), JFE:539 , Min:145 , ZLM:768   - Diastolic (43IYY), XAD:03, Min:71, Max:100  - CBC, CMP, Urine P:C wnl  - Administered her home dose of Labetalol 300mg this evening (patient did not take her AM dose); Will continue home dose of Labetalol 300mg BID  - s/p Labetalol 20/40 IV  - Will continue to monitor for severe range blood pressures and treat as necessary  2  Headache  - s/p Tylenol 975mg  - Continue IV fluids    3  IUP at 34w5d   - Intermittent FHR and tocometry monitoring   - Do not recommend BTM at this time;  However if PreE w/ SF is diagnosed, would recommend BTM   - Continue PNV    4  FEN: Diabetic diet; NST    D/w Dr Luis Armando Poon MD, PGY-3  5/28/2021  6:09 PM

## 2021-05-28 NOTE — ASSESSMENT & PLAN NOTE
Overdue for repeat labs to reassess anemia- being sent to triage for other purpose, but cbc can be done there

## 2021-05-28 NOTE — ASSESSMENT & PLAN NOTE
Non-compliant  Not checking BG regularly and what she does check is  suboptimal  Some mild elevations both fasting and PP per pt  Overdue to send log into [facility]  Pt reports elevation occurring with poor food choices  Stressed importance of good BG control as risk for adverse outcomes increased in 3rd trimester with poor control   Encouraged to send log it ASAP

## 2021-05-28 NOTE — ASSESSMENT & PLAN NOTE
HTN not well controlled today   initially 148/100  Repeat 154/96  Also with new c/o HA- denies vision changes or RUQ pain  +1 Proteinuria  Sent to L&D triage for further w/u   Doc on call George German) notified and unit aware of pending arrival   NST not completed today as pt being sent to triage and will be completed there

## 2021-05-28 NOTE — PROGRESS NOTES
L&D Triage Note - OB/GYN  Lavern Londono 38 y o  female MRN: [id number]  Unit/Bed#: L&D 320-01 Encounter: [id number]      Assessment:  38 y o  [id number] at 34w5d with chronic hypertension for evaluation to rule out preeclampsia with severe features    Plan:  1  Elevated blood pressures  - Systolic (96MDU), YDA:723 , Min:148 , LBU:155   - Diastolic (03BCV), WLI:34, Min:87, Max:100  - Administered 300mg Labetalol as patient's home dose since she did not take it this morning  - Administered 20mg IV labetalol due to SR BP    2  Headache  - Administered Tylenol 975mg and reassess  - CBC, CMP, and urine P:C ordered  Will continue to monitor      ______________________________________________________________________      Chief Compliant: headache    TIME: 1430  Subjective:  38 y o  [id number] at 34w5d who presents from the office due to headache with known chronic hypertension  Patient states that she did not take her labetalol this morning as she had an issue with the pharmacy  Patient states that she has now received a text message that verifies that her prescription is ready for pickup  She reports having a headache intermittently for the past 3 days  She reports that last night was the worst including visual changes and some right upper quadrant pain  She denies changes in her vision or right upper quadrant pain today  She denies loss of fluid, vaginal bleeding  She reports good fetal movement  She reports intermittent contractions mainly overnight  This pregnancy is complicated by Y8VYP (noncompliant), cHTN with history of SI PreE w/ SF requiring IV antihypertensives in her last pregnancy, and request of permanent sterilization       Objective:  Vitals:    05/28/21 1421   BP: 160/87   Pulse: 96       FHT:  140 / Moderate 6 - 25 bpm / accelerations, no decelerations  [place]: none      Rhona Corona MD 5/28/2021 2:36 PM

## 2021-05-29 PROBLEM — O11.9 CHRONIC HYPERTENSION WITH SUPERIMPOSED PREECLAMPSIA: Status: ACTIVE | Noted: 2020-11-25

## 2021-05-29 PROBLEM — Z86.32 HX OF GESTATIONAL DIABETES IN PRIOR PREGNANCY, CURRENTLY PREGNANT: Status: RESOLVED | Noted: 2020-12-04 | Resolved: 2021-05-29

## 2021-05-29 PROBLEM — O09.299 HX OF GESTATIONAL DIABETES IN PRIOR PREGNANCY, CURRENTLY PREGNANT: Status: RESOLVED | Noted: 2020-12-04 | Resolved: 2021-05-29

## 2021-05-29 LAB
ALBUMIN SERPL BCP-MCNC: 2.6 G/DL (ref 3.5–5)
ALBUMIN SERPL BCP-MCNC: 2.6 G/DL (ref 3.5–5)
ALBUMIN SERPL BCP-MCNC: 2.8 G/DL (ref 3.5–5)
ALP SERPL-CCNC: 89 U/L (ref 46–116)
ALP SERPL-CCNC: 91 U/L (ref 46–116)
ALP SERPL-CCNC: 95 U/L (ref 46–116)
ALT SERPL W P-5'-P-CCNC: 23 U/L (ref 12–78)
ALT SERPL W P-5'-P-CCNC: 24 U/L (ref 12–78)
ALT SERPL W P-5'-P-CCNC: 25 U/L (ref 12–78)
ANION GAP SERPL CALCULATED.3IONS-SCNC: 11 MMOL/L (ref 4–13)
ANION GAP SERPL CALCULATED.3IONS-SCNC: 12 MMOL/L (ref 4–13)
ANION GAP SERPL CALCULATED.3IONS-SCNC: 13 MMOL/L (ref 4–13)
AST SERPL W P-5'-P-CCNC: 14 U/L (ref 5–45)
AST SERPL W P-5'-P-CCNC: 15 U/L (ref 5–45)
AST SERPL W P-5'-P-CCNC: 18 U/L (ref 5–45)
BILIRUB SERPL-MCNC: 0.2 MG/DL (ref 0.2–1)
BILIRUB SERPL-MCNC: 0.25 MG/DL (ref 0.2–1)
BILIRUB SERPL-MCNC: 0.3 MG/DL (ref 0.2–1)
BUN SERPL-MCNC: 5 MG/DL (ref 5–25)
BUN SERPL-MCNC: 5 MG/DL (ref 5–25)
BUN SERPL-MCNC: 6 MG/DL (ref 5–25)
CALCIUM ALBUM COR SERPL-MCNC: 9.2 MG/DL (ref 8.3–10.1)
CALCIUM ALBUM COR SERPL-MCNC: 9.4 MG/DL (ref 8.3–10.1)
CALCIUM ALBUM COR SERPL-MCNC: 9.9 MG/DL (ref 8.3–10.1)
CALCIUM SERPL-MCNC: 8.1 MG/DL (ref 8.3–10.1)
CALCIUM SERPL-MCNC: 8.3 MG/DL (ref 8.3–10.1)
CALCIUM SERPL-MCNC: 8.9 MG/DL (ref 8.3–10.1)
CHLORIDE SERPL-SCNC: 107 MMOL/L (ref 100–108)
CO2 SERPL-SCNC: 20 MMOL/L (ref 21–32)
CO2 SERPL-SCNC: 20 MMOL/L (ref 21–32)
CO2 SERPL-SCNC: 22 MMOL/L (ref 21–32)
CREAT SERPL-MCNC: 0.55 MG/DL (ref 0.6–1.3)
CREAT SERPL-MCNC: 0.63 MG/DL (ref 0.6–1.3)
CREAT SERPL-MCNC: 0.67 MG/DL (ref 0.6–1.3)
ERYTHROCYTE [DISTWIDTH] IN BLOOD BY AUTOMATED COUNT: 15.9 % (ref 11.6–15.1)
ERYTHROCYTE [DISTWIDTH] IN BLOOD BY AUTOMATED COUNT: 16 % (ref 11.6–15.1)
ERYTHROCYTE [DISTWIDTH] IN BLOOD BY AUTOMATED COUNT: 16 % (ref 11.6–15.1)
FERRITIN SERPL-MCNC: 29 NG/ML (ref 8–388)
GFR SERPL CREATININE-BSD FRML MDRD: 131 ML/MIN/1.73SQ M
GFR SERPL CREATININE-BSD FRML MDRD: 133 ML/MIN/1.73SQ M
GFR SERPL CREATININE-BSD FRML MDRD: 140 ML/MIN/1.73SQ M
GLUCOSE SERPL-MCNC: 125 MG/DL (ref 65–140)
GLUCOSE SERPL-MCNC: 126 MG/DL (ref 65–140)
GLUCOSE SERPL-MCNC: 129 MG/DL (ref 65–140)
GLUCOSE SERPL-MCNC: 135 MG/DL (ref 65–140)
GLUCOSE SERPL-MCNC: 144 MG/DL (ref 65–140)
GLUCOSE SERPL-MCNC: 149 MG/DL (ref 65–140)
GLUCOSE SERPL-MCNC: 153 MG/DL (ref 65–140)
HCT VFR BLD AUTO: 30.1 % (ref 34.8–46.1)
HCT VFR BLD AUTO: 30.7 % (ref 34.8–46.1)
HCT VFR BLD AUTO: 30.9 % (ref 34.8–46.1)
HGB BLD-MCNC: 9.1 G/DL (ref 11.5–15.4)
HGB BLD-MCNC: 9.3 G/DL (ref 11.5–15.4)
HGB BLD-MCNC: 9.4 G/DL (ref 11.5–15.4)
MCH RBC QN AUTO: 21.4 PG (ref 26.8–34.3)
MCH RBC QN AUTO: 21.6 PG (ref 26.8–34.3)
MCH RBC QN AUTO: 22 PG (ref 26.8–34.3)
MCHC RBC AUTO-ENTMCNC: 30.1 G/DL (ref 31.4–37.4)
MCHC RBC AUTO-ENTMCNC: 30.2 G/DL (ref 31.4–37.4)
MCHC RBC AUTO-ENTMCNC: 30.6 G/DL (ref 31.4–37.4)
MCV RBC AUTO: 71 FL (ref 82–98)
MCV RBC AUTO: 71 FL (ref 82–98)
MCV RBC AUTO: 72 FL (ref 82–98)
PLATELET # BLD AUTO: 299 THOUSANDS/UL (ref 149–390)
PLATELET # BLD AUTO: 312 THOUSANDS/UL (ref 149–390)
PLATELET # BLD AUTO: 326 THOUSANDS/UL (ref 149–390)
PMV BLD AUTO: 11.4 FL (ref 8.9–12.7)
PMV BLD AUTO: 12 FL (ref 8.9–12.7)
PMV BLD AUTO: 12.4 FL (ref 8.9–12.7)
POTASSIUM SERPL-SCNC: 3.7 MMOL/L (ref 3.5–5.3)
POTASSIUM SERPL-SCNC: 3.9 MMOL/L (ref 3.5–5.3)
POTASSIUM SERPL-SCNC: 4.5 MMOL/L (ref 3.5–5.3)
PROT SERPL-MCNC: 6.3 G/DL (ref 6.4–8.2)
PROT SERPL-MCNC: 6.4 G/DL (ref 6.4–8.2)
PROT SERPL-MCNC: 6.6 G/DL (ref 6.4–8.2)
RBC # BLD AUTO: 4.22 MILLION/UL (ref 3.81–5.12)
RBC # BLD AUTO: 4.28 MILLION/UL (ref 3.81–5.12)
RBC # BLD AUTO: 4.35 MILLION/UL (ref 3.81–5.12)
SODIUM SERPL-SCNC: 139 MMOL/L (ref 136–145)
SODIUM SERPL-SCNC: 140 MMOL/L (ref 136–145)
SODIUM SERPL-SCNC: 140 MMOL/L (ref 136–145)
WBC # BLD AUTO: 10.18 THOUSAND/UL (ref 4.31–10.16)
WBC # BLD AUTO: 12.42 THOUSAND/UL (ref 4.31–10.16)
WBC # BLD AUTO: 7.33 THOUSAND/UL (ref 4.31–10.16)

## 2021-05-29 PROCEDURE — NC001 PR NO CHARGE: Performed by: OBSTETRICS & GYNECOLOGY

## 2021-05-29 PROCEDURE — 99232 SBSQ HOSP IP/OBS MODERATE 35: CPT | Performed by: OBSTETRICS & GYNECOLOGY

## 2021-05-29 PROCEDURE — 99243 OFF/OP CNSLTJ NEW/EST LOW 30: CPT | Performed by: OBSTETRICS & GYNECOLOGY

## 2021-05-29 PROCEDURE — 99204 OFFICE O/P NEW MOD 45 MIN: CPT

## 2021-05-29 PROCEDURE — 59025 FETAL NON-STRESS TEST: CPT | Performed by: OBSTETRICS & GYNECOLOGY

## 2021-05-29 PROCEDURE — 85027 COMPLETE CBC AUTOMATED: CPT | Performed by: OBSTETRICS & GYNECOLOGY

## 2021-05-29 PROCEDURE — 80053 COMPREHEN METABOLIC PANEL: CPT | Performed by: OBSTETRICS & GYNECOLOGY

## 2021-05-29 PROCEDURE — 82948 REAGENT STRIP/BLOOD GLUCOSE: CPT

## 2021-05-29 RX ORDER — MAGNESIUM SULFATE HEPTAHYDRATE 40 MG/ML
2 INJECTION, SOLUTION INTRAVENOUS CONTINUOUS
Status: DISCONTINUED | OUTPATIENT
Start: 2021-05-29 | End: 2021-06-02

## 2021-05-29 RX ORDER — BUTALBITAL, ACETAMINOPHEN AND CAFFEINE 50; 325; 40 MG/1; MG/1; MG/1
1 TABLET ORAL EVERY 4 HOURS PRN
Status: DISCONTINUED | OUTPATIENT
Start: 2021-05-29 | End: 2021-06-01

## 2021-05-29 RX ORDER — LABETALOL 20 MG/4 ML (5 MG/ML) INTRAVENOUS SYRINGE
Status: COMPLETED
Start: 2021-05-29 | End: 2021-05-29

## 2021-05-29 RX ORDER — LABETALOL 20 MG/4 ML (5 MG/ML) INTRAVENOUS SYRINGE
40 ONCE
Status: COMPLETED | OUTPATIENT
Start: 2021-05-29 | End: 2021-05-29

## 2021-05-29 RX ORDER — MAGNESIUM SULFATE HEPTAHYDRATE 40 MG/ML
4 INJECTION, SOLUTION INTRAVENOUS ONCE
Status: COMPLETED | OUTPATIENT
Start: 2021-05-29 | End: 2021-05-29

## 2021-05-29 RX ORDER — BETAMETHASONE SODIUM PHOSPHATE AND BETAMETHASONE ACETATE 3; 3 MG/ML; MG/ML
12 INJECTION, SUSPENSION INTRA-ARTICULAR; INTRALESIONAL; INTRAMUSCULAR; SOFT TISSUE EVERY 24 HOURS
Status: COMPLETED | OUTPATIENT
Start: 2021-05-29 | End: 2021-05-30

## 2021-05-29 RX ORDER — LABETALOL 20 MG/4 ML (5 MG/ML) INTRAVENOUS SYRINGE
20 ONCE
Status: COMPLETED | OUTPATIENT
Start: 2021-05-29 | End: 2021-05-29

## 2021-05-29 RX ORDER — MAGNESIUM SULFATE HEPTAHYDRATE 40 MG/ML
2 INJECTION, SOLUTION INTRAVENOUS ONCE
Status: COMPLETED | OUTPATIENT
Start: 2021-05-29 | End: 2021-05-29

## 2021-05-29 RX ORDER — LABETALOL 200 MG/1
400 TABLET, FILM COATED ORAL 2 TIMES DAILY
Status: DISCONTINUED | OUTPATIENT
Start: 2021-05-29 | End: 2021-05-31

## 2021-05-29 RX ORDER — LABETALOL 20 MG/4 ML (5 MG/ML) INTRAVENOUS SYRINGE
80 ONCE
Status: COMPLETED | OUTPATIENT
Start: 2021-05-29 | End: 2021-05-29

## 2021-05-29 RX ADMIN — MAGNESIUM SULFATE HEPTAHYDRATE 2 G/HR: 40 INJECTION, SOLUTION INTRAVENOUS at 08:17

## 2021-05-29 RX ADMIN — PRENATAL VIT W/ FE FUMARATE-FA TAB 27-0.8 MG 1 TABLET: 27-0.8 TAB at 09:08

## 2021-05-29 RX ADMIN — FERROUS SULFATE TAB 325 MG (65 MG ELEMENTAL FE) 325 MG: 325 (65 FE) TAB at 09:08

## 2021-05-29 RX ADMIN — LABETALOL HYDROCHLORIDE 400 MG: 200 TABLET, FILM COATED ORAL at 09:08

## 2021-05-29 RX ADMIN — SODIUM CHLORIDE 125 ML/HR: 0.9 INJECTION, SOLUTION INTRAVENOUS at 19:19

## 2021-05-29 RX ADMIN — BUTALBITAL, ACETAMINOPHEN, AND CAFFEINE 1 TABLET: 50; 325; 40 TABLET ORAL at 23:18

## 2021-05-29 RX ADMIN — LABETALOL 20 MG/4 ML (5 MG/ML) INTRAVENOUS SYRINGE 40 MG: at 05:42

## 2021-05-29 RX ADMIN — BETAMETHASONE SODIUM PHOSPHATE AND BETAMETHASONE ACETATE 12 MG: 3; 3 INJECTION, SUSPENSION INTRA-ARTICULAR; INTRALESIONAL; INTRAMUSCULAR at 07:16

## 2021-05-29 RX ADMIN — MAGNESIUM SULFATE HEPTAHYDRATE 2 G/HR: 40 INJECTION, SOLUTION INTRAVENOUS at 19:19

## 2021-05-29 RX ADMIN — LABETALOL 20 MG/4 ML (5 MG/ML) INTRAVENOUS SYRINGE 80 MG: at 06:48

## 2021-05-29 RX ADMIN — LABETALOL 20 MG/4 ML (5 MG/ML) INTRAVENOUS SYRINGE 20 MG: at 04:45

## 2021-05-29 RX ADMIN — DOCUSATE SODIUM 100 MG: 100 CAPSULE, LIQUID FILLED ORAL at 18:22

## 2021-05-29 RX ADMIN — DOCUSATE SODIUM 100 MG: 100 CAPSULE, LIQUID FILLED ORAL at 09:08

## 2021-05-29 RX ADMIN — MAGNESIUM SULFATE HEPTAHYDRATE 2 G: 40 INJECTION, SOLUTION INTRAVENOUS at 08:00

## 2021-05-29 RX ADMIN — IRON SUCROSE 300 MG: 20 INJECTION, SOLUTION INTRAVENOUS at 01:28

## 2021-05-29 RX ADMIN — SODIUM CHLORIDE 500 MG: 0.9 INJECTION, SOLUTION INTRAVENOUS at 00:00

## 2021-05-29 RX ADMIN — ACETAMINOPHEN 975 MG: 325 TABLET, FILM COATED ORAL at 16:09

## 2021-05-29 RX ADMIN — LABETALOL HYDROCHLORIDE 400 MG: 200 TABLET, FILM COATED ORAL at 18:21

## 2021-05-29 RX ADMIN — MAGNESIUM SULFATE HEPTAHYDRATE 4 G: 40 INJECTION, SOLUTION INTRAVENOUS at 07:34

## 2021-05-29 NOTE — CONSULTS
Consultation - Maternal Fetal Medicine   Geovani Flores 38 y o  female MRN: [id number]  Unit/Bed#: L&D 327-01 Encounter: [id number]    Assessment/Plan   Ms Graham Naik is a 38y o  year-old [id number] at [address] [facility] Day: 2, admitted for hypertension  By issue:    * Chronic hypertension with superimposed preeclampsia  Assessment & Plan  Yanet Prajapati presented with hypertension and headache that has been evolving for several days  While she had missed yesterday's medication dose, she was re-initiated on this after initial stabilization with blood pressure medications, but even after re-initiation of home dosing, she has required several doses of IV antihypertensive treatment  This overall represents a sudden increase in blood pressure which was previously well controlled on a stable dose of medication  I suspect that she is developing superimposed preeclampsia on her chronic hypertension  Additionally, she has an unremitting headache which has improved only slightly since admission  She was treated with Tylenol and subsequently combination medications including Solu-Medrol, but her headache remains  My clinical impression is that she has superimposed preeclampsia with severe features, on chronic hypertension  Given current gestational age of 34w7d, I recommend proceeding with delivery  She has been overall clinically stabilized since admission and I think that attempting betamethasone administration for fetal lung maturation is reasonable  First dose was administered this morning  This will increase her blood sugars and likely need to requirement for an insulin drip intrapartum  I discussed these recommendations with the patient  She is a little disappointed as her baby shower is planned for today but she is very accepting of this plan  If there is a change in maternal or fetal status, I would expedite delivery  For now labs should be checked every 12 hours      Maternal morbid obesity, antepartum [facility])  Assessment & Plan  SCDs intrapartum  Lovenox if  section  Diet controlled gestational diabetes mellitus (GDM) in third trimester  Assessment & Plan  Lab Results   Component Value Date    HGBA1C 4 8 2018       Recent Labs     21  0604   POCGLU 117 129       Blood Sugar Average: Last 72 hrs:  (P) 123     Solu-medrol was given last night and betamethasone was administered this morning  Aim for blood sugar control  intrapartum  Likely will need insulin drip intrapartum  34 weeks gestation of pregnancy  Assessment & Plan    Please assess fetal position via ultrasound prior to induction  Maternal anemia, antepartum  Assessment & Plan  Lab Results   Component Value Date    WBC 7 33 2021    HGB 9 3 (L) 2021    HCT 30 9 (L) 2021    MCV 71 (L) 2021     2021     For iron sucrose during latency period  She is at [address] [place] [address] for hemorrhage  She has asthma and hypertension therefore uterotonic medications would be Pitocin and misoprostol  Low threshold for TXA  History of Present Illness     Chief Complaint: hypertension, headache    Referring physician:   Md Roel Bates [address] [place] [address]    Reason for consultation:   Chief Complaint   Patient presents with    Hypertension     Dear Dr Yolande Johnson,    Thank you for referring patient Bibi Alexandre for Maternal-Fetal Medicine consultation regarding hypertension possible preeclampsia  HPI: As you know, Ms Jeimy Bailey is a 38y o  year-old [id number] with an NANO of 2021, by Ultrasound at [address] [facility] Day: 2, admitted for suspected preeclampsia  Her current obstetrical history is significant for gestational DM  Other obstetric review of symptoms:  Contractions: occasional     Leakage of fluid: None  Bleeding: None      positive for Headache    Other history is as follows:    Historical Information   OB History    Para Term  AB Living   8 3 3 0 4 3   SAB TAB Ectopic Multiple Live Births   1 3   0 3      # Outcome Date GA Lbr Harshad/2nd Weight Sex Delivery Anes PTL Lv   8 Current            7 Term 18 38w2d 00:19 / 00:03 3544 g (7 lb 13 oz) M Vag-Spont None N CASI   6 SAB 16 7w0d          5 Term 04/27/15   3572 g (7 lb 14 oz) F Vag-Spont None  CASI      Complications: Gestational hypertension   4 TAB            3 Term 07 39w0d  2948 g (6 lb 8 oz) F Vag-Spont None N CASI   2 TAB            1 TAB              Gynecologic history: Patient's last menstrual period was 2020 (exact date)  Past Medical History:   Diagnosis Date    Diabetes mellitus ([facility] [address] )     History of recurrent miscarriages     see OB hx    Hypertension      Past Surgical History:   Procedure Laterality Date    INDUCED       KS SURG RX MISSED ABORTN,1ST TRI N/A 2016    Procedure: DILATATION AND EVACUATION (D&E); Surgeon: Sotero Mark MD;  Location: [facility];  Service: Gynecology    US GUIDANCE BREAST BIOPSY RIGHT EACH ADDITIONAL Right 2/10/2021    US GUIDED BREAST BIOPSY RIGHT COMPLETE Right 2/10/2021     Social History   Social History     Substance and Sexual Activity   Alcohol Use No     Social History     Substance and Sexual Activity   Drug Use No     Social History     Tobacco Use   Smoking Status Former Smoker    Types: Cigarettes    Quit date:     Years since quittin 4   Smokeless Tobacco Never Used   Tobacco Comment    Former smoker per [employer]     Meds/Allergies   Prior to Admission Medications   Prescriptions Last Dose Informant Patient Reported? Taking? Blood Glucose Monitoring Suppl (OneTouch Verio Flex System) w/Device KIT  Self No No   Sig: Test 4 times for 1 week   OneTouch Delica Lancets 75X MISC  Self No No   Sig: Test 4 times daily  OneTouch Verio test strip  Self No No   Sig: TEST FOUR TIMES DAILY  90 day supply equals 360 test strips     Prenatal Vit-Fe Fumarate-FA (PNV Folic Acid + Iron) 27-1 MG TABS 5/28/2021 at Unknown time Self No Yes   Sig: Take 1 tablet by mouth daily   acetaminophen (TYLENOL) 500 mg tablet  Self Yes No   Sig: Take 500 mg by mouth every 6 (six) hours as needed for mild pain   aspirin (ECOTRIN LOW STRENGTH) 81 mg EC tablet 5/28/2021 at Unknown time Self No Yes   Sig: Take 2 tablets (162 mg total) by mouth daily   docusate sodium (COLACE) 100 mg capsule 5/28/2021 at Unknown time Self No Yes   Sig: Take 1 capsule (100 mg total) by mouth 2 (two) times a day   ferrous sulfate 324 (65 Fe) mg 5/28/2021 at Unknown time Self No Yes   Sig: Take 1 tablet (325 mg total) by mouth 2 (two) times a day before meals   labetalol (NORMODYNE) 100 mg tablet 5/27/2021 at Unknown time Self No Yes   Sig: Take 3 tablets (300 mg total) by mouth 2 (two) times a day   labetalol (NORMODYNE) 200 mg tablet 5/27/2021 at Unknown time Self Yes Yes   Sig: TAKE 2 TABLETS BY MOUTH TWICE DAILY      Facility-Administered Medications: None     Medication Administration - last 24 hours from 05/28/2021 0906 to 05/29/2021 3147       Date/Time Order Dose Route Action Action by     05/28/2021 2117 acetaminophen (TYLENOL) tablet 975 mg 975 mg Oral Given Dereje Devi RN     05/28/2021 1411 acetaminophen (TYLENOL) tablet 975 mg 975 mg Oral Given Breezy Rodríguez RN     05/28/2021 2114 labetalol (NORMODYNE) tablet 300 mg 300 mg Oral Given Dereje Devi RN     05/28/2021 1429 labetalol (NORMODYNE) tablet 300 mg 300 mg Oral Given Breezy Rodríguez RN     05/28/2021 1505 Labetalol HCl (NORMODYNE) injection 40 mg 20 mg Intravenous Given Jaison Loo, RAJENDRA     05/28/2021 1535 Labetalol HCl (NORMODYNE) injection 40 mg 40 mg Intravenous Given Librado Singh, RN     05/28/2021 1800 sodium chloride 0 9 % bolus 1,000 mL 0 mL Intravenous Stopped Samanta Lawson, RAJENDRA     05/28/2021 1638 sodium chloride 0 9 % bolus 1,000 mL 1,000 mL Intravenous [address] Samanta Lawson RN     05/28/2021 2118 docusate sodium (COLACE) capsule 100 mg 100 mg Oral Given Larence Bone, RN     05/28/2021 2306 sodium chloride 0 9 % infusion 125 mL/hr Intravenous New [address] [facility] [address] Larence Bone, [facility]     05/28/2021 2307 metoclopramide (REGLAN) injection 10 mg 10 mg Intravenous Given Larence Bone, RN     05/28/2021 2245 metoclopramide (REGLAN) injection 10 mg   Intravenous Canceled Entry Larence Bone, RN     05/29/2021 0100 methylPREDNISolone sodium succinate (Solu-MEDROL) 500 mg in sodium chloride 0 9 % 250 mL IVPB 0 mg Intravenous Stopped Larence Bone, RN     05/29/2021 0000 methylPREDNISolone sodium succinate (Solu-MEDROL) 500 mg in sodium chloride 0 9 % 250 mL IVPB 500 mg Intravenous New Bag Larence Bone, RN     05/28/2021 2345 magnesium sulfate 2 g/50 mL IVPB (premix) 2 g 0 g Intravenous Stopped Larence Bone, RN     05/28/2021 2319 magnesium sulfate 2 g/50 mL IVPB (premix) 2 g 2 g Intravenous [address] Larence Bone, RN     05/28/2021 2245 magnesium sulfate 2 g/50 mL IVPB (premix) 2 g   Intravenous Canceled Entry Larence Bone, RN     05/29/2021 0300 iron sucrose (VENOFER) 300 mg in sodium chloride 0 9 % 250 mL IVPB 0 mg Intravenous Stopped Larence Bone, RN     05/29/2021 0128 iron sucrose (VENOFER) 300 mg in sodium chloride 0 9 % 250 mL IVPB 300 mg Intravenous [address] Larence Bone, RN     05/29/2021 0445 Labetalol HCl (NORMODYNE) injection 20 mg 20 mg Intravenous Given Larence Bone, RN     05/29/2021 0542 Labetalol HCl (NORMODYNE) injection 40 mg 40 mg Intravenous Given Larence Bone, RN     05/29/2021 4158 Labetalol HCl (NORMODYNE) injection 80 mg 80 mg Intravenous Given Della Ivan, RN     05/29/2021 0716 betamethasone acetate-betamethasone sodium phosphate (CELESTONE) injection 12 mg 12 mg Intramuscular Given Larence Bone, RN     05/29/2021 0800 magnesium sulfate 4 g/100 mL IVPB (premix) 4 g 0 g Intravenous Stopped Larence Bone, RAJENDRA     05/29/2021 0734 magnesium sulfate 4 g/100 mL IVPB (premix) 4 g 4 g Intravenous [address] Yaneth Mead, [address] [place] [address]     05/29/2021 9933 magnesium sulfate 2 g/50 mL IVPB (premix) 2 g 0 g Intravenous Stopped Maggie Ryan RN     05/29/2021 0800 magnesium sulfate 2 g/50 mL IVPB (premix) 2 g 2 g Intravenous New Bag Yaneth Mead, [address] [place] [address]     05/29/2021 2983 magnesium sulfate 20 g/500 mL infusion (premix) 2 g/hr Intravenous New Bag Maggie Ryan RN        Current Facility-Administered Medications   Medication Dose Route Frequency    acetaminophen (TYLENOL) tablet 975 mg  975 mg Oral Q6H PRN    betamethasone acetate-betamethasone sodium phosphate (CELESTONE) injection 12 mg  12 mg Intramuscular Q24H    calcium carbonate (TUMS) chewable tablet 1,000 mg  1,000 mg Oral Daily PRN    docusate sodium (COLACE) capsule 100 mg  100 mg Oral BID    ferrous sulfate tablet 325 mg  325 mg Oral Daily With Breakfast    labetalol (NORMODYNE) tablet 400 mg  400 mg Oral BID    magnesium sulfate 20 g/500 mL infusion (premix)  2 g/hr Intravenous Continuous    ondansetron (ZOFRAN) injection 4 mg  4 mg Intravenous Q8H PRN    prenatal multivitamin tablet 1 tablet  1 tablet Oral Daily    sodium chloride 0 9 % infusion  125 mL/hr Intravenous Continuous     Allergies   Allergen Reactions    Other      bbq sauce    Tomato - Food Allergy        Objective   Patient Vitals for the past 24 hrs:   BP Temp Temp src Pulse Resp Height Weight   05/29/21 0803 158/83 98 4 °F (36 9 °C) Oral 103 18 -- --   05/29/21 0743 166/79 -- -- 94 -- -- --   05/29/21 0710 165/77 -- -- 85 -- -- --   05/29/21 0700 166/77 -- -- 90 -- -- --   05/29/21 0650 159/74 -- -- 80 -- -- --   05/29/21 0640 162/75 -- -- 86 -- -- --   05/29/21 0631 (!) 178/84 -- -- 90 -- -- --   05/29/21 0620 151/75 -- -- 93 -- -- --   05/29/21 0615 (!) 179/79 -- -- 90 -- -- --   05/29/21 0520 (!) 171/79 -- -- 86 -- -- --   05/29/21 0510 167/77 -- -- 84 -- -- --   05/29/21 0500 165/76 -- -- 85 -- -- --   05/29/21 0452 160/76 -- -- 83 -- -- --   05/29/21 0433 (!) 188/86 -- -- 88 -- -- --   05/29/21 0323 156/69 -- -- 91 -- -- --   05/29/21 0313 161/79 -- -- 93 -- -- --   05/29/21 0213 156/85 -- -- 103 -- -- --   05/29/21 0134 150/77 -- -- 93 -- -- --   05/29/21 0113 165/80 -- -- 86 -- -- --   05/29/21 0032 156/78 -- -- 93 -- -- --   05/29/21 0015 162/77 -- -- 96 -- -- --   05/28/21 2314 (!) 175/81 -- -- 93 -- -- --   05/28/21 2213 140/74 -- -- 86 -- -- --   05/28/21 2114 147/75 -- -- 95 -- -- --   05/28/21 2113 147/75 -- -- 95 -- -- --   05/28/21 2006 156/82 -- -- 95 -- -- --   05/28/21 1951 153/85 -- -- 93 -- -- --   05/28/21 1936 158/86 -- -- 100 -- -- --   05/28/21 1921 160/87 -- -- 102 -- -- --   05/28/21 1907 153/83 -- -- 104 -- -- --   05/28/21 1851 156/88 -- -- 93 -- -- --   05/28/21 1836 151/93 -- -- 92 -- -- --   05/28/21 1822 145/94 -- -- 92 -- -- --   05/28/21 1806 160/77 -- -- 103 -- -- --   05/28/21 1752 165/87 -- -- 88 -- -- --   05/28/21 1736 159/84 -- -- 90 -- -- --   05/28/21 1721 158/81 -- -- 88 -- -- --   05/28/21 1706 159/84 -- -- 88 -- -- --   05/28/21 1651 158/83 -- -- 84 -- -- --   05/28/21 1636 150/78 -- -- 90 -- -- --   05/28/21 1621 154/76 -- -- 93 -- -- --   05/28/21 1606 154/76 -- -- 87 -- -- --   05/28/21 1551 154/77 -- -- 103 -- -- --   05/28/21 1546 152/71 -- -- 92 -- -- --   05/28/21 1536 158/76 -- -- 89 -- -- --   05/28/21 1521 161/77 -- -- 89 -- -- --   05/28/21 1513 164/77 -- -- 87 -- -- --   05/28/21 1507 168/93 -- -- 88 -- -- --   05/28/21 1436 162/90 -- -- 95 -- -- --   05/28/21 1421 160/87 -- -- 96 -- -- --   05/28/21 1405 155/87 -- -- 100 -- -- --   05/28/21 1401 -- -- -- -- -- 5' 4" (1 626 m) 115 kg (253 lb)     Vitals: Blood pressure 158/83, pulse 103, temperature 98 4 °F (36 9 °C), temperature source Oral, resp  rate 18, height 5' 4" (1 626 m), weight 115 kg (253 lb), last menstrual period 09/16/2020, currently breastfeeding  Body mass index is 43 43 kg/m²    Constitutional: well-developed, well-nourished, in no acute distress, with vitals documented above  Neuro: awake, alert and oriented x3  No clonus  1+ patellar reflexes  Psychiatric: mood and affect are appropriate   Skin: skin is intact without rashes or lesions or jaundice  HEENT: Pupils are equal and round; sclera are anicteric; extraocular movements are symmetric   Pulmonary exam: symmetric air entry, clear to auscultation bilaterally, without use of accessory respiratory muscles, without rhonchi crackles or rales   Cardiovascular exam: regular rate and rhythm, with a normal S1 and S2, without S3 or S4; soft II/VI murmur  Abdominal exam: soft and nontender throughout, with no rebound or guarding, gravid, obese, with no hernias  Extremity exam: nonedematous, symmetric, and nontender bilaterally      Invasive Devices     Peripheral Intravenous Line            Peripheral IV 05/29/21 Left Wrist less than 1 day                Physical Exam    Results from last 7 days   Lab Units 05/29/21  0606 05/28/21  1418   WBC Thousand/uL 7 33 7 50   HEMOGLOBIN g/dL 9 3* 9 4*   MCV fL 71* 74*   PLATELETS Thousands/uL 299 273         Results from last 7 days   Lab Units 05/29/21  0606 05/28/21  1418   POTASSIUM mmol/L 4 5 3 9   CHLORIDE mmol/L 107 106   CO2 mmol/L 20* 24   BUN mg/dL 5 4*   CREATININE mg/dL 0 55* 0 66   EGFR ml/min/1 73sq m 140 131     Results from last 7 days   Lab Units 05/29/21  0606 05/28/21  1418   AST U/L 15 22   ALT U/L 23 20   ALK PHOS U/L 95 94     Results from last 7 days   Lab Units 05/29/21  0606 05/28/21  1418   PLATELETS Thousands/uL 299 273     Results from last 7 days   Lab Units 05/29/21  0604 05/28/21  2017   POC GLUCOSE mg/dl 129 117     Results from last 7 days   Lab Units 05/28/21  1619   PROT/CREAT RATIO UR  0 16*                   Fetal data:    NST 05/29/21 Time: 0608 - 0706  Baseline: 130  Variability: moderate  Accelerations: absent  Decelerations: absent  Contractions: present  Assessment: nonreactive; reassuring with moderate variability throughout; discontinuous  Plan: continue externally continuously      [facility] ultrasound report key findings: Ultrasound findings 5/13/21 are as follows: 84th percentile, normal fluid, no anomalies  See ultrasound report under "OB Procedures" tab  I conveyed these findings above to the patient      --------------------    At the conclusion of today's encounter, all questions were answered to her satisfaction  Thank you very much for this kind referral and please do not hesitate to contact me with any further questions or concerns      Sincerely,    Magno Trent MD  Attending Physician, Tre

## 2021-05-29 NOTE — UTILIZATION REVIEW
Initial Clinical Review  WAS OBSERVATION 5/28/21/21859 CONVERTED TO INPATIENT ADMISSION 5/29/21/21031 DUE TO CONTINUED STAY REQUIRED TO CARE FOR PATIENT WITH  SEVERE PREECLAMPSIA W/PERSISTENT HTN AND HEADACHE   Admission: Date/Time/Statement:   Admission Orders (From admission, onward)     Ordered        05/29/21 1031  Inpatient Admission  Once         05/28/21 1859  Place in Observation  Once                   Orders Placed This Encounter   Procedures    Inpatient Admission     Standing Status:   Standing     Number of Occurrences:   1     Order Specific Question:   Level of Care     Answer:   Med Surg [16]     Order Specific Question:   Estimated length of stay     Answer:   More than 2 Midnights     Order Specific Question:   Certification     Answer:   I certify that inpatient services are medically necessary for this patient for a duration of greater than two midnights  See H&P and MD Progress Notes for additional information about the patient's course of treatment  Initial Presentation: 37 yo 34w5d pregnant fem [id number], NANO 7/4/21, w/hx htn, dm, recurrent miscarriages, from home, directly admitted under observation due to severe hypertension with concern for superimposed preeclampsia with severe features  Presented initially to the OB treatment area as outpt due to 3 days of headache and high bp during which time she was given PO bb dose, then 2 doses IV labetalol for headache    Cites ran out of labetalol and her pharmacy did not have her dose in stock  BP improved; however, headache persisted and decision was made to admit under obs  Exam reveals nontender gravid uterus,  with accelerations, no decels  Analgesics, IVF, fetal monitoring in progress  Will do NST      5/28 PM Update: bp's remain under the severe range, c/o 6/10 periorbital headache  Denies blurred vision or epigastric pain  Will try IV mg, IV steroid, IV reglan for headache, collect 24 hr urine   Holding on betamethasone as delivery is not indicated at this time  GBS collected  Will repeat labs in am and check ferritin  IV venofer will be given for anemia  Date: 5/29 CONVERTED TO INPATIENT ADMISSION  uptitrating PO labetalol from 300mg to 400mg bid, starting IV mg for seizure prophy, will give 2 doses IV betamethasone for fetal lung maturity, recheck cbc/cmp, consult maternal fetal medicine  HGB 9 3 after IV venofer  Headache improved after headache cocktail  No chest pain, sob, contractions, fluid leakage, decreased fetal movement, or vaginal bleeding  Per MFM: suspect she is developing superimposed preeclampsia w/severe features on her chronic HRN  Unremitting headache improved only slightly since admission after tylenol and combination medications, which included IV steroid  Recommend proceeding with delivery due to gestational age of 34w7d  Will give the betamethasone, which will increase her sugars and likely will lead to needing insulin drip  If any changes in maternal or fetal status, will expedite deliver  For now, recheck labs q12 hrs  Need to assess fetal position via US prior to induction  Will use pitocin and misoprostol due to asthma and htn  She has hemorrhage risk  Low threshold to give TXA  NST 05/29/21 Time: 0608 - 7312  Baseline: 130  Variability: moderate  Accelerations: absent  Decelerations: absent  Contractions: present  Assessment: nonreactive; reassuring with moderate variability throughout; discontinuous  Plan: continue externally continuously      5/30 Inpatient Day 2:   During the night, c/o increased headache behind L eye, throbbing, and sharp chest pain that throbs along with the eye pain  Chest pain non reproducible  bp 163/90's  This am, headche persists, 7/10  bp slowly downtrending, was 170's/70/s-80s, then 160's-150's/70's, then  140's/70's  Remains on RA  No edema, No contractions, fetal monitor in place  No bleeding  Remains on IVF and MG gtt, using tylenol and fioricet for headaches  Temp Pulse Resp Blood Pressure SpO2   -- 100 -- 155/87 --         Pain Score       6          Wt Readings from Last 1 Encounters:   05/28/21 115 kg (253 lb)     Additional Vital Signs:   Date/Time  Temp  Pulse  Resp  BP  SpO2  O2 Device    05/30/21 1315  --  --  --  --  99 %  None (Room air)    05/30/21 1225  --  96  --  149/74  --  --    05/30/21 1123  --  98  --  149/82  --  --    05/30/21 1121  98 3 °F (36 8 °C)  --  --  --  --  --    05/30/21 1110  --  --  --  --  98 %  None (Room air)    05/30/21 1022  --  93  --  143/79  --  --    05/30/21 0922  --  100  --  146/79  --  --    05/30/21 0900  --  --  --  --  97 %  None (Room air)    05/30/21 0823  --  91  --  156/79  --  --    05/30/21 0728  --  102  --  146/81  --  --    05/30/21 0700  --  --  --  --  98 %  None (Room air)    05/30/21 0612  --  97  --  148/74  --  --    05/30/21 0512  --  92  --  152/75  --  --    05/30/21 0500  --  --  --  --  --  None (Room air)    05/30/21 0404  --  91  --  150/75  --  --    05/30/21 0401  --  96  --  --  99 %  --    05/30/21 0356  --  94  --  --  98 %  --    05/30/21 0351  --  94  --  --  98 %  --    05/30/21 0346  --  95  --  --  98 %  --    05/30/21 0341  --  95  --  --  98 %  --    05/30/21 0338  --  94  --  163/87  --  --    05/30/21 0336  --  100  --  --  98 %  --    05/30/21 0313  --  --  --  --  98 %  None (Room air)    05/30/21 0204  --  92  --  143/70  --  --    05/30/21 0100  --  --  18  --  98 %  None (Room air)    05/30/21 0004  --  100  --  138/73  --  --    05/29/21 2308  98 8 °F (37 1 °C)  --  18  --  98 %  None (Room air)    05/29/21 2204  --  98  --  138/76  --  --    05/29/21 2106  --  100  --  --  95 %  --    05/29/21 2100  --  --  --  --  --  None (Room air)    05/29/21 2004  --  101  18  142/77  --  --    05/29/21 1900  --  --  --  --  --  None (Room air)    05/29/21 1821  --  110Abnormal   --  140/69  --  --    05/29/21 1820  --  111Abnormal   --  --  97 %  --    05/29/21 1815  --  109Abnormal --  --  97 %  None (Room air)    05/29/21 1810  --  111Abnormal   --  --  97 %  --    05/29/21 1805  --  110Abnormal   --  --  97 %  --    05/29/21 1804  --  113Abnormal   --  140/69  94 %  --    05/29/21 1800  --  111Abnormal   --  --  97 %  --    05/29/21 1755  --  116Abnormal   --  --  98 %  --    05/29/21 1750  --  118Abnormal   --  --  98 %  --    05/29/21 1745  --  108Abnormal   --  --  97 %  --    05/29/21 1740  --  112Abnormal   --  --  96 %  --    05/29/21 1735  --  108Abnormal   --  --  96 %  --    05/29/21 1733  --  111Abnormal   --  --  94 %  --    05/29/21 1730  --  112Abnormal   --  --  97 %  --    05/29/21 1635  --  106Abnormal   --  --  96 %  --    05/29/21 1630  --  119Abnormal   --  --  98 %  --    05/29/21 1625  --  107Abnormal   --  --  96 %  --    05/29/21 1620  --  110Abnormal   --  --  96 %  --    05/29/21 1615  --  112Abnormal   --  138/72  96 %  None (Room air)    05/29/21 1612  --  112Abnormal   --  --  94 %  --    05/29/21 1610  --  110Abnormal   --  --  97 %  --    05/29/21 1415  --  --  --  160/79  97 %  None (Room air)    05/29/21 1204  --  114Abnormal   --  142/73  --  --    05/29/21 1200  --  --  --  --  98 %  None (Room air)    05/29/21 1158  --  108Abnormal   --  139/72  --  --    05/29/21 1055  --  112Abnormal   --  143/73  --  --    05/29/21 1026  --  112Abnormal   --  --  96 %  --    05/29/21 1022  --  111Abnormal   --  157/82  --  --    05/29/21 1011  --  113Abnormal   --  177/85Abnormal   --  --    05/29/21 1000  --  --  --  --  97 %  None (Room air)    05/29/21 0924  --  111Abnormal   --  159/87  --  --    05/29/21 0908  --  110Abnormal   --  153/75  --  --    05/29/21 0907  --  110Abnormal   --  153/75  --  --    05/29/21 0843  --  102  --  159/85  --  --    05/29/21 0823  --  103  --  145/77  --  --    05/29/21 0803  98 4 °F (36 9 °C)  103  18  158/83  --  --    05/29/21 0743  --  94  --  166/79  --  --    05/29/21 0710  --  85  --  165/77  --  --    05/29/21 0700  --  90 --  166/77  --  --        Date/Time  Pulse  BP   05/29/21 0650  80  159/74   05/29/21 0640  86  162/75   05/29/21 0631  90  178/84Abnormal    05/29/21 0620  93  151/75   05/29/21 0615  90  179/79Abnormal    05/29/21 0520  86  171/79Abnormal    05/29/21 0510  84  167/77   05/29/21 0500  85  165/76   05/29/21 0452  83  160/76   05/29/21 0433  88  188/86Abnormal    05/29/21 0323  91  156/69   05/29/21 0313  93  161/79   05/29/21 0213  103  156/85   05/29/21 0134  93  150/77   05/29/21 0113  86  165/80    BP: dr Leonarda Helm at 05/29/21 0113   05/29/21 0032  93  156/78   05/29/21 0015  96  162/77    BP: dr Leonarda Helm aware at 05/29/21 0015   05/28/21 2314  93  175/81Abnormal     BP: dr Leonarda Helm aware at 05/28/21 2314   05/28/21 2213  86  140/74   05/28/21 2114  95  147/75   05/28/21 2113  95  147/75   05/28/21 2006  95  156/82   05/28/21 1951  93  153/85   05/28/21 1936  100  158/86   05/28/21 1921  102  160/87   05/28/21 1907  104  153/83   05/28/21 1851  93  156/88   05/28/21 1836  92  151/93   05/28/21 1822  92  145/94   05/28/21 1806  103  160/77   05/28/21 1752  88  165/87   05/28/21 1736  90  159/84   05/28/21 1721  88  158/81   05/28/21 1706  88  159/84   05/28/21 1651  84  158/83   05/28/21 1636  90  150/78   05/28/21 1621  93  154/76   05/28/21 1606  87  154/76   05/28/21 1551  103  154/77   05/28/21 1546  92  152/71   05/28/21 1536  89  158/76   05/28/21 1521  89  161/77   05/28/21 1513  87  164/77   05/28/21 1507  88  168/93   05/28/21 1436  95  162/90   05/28/21 1421  96  160/87       Pertinent Labs/Diagnostic Test Results:     no rads or EKG  Results from last 7 days   Lab Units 05/30/21  0735 05/29/21 1916 05/29/21  1239 05/29/21  0606 05/28/21  1418   WBC Thousand/uL 11 22* 12 42* 10 18* 7 33 7 50   HEMOGLOBIN g/dL 8 5* 9 1* 9 4* 9 3* 9 4*   HEMATOCRIT % 28 9* 30 1* 30 7* 30 9* 32 7*   PLATELETS Thousands/uL 326 326 312 299 273         Results from last 7 days   Lab Units 05/30/21  0736 05/29/21 1916 05/29/21  1239 05/29/21  0606 05/28/21  1418   SODIUM mmol/L 138 140 140 139 140   POTASSIUM mmol/L 3 5 3 7 3 9 4 5 3 9   CHLORIDE mmol/L 106 107 107 107 106   CO2 mmol/L 22 22 20* 20* 24   ANION GAP mmol/L 10 11 13 12 10   BUN mg/dL 5 6 5 5 4*   CREATININE mg/dL 0 58* 0 67 0 63 0 55* 0 66   EGFR ml/min/1 73sq m 137 131 133 140 131   CALCIUM mg/dL 7 5* 8 1* 8 3 8 9 8 8     Results from last 7 days   Lab Units 05/30/21  0736 05/29/21  1916 05/29/21  1239 05/29/21  0606 05/28/21  1418   AST U/L 20 14 18 15 22   ALT U/L 28 24 25 23 20   ALK PHOS U/L 87 89 91 95 94   TOTAL PROTEIN g/dL 6 1* 6 3* 6 4 6 6 6 4   ALBUMIN g/dL 2 6* 2 6* 2 6* 2 8* 2 7*   TOTAL BILIRUBIN mg/dL 0 14* 0 20 0 25 0 30 0 30     Results from last 7 days   Lab Units 05/30/21  1219 05/30/21  0357 05/29/21  2320 05/29/21  1608 05/29/21  1208 05/29/21  0604 05/28/21  2017   POC GLUCOSE mg/dl 115 114 135 153* 149* 129 117     Results from last 7 days   Lab Units 05/30/21  0736 05/29/21  1916 05/29/21  1239 05/29/21  0606 05/28/21  1418   GLUCOSE RANDOM mg/dL 109 125 144* 126 87       Results from last 7 days   Lab Units 05/28/21  1619 05/28/21  1222   CLARITY UA   --  clear   COLOR UA   --  dark yellow/brian   GLUCOSE UA   --  -   KETONES UA   --  5   BLOOD UA   --  -   PROTEIN UA   --  30   NITRITE UA   --  -   BILIRUBIN UA POC   --  -   UROBILINOGEN UA   --  0 2   LEUKOCYTES UA   --  -   CREATININE UR mg/dL 335 5  --    PROTEIN UR mg/dL 53  --    PROT/CREAT RATIO UR  0 16*  --        Past Medical History:   Diagnosis Date    Diabetes mellitus ([address] )     History of recurrent miscarriages     see OB hx    Hypertension      Present on Admission:   Maternal morbid obesity, antepartum ([address] )   Chronic hypertension with superimposed preeclampsia   Maternal anemia, antepartum   Diet controlled gestational diabetes mellitus (GDM) in third trimester      Admitting Diagnosis: High blood pressure affecting pregnancy in third trimester, antepartum [O16 3]  Age/Sex: 38 y o  female  Admission Orders:  Scheduled Medications:  betamethasone acetate-betamethasone sodium phosphate, 12 mg, Intramuscular, Q24H  docusate sodium, 100 mg, Oral, BID  ferrous sulfate, 325 mg, Oral, Daily With Breakfast  labetalol, 400 mg, Oral, BID  Labetalol HCl, 80 mg, Intravenous, Once [email or web address]  magnesium sulfate, 4 g, Intravenous, [email or web address]  magnesium sulfate, 2 g, Intravenous, [email or web address]; 5/29 @0800  prenatal multivitamin, 1 tablet, Oral, Daily  iron sucrose (VENOFER) 300 mg in sodium chloride 0 9 % 250 mL IVPB   Dose: 300 mg  Freq: Once Route: IV  Last Dose: Stopped (05/29/21 0300)  Start: 05/28/21 2330 End: 05/29/21 0300    methylPREDNISolone sodium succinate (Solu-MEDROL) 500 mg in sodium chloride 0 9 % 250 mL IVPB   Dose: 500 mg  Freq: Once Route: IV  Last Dose: Stopped (05/29/21 0100)  Start: 05/28/21 2245 End: 05/29/21 0100  iron sucrose (VENOFER) 300 mg in sodium chloride 0 9 % 250 mL IVPB   Dose: 300 mg  Freq: Once Route: IV  Start: 05/30/21 0900 End: 05/30/21 1052    labetalol (NORMODYNE) tablet 300 mg   Dose: 300 mg  Freq: 2 times daily Route: PO  Indications of Use: HYPERTENSION  Start: 05/28/21 1430 End: 05/29/21 0624   (2 doses given on 5/28)    IV labetalol 20mg [email or web address]  IV labetalol 40mg [email or web address], 1535; [email or web address]  IV reglan x 1 5/28 @2307    Continuous IV Infusions:  sodium chloride 0 9 % bolus 1,000 mL   Dose: 1,000 mL  Freq:  Once Route: IV  Last Dose: 1,000 mL (05/28/21 1638)  Start: 05/28/21 1645 End: 05/28/21 1738  magnesium sulfate, 2 g/hr, Intravenous, Continuous  sodium chloride, 125 mL/hr, Intravenous, Continuous      PRN Meds:  acetaminophen, 975 mg, Oral, Q6H PRN x 2 on 5/28, x 1 5/29, x 2 5/30  calcium carbonate, 1,000 mg, Oral, Daily PRN  ondansetron, 4 mg, Intravenous, Q8H PRN  PO fioricet x 1 5/29, x 1 5/30    Gestational dm diet      Network Utilization Review Department  ATTENTION: Please call with any questions or concerns to [phone] and carefully listen to the prompts so that you are directed to the right person  All voicemails are confidential   Peri Colunga all requests for admission clinical reviews, approved or denied determinations and any other requests to dedicated fax number below belonging to the campus where the patient is receiving treatment   List of dedicated fax numbers for the Facilities:  [address] DENIALS (Administrative/Medical Necessity) [phone]   [address] (Maternity/NICU/Pediatrics) [phone] [address] [place] [address] [id number] [place] [address] [address] [facility] [place] [address] [phone]

## 2021-05-29 NOTE — PROGRESS NOTES
Progress Note - OB  Lou Ebbing 38 y o  female MRN: [id number]  Unit/Bed#: L&D 327-01 Encounter: [id number]    Assessment:  38 y o  [id number] at 34w6d admitted with chronic hypertension and headache with concerning for SI Pre-E w/SF  Hospital day 1    Plan:  1) Chronic hypertension    -BP's (140-188/74-86)   -Concern for superimposed preeclampsia due to severe range pressures requiring treatment with labetalol 20/40/80 mg IV   -Will up titrate labetalol 300 mg b i d  to labetalol 400 mg b i d    -Start magnesium for seizure prophylaxis   -Betamethasone 12mg q24hr for 2 doses for fetal lung maturity   -Follow-up CBC and CMP   -Consult MFM    2) Headache   -Improved with headache cocktail   -Denies visual disturbances    3) Diet-controlled gestational diabetes   -Continue diabetic diet and fingersticks    4) Anemia   -Hgb 9 3   -S/p IV Venofer    5) 34 weeks gestation of pregnancy   -Continuous monitoring        Subjective/Objective   Chief Complaint:     Patient reports improvement of her headache after headache cocktail overnight  Patient denies any visual disturbances, chest pain, shortness of breath, right upper quadrant pain, contractions, vaginal bleeding, leakage of fluid, decreased fetal movement, or calf pain/tenderness  Subjective:     Contractions: no  Loss of fluid: no  Vaginal bleeding: no  Fetal movement: yes    Pain: no  Tolerating PO: yes  Voiding: yes  Ambulating: yes  Headaches:  Improving after headache cocktail  Visual changes: no  Chest pain: no  Shortness of breath: no  Nausea: no  Vomiting/Diarrhea: no  Dysuria: no  Leg pain: no    Objective:     Vitals:   Temp:  [97 3 °F (36 3 °C)] 97 3 °F (36 3 °C)  HR:  [] 90  BP: (140-188)/() 178/84       Physical Exam  Constitutional:       Appearance: Normal appearance  Cardiovascular:      Rate and Rhythm: Normal rate and regular rhythm  Heart sounds: No murmur  No friction rub  No gallop      Pulmonary:      Effort: Pulmonary effort is normal  No respiratory distress  Breath sounds: Normal breath sounds  No wheezing  Abdominal:      General: There is no distension  Palpations: Abdomen is soft  Tenderness: There is no abdominal tenderness  Skin:     General: Skin is warm and dry  Capillary Refill: Capillary refill takes less than 2 seconds  Neurological:      Mental Status: She is alert  Fetal Assessment:  FHT: 130 / Moderate 6 - 25 bpm / reactive  [place]:  Quiet    Lab, Imaging and other studies: I have personally reviewed pertinent reports        Lab Results   Component Value Date    WBC 7 33 05/29/2021    HGB 9 3 (L) 05/29/2021    HCT 30 9 (L) 05/29/2021    MCV 71 (L) 05/29/2021     05/29/2021       Lab Results   Component Value Date    GLUCOSE 91 04/28/2015    CALCIUM 8 8 05/28/2021     04/28/2015    K 3 9 05/28/2021    CO2 24 05/28/2021     05/28/2021    BUN 4 (L) 05/28/2021    CREATININE 0 66 05/28/2021       Lab Results   Component Value Date    ALT 20 05/28/2021    AST 22 05/28/2021    ALKPHOS 94 05/28/2021    BILITOT 0 3 04/28/2015       Nahomi Almazan MD  OBGYN, PGY-2  5/29/2021   6:41 AM

## 2021-05-29 NOTE — ASSESSMENT & PLAN NOTE
Recent Labs     05/29/21  0604 05/29/21  1208 05/29/21  1608 05/29/21  2320 05/30/21  0357 05/30/21  1219 05/30/21  1921 05/31/21  0555   POCGLU 129 149* 153* 135 114 115 136 110     Likely for insulin gtt intrapartum

## 2021-05-29 NOTE — H&P
Progress Note - OB  Rafael Deal 38 y o  female MRN: [id number]  Unit/Bed#: L&D 327-01 Encounter: [id number]    Assessment:  38 y o  [id number] at 34w6d admitted with chronic hypertension and headache with concerning for SI Pre-E w/SF  Hospital day 1    Plan:  1) Chronic hypertension    -BP's (140-188/74-86)   -Concern for superimposed preeclampsia due to severe range pressures requiring treatment with labetalol 20/40/80 mg IV   -Will up titrate labetalol 300 mg b i d  to labetalol 400 mg b i d    -Start magnesium for seizure prophylaxis   -Betamethasone 12mg q24hr for 2 doses for fetal lung maturity   -Follow-up CBC and CMP   -Consult MFM    2) Headache   -Improved with headache cocktail   -Denies visual disturbances    3) Diet-controlled gestational diabetes   -Continue diabetic diet and fingersticks    4) Anemia   -Hgb 9 3   -S/p IV Venofer    5) 34 weeks gestation of pregnancy   -Continuous monitoring        Subjective/Objective   Chief Complaint:     Patient reports improvement of her headache after headache cocktail overnight  Patient denies any visual disturbances, chest pain, shortness of breath, right upper quadrant pain, contractions, vaginal bleeding, leakage of fluid, decreased fetal movement, or calf pain/tenderness  Subjective:     Contractions: no  Loss of fluid: no  Vaginal bleeding: no  Fetal movement: yes    Pain: no  Tolerating PO: yes  Voiding: yes  Ambulating: yes  Headaches:  Improving after headache cocktail  Visual changes: no  Chest pain: no  Shortness of breath: no  Nausea: no  Vomiting/Diarrhea: no  Dysuria: no  Leg pain: no    Objective:     Vitals:   Temp:  [97 3 °F (36 3 °C)] 97 3 °F (36 3 °C)  HR:  [] 90  BP: (140-188)/() 178/84       Physical Exam  Constitutional:       Appearance: Normal appearance  Cardiovascular:      Rate and Rhythm: Normal rate and regular rhythm  Heart sounds: No murmur  No friction rub  No gallop      Pulmonary:      Effort: Pulmonary effort is normal  No respiratory distress  Breath sounds: Normal breath sounds  No wheezing  Abdominal:      General: There is no distension  Palpations: Abdomen is soft  Tenderness: There is no abdominal tenderness  Skin:     General: Skin is warm and dry  Capillary Refill: Capillary refill takes less than 2 seconds  Neurological:      Mental Status: She is alert  Fetal Assessment:  FHT: 130 / Moderate 6 - 25 bpm / reactive  [place]:  Quiet    Lab, Imaging and other studies: I have personally reviewed pertinent reports        Lab Results   Component Value Date    WBC 7 33 05/29/2021    HGB 9 3 (L) 05/29/2021    HCT 30 9 (L) 05/29/2021    MCV 71 (L) 05/29/2021     05/29/2021       Lab Results   Component Value Date    GLUCOSE 91 04/28/2015    CALCIUM 8 8 05/28/2021     04/28/2015    K 3 9 05/28/2021    CO2 24 05/28/2021     05/28/2021    BUN 4 (L) 05/28/2021    CREATININE 0 66 05/28/2021       Lab Results   Component Value Date    ALT 20 05/28/2021    AST 22 05/28/2021    ALKPHOS 94 05/28/2021    BILITOT 0 3 04/28/2015       Manjit Keita MD  OBGYN, PGY-2  5/29/2021   6:41 AM

## 2021-05-29 NOTE — ASSESSMENT & PLAN NOTE
Lab Results   Component Value Date    WBC 7 33 05/29/2021    HGB 9 3 (L) 05/29/2021    HCT 30 9 (L) 05/29/2021    MCV 71 (L) 05/29/2021     05/29/2021     For iron sucrose during latency period  She is at [facility] for hemorrhage  She has asthma and hypertension therefore uterotonic medications would be Pitocin and misoprostol  Low threshold for TXA

## 2021-05-29 NOTE — PROGRESS NOTES
Results from last 7 days   Lab Units 05/28/21  1418   WBC Thousand/uL 7 50   HEMOGLOBIN g/dL 9 4*   PLATELETS Thousands/uL 273         Results from last 7 days   Lab Units 05/28/21  1418   POTASSIUM mmol/L 3 9   CHLORIDE mmol/L 106   CO2 mmol/L 24   BUN mg/dL 4*   CREATININE mg/dL 0 66   EGFR ml/min/1 73sq m 131   CALCIUM mg/dL 8 8   AST U/L 22   ALT U/L 20   ALK PHOS U/L 94       P:C ratio 0 16        Date/Time  Pulse  BP   05/28/21 2213  86  140/74   05/28/21 2114  95  147/75   05/28/21 2113  95  147/75   05/28/21 2006  95  156/82   05/28/21 1951  93  153/85   05/28/21 1936  100  158/86   05/28/21 1921  102  160/87   05/28/21 1907  104  153/83   05/28/21 1851  93  156/88   05/28/21 1836  92  151/93   05/28/21 1822  92  145/94   05/28/21 1806  103  160/77   05/28/21 1752  88  165/87   05/28/21 1736  90  159/84   05/28/21 1721  88  158/81   05/28/21 1706  88  159/84   05/28/21 1651  84  158/83   05/28/21 1636  90  150/78   05/28/21 1621  93  154/76   05/28/21 1606  87  154/76   05/28/21 1551  103  154/77   05/28/21 1546  92  152/71   05/28/21 1536  89  158/76   05/28/21 1521  89  161/77   05/28/21 1513  87  164/77   05/28/21 1507  88  168/93   05/28/21 1436  95  162/90   05/28/21 1421  96  160/87   05/28/21 1405  100  155/87     Took over patient care at 21:00 reviewed patient history, admission labs and hospital course  Labs have been essentially within normal range with protein creatinine ratio less than 0 3  Blood pressures have not been in severe range after being treated with 2 doses of labetalol 20 mg and 40 mg IV subsequently  She is on labetalol 300 mg p o  B i d  She is still complaining of a headache she rates as 6/10 that is periorbital   She denies any blurry vision or epigastric pain  Discussed case with M Dr Abarca Angry  She agrees to continued observation for now  Delivery currently is not recommended at this point    Discussed trial of headache cocktail with magnesium sulfate 2 mg IV, Solu-Medrol 500 mg IV and Reglan 10 mg IV to see if this resolves her headache that is currently not resolved with Tylenol 975 mg     24 hour urinary protein collection started    Will hold off on  Betamethasone for fetal lung maturity until delivery is indicated  She is diet controlled GDM    GBS collected    Repeat labs in AM  Ferritin added to labs    Will also administer Venofer 300 mg for anemia

## 2021-05-29 NOTE — ASSESSMENT & PLAN NOTE
For delivery today  Stable maternal and fetal status at this time  Continue PO meds with PRN IV antihypertensive medication

## 2021-05-30 LAB
ALBUMIN SERPL BCP-MCNC: 2.6 G/DL (ref 3.5–5)
ALBUMIN SERPL BCP-MCNC: 2.8 G/DL (ref 3.5–5)
ALP SERPL-CCNC: 81 U/L (ref 46–116)
ALP SERPL-CCNC: 87 U/L (ref 46–116)
ALT SERPL W P-5'-P-CCNC: 28 U/L (ref 12–78)
ALT SERPL W P-5'-P-CCNC: 36 U/L (ref 12–78)
ANION GAP SERPL CALCULATED.3IONS-SCNC: 10 MMOL/L (ref 4–13)
ANION GAP SERPL CALCULATED.3IONS-SCNC: 10 MMOL/L (ref 4–13)
AST SERPL W P-5'-P-CCNC: 20 U/L (ref 5–45)
AST SERPL W P-5'-P-CCNC: 29 U/L (ref 5–45)
BACTERIA UR CULT: NORMAL
BILIRUB SERPL-MCNC: 0.14 MG/DL (ref 0.2–1)
BILIRUB SERPL-MCNC: 0.16 MG/DL (ref 0.2–1)
BUN SERPL-MCNC: 5 MG/DL (ref 5–25)
BUN SERPL-MCNC: 7 MG/DL (ref 5–25)
CALCIUM ALBUM COR SERPL-MCNC: 8.6 MG/DL (ref 8.3–10.1)
CALCIUM ALBUM COR SERPL-MCNC: 8.6 MG/DL (ref 8.3–10.1)
CALCIUM SERPL-MCNC: 7.5 MG/DL (ref 8.3–10.1)
CALCIUM SERPL-MCNC: 7.6 MG/DL (ref 8.3–10.1)
CHLORIDE SERPL-SCNC: 106 MMOL/L (ref 100–108)
CHLORIDE SERPL-SCNC: 107 MMOL/L (ref 100–108)
CO2 SERPL-SCNC: 22 MMOL/L (ref 21–32)
CO2 SERPL-SCNC: 22 MMOL/L (ref 21–32)
CREAT SERPL-MCNC: 0.58 MG/DL (ref 0.6–1.3)
CREAT SERPL-MCNC: 0.67 MG/DL (ref 0.6–1.3)
ERYTHROCYTE [DISTWIDTH] IN BLOOD BY AUTOMATED COUNT: 16 % (ref 11.6–15.1)
ERYTHROCYTE [DISTWIDTH] IN BLOOD BY AUTOMATED COUNT: 16.3 % (ref 11.6–15.1)
GFR SERPL CREATININE-BSD FRML MDRD: 131 ML/MIN/1.73SQ M
GFR SERPL CREATININE-BSD FRML MDRD: 137 ML/MIN/1.73SQ M
GLUCOSE SERPL-MCNC: 109 MG/DL (ref 65–140)
GLUCOSE SERPL-MCNC: 114 MG/DL (ref 65–140)
GLUCOSE SERPL-MCNC: 115 MG/DL (ref 65–140)
GLUCOSE SERPL-MCNC: 145 MG/DL (ref 65–140)
GP B STREP DNA SPEC QL NAA+PROBE: NEGATIVE
HCT VFR BLD AUTO: 28.6 % (ref 34.8–46.1)
HCT VFR BLD AUTO: 28.9 % (ref 34.8–46.1)
HGB BLD-MCNC: 8.5 G/DL (ref 11.5–15.4)
HGB BLD-MCNC: 8.6 G/DL (ref 11.5–15.4)
MCH RBC QN AUTO: 20.9 PG (ref 26.8–34.3)
MCH RBC QN AUTO: 21.4 PG (ref 26.8–34.3)
MCHC RBC AUTO-ENTMCNC: 29.4 G/DL (ref 31.4–37.4)
MCHC RBC AUTO-ENTMCNC: 30.1 G/DL (ref 31.4–37.4)
MCV RBC AUTO: 71 FL (ref 82–98)
MCV RBC AUTO: 71 FL (ref 82–98)
PLATELET # BLD AUTO: 314 THOUSANDS/UL (ref 149–390)
PLATELET # BLD AUTO: 326 THOUSANDS/UL (ref 149–390)
PMV BLD AUTO: 11.8 FL (ref 8.9–12.7)
PMV BLD AUTO: 12 FL (ref 8.9–12.7)
POTASSIUM SERPL-SCNC: 3.5 MMOL/L (ref 3.5–5.3)
POTASSIUM SERPL-SCNC: 3.6 MMOL/L (ref 3.5–5.3)
PROT 24H UR-MCNC: 434 MG/24 HRS (ref 40–150)
PROT SERPL-MCNC: 6.1 G/DL (ref 6.4–8.2)
PROT SERPL-MCNC: 6.3 G/DL (ref 6.4–8.2)
RBC # BLD AUTO: 4.01 MILLION/UL (ref 3.81–5.12)
RBC # BLD AUTO: 4.06 MILLION/UL (ref 3.81–5.12)
SODIUM SERPL-SCNC: 138 MMOL/L (ref 136–145)
SODIUM SERPL-SCNC: 139 MMOL/L (ref 136–145)
SPECIMEN VOL UR: 1550 ML
WBC # BLD AUTO: 11.07 THOUSAND/UL (ref 4.31–10.16)
WBC # BLD AUTO: 11.22 THOUSAND/UL (ref 4.31–10.16)

## 2021-05-30 PROCEDURE — NC001 PR NO CHARGE: Performed by: OBSTETRICS & GYNECOLOGY

## 2021-05-30 PROCEDURE — 80053 COMPREHEN METABOLIC PANEL: CPT | Performed by: OBSTETRICS & GYNECOLOGY

## 2021-05-30 PROCEDURE — 84156 ASSAY OF PROTEIN URINE: CPT | Performed by: OBSTETRICS & GYNECOLOGY

## 2021-05-30 PROCEDURE — 99232 SBSQ HOSP IP/OBS MODERATE 35: CPT | Performed by: OBSTETRICS & GYNECOLOGY

## 2021-05-30 PROCEDURE — 85027 COMPLETE CBC AUTOMATED: CPT | Performed by: OBSTETRICS & GYNECOLOGY

## 2021-05-30 PROCEDURE — 59025 FETAL NON-STRESS TEST: CPT | Performed by: OBSTETRICS & GYNECOLOGY

## 2021-05-30 PROCEDURE — 82948 REAGENT STRIP/BLOOD GLUCOSE: CPT

## 2021-05-30 RX ADMIN — IRON SUCROSE 300 MG: 20 INJECTION, SOLUTION INTRAVENOUS at 09:22

## 2021-05-30 RX ADMIN — LABETALOL HYDROCHLORIDE 400 MG: 200 TABLET, FILM COATED ORAL at 09:14

## 2021-05-30 RX ADMIN — BUTALBITAL, ACETAMINOPHEN, AND CAFFEINE 1 TABLET: 50; 325; 40 TABLET ORAL at 09:56

## 2021-05-30 RX ADMIN — FERROUS SULFATE TAB 325 MG (65 MG ELEMENTAL FE) 325 MG: 325 (65 FE) TAB at 07:11

## 2021-05-30 RX ADMIN — BETAMETHASONE SODIUM PHOSPHATE AND BETAMETHASONE ACETATE 12 MG: 3; 3 INJECTION, SUSPENSION INTRA-ARTICULAR; INTRALESIONAL; INTRAMUSCULAR at 07:12

## 2021-05-30 RX ADMIN — MAGNESIUM SULFATE HEPTAHYDRATE 2 G/HR: 40 INJECTION, SOLUTION INTRAVENOUS at 15:14

## 2021-05-30 RX ADMIN — CALCIUM CARBONATE (ANTACID) CHEW TAB 500 MG 1000 MG: 500 CHEW TAB at 15:54

## 2021-05-30 RX ADMIN — DOCUSATE SODIUM 100 MG: 100 CAPSULE, LIQUID FILLED ORAL at 09:14

## 2021-05-30 RX ADMIN — PRENATAL VIT W/ FE FUMARATE-FA TAB 27-0.8 MG 1 TABLET: 27-0.8 TAB at 09:14

## 2021-05-30 RX ADMIN — ACETAMINOPHEN 975 MG: 325 TABLET, FILM COATED ORAL at 03:41

## 2021-05-30 RX ADMIN — LABETALOL HYDROCHLORIDE 400 MG: 200 TABLET, FILM COATED ORAL at 19:22

## 2021-05-30 RX ADMIN — MAGNESIUM SULFATE HEPTAHYDRATE 2 G/HR: 40 INJECTION, SOLUTION INTRAVENOUS at 04:45

## 2021-05-30 NOTE — PROGRESS NOTES
Progress Note - OB  Michi Schroeder 38 y o  female MRN: [id number]  Unit/Bed#: L&D 327-01 Encounter: [id number]    Assessment:  38 y o  [id number] at 35w0d admitted with chronic hypertension and headache with concerning for SI Pre-E w/SF  Plan:  1) Chronic hypertension with superimposed preeclampsia with severe features  - Lived in the 130s-140s/70s overnight, had one SRBP at 0345 AM along with a headache primarily behind her left eye  - S/p tx with labetalol 20/40/80 mg IV on 5/29  - labetalol increased to 400 b i d   - On magnesium for seizure prophylaxis  - Betamethasone 12mg q24hr for 2 doses for fetal lung maturity      2) Headache  - Improved with headache cocktail yesterday and got a 1x dose of fioricet in the evening  - Will continue with tylenol to avoid fioricet rebound  - Denies visual disturbances at this time    3) Diet-controlled gestational diabetes  - Continue diabetic diet   - Fingersticks in the 150s s/p betamethasone  - Postprandial after dinner normal at 135    4) Anemia  - Hgb 9 3  - Plan to get IV Venofer today, 300 mg    5) 34 weeks gestation of pregnancy  - Continuous monitoring while on magnesium        Subjective/Objective   Chief Complaint:     Patient reports headache overnight behind her eye on left side that correlated with a sharp pain in her chest   The chest pain is not reproducible with musculoskeletal maneuvers  Patient denies any visual disturbances, chest pain, shortness of breath, right upper quadrant pain, contractions, vaginal bleeding, leakage of fluid, decreased fetal movement, or calf pain/tenderness      Subjective:     Contractions: no  Loss of fluid: no  Vaginal bleeding: no  Fetal movement: yes    Pain: no  Tolerating PO: yes  Voiding: yes  Ambulating: yes  Headaches:  Improving s/p headache cocktail  Visual changes: no  Chest pain: no  Shortness of breath: no  Nausea: no  Vomiting/Diarrhea: no  Dysuria: no  Leg pain: no    Objective:     Vitals:   Temp:  [97 3 °F (36 3 °C)] 97 3 °F (36 3 °C)  HR:  [] 90  BP: (140-188)/() 178/84       Physical Exam  Constitutional:       Appearance: Normal appearance  Cardiovascular:      Rate and Rhythm: Normal rate and regular rhythm  Heart sounds: No murmur  No friction rub  No gallop  Pulmonary:      Effort: Pulmonary effort is normal  No respiratory distress  Breath sounds: Normal breath sounds  No wheezing  Abdominal:      General: There is no distension  Palpations: Abdomen is soft  Tenderness: There is no abdominal tenderness  Skin:     General: Skin is warm and dry  Capillary Refill: Capillary refill takes less than 2 seconds  Neurological:      Mental Status: She is alert  Fetal Assessment:  FHT: 130 / Moderate 6 - 25 bpm / reactive  [place]:  Quiet    Lab, Imaging and other studies: I have personally reviewed pertinent reports  Lab Results   Component Value Date    WBC 7 33 05/29/2021    HGB 9 3 (L) 05/29/2021    HCT 30 9 (L) 05/29/2021    MCV 71 (L) 05/29/2021     05/29/2021       Lab Results   Component Value Date    GLUCOSE 91 04/28/2015    CALCIUM 8 8 05/28/2021     04/28/2015    K 3 9 05/28/2021    CO2 24 05/28/2021     05/28/2021    BUN 4 (L) 05/28/2021    CREATININE 0 66 05/28/2021       Lab Results   Component Value Date    ALT 20 05/28/2021    AST 22 05/28/2021    ALKPHOS 94 05/28/2021    BILITOT 0 3 04/28/2015     Mark Briceno [address] [place] [address] Gynecology PGY-1  5/30/2021  3:52 AM

## 2021-05-30 NOTE — PLAN OF CARE
Problem: ANTEPARTUM  Goal: Maintain pregnancy as long as maternal and/or fetal condition is stable  Description: INTERVENTIONS:  - Maternal surveillance  - Fetal surveillance  - Monitor uterine activity  - Medications as ordered  - Bedrest  5/30/2021 0252 by Tiesha Lopez RN  Outcome: Progressing  5/29/2021 2120 by Tiesha Lopez RN  Outcome: Progressing

## 2021-05-30 NOTE — CONSULTS
Consulting Provider: Elizabeth Duran MD     Reason for Consult: Chronic hypertension and super imposed pre-eclampsia with severe features  HPI: Lisa Quiroga is a 38 y o  [id number] female with an NANO of 7/4/2021, by Ultrasound at 34w6d weeks gestation who is being admitted for observation due to severely elevated blood pressures in the setting of known chronic hypertension  Pregnancy is also complicated by R9AGY diet controlled  She is on labetalol, Mg and has completed the course of betamethasone today  GBS negative  Impression:     I spent 40 minutes with Ms Nik Pittman at her bedside in L&D discussing her pregnancy and typical delivery and NICU course for babies born at 27 weeks GA  Ms Nik Pittman is currently pregnant with a male fetus named Shaina Burris  She also has a 15year old, 10year old and 1year old  Plan is to start induction tomorrow at 35+0 weeks  Last fetal US on 5/13 showed an EFW of 2350 at 32+4 weeks  We discussed the following topics:    If baby did well at delivery, we would allow him the opportunity to stay with her in couplet care and encourage transitioning there  We discussed the common reasons a 35 week infant may need NICU admission that include but are not limited to the following: hypothermia/temp instability, poor feeding, hypoglycemia  We also discussed other possibilities as listed below  Delivery room assessment: including presence of neonatologist and/or NNP, supplemental oxygen if needed via CPAP or NC, and less likely intubation and need for mechanical ventilation and the possible need for surfactant  We discussed the typical NICU stay for a 35 week infant as well as criteria needed for discharge including thermoregulation, feeding, respiratory status, and ABD events  We discussed the benefits of breast milk for infants, especially premature infants  Mom does plan to breast feed (she previously  her other 3 children)   Donor breastmilk was discussed and mom is thinking about it if it were to be needed  atheters  We also talked about frequent blood tests, anemia, and the possibility of blood transfusions  I described common screening tools in the NICU, including blood work and imaging as needed  All questions were answered at the time of consult  Mom was encouraged to reach out the the NICU if she has further questions or concerns and we would be happy to help  Mom desires a circumcision prior to discharge  PCP for follow up will be [facility]  Thank you for allowing us to take part in the care of Ms Krystle Arroyo  Please do not hesitate to contact the NICU with any questions or concerns       Irina Ayoub MD  Neonatology

## 2021-05-30 NOTE — QUICK NOTE
Sydnee Black was complaining of increased headache, primarily behind her left eye which is throbbing, and a sharp chest pain that throbs along with the eye pain  The chest pain is not reproducible to musculoskeletal maneuvers  Blood pressure is 163/90s  Will repeat in 15 minutes and if another severe range will push 20 of IV labetalol  Ranjana Briceno [address], [facility] Gynecology PGY-1  5/30/2021  3:42 AM

## 2021-05-31 PROBLEM — Z3A.35 35 WEEKS GESTATION OF PREGNANCY: Status: ACTIVE | Noted: 2021-03-24

## 2021-05-31 LAB
ALBUMIN SERPL BCP-MCNC: 2.7 G/DL (ref 3.5–5)
ALBUMIN SERPL BCP-MCNC: 3 G/DL (ref 3.5–5)
ALP SERPL-CCNC: 82 U/L (ref 46–116)
ALP SERPL-CCNC: 88 U/L (ref 46–116)
ALT SERPL W P-5'-P-CCNC: 43 U/L (ref 12–78)
ALT SERPL W P-5'-P-CCNC: 48 U/L (ref 12–78)
ANION GAP SERPL CALCULATED.3IONS-SCNC: 10 MMOL/L (ref 4–13)
ANION GAP SERPL CALCULATED.3IONS-SCNC: 9 MMOL/L (ref 4–13)
AST SERPL W P-5'-P-CCNC: 32 U/L (ref 5–45)
AST SERPL W P-5'-P-CCNC: 36 U/L (ref 5–45)
BILIRUB SERPL-MCNC: 0.12 MG/DL (ref 0.2–1)
BILIRUB SERPL-MCNC: 0.19 MG/DL (ref 0.2–1)
BUN SERPL-MCNC: 5 MG/DL (ref 5–25)
BUN SERPL-MCNC: 6 MG/DL (ref 5–25)
CALCIUM ALBUM COR SERPL-MCNC: 8.2 MG/DL (ref 8.3–10.1)
CALCIUM ALBUM COR SERPL-MCNC: 8.5 MG/DL (ref 8.3–10.1)
CALCIUM SERPL-MCNC: 7.4 MG/DL (ref 8.3–10.1)
CALCIUM SERPL-MCNC: 7.5 MG/DL (ref 8.3–10.1)
CHLORIDE SERPL-SCNC: 105 MMOL/L (ref 100–108)
CHLORIDE SERPL-SCNC: 107 MMOL/L (ref 100–108)
CO2 SERPL-SCNC: 25 MMOL/L (ref 21–32)
CO2 SERPL-SCNC: 25 MMOL/L (ref 21–32)
CREAT SERPL-MCNC: 0.64 MG/DL (ref 0.6–1.3)
CREAT SERPL-MCNC: 0.67 MG/DL (ref 0.6–1.3)
ERYTHROCYTE [DISTWIDTH] IN BLOOD BY AUTOMATED COUNT: 16.3 % (ref 11.6–15.1)
ERYTHROCYTE [DISTWIDTH] IN BLOOD BY AUTOMATED COUNT: 16.4 % (ref 11.6–15.1)
GFR SERPL CREATININE-BSD FRML MDRD: 131 ML/MIN/1.73SQ M
GFR SERPL CREATININE-BSD FRML MDRD: 133 ML/MIN/1.73SQ M
GLUCOSE SERPL-MCNC: 100 MG/DL (ref 65–140)
GLUCOSE SERPL-MCNC: 110 MG/DL (ref 65–140)
GLUCOSE SERPL-MCNC: 119 MG/DL (ref 65–140)
GLUCOSE SERPL-MCNC: 136 MG/DL (ref 65–140)
GLUCOSE SERPL-MCNC: 84 MG/DL (ref 65–140)
GLUCOSE SERPL-MCNC: 91 MG/DL (ref 65–140)
GLUCOSE SERPL-MCNC: 98 MG/DL (ref 65–140)
HCT VFR BLD AUTO: 27.8 % (ref 34.8–46.1)
HCT VFR BLD AUTO: 30 % (ref 34.8–46.1)
HGB BLD-MCNC: 8.3 G/DL (ref 11.5–15.4)
HGB BLD-MCNC: 8.9 G/DL (ref 11.5–15.4)
MCH RBC QN AUTO: 21.1 PG (ref 26.8–34.3)
MCH RBC QN AUTO: 21.3 PG (ref 26.8–34.3)
MCHC RBC AUTO-ENTMCNC: 29.7 G/DL (ref 31.4–37.4)
MCHC RBC AUTO-ENTMCNC: 29.9 G/DL (ref 31.4–37.4)
MCV RBC AUTO: 71 FL (ref 82–98)
MCV RBC AUTO: 71 FL (ref 82–98)
PLATELET # BLD AUTO: 309 THOUSANDS/UL (ref 149–390)
PLATELET # BLD AUTO: 333 THOUSANDS/UL (ref 149–390)
PMV BLD AUTO: 11.6 FL (ref 8.9–12.7)
PMV BLD AUTO: 11.7 FL (ref 8.9–12.7)
POTASSIUM SERPL-SCNC: 3.2 MMOL/L (ref 3.5–5.3)
POTASSIUM SERPL-SCNC: 3.7 MMOL/L (ref 3.5–5.3)
PROT SERPL-MCNC: 6.1 G/DL (ref 6.4–8.2)
PROT SERPL-MCNC: 6.6 G/DL (ref 6.4–8.2)
RBC # BLD AUTO: 3.9 MILLION/UL (ref 3.81–5.12)
RBC # BLD AUTO: 4.21 MILLION/UL (ref 3.81–5.12)
SODIUM SERPL-SCNC: 140 MMOL/L (ref 136–145)
SODIUM SERPL-SCNC: 141 MMOL/L (ref 136–145)
WBC # BLD AUTO: 11.34 THOUSAND/UL (ref 4.31–10.16)
WBC # BLD AUTO: 12.57 THOUSAND/UL (ref 4.31–10.16)

## 2021-05-31 PROCEDURE — 59025 FETAL NON-STRESS TEST: CPT | Performed by: OBSTETRICS & GYNECOLOGY

## 2021-05-31 PROCEDURE — 82948 REAGENT STRIP/BLOOD GLUCOSE: CPT

## 2021-05-31 PROCEDURE — NC001 PR NO CHARGE: Performed by: OBSTETRICS & GYNECOLOGY

## 2021-05-31 PROCEDURE — 85027 COMPLETE CBC AUTOMATED: CPT | Performed by: OBSTETRICS & GYNECOLOGY

## 2021-05-31 PROCEDURE — 3E0DXGC INTRODUCTION OF OTHER THERAPEUTIC SUBSTANCE INTO MOUTH AND PHARYNX, EXTERNAL APPROACH: ICD-10-PCS | Performed by: OBSTETRICS & GYNECOLOGY

## 2021-05-31 PROCEDURE — 80053 COMPREHEN METABOLIC PANEL: CPT | Performed by: OBSTETRICS & GYNECOLOGY

## 2021-05-31 PROCEDURE — 99232 SBSQ HOSP IP/OBS MODERATE 35: CPT | Performed by: OBSTETRICS & GYNECOLOGY

## 2021-05-31 PROCEDURE — 3E0P7VZ INTRODUCTION OF HORMONE INTO FEMALE REPRODUCTIVE, VIA NATURAL OR ARTIFICIAL OPENING: ICD-10-PCS | Performed by: OBSTETRICS & GYNECOLOGY

## 2021-05-31 PROCEDURE — 4A1HXCZ MONITORING OF PRODUCTS OF CONCEPTION, CARDIAC RATE, EXTERNAL APPROACH: ICD-10-PCS | Performed by: OBSTETRICS & GYNECOLOGY

## 2021-05-31 RX ORDER — LABETALOL 200 MG/1
400 TABLET, FILM COATED ORAL 3 TIMES DAILY
Status: DISCONTINUED | OUTPATIENT
Start: 2021-05-31 | End: 2021-06-01

## 2021-05-31 RX ADMIN — SODIUM CHLORIDE 125 ML/HR: 0.9 INJECTION, SOLUTION INTRAVENOUS at 12:17

## 2021-05-31 RX ADMIN — MISOPROSTOL 50 MCG: 100 TABLET ORAL at 14:12

## 2021-05-31 RX ADMIN — LABETALOL HYDROCHLORIDE 400 MG: 200 TABLET, FILM COATED ORAL at 17:10

## 2021-05-31 RX ADMIN — MISOPROSTOL 50 MCG: 100 TABLET ORAL at 10:07

## 2021-05-31 RX ADMIN — MISOPROSTOL 25 MCG: 100 TABLET ORAL at 23:01

## 2021-05-31 RX ADMIN — FERROUS SULFATE TAB 325 MG (65 MG ELEMENTAL FE) 325 MG: 325 (65 FE) TAB at 07:28

## 2021-05-31 RX ADMIN — PRENATAL VIT W/ FE FUMARATE-FA TAB 27-0.8 MG 1 TABLET: 27-0.8 TAB at 09:32

## 2021-05-31 RX ADMIN — BUTALBITAL, ACETAMINOPHEN, AND CAFFEINE 1 TABLET: 50; 325; 40 TABLET ORAL at 02:36

## 2021-05-31 RX ADMIN — DOCUSATE SODIUM 100 MG: 100 CAPSULE, LIQUID FILLED ORAL at 09:33

## 2021-05-31 RX ADMIN — MAGNESIUM SULFATE HEPTAHYDRATE 2 G/HR: 40 INJECTION, SOLUTION INTRAVENOUS at 21:57

## 2021-05-31 RX ADMIN — LABETALOL HYDROCHLORIDE 400 MG: 200 TABLET, FILM COATED ORAL at 22:12

## 2021-05-31 RX ADMIN — FAMOTIDINE 20 MG: 10 INJECTION INTRAVENOUS at 09:32

## 2021-05-31 RX ADMIN — MAGNESIUM SULFATE HEPTAHYDRATE 2 G/HR: 40 INJECTION, SOLUTION INTRAVENOUS at 01:27

## 2021-05-31 RX ADMIN — MAGNESIUM SULFATE HEPTAHYDRATE 2 G/HR: 40 INJECTION, SOLUTION INTRAVENOUS at 12:16

## 2021-05-31 RX ADMIN — CALCIUM CARBONATE (ANTACID) CHEW TAB 500 MG 1000 MG: 500 CHEW TAB at 02:36

## 2021-05-31 RX ADMIN — LABETALOL HYDROCHLORIDE 400 MG: 200 TABLET, FILM COATED ORAL at 09:33

## 2021-05-31 RX ADMIN — MISOPROSTOL 25 MCG: 100 TABLET ORAL at 18:54

## 2021-05-31 RX ADMIN — DOCUSATE SODIUM 100 MG: 100 CAPSULE, LIQUID FILLED ORAL at 22:12

## 2021-05-31 RX ADMIN — FAMOTIDINE 20 MG: 10 INJECTION INTRAVENOUS at 22:53

## 2021-05-31 NOTE — OB LABOR/OXYTOCIN SAFETY PROGRESS
Labor Progress Note - David Mchugh 38 y o  female MRN: [id number]    Unit/Bed#: L&D 325-01 Encounter: [id number]       Contraction Frequency (minutes): 3-6  Contraction Quality: Mild  Tachysystole: No   Cervical Dilation: Fingertip        Cervical Effacement: 30  Fetal Station: -3  Baseline Rate: 120 bpm  Fetal Heart Rate: 120 BPM  FHR Category: Category I               Vital Signs:   Vitals:    05/31/21 1811   BP: 166/85   Pulse: 91   Resp:    Temp:    SpO2:            Notes/comments:      Third dose of Cytotec given, this dose was 25mcg placed vaginally  Cervix unchanged and FHT category I  Some elevated systolic Bps in the 418K' untreated since [facility]  Diastolic remains in the 43A  Will recheck cervix in 3-4 hour and may attempt mixon balloon at that time       Manjeet Fu MD 5/31/2021 7:02 PM

## 2021-05-31 NOTE — OB LABOR/OXYTOCIN SAFETY PROGRESS
Labor Progress Note - Lou Ebbing 38 y o  female MRN: [id number]    Unit/Bed#: L&D 325-01 Encounter: [id number]       Contraction Frequency (minutes): occasional  Contraction Quality: Moderate  Tachysystole: No   Cervical Dilation: Fingertip        Cervical Effacement: 30  Fetal Station: -3  Baseline Rate: 120 bpm  Fetal Heart Rate: 120 BPM  FHR Category: Category I               Vital Signs:   Vitals:    05/31/21 1500   BP: 150/70   Pulse: 90   Resp:    Temp:    SpO2:            Notes/comments:      Patient has received two doses of oral Cytotec  Next dose due at 1810, will check cervix at that time  FHT category I  Bps have ranged 140s-150s/60s-70s       Anthony Onofre MD 5/31/2021 3:49 PM

## 2021-05-31 NOTE — OB LABOR/OXYTOCIN SAFETY PROGRESS
Labor Progress Note - Cheril Dakins 38 y o  female MRN: [id number]    Unit/Bed#: L&D 325-01 Encounter: [id number]       Contraction Frequency (minutes): IRREGULAR  Contraction Quality: Moderate  Tachysystole: No   Cervical Dilation: Fingertip        Cervical Effacement: 30  Fetal Station: -3  Baseline Rate: 120 bpm  Fetal Heart Rate: 120 BPM  FHR Category: Category I               Vital Signs:   /82   Pulse 93   Temp 98 6 °F (37 °C) (Oral)   Resp 20   Ht 5' 4" (1 626 m)   Wt 115 kg (253 lb)   LMP 09/16/2020 (Exact Date)   SpO2 97%   BMI 43 43 kg/m²     Vitals:    05/31/21 0945   BP:    Pulse:    Resp:    Temp:    SpO2: 97%           Notes/comments:       IOL for CHTN with Super imposed PEC with severe features  She is currently 35 weeks  S/p Betamethasone  scanned yesterday to vertex    Will start Cytotec and in 4 hours no change will give mixon and Cytotec  Pt understands the plan and is in agreement with such     Colleen Mcnamara MD 5/31/2021 9:48 AM

## 2021-05-31 NOTE — PROGRESS NOTES
ADDIE Attending Progress Note:    Assessment and Plan:  Ms Jeimy Bailey is a 38 y o  [id number] at [address]: 4, admitted with preeclampsia superimposed on chronic hypertension with severe features  By issue:    * Chronic hypertension with superimposed preeclampsia  Assessment & Plan  For delivery today  Stable maternal and fetal status at this time  Continue PO meds with PRN IV antihypertensive medication  Maternal morbid obesity, antepartum ([address] )  Assessment & Plan  SCDs intrapartum  Lovenox if  section  Diet controlled gestational diabetes mellitus (GDM) in third trimester  Assessment & Plan  Recent Labs     21  0604 21  1208 21  1608 21  2320 21  0357 21  1219 21  1921 21  0555   POCGLU 129 149* 153* 135 114 115 136 110     Likely for insulin gtt intrapartum  35 weeks gestation of pregnancy  Assessment & Plan    Please assess fetal position via ultrasound prior to induction  Maternal anemia, antepartum  Assessment & Plan  Lab Results   Component Value Date    WBC 7 33 2021    HGB 9 3 (L) 2021    HCT 30 9 (L) 2021    MCV 71 (L) 2021     2021     For iron sucrose during latency period  She is at [address] for hemorrhage  She has asthma and hypertension therefore uterotonic medications would be Pitocin and misoprostol    Low threshold for TXA       ----------------    Objective:  Patient Vitals for the past 24 hrs:   BP Temp Temp src Pulse Resp SpO2   21 0745 -- 98 6 °F (37 °C) Oral -- -- 99 %   21 0739 135/73 -- -- 93 -- --   21 0555 -- -- -- -- -- 97 %   21 0553 142/77 -- -- 99 -- --   21 0551 -- -- -- (!) 108 -- 97 %   21 0348 145/80 -- -- 93 -- --   21 0330 -- -- -- -- -- 99 %   21 0130 135/69 98 7 °F (37 1 °C) Oral 92 20 98 %   21 2336 130/71 97 8 °F (36 6 °C) Oral 85 22 99 %   214 -- -- -- 90 -- 98 %   211 141/73 98 2 °F (36 8 °C) Oral 91 20 --   21 1926 142/67 98 6 °F (37 °C) Oral (!) 109 20 98 %   21 1722 152/76 -- -- 95 -- --   21 1703 167/85 98 °F (36 7 °C) Oral 90 18 98 %   21 1517 138/85 98 1 °F (36 7 °C) Oral (!) 106 20 100 %   21 1427 155/90 -- -- 90 -- --   21 1326 149/79 -- -- 86 -- --   21 1315 -- -- -- -- -- 99 %   21 1225 149/74 -- -- 96 -- --   21 1123 149/82 -- -- 98 -- --   21 1121 -- 98 3 °F (36 8 °C) Oral -- -- --   21 1110 -- -- -- -- -- 98 %   21 1022 143/79 -- -- 93 -- --   21 0922 146/79 -- -- 100 -- --   21 0900 -- -- -- -- -- 97 %   21 0823 156/79 -- -- 91 -- --       Past Medical History:   Diagnosis Date    Diabetes mellitus ([facility] [address] )     History of recurrent miscarriages     see OB hx    Hypertension      Past Surgical History:   Procedure Laterality Date    INDUCED       KS SURG RX MISSED ABORTN,1ST TRI N/A 2016    Procedure: DILATATION AND EVACUATION (D&E);   Surgeon: Sahara Miguel MD;  Location: [facility] OR;  Service: Gynecology    US GUIDANCE BREAST BIOPSY RIGHT EACH ADDITIONAL Right 2/10/2021    US GUIDED BREAST BIOPSY RIGHT COMPLETE Right 2/10/2021       OB History    Para Term  AB Living   8 3 3 0 4 3   SAB TAB Ectopic Multiple Live Births   1 3   0 3      # Outcome Date GA Lbr Harshad/2nd Weight Sex Delivery Anes PTL Lv   8 Current            7 Term 18 38w2d 00:19 / 00:03 3544 g (7 lb 13 oz) M Vag-Spont None N CASI   6 SAB 16 7w0d          5 Term 04/27/15   3572 g (7 lb 14 oz) F Vag-Spont None  CASI      Complications: Gestational hypertension   4 TAB            3 Term 07 39w0d  2948 g (6 lb 8 oz) F Vag-Spont None N CASI   2 TAB            1 TAB                Physical exam:    Objective   Constitutional: well-developed, well-nourished, in no acute distress, with vitals documented above  Neuro: awake, alert and oriented x3  Psychiatric: mood and affect are appropriate Skin: skin is intact without rashes or lesions or jaundice  HEENT: Pupils are equal and round; sclera are anicteric; extraocular movements are symmetric   Pulmonary exam: symmetric air entry, clear to auscultation bilaterally, without use of accessory respiratory muscles, without rhonchi crackles or rales   Cardiovascular exam: regular rate and rhythm, with a normal S1 and S2, without S3 or S4, with no murmurs rubs or gallops   Abdominal exam: soft and nontender throughout, with no rebound or guarding, gravid, obese, with no hernias  Extremity exam: nonedematous, symmetric, and nontender bilaterally  I/O       05/29 0701 - 05/30 0700 05/30 0701 - 05/31 0700 05/31 0701 - 06/01 0700    P  O   720 600    Total Intake(mL/kg)  720 (6 3) 600 (5 2)    Urine (mL/kg/hr) 850 (0 3)      Total Output 850      Net -850 +720 +600                 Invasive Devices     Peripheral Intravenous Line            Peripheral IV 05/29/21 Left Wrist 2 days    Peripheral IV 05/30/21 Left Antecubital less than 1 day                Results from last 7 days   Lab Units 05/31/21  0743 05/30/21 1959 05/30/21  0735 05/29/21 1916 05/29/21  1239   WBC Thousand/uL 12 57* 11 07* 11 22* 12 42* 10 18*   HEMOGLOBIN g/dL 8 3* 8 6* 8 5* 9 1* 9 4*   MCV fL 71* 71* 71* 71* 72*   PLATELETS Thousands/uL 309 314 326 326 312           Results from last 7 days   Lab Units 05/30/21 1959 05/30/21  0736 05/29/21 1916 05/29/21  1239 05/29/21  0606   POTASSIUM mmol/L 3 6 3 5 3 7 3 9 4 5   CHLORIDE mmol/L 107 106 107 107 107   CO2 mmol/L 22 22 22 20* 20*   BUN mg/dL 7 5 6 5 5   CREATININE mg/dL 0 67 0 58* 0 67 0 63 0 55*   EGFR ml/min/1 73sq m 131 137 131 133 140     Results from last 7 days   Lab Units 05/30/21 1959 05/30/21  0736 05/29/21 1916 05/29/21  1239 05/29/21  0606   AST U/L 29 20 14 18 15   ALT U/L 36 28 24 25 23   ALK PHOS U/L 81 87 89 91 95       Results from last 7 days   Lab Units 05/31/21  0743 05/30/21 1959 05/30/21  0735 05/29/21 1916 05/29/21  1239   PLATELETS Thousands/uL 309 314 326 326 312       Results from last 7 days   Lab Units 05/31/21  0555 05/30/21  1921 05/30/21  1219 05/30/21  0357 05/29/21  2320 05/29/21  1608 05/29/21  1208 05/29/21  0604 05/28/21 2017   POC GLUCOSE mg/dl 110 136 115 114 135 153* 149* 129 117       Results from last 7 days   Lab Units 05/28/21  1619   PROT/CREAT RATIO UR  0 16*         Results from last 7 days   Lab Units 05/30/21  0106   PROTEIN 24H UR mg/24 hrs 434*       Results from last 7 days   Lab Units 05/28/21  1223   URINE CULTURE  10,000-19,000 cfu/ml          NST 05/31/21 Time: 0618 - 0654    Baseline: 120  Variability: moderate  Accelerations: present, 10x10  Decelerations: absent  Contractions: absent  Assessment: nonreactive, reassuring  Plan: continue intrapartum    MEDS:   Medication Administration - last 24 hours from 05/30/2021 0751 to 05/31/2021 0751       Date/Time Order Dose Route Action Action by     05/30/2021 2100 docusate sodium (COLACE) capsule 100 mg 0 mg Oral Hold Phuong Aquino RN     05/30/2021 0914 docusate sodium (COLACE) capsule 100 mg 100 mg Oral Given Marian Hua RN     05/31/2021 4879 ferrous sulfate tablet 325 mg 325 mg Oral Given Marian Hua RN     05/30/2021 2697 prenatal multivitamin tablet 1 tablet 1 tablet Oral Given Marian Hua RN     05/31/2021 0236 calcium carbonate (TUMS) chewable tablet 1,000 mg 1,000 mg Oral Given Phuong Aquino RN     05/30/2021 1554 calcium carbonate (TUMS) chewable tablet 1,000 mg 1,000 mg Oral Given Phuong Aquino RN     05/30/2021 1922 labetalol (NORMODYNE) tablet 400 mg 400 mg Oral Given Phuong Aquino RN     05/30/2021 0914 labetalol (NORMODYNE) tablet 400 mg 400 mg Oral Given Marian Hua RN     05/31/2021 0127 magnesium sulfate 20 g/500 mL infusion (premix) 2 g/hr Intravenous [address] Phuong Aquino, RAJENDRA     05/30/2021 1514 magnesium sulfate 20 g/500 mL infusion (premix) 2 g/hr Intravenous Roel Lovell Danny Urbano, RAJENDRA     05/30/2021 6427 iron sucrose (VENOFER) 300 mg in sodium chloride 0 9 % 250 mL IVPB 300 mg Intravenous [place] Héctor Plummer, RAJENDRA     05/31/2021 0236 butalbital-acetaminophen-caffeine (FIORICET,ESGIC) -40 mg per tablet 1 tablet 1 tablet Oral Given Frances Diaz, RAJENDRA     05/30/2021 9590 butalbital-acetaminophen-caffeine (FIORICET,ESGIC) -40 mg per tablet 1 tablet 1 tablet Oral Given Samanta Lawson, RAJENDRA     05/30/2021 2233 famotidine (PEPCID) injection 20 mg 0 mg Intravenous Hold Frances Diaz RN        Current Facility-Administered Medications   Medication Dose Route Frequency    acetaminophen (TYLENOL) tablet 975 mg  975 mg Oral Q6H PRN    butalbital-acetaminophen-caffeine (FIORICET,ESGIC) -40 mg per tablet 1 tablet  1 tablet Oral Q4H PRN    calcium carbonate (TUMS) chewable tablet 1,000 mg  1,000 mg Oral Daily PRN    docusate sodium (COLACE) capsule 100 mg  100 mg Oral BID    famotidine (PEPCID) injection 20 mg  20 mg Intravenous Q12H [facility]    ferrous sulfate tablet 325 mg  325 mg Oral Daily With Breakfast    labetalol (NORMODYNE) tablet 400 mg  400 mg Oral BID    magnesium sulfate 20 g/500 mL infusion (premix)  2 g/hr Intravenous Continuous    ondansetron (ZOFRAN) injection 4 mg  4 mg Intravenous Q8H PRN    prenatal multivitamin tablet 1 tablet  1 tablet Oral Daily    sodium chloride 0 9 % infusion  125 mL/hr Intravenous Continuous     Invasive Devices     Peripheral Intravenous Line            Peripheral IV 05/29/21 Left Wrist 2 days    Peripheral IV 05/30/21 Left Antecubital less than 1 day                -------------------------------    The floor/unit time was 25 minutes  The majority of time (>50%) was spent counseling and/or coordinating care with the patient and/or family members  At the conclusion of today's encounter, all questions were answered to her satisfaction    Thank you very much for this kind referral and please do not hesitate to contact me with any further questions or concerns      Marcos Hui MD  Attending Physician, [place]

## 2021-06-01 ENCOUNTER — ANESTHESIA (INPATIENT)
Dept: ANESTHESIOLOGY | Facility: HOSPITAL | Age: 36
DRG: 560 | End: 2021-06-01
Payer: COMMERCIAL

## 2021-06-01 ENCOUNTER — ANESTHESIA EVENT (INPATIENT)
Dept: ANESTHESIOLOGY | Facility: HOSPITAL | Age: 36
DRG: 560 | End: 2021-06-01
Payer: COMMERCIAL

## 2021-06-01 LAB
ALBUMIN SERPL BCP-MCNC: 3.1 G/DL (ref 3.5–5)
ALP SERPL-CCNC: 93 U/L (ref 46–116)
ALT SERPL W P-5'-P-CCNC: 57 U/L (ref 12–78)
ANION GAP SERPL CALCULATED.3IONS-SCNC: 12 MMOL/L (ref 4–13)
AST SERPL W P-5'-P-CCNC: 38 U/L (ref 5–45)
BASE EXCESS BLDCOA CALC-SCNC: 0.7 MMOL/L (ref 3–11)
BASE EXCESS BLDCOV CALC-SCNC: 0.5 MMOL/L (ref 1–9)
BILIRUB SERPL-MCNC: 0.28 MG/DL (ref 0.2–1)
BUN SERPL-MCNC: 4 MG/DL (ref 5–25)
CALCIUM ALBUM COR SERPL-MCNC: 8.6 MG/DL (ref 8.3–10.1)
CALCIUM SERPL-MCNC: 7.9 MG/DL (ref 8.3–10.1)
CHLORIDE SERPL-SCNC: 105 MMOL/L (ref 100–108)
CO2 SERPL-SCNC: 24 MMOL/L (ref 21–32)
CREAT SERPL-MCNC: 0.66 MG/DL (ref 0.6–1.3)
ERYTHROCYTE [DISTWIDTH] IN BLOOD BY AUTOMATED COUNT: 16.5 % (ref 11.6–15.1)
GFR SERPL CREATININE-BSD FRML MDRD: 131 ML/MIN/1.73SQ M
GLUCOSE SERPL-MCNC: 83 MG/DL (ref 65–140)
GLUCOSE SERPL-MCNC: 85 MG/DL (ref 65–140)
GLUCOSE SERPL-MCNC: 86 MG/DL (ref 65–140)
GLUCOSE SERPL-MCNC: 87 MG/DL (ref 65–140)
GLUCOSE SERPL-MCNC: 88 MG/DL (ref 65–140)
GLUCOSE SERPL-MCNC: 92 MG/DL (ref 65–140)
HCO3 BLDCOA-SCNC: 29.9 MMOL/L (ref 17.3–27.3)
HCO3 BLDCOV-SCNC: 25.5 MMOL/L (ref 12.2–28.6)
HCT VFR BLD AUTO: 32 % (ref 34.8–46.1)
HGB BLD-MCNC: 9.5 G/DL (ref 11.5–15.4)
MCH RBC QN AUTO: 21.1 PG (ref 26.8–34.3)
MCHC RBC AUTO-ENTMCNC: 29.7 G/DL (ref 31.4–37.4)
MCV RBC AUTO: 71 FL (ref 82–98)
O2 CT VFR BLDCOA CALC: 4.8 ML/DL
OXYHGB MFR BLDCOA: 23.9 %
OXYHGB MFR BLDCOV: 69.7 %
PCO2 BLDCOA: 69.7 MM[HG] (ref 30–60)
PCO2 BLDCOV: 42.3 MM HG (ref 27–43)
PH BLDCOA: 7.25 [PH] (ref 7.23–7.43)
PH BLDCOV: 7.4 [PH] (ref 7.19–7.49)
PLATELET # BLD AUTO: 354 THOUSANDS/UL (ref 149–390)
PMV BLD AUTO: 12.2 FL (ref 8.9–12.7)
PO2 BLDCOA: 13.9 MM HG (ref 5–25)
PO2 BLDCOV: 27.4 MM HG (ref 15–45)
POTASSIUM SERPL-SCNC: 3.4 MMOL/L (ref 3.5–5.3)
PROT SERPL-MCNC: 6.7 G/DL (ref 6.4–8.2)
RBC # BLD AUTO: 4.51 MILLION/UL (ref 3.81–5.12)
SAO2 % BLDCOV: 13.9 ML/DL
SODIUM SERPL-SCNC: 141 MMOL/L (ref 136–145)
WBC # BLD AUTO: 12.2 THOUSAND/UL (ref 4.31–10.16)

## 2021-06-01 PROCEDURE — 59409 OBSTETRICAL CARE: CPT | Performed by: OBSTETRICS & GYNECOLOGY

## 2021-06-01 PROCEDURE — 82948 REAGENT STRIP/BLOOD GLUCOSE: CPT

## 2021-06-01 PROCEDURE — 82805 BLOOD GASES W/O2 SATURATION: CPT | Performed by: OBSTETRICS & GYNECOLOGY

## 2021-06-01 PROCEDURE — 80053 COMPREHEN METABOLIC PANEL: CPT | Performed by: OBSTETRICS & GYNECOLOGY

## 2021-06-01 PROCEDURE — NC001 PR NO CHARGE: Performed by: OBSTETRICS & GYNECOLOGY

## 2021-06-01 PROCEDURE — 0HQ9XZZ REPAIR PERINEUM SKIN, EXTERNAL APPROACH: ICD-10-PCS | Performed by: OBSTETRICS & GYNECOLOGY

## 2021-06-01 PROCEDURE — 88307 TISSUE EXAM BY PATHOLOGIST: CPT | Performed by: PATHOLOGY

## 2021-06-01 PROCEDURE — 10907ZC DRAINAGE OF AMNIOTIC FLUID, THERAPEUTIC FROM PRODUCTS OF CONCEPTION, VIA NATURAL OR ARTIFICIAL OPENING: ICD-10-PCS | Performed by: OBSTETRICS & GYNECOLOGY

## 2021-06-01 PROCEDURE — 85027 COMPLETE CBC AUTOMATED: CPT | Performed by: OBSTETRICS & GYNECOLOGY

## 2021-06-01 RX ORDER — ONDANSETRON 2 MG/ML
4 INJECTION INTRAMUSCULAR; INTRAVENOUS EVERY 6 HOURS PRN
Status: DISCONTINUED | OUTPATIENT
Start: 2021-06-01 | End: 2021-06-04 | Stop reason: HOSPADM

## 2021-06-01 RX ORDER — LABETALOL 200 MG/1
400 TABLET, FILM COATED ORAL 3 TIMES DAILY
Status: DISCONTINUED | OUTPATIENT
Start: 2021-06-01 | End: 2021-06-01

## 2021-06-01 RX ORDER — DIAPER,BRIEF,INFANT-TODD,DISP
1 EACH MISCELLANEOUS 4 TIMES DAILY PRN
Status: DISCONTINUED | OUTPATIENT
Start: 2021-06-01 | End: 2021-06-04 | Stop reason: HOSPADM

## 2021-06-01 RX ORDER — MORPHINE SULFATE 4 MG/ML
8 INJECTION, SOLUTION INTRAMUSCULAR; INTRAVENOUS ONCE
Status: COMPLETED | OUTPATIENT
Start: 2021-06-01 | End: 2021-06-01

## 2021-06-01 RX ORDER — CALCIUM CARBONATE 200(500)MG
1000 TABLET,CHEWABLE ORAL 3 TIMES DAILY PRN
Status: DISCONTINUED | OUTPATIENT
Start: 2021-06-01 | End: 2021-06-04 | Stop reason: HOSPADM

## 2021-06-01 RX ORDER — LABETALOL 20 MG/4 ML (5 MG/ML) INTRAVENOUS SYRINGE
20 ONCE
Status: COMPLETED | OUTPATIENT
Start: 2021-06-01 | End: 2021-06-01

## 2021-06-01 RX ORDER — LABETALOL 20 MG/4 ML (5 MG/ML) INTRAVENOUS SYRINGE
80 ONCE
Status: COMPLETED | OUTPATIENT
Start: 2021-06-01 | End: 2021-06-01

## 2021-06-01 RX ORDER — HYDRALAZINE HYDROCHLORIDE 20 MG/ML
5 INJECTION INTRAMUSCULAR; INTRAVENOUS ONCE
Status: COMPLETED | OUTPATIENT
Start: 2021-06-01 | End: 2021-06-01

## 2021-06-01 RX ORDER — DOCUSATE SODIUM 100 MG/1
100 CAPSULE, LIQUID FILLED ORAL 2 TIMES DAILY
Status: DISCONTINUED | OUTPATIENT
Start: 2021-06-01 | End: 2021-06-04 | Stop reason: HOSPADM

## 2021-06-01 RX ORDER — MORPHINE SULFATE 4 MG/ML
10 INJECTION, SOLUTION INTRAMUSCULAR; INTRAVENOUS ONCE
Status: DISCONTINUED | OUTPATIENT
Start: 2021-06-01 | End: 2021-06-01

## 2021-06-01 RX ORDER — BUTORPHANOL TARTRATE 1 MG/ML
1 INJECTION, SOLUTION INTRAMUSCULAR; INTRAVENOUS ONCE
Status: COMPLETED | OUTPATIENT
Start: 2021-06-01 | End: 2021-06-01

## 2021-06-01 RX ORDER — PROMETHAZINE HYDROCHLORIDE 25 MG/ML
25 INJECTION, SOLUTION INTRAMUSCULAR; INTRAVENOUS ONCE
Status: COMPLETED | OUTPATIENT
Start: 2021-06-01 | End: 2021-06-01

## 2021-06-01 RX ORDER — HYDRALAZINE HYDROCHLORIDE 20 MG/ML
10 INJECTION INTRAMUSCULAR; INTRAVENOUS ONCE
Status: COMPLETED | OUTPATIENT
Start: 2021-06-01 | End: 2021-06-01

## 2021-06-01 RX ORDER — IBUPROFEN 600 MG/1
600 TABLET ORAL EVERY 6 HOURS PRN
Status: DISCONTINUED | OUTPATIENT
Start: 2021-06-01 | End: 2021-06-04 | Stop reason: HOSPADM

## 2021-06-01 RX ORDER — LABETALOL 200 MG/1
400 TABLET, FILM COATED ORAL EVERY 12 HOURS SCHEDULED
Status: DISCONTINUED | OUTPATIENT
Start: 2021-06-01 | End: 2021-06-01

## 2021-06-01 RX ORDER — LABETALOL 20 MG/4 ML (5 MG/ML) INTRAVENOUS SYRINGE
40 ONCE
Status: COMPLETED | OUTPATIENT
Start: 2021-06-01 | End: 2021-06-01

## 2021-06-01 RX ORDER — NIFEDIPINE 10 MG/1
10 CAPSULE ORAL ONCE
Status: COMPLETED | OUTPATIENT
Start: 2021-06-01 | End: 2021-06-01

## 2021-06-01 RX ORDER — BISACODYL 10 MG
10 SUPPOSITORY, RECTAL RECTAL DAILY PRN
Status: DISCONTINUED | OUTPATIENT
Start: 2021-06-01 | End: 2021-06-04 | Stop reason: HOSPADM

## 2021-06-01 RX ORDER — METHYLERGONOVINE MALEATE 0.2 MG/ML
INJECTION INTRAVENOUS
Status: DISCONTINUED
Start: 2021-06-01 | End: 2021-06-01 | Stop reason: WASHOUT

## 2021-06-01 RX ORDER — OXYTOCIN/RINGER'S LACTATE 30/500 ML
62.5 PLASTIC BAG, INJECTION (ML) INTRAVENOUS ONCE
Status: DISCONTINUED | OUTPATIENT
Start: 2021-06-01 | End: 2021-06-04 | Stop reason: HOSPADM

## 2021-06-01 RX ORDER — ACETAMINOPHEN 325 MG/1
650 TABLET ORAL EVERY 6 HOURS PRN
Status: DISCONTINUED | OUTPATIENT
Start: 2021-06-01 | End: 2021-06-04 | Stop reason: HOSPADM

## 2021-06-01 RX ORDER — DIPHENHYDRAMINE HCL 25 MG
25 TABLET ORAL EVERY 6 HOURS PRN
Status: DISCONTINUED | OUTPATIENT
Start: 2021-06-01 | End: 2021-06-04 | Stop reason: HOSPADM

## 2021-06-01 RX ORDER — OXYTOCIN/RINGER'S LACTATE 30/500 ML
1-30 PLASTIC BAG, INJECTION (ML) INTRAVENOUS
Status: DISCONTINUED | OUTPATIENT
Start: 2021-06-01 | End: 2021-06-01

## 2021-06-01 RX ORDER — KETOROLAC TROMETHAMINE 30 MG/ML
15 INJECTION, SOLUTION INTRAMUSCULAR; INTRAVENOUS ONCE
Status: COMPLETED | OUTPATIENT
Start: 2021-06-01 | End: 2021-06-01

## 2021-06-01 RX ORDER — BUPIVACAINE HYDROCHLORIDE 2.5 MG/ML
INJECTION, SOLUTION EPIDURAL; INFILTRATION; INTRACAUDAL
Status: COMPLETED
Start: 2021-06-01 | End: 2021-06-01

## 2021-06-01 RX ORDER — NIFEDIPINE 30 MG/1
60 TABLET, EXTENDED RELEASE ORAL DAILY
Status: DISCONTINUED | OUTPATIENT
Start: 2021-06-01 | End: 2021-06-02

## 2021-06-01 RX ORDER — SIMETHICONE 80 MG
80 TABLET,CHEWABLE ORAL EVERY 6 HOURS PRN
Status: DISCONTINUED | OUTPATIENT
Start: 2021-06-01 | End: 2021-06-04 | Stop reason: HOSPADM

## 2021-06-01 RX ADMIN — PRENATAL VIT W/ FE FUMARATE-FA TAB 27-0.8 MG 1 TABLET: 27-0.8 TAB at 08:07

## 2021-06-01 RX ADMIN — NIFEDIPINE 60 MG: 30 TABLET, FILM COATED, EXTENDED RELEASE ORAL at 16:46

## 2021-06-01 RX ADMIN — IBUPROFEN 600 MG: 600 TABLET, FILM COATED ORAL at 14:24

## 2021-06-01 RX ADMIN — HYDRALAZINE HYDROCHLORIDE 5 MG: 20 INJECTION INTRAMUSCULAR; INTRAVENOUS at 10:50

## 2021-06-01 RX ADMIN — HYDRALAZINE HYDROCHLORIDE 10 MG: 20 INJECTION INTRAMUSCULAR; INTRAVENOUS at 14:57

## 2021-06-01 RX ADMIN — MAGNESIUM SULFATE HEPTAHYDRATE 2 G/HR: 40 INJECTION, SOLUTION INTRAVENOUS at 20:36

## 2021-06-01 RX ADMIN — FERROUS SULFATE TAB 325 MG (65 MG ELEMENTAL FE) 325 MG: 325 (65 FE) TAB at 08:07

## 2021-06-01 RX ADMIN — BUTORPHANOL TARTRATE 1 MG: 1 INJECTION, SOLUTION INTRAMUSCULAR; INTRAVENOUS at 10:34

## 2021-06-01 RX ADMIN — LABETALOL HYDROCHLORIDE 400 MG: 200 TABLET, FILM COATED ORAL at 08:07

## 2021-06-01 RX ADMIN — LABETALOL 20 MG/4 ML (5 MG/ML) INTRAVENOUS SYRINGE 40 MG: at 09:12

## 2021-06-01 RX ADMIN — LABETALOL 20 MG/4 ML (5 MG/ML) INTRAVENOUS SYRINGE 40 MG: at 19:29

## 2021-06-01 RX ADMIN — Medication 2 MILLI-UNITS/MIN: at 09:51

## 2021-06-01 RX ADMIN — LABETALOL 20 MG/4 ML (5 MG/ML) INTRAVENOUS SYRINGE 20 MG: at 08:07

## 2021-06-01 RX ADMIN — MISOPROSTOL 25 MCG: 100 TABLET ORAL at 03:03

## 2021-06-01 RX ADMIN — PROMETHAZINE HYDROCHLORIDE 25 MG: 25 INJECTION INTRAMUSCULAR; INTRAVENOUS at 10:34

## 2021-06-01 RX ADMIN — MAGNESIUM SULFATE HEPTAHYDRATE 2 G/HR: 40 INJECTION, SOLUTION INTRAVENOUS at 10:04

## 2021-06-01 RX ADMIN — FAMOTIDINE 20 MG: 10 INJECTION INTRAVENOUS at 08:43

## 2021-06-01 RX ADMIN — LABETALOL 20 MG/4 ML (5 MG/ML) INTRAVENOUS SYRINGE 80 MG: at 09:52

## 2021-06-01 RX ADMIN — ACETAMINOPHEN 650 MG: 325 TABLET, FILM COATED ORAL at 19:40

## 2021-06-01 RX ADMIN — KETOROLAC TROMETHAMINE 15 MG: 30 INJECTION, SOLUTION INTRAMUSCULAR; INTRAVENOUS at 12:38

## 2021-06-01 RX ADMIN — BUPIVACAINE HYDROCHLORIDE 30 ML: 2.5 INJECTION, SOLUTION EPIDURAL; INFILTRATION; INTRACAUDAL; PERINEURAL at 12:37

## 2021-06-01 RX ADMIN — DOCUSATE SODIUM 100 MG: 100 CAPSULE, LIQUID FILLED ORAL at 08:07

## 2021-06-01 RX ADMIN — LABETALOL 20 MG/4 ML (5 MG/ML) INTRAVENOUS SYRINGE 20 MG: at 15:41

## 2021-06-01 RX ADMIN — MORPHINE SULFATE 8 MG: 4 INJECTION INTRAVENOUS at 05:08

## 2021-06-01 RX ADMIN — NIFEDIPINE 10 MG: 10 CAPSULE ORAL at 11:23

## 2021-06-01 RX ADMIN — LABETALOL 20 MG/4 ML (5 MG/ML) INTRAVENOUS SYRINGE 20 MG: at 17:44

## 2021-06-01 RX ADMIN — PROMETHAZINE HYDROCHLORIDE 25 MG: 25 INJECTION INTRAMUSCULAR; INTRAVENOUS at 05:08

## 2021-06-01 NOTE — OB LABOR/OXYTOCIN SAFETY PROGRESS
Labor Progress Note - Ayla Ruvalcaba 38 y o  female MRN: [id number]    Unit/Bed#: L&D 325-01 Encounter: [id number]       Contraction Frequency (minutes): 5 (per patient; difficult to trace toco)  Contraction Quality: Mild  Tachysystole: No   Cervical Dilation: 1        Cervical Effacement: 50  Fetal Station: -2  Baseline Rate: 120 bpm  Fetal Heart Rate: 120 BPM  FHR Category: Category I               Vital Signs:   Vitals:    06/01/21 0751   BP: (!) 173/96   Pulse: 89   Resp:    Temp:    SpO2:            Notes/comments:     Pt had severe range blood pressure after 20mg of labetalol will order and administer 40mg IV  On exam pt is unchanged, uncomfortable discussed IV pain meds vs epidural  Pt would like to consider her options and discuss with her  and anesthesia  Dr Yahir Soto notified and aware, will start IV pitocin per protocol         Barrie Day DO 6/1/2021 8:28 AM

## 2021-06-01 NOTE — UTILIZATION REVIEW
Continued Stay Review    Date:     06-01-21                     Current Patient Class: inpatient  Current Level of Care: medical    HPI:35 y o  female initially admitted on  05-29-21    Assessment/Plan: 06-01-21 @ 0330   Contraction Frequency (minutes): n/a  Contraction Quality: Not applicable  Tachysystole: No   Cervical Dilation: 1  Cervical Effacement: 50  Fetal Station: -2  Baseline Rate: 125 bpm  Fetal Heart Rate: 120 BPM  FHR Category: Category I   mixon balloon inserted     @ 1225  Dose (adriane-units/min) Oxytocin: 6 adriane-units/min  Contraction Frequency (minutes): 2-3  Contraction Quality: Moderate  Tachysystole: No   Cervical Dilation: 6  Cervical Effacement: 90  Fetal Station: -3  Baseline Rate: 120 bpm  Fetal Heart Rate: 130 BPM  FHR Category: Category II        05-31-21 plan induction via cytotec q 4 hrs as needed and mixon balloon once able to insert     05-30-21 increased headache, primarily behind her left eye which is throbbing, and a sharp chest pain that throbs along with the eye pain  The chest pain is not reproducible to musculoskeletal maneuvers  Blood pressure is 163/90s  Will repeat in 15 minutes and if another severe range will push 20 of IV labetalol  Do to persistent H/a will start induction at 35 wks           Vital Signs:   Date/Time  Temp  Pulse  Resp  BP  SpO2  O2 Device  Cardiac (WDL)   06/01/21 1200  --  --  16  --  97 %  None (Room air)  --   06/01/21 1149  --  94  --  170/91   --  --  --   BP: Dr Maricarmen Paulino aware, recheck BP in 15min at 06/01/21 1149   06/01/21 1139  --  96  --  160/86  --  --  --   06/01/21 1133  --  83  --  160/88   --  --  --   BP: Dr Irving Howell notified; no new orders received  at 06/01/21 1133   06/01/21 1130  --  --  17  --  --  --  --   06/01/21 1123  --  --  --  174/84Abnormal    --  --  --   BP: Dr Irving Howell aware; procardia given as ordered   at 06/01/21 1123   06/01/21 1115  --  --  17  --  --  --  --   06/01/21 1114  --  --  --  --   --  --  --   BP: Oral procardia ordered by Dr Marcela Zhang  at 06/01/21 1114   06/01/21 1111  --  85  --  174/84Abnormal    --  --  --   BP: Dr Marcela Zhang notified  Dr Marcela Zhang at bedside speaking w/ pt at 06/01/21 1111   06/01/21 1100  98 1 °F (36 7 °C)  --  --  --  --  --  --   06/01/21 1037  --  85  --  172/75Abnormal    --  --  --   BP: Dr Marcela Zhang notified  at 06/01/21 1037   06/01/21 1027  --  83  --  173/80Abnormal    --  --  --   BP: Dr Marcela Zhang notified  Hydralazine ordered by Dr Marcela Zhang  at 06/01/21 1027   06/01/21 1017  --  84  --  169/73   --  --  --   BP: Dr Marcela Zhang notified  Awaiting further orders  at 06/01/21 1017   06/01/21 1008  --  88  --  --  --  --  --   06/01/21 1007  --  89  --  177/82Abnormal    93 %  --  --   BP: Dr Marcela Zhang notified  at 06/01/21 1007   06/01/21 1006  --  92  --  --  96 %  --  --   06/01/21 1000  --  82  16  173/81Abnormal    96 %  None (Room air)  --   BP: Dr Marcela Zhang notified  at 06/01/21 1000   06/01/21 0947  --  82  --  168/82   --  --  --   BP: Dr Marcela Zhang notified  Awaiting pharmacy to profile labetalol at 06/01/21 0947   06/01/21 0937  --  82  --  165/78   --  --  --   BP: Sid morales, orders placed for 80mg labetolol at 06/01/21 0937   06/01/21 0927  --  91  --  174/81Abnormal    --  --  --   BP: Dr Marcela Zhang notified; awaiting further orders  at 06/01/21 0927   06/01/21 0917  --  89  --  173/81Abnormal    --  --  --   BP: Dr Marcela Zhang notified; awaiting further orders  at 06/01/21 0917 06/01/21 0907  --  81  --  166/77  --  --  --   06/01/21 0900  98 4 °F (36 9 °C)  --  --  --  --  --  --   06/01/21 0857  --  91  --  167/88   --  --  --   BP: Dr Lau notified  Additional labetalol ordered  See orders at 06/01/21 0857 06/01/21 0839  --  84  --  --  97 %  --  --   06/01/21 0821  --  88  --  184/86Abnormal    --  --  --   BP: resident in room   medication given and will recheck BP at 06/01/21 0821 06/01/21 0800  --  --  16  --  98 %  None (Room air)  --   06/01/21 0751  --  89 --  173/96Abnormal    --  --  --   BP: Dr Jason Pratt notified; IV labetalol ordered  at 06/01/21 0751   06/01/21 0736  --  93  --  172/90Abnormal    --  --  --   BP: Dr Jason Pratt aware told to give scheduled labetolol at 0800 at 06/01/21 0736   06/01/21 0721  --  86  --  170/83   --  --  --   BP: Dr Jason Pratt notified   at 06/01/21 0721 06/01/21 0700  98 2 °F (36 8 °C)  --  17  --  --  --  --   06/01/21 0658  --  79  --  159/85  --  --  --   06/01/21 0628  --  76  --  164/84  --  --  --   06/01/21 0603  --  75  --  --  95 %  --  --   06/01/21 0600  --  --  --  --  96 %  None (Room air)  --   06/01/21 0558  --  90  --  164/84  92 %  --  --   06/01/21 0557  --  91  --  --  92 %  --  --   06/01/21 0553  --  84  --  --  96 %  --  --   06/01/21 0548  --  79  --  --  96 %  --  --   06/01/21 0543  --  79  --  --  96 %  --  --   06/01/21 0538  --  79  --  --  97 %  --  --   06/01/21 0533  --  77  --  --  97 %  --  --   06/01/21 0528  --  74  --  --  97 %  --  --   06/01/21 0527  --  77  --  160/84  --  --  --   06/01/21 0523  --  80  --  --  96 %  --  --   06/01/21 0518  --  82  --  --  96 %  --  --   06/01/21 0513  --  92  --  --  97 %  --  --   06/01/21 0512  --  88  --  171/89Abnormal    --  --  --   BP: dr En Aiken aware at 06/01/21 0512   06/01/21 0508  --  93  --  --  99 %  --  --   06/01/21 0457  --  81  --  167/86  --  --  --   06/01/21 0442  --  93  --  172/88Abnormal    --  --  --   BP: dr En Aiken aware at 06/01/21 0442   06/01/21 0428  --  85  --  172/91Abnormal    --  --  --   BP: dr En Aiken aware at 06/01/21 0428   06/01/21 0409  --  79  --  187/88Abnormal    --  --  --   BP: dr En Aiken aware at 06/01/21 0409   06/01/21 0400  --  --  --  --  97 %  None (Room air)  --   06/01/21 0344  --  85  --  163/92  --  --  --   06/01/21 0238  --  81  --  164/87   --  --  --   BP: physician aware at 06/01/21 0238   06/01/21 0215  98 4 °F (36 9 °C)  80  18  173/82Abnormal    96 %  --  --   BP: physician aware, 15 minute recheck at 06/01/21 0215   06/01/21 0214  --  81  --  --  --  --  --   06/01/21 0115  --  --  --  157/77  --  --  --   06/01/21 0005  --  90  --  163/86  --  --  --   05/31/21 2350  --  91  --  163/91  --  --  --   05/31/21 2335  --  81  --  157/80  --  --  --   05/31/21 2321  --  85  --  158/82  --  --  --   05/31/21 2306  --  84  --  160/83  --  --  --   05/31/21 2300  98 8 °F (37 1 °C)  --  --  --  --  --  --   05/31/21 2200  --  --  18  --  96 %  None (Room air)  --   05/31/21 2126  --  85  --  163/81  --  --  --   05/31/21 2015  --  86  --  168/83  --  --  --   05/31/21 1959  --  87  18  170/81  --  --  --   05/31/21 1950  --  83  --  --  97 %  None (Room air)  --   05/31/21 1930  98 8 °F (37 1 °C)  85  18  179/86Abnormal   --  --  --   05/31/21 1916  --  82  --  163/86  --  --  --   05/31/21 1900  --  91  18  167/89  --  --  --   05/31/21 1811  --  91  --  166/85  --  --  --   05/31/21 1707  98 8 °F (37 1 °C)  81  18  162/82   99 %  None (Room air)  --   BP: MD notified at 05/31/21 1707 05/31/21 1652  --  81  --  167/83   --  --  --   BP: MD notified at 05/31/21 1652 05/31/21 1637  --  82  --  157/86  --  --  --   05/31/21 1621  --  85  --  160/85   --  --  --   BP: MD notified at 05/31/21 1621   05/31/21 1548  --  89  --  158/73  --  --  --   05/31/21 1532  98 1 °F (36 7 °C)  91  20  157/75  98 %  None (Room air)  --   05/31/21 1517  --  87  --  136/61  --  --  --   05/31/21 1500  --  90  --  150/70  --  --  --   05/31/21 1447  --  90  --  149/70  --  --  --   05/31/21 1432  --  86  --  154/77  --  --  --   05/31/21 1329  --  89  --  --  97 %  None (Room air)  --   05/31/21 1328  --  85  --  158/80  --  --  --   05/31/21 1230  --  --  --  153/82  --  --  --   05/31/21 1226  --  99  --  --  --  --  --   05/31/21 1145  --  --  --  --  99 %  None (Room air)  --   05/31/21 1125  --  93  --  144/79  --  --  --   05/31/21 1110  --  82  --  146/79  --  --  --   05/31/21 1055  --  82  --  146/75  --  --  --   05/31/21 1040  --  82  --  151/82  --  --  --   05/31/21 1025  --  88  --  154/81  --  --  --   05/31/21 0945  --  --  --  --  97 %  None (Room air)  --   05/31/21 0942  --  93  --  155/82  --  --  --   05/31/21 0745  98 6 °F (37 °C)  --  --  --  99 %  None (Room air)  --   05/31/21 0739  --  93  --  135/73  --  --  --   05/31/21 0555  --  --  --  --  97 %  None (Room air)  --   05/31/21 0553  --  99  --  142/77  --  --  --   05/31/21 0551  --  108Abnormal   --  --  97 %  --  --   05/31/21 0348  --  93  --  145/80  --  --  --   05/31/21 0330  --  --  --  --  99 %  None (Room air)  --   05/31/21 0130  98 7 °F (37 1 °C)  92  20  135/69  98 %  --  --   05/30/21 2336  97 8 °F (36 6 °C)  85  22  130/71  99 %  --  --   05/30/21 2134  --  90  --  --  98 %  --  --   05/30/21 2131  98 2 °F (36 8 °C)  91  20  141/73  --  --  --   05/30/21 1926  98 6 °F (37 °C)  109Abnormal   20  142/67  98 %  None (Room air)  --   05/30/21 1722  --  95  --  152/76  --  --  --   05/30/21 1703  98 °F (36 7 °C)  90  18  167/85  98 %  None (Room air)  --   05/30/21 1517  98 1 °F (36 7 °C)  106Abnormal   20  138/85  100 %  None (Room air)  WDL   05/30/21 1427  --  90  --  155/90  --  --  --   05/30/21 1326  --  86  --  149/79  --  --  --   05/30/21 1315  --  --  --  --  99 %  None (Room air)  --   05/30/21 1225  --  96  --  149/74  --  --  --   05/30/21 1123  --  98  --  149/82  --  --  --   05/30/21 1121  98 3 °F (36 8 °C)  --  --  --  --  --  --   05/30/21 1110  --  --  --  --  98 %  None (Room air)  --   05/30/21 1022  --  93  --  143/79  --  --  --   05/30/21 0922  --  100  --  146/79  --  --  --   05/30/21 0900  --  --  --  --  97 %  None (Room air)  --   05/30/21 0823  --  91  --  156/79  --  --  --   05/30/21 0728  --  102  --  146/81  --  --  --   05/30/21 0700  --  --  --  --  98 %  None (Room air)  --   05/30/21 0612  --  97  --  148/74  --  --  --   05/30/21 0512  --  92  --  152/75  --  --  --   05/30/21 0500  --  --  --  --  --  None (Room air)  --   05/30/21 0404  --  91  --  150/75  --  --  --   05/30/21 0401  --  96  --  --  99 %  --  --   05/30/21 0356  --  94  --  --  98 %  --  --   05/30/21 0351  --  94  --  --  98 %  --  --   05/30/21 0346  --  95  --  --  98 %  --  --   05/30/21 0341  --  95  --  --  98 %  --  --   05/30/21 0338  --  94  --  163/87  --  --  --   05/30/21 0336  --  100  --  --  98 %  --  --   05/30/21 0313  --  --  --  --  98 %  None (Room air)  --   05/30/21 0204  --  92  --  143/70  --  --  --   05/30/21 0100  --  --  18  --  98 %  None (Room air)  --   05/30/21 0004  --  100  --  138/73  --  --  --            Pertinent Labs/Diagnostic Results:       Results from last 7 days   Lab Units 06/01/21  0847 05/31/21 1948 05/31/21 0743 05/30/21 1959 05/30/21  0735   WBC Thousand/uL 12 20* 11 34* 12 57* 11 07* 11 22*   HEMOGLOBIN g/dL 9 5* 8 9* 8 3* 8 6* 8 5*   HEMATOCRIT % 32 0* 30 0* 27 8* 28 6* 28 9*   PLATELETS Thousands/uL 354 333 309 314 326         Results from last 7 days   Lab Units 06/01/21  0847 05/31/21 1948 05/31/21  0743 05/30/21 1959 05/30/21  0736   SODIUM mmol/L 141 140 141 139 138   POTASSIUM mmol/L 3 4* 3 2* 3 7 3 6 3 5   CHLORIDE mmol/L 105 105 107 107 106   CO2 mmol/L 24 25 25 22 22   ANION GAP mmol/L 12 10 9 10 10   BUN mg/dL 4* 5 6 7 5   CREATININE mg/dL 0 66 0 67 0 64 0 67 0 58*   EGFR ml/min/1 73sq m 131 131 133 131 137   CALCIUM mg/dL 7 9* 7 4* 7 5* 7 6* 7 5*     Results from last 7 days   Lab Units 06/01/21  0847 05/31/21  1948 05/31/21  0743 05/30/21  1959 05/30/21  0736   AST U/L 38 36 32 29 20   ALT U/L 57 48 43 36 28   ALK PHOS U/L 93 88 82 81 87   TOTAL PROTEIN g/dL 6 7 6 6 6 1* 6 3* 6 1*   ALBUMIN g/dL 3 1* 3 0* 2 7* 2 8* 2 6*   TOTAL BILIRUBIN mg/dL 0 28 0 19* 0 12* 0 16* 0 14*     Results from last 7 days   Lab Units 06/01/21  1201 06/01/21  1026 06/01/21  0806 06/01/21  4260 05/31/21  2122 05/31/21  1618 05/31/21  1035 05/31/21  1070 05/30/21  1921 05/30/21  1219 05/30/21  0357 05/29/21  2320 POC GLUCOSE mg/dl 88 86 85 83 98 84 91 110 136 115 114 135     Results from last 7 days   Lab Units 06/01/21  0847 05/31/21  1948 05/31/21  0743 05/30/21  1959 05/30/21  0736 05/29/21  1916 05/29/21  1239 05/29/21  0606 05/28/21  1418   GLUCOSE RANDOM mg/dL 87 119 100 145* 109 125 144* 126 87     Results from last 7 days   Lab Units 05/28/21  1418   FERRITIN ng/mL 29       Results from last 7 days   Lab Units 05/28/21  1619 05/28/21  1222   CLARITY UA   --  clear   COLOR UA   --  dark yellow/brian   GLUCOSE UA   --  -   KETONES UA   --  5   BLOOD UA   --  -   PROTEIN UA   --  30   NITRITE UA   --  -   BILIRUBIN UA POC   --  -   UROBILINOGEN UA   --  0 2   LEUKOCYTES UA   --  -   CREATININE UR mg/dL 335 5  --    PROTEIN UR mg/dL 53  --    PROT/CREAT RATIO UR  0 16*  --      Results from last 7 days   Lab Units 05/28/21  1223   URINE CULTURE  10,000-19,000 cfu/ml        Medications:   Scheduled Medications:  docusate sodium, 100 mg, Oral, BID  famotidine, 20 mg, Intravenous, Q12H ALICJA  ferrous sulfate, 325 mg, Oral, Daily With Breakfast  labetalol, 400 mg, Oral, TID  prenatal multivitamin, 1 tablet, Oral, Daily      Continuous IV Infusions:  magnesium sulfate, 2 g/hr, Intravenous, Continuous  oxytocin, 1-30 adriane-units/min, Intravenous, Titrated  sodium chloride, 50 mL/hr, Intravenous, Continuous      PRN Meds:  acetaminophen, 975 mg, Oral, Q6H PRN  butalbital-acetaminophen-caffeine, 1 tablet, Oral, Q4H PRN  calcium carbonate, 1,000 mg, Oral, Daily PRN  ondansetron, 4 mg, Intravenous, Q8H PRN        Discharge Plan: Home once medically stable after delivery  Network Utilization Review Department  ATTENTION: Please call with any questions or concerns to [phone] and carefully listen to the prompts so that you are directed to the right person   All voicemails are confidential   Lee No all requests for admission clinical reviews, approved or denied determinations and any other requests to dedicated fax number below belonging to the campus where the patient is receiving treatment   List of dedicated fax numbers for the Facilities:  [address] [place] [address] DENIALS (Administrative/Medical Necessity) [phone]   [address]  (Maternity/NICU/Pediatrics) [phone] [address] Hitesh Sykes [address] [address] [place] [address] [address] [place] [address] [phone]

## 2021-06-01 NOTE — QUICK NOTE
Vitals:    06/01/21 1419 06/01/21 1423 06/01/21 1430 06/01/21 1438   BP: 167/78 168/74  (!) 175/79   BP Location:       Pulse: 84 85  85   Resp:   18    Temp:       TempSrc:       SpO2:       Weight:       Height:           Evaluated patient after noted to have severe range blood pressures postpartum  No new symptoms  Will give 10mg IV hydralazine at this time and continue to monitor      Discussed with Dr Jaky Terry MD  Ob/Gyn R-2  06/01/21 2:44 PM

## 2021-06-01 NOTE — QUICK NOTE
Pt's blood pressure still elevated after 40mg of IV labetalol  80mg given, severe persistent blood pressure  D/w Dr Phil Argueta  5mg IV hydralazine ordered now, will continue to treat and monitor Bps very closely  D/w patient and she is aware of the treatment plan      Fawn Go DO  PGY-4 OB/GYN  6/1/2021 10:33 AM

## 2021-06-01 NOTE — ANESTHESIA PREPROCEDURE EVALUATION
Procedure:  LABOR ANALGESIA    Relevant Problems   ANESTHESIA (within normal limits)      CARDIO   (+) Chronic hypertension with superimposed preeclampsia   (+) Orthopnea      ENDO  BMI 43      /RENAL (within normal limits)      GYN   (+) 35 weeks gestation of pregnancy   (+) High-risk pregnancy, multigravida of advanced maternal age, antepartum      HEMATOLOGY   (+) Maternal anemia, antepartum      MUSCULOSKELETAL (within normal limits)      PULMONARY   (+) Orthopnea        Physical Exam    Airway    Mallampati score: II  TM Distance: >3 FB  Neck ROM: full     Dental   No notable dental hx     Cardiovascular  Rhythm: regular, Rate: normal, Cardiovascular exam normal    Pulmonary  Pulmonary exam normal Breath sounds clear to auscultation,     Other Findings        Anesthesia Plan  ASA Score- 2     Anesthesia Type- epidural with ASA Monitors  Additional Monitors:   Airway Plan:           Plan Factors-    Chart reviewed  Existing labs reviewed  Patient summary reviewed  Induction- intravenous  Postoperative Plan-     Informed Consent- Anesthetic plan and risks discussed with patient

## 2021-06-01 NOTE — OB LABOR/OXYTOCIN SAFETY PROGRESS
Oxytocin Safety Progress Check Note - Terrence Talbert 38 y o  female MRN: [id number]    Unit/Bed#: L&D 325-01 Encounter: [id number]    Dose (adriane-units/min) Oxytocin: 6 adriane-units/min  Contraction Frequency (minutes): 2-3  Contraction Quality: Moderate  Tachysystole: No   Cervical Dilation: 6        Cervical Effacement: 90  Fetal Station: -3  Baseline Rate: 120 bpm  Fetal Heart Rate: 130 BPM  FHR Category: Category II               Vital Signs:   Vitals:    21 1200   BP:    Pulse:    Resp: 16   Temp:    SpO2: 97%           Notes/comments:   Pt complains of worsening pain  Mixon baloon intact with bulging Bag of cha beyond mixon  Mixon emptied and removed  U/s performed to confirm vertex presentation as presenting part not palpable  Vtx confirmed  AROM performed    Continue pitocin  Anticipate     Rodriguez Garcia MD 2021 12:25 PM

## 2021-06-01 NOTE — PROGRESS NOTES
Patient is having pain   Will given pain meds  The tracing is cat 1     Cont watch for the mixon to come out  Watch for control of BP   Watch for fetal well being

## 2021-06-01 NOTE — UTILIZATION REVIEW
Initial Clinical Review  WAS OBSERVATION 5/28/21/21859 CONVERTED TO INPATIENT ADMISSION 5/29/21/21031 DUE TO CONTINUED STAY REQUIRED TO CARE FOR PATIENT WITH  SEVERE PREECLAMPSIA W/PERSISTENT HTN AND HEADACHE   Admission: Date/Time/Statement:   Admission Orders (From admission, onward)     Ordered        05/29/21 1031  Inpatient Admission  Once         05/28/21 1859  Place in Observation  Once                   Orders Placed This Encounter   Procedures    Inpatient Admission     Standing Status:   Standing     Number of Occurrences:   1     Order Specific Question:   Level of Care     Answer:   Med Surg [16]     Order Specific Question:   Estimated length of stay     Answer:   More than 2 Midnights     Order Specific Question:   Certification     Answer:   I certify that inpatient services are medically necessary for this patient for a duration of greater than two midnights  See H&P and MD Progress Notes for additional information about the patient's course of treatment  Initial Presentation: 37 yo 34w5d pregnant fem [id number], NANO 7/4/21, w/hx htn, dm, recurrent miscarriages, from home, directly admitted under observation due to severe hypertension with concern for superimposed preeclampsia with severe features  Presented initially to the OB treatment area as outpt due to 3 days of headache and high bp during which time she was given PO bb dose, then 2 doses IV labetalol for headache    Cites ran out of labetalol and her pharmacy did not have her dose in stock  BP improved; however, headache persisted and decision was made to admit under obs  Exam reveals nontender gravid uterus,  with accelerations, no decels  Analgesics, IVF, fetal monitoring in progress  Will do NST      5/28 PM Update: bp's remain under the severe range, c/o 6/10 periorbital headache  Denies blurred vision or epigastric pain  Will try IV mg, IV steroid, IV reglan for headache, collect 24 hr urine   Holding on betamethasone as delivery is not indicated at this time  GBS collected  Will repeat labs in am and check ferritin  IV venofer will be given for anemia  Date: 5/29 CONVERTED TO INPATIENT ADMISSION  uptitrating PO labetalol from 300mg to 400mg bid, starting IV mg for seizure prophy, will give 2 doses IV betamethasone for fetal lung maturity, recheck cbc/cmp, consult maternal fetal medicine  HGB 9 3 after IV venofer  Headache improved after headache cocktail  No chest pain, sob, contractions, fluid leakage, decreased fetal movement, or vaginal bleeding  Per MFM: suspect she is developing superimposed preeclampsia w/severe features on her chronic HRN  Unremitting headache improved only slightly since admission after tylenol and combination medications, which included IV steroid  Recommend proceeding with delivery due to gestational age of 34w7d  Will give the betamethasone, which will increase her sugars and likely will lead to needing insulin drip  If any changes in maternal or fetal status, will expedite deliver  For now, recheck labs q12 hrs  Need to assess fetal position via US prior to induction  Will use pitocin and misoprostol due to asthma and htn  She has hemorrhage risk  Low threshold to give TXA  NST 05/29/21 Time: 0608 - 1196  Baseline: 130  Variability: moderate  Accelerations: absent  Decelerations: absent  Contractions: present  Assessment: nonreactive; reassuring with moderate variability throughout; discontinuous  Plan: continue externally continuously      5/30 Inpatient Day 2:   During the night, c/o increased headache behind L eye, throbbing, and sharp chest pain that throbs along with the eye pain  Chest pain non reproducible  bp 163/90's  This am, headche persists, 7/10  bp slowly downtrending, was 170's/70/s-80s, then 160's-150's/70's, then  140's/70's  Remains on RA  No edema, No contractions, fetal monitor in place  No bleeding  Remains on IVF and MG gtt, using tylenol and fioricet for headaches  Temp Pulse Resp Blood Pressure SpO2   -- 100 -- 155/87 --         Pain Score       6          Wt Readings from Last 1 Encounters:   05/28/21 115 kg (253 lb)     Additional Vital Signs:   Date/Time  Temp  Pulse  Resp  BP  SpO2  O2 Device    05/30/21 1315  --  --  --  --  99 %  None (Room air)    05/30/21 1225  --  96  --  149/74  --  --    05/30/21 1123  --  98  --  149/82  --  --    05/30/21 1121  98 3 °F (36 8 °C)  --  --  --  --  --    05/30/21 1110  --  --  --  --  98 %  None (Room air)    05/30/21 1022  --  93  --  143/79  --  --    05/30/21 0922  --  100  --  146/79  --  --    05/30/21 0900  --  --  --  --  97 %  None (Room air)    05/30/21 0823  --  91  --  156/79  --  --    05/30/21 0728  --  102  --  146/81  --  --    05/30/21 0700  --  --  --  --  98 %  None (Room air)    05/30/21 0612  --  97  --  148/74  --  --    05/30/21 0512  --  92  --  152/75  --  --    05/30/21 0500  --  --  --  --  --  None (Room air)    05/30/21 0404  --  91  --  150/75  --  --    05/30/21 0401  --  96  --  --  99 %  --    05/30/21 0356  --  94  --  --  98 %  --    05/30/21 0351  --  94  --  --  98 %  --    05/30/21 0346  --  95  --  --  98 %  --    05/30/21 0341  --  95  --  --  98 %  --    05/30/21 0338  --  94  --  163/87  --  --    05/30/21 0336  --  100  --  --  98 %  --    05/30/21 0313  --  --  --  --  98 %  None (Room air)    05/30/21 0204  --  92  --  143/70  --  --    05/30/21 0100  --  --  18  --  98 %  None (Room air)    05/30/21 0004  --  100  --  138/73  --  --    05/29/21 2308  98 8 °F (37 1 °C)  --  18  --  98 %  None (Room air)    05/29/21 2204  --  98  --  138/76  --  --    05/29/21 2106  --  100  --  --  95 %  --    05/29/21 2100  --  --  --  --  --  None (Room air)    05/29/21 2004  --  101  18  142/77  --  --    05/29/21 1900  --  --  --  --  --  None (Room air)    05/29/21 1821  --  110Abnormal   --  140/69  --  --    05/29/21 1820  --  111Abnormal   --  --  97 %  --    05/29/21 1815  --  109Abnormal --  --  97 %  None (Room air)    05/29/21 1810  --  111Abnormal   --  --  97 %  --    05/29/21 1805  --  110Abnormal   --  --  97 %  --    05/29/21 1804  --  113Abnormal   --  140/69  94 %  --    05/29/21 1800  --  111Abnormal   --  --  97 %  --    05/29/21 1755  --  116Abnormal   --  --  98 %  --    05/29/21 1750  --  118Abnormal   --  --  98 %  --    05/29/21 1745  --  108Abnormal   --  --  97 %  --    05/29/21 1740  --  112Abnormal   --  --  96 %  --    05/29/21 1735  --  108Abnormal   --  --  96 %  --    05/29/21 1733  --  111Abnormal   --  --  94 %  --    05/29/21 1730  --  112Abnormal   --  --  97 %  --    05/29/21 1635  --  106Abnormal   --  --  96 %  --    05/29/21 1630  --  119Abnormal   --  --  98 %  --    05/29/21 1625  --  107Abnormal   --  --  96 %  --    05/29/21 1620  --  110Abnormal   --  --  96 %  --    05/29/21 1615  --  112Abnormal   --  138/72  96 %  None (Room air)    05/29/21 1612  --  112Abnormal   --  --  94 %  --    05/29/21 1610  --  110Abnormal   --  --  97 %  --    05/29/21 1415  --  --  --  160/79  97 %  None (Room air)    05/29/21 1204  --  114Abnormal   --  142/73  --  --    05/29/21 1200  --  --  --  --  98 %  None (Room air)    05/29/21 1158  --  108Abnormal   --  139/72  --  --    05/29/21 1055  --  112Abnormal   --  143/73  --  --    05/29/21 1026  --  112Abnormal   --  --  96 %  --    05/29/21 1022  --  111Abnormal   --  157/82  --  --    05/29/21 1011  --  113Abnormal   --  177/85Abnormal   --  --    05/29/21 1000  --  --  --  --  97 %  None (Room air)    05/29/21 0924  --  111Abnormal   --  159/87  --  --    05/29/21 0908  --  110Abnormal   --  153/75  --  --    05/29/21 0907  --  110Abnormal   --  153/75  --  --    05/29/21 0843  --  102  --  159/85  --  --    05/29/21 0823  --  103  --  145/77  --  --    05/29/21 0803  98 4 °F (36 9 °C)  103  18  158/83  --  --    05/29/21 0743  --  94  --  166/79  --  --    05/29/21 0710  --  85  --  165/77  --  --    05/29/21 0700  --  90 --  166/77  --  --        Date/Time  Pulse  BP   05/29/21 0650  80  159/74   05/29/21 0640  86  162/75   05/29/21 0631  90  178/84Abnormal    05/29/21 0620  93  151/75   05/29/21 0615  90  179/79Abnormal    05/29/21 0520  86  171/79Abnormal    05/29/21 0510  84  167/77   05/29/21 0500  85  165/76   05/29/21 0452  83  160/76   05/29/21 0433  88  188/86Abnormal    05/29/21 0323  91  156/69   05/29/21 0313  93  161/79   05/29/21 0213  103  156/85   05/29/21 0134  93  150/77   05/29/21 0113  86  165/80    BP: dr Thalia Shipley at 05/29/21 0113   05/29/21 0032  93  156/78   05/29/21 0015  96  162/77    BP: dr Thalia Shipley aware at 05/29/21 0015   05/28/21 2314  93  175/81Abnormal     BP: dr Thalia Shipley aware at 05/28/21 2314   05/28/21 2213  86  140/74   05/28/21 2114  95  147/75   05/28/21 2113  95  147/75   05/28/21 2006  95  156/82   05/28/21 1951  93  153/85   05/28/21 1936  100  158/86   05/28/21 1921  102  160/87   05/28/21 1907  104  153/83   05/28/21 1851  93  156/88   05/28/21 1836  92  151/93   05/28/21 1822  92  145/94   05/28/21 1806  103  160/77   05/28/21 1752  88  165/87   05/28/21 1736  90  159/84   05/28/21 1721  88  158/81   05/28/21 1706  88  159/84   05/28/21 1651  84  158/83   05/28/21 1636  90  150/78   05/28/21 1621  93  154/76   05/28/21 1606  87  154/76   05/28/21 1551  103  154/77   05/28/21 1546  92  152/71   05/28/21 1536  89  158/76   05/28/21 1521  89  161/77   05/28/21 1513  87  164/77   05/28/21 1507  88  168/93   05/28/21 1436  95  162/90   05/28/21 1421  96  160/87       Pertinent Labs/Diagnostic Test Results:     no rads or EKG  Results from last 7 days   Lab Units 06/01/21  0847 05/31/21 1948 05/31/21  0743 05/30/21 1959 05/30/21  0735   WBC Thousand/uL 12 20* 11 34* 12 57* 11 07* 11 22*   HEMOGLOBIN g/dL 9 5* 8 9* 8 3* 8 6* 8 5*   HEMATOCRIT % 32 0* 30 0* 27 8* 28 6* 28 9*   PLATELETS Thousands/uL 354 333 309 314 326         Results from last 7 days   Lab Units 06/01/21  0847 05/31/21 1948 05/31/21  0743 05/30/21 1959 05/30/21  0736   SODIUM mmol/L 141 140 141 139 138   POTASSIUM mmol/L 3 4* 3 2* 3 7 3 6 3 5   CHLORIDE mmol/L 105 105 107 107 106   CO2 mmol/L 24 25 25 22 22   ANION GAP mmol/L 12 10 9 10 10   BUN mg/dL 4* 5 6 7 5   CREATININE mg/dL 0 66 0 67 0 64 0 67 0 58*   EGFR ml/min/1 73sq m 131 131 133 131 137   CALCIUM mg/dL 7 9* 7 4* 7 5* 7 6* 7 5*     Results from last 7 days   Lab Units 06/01/21  0847 05/31/21  1948 05/31/21  0743 05/30/21 1959 05/30/21  0736   AST U/L 38 36 32 29 20   ALT U/L 57 48 43 36 28   ALK PHOS U/L 93 88 82 81 87   TOTAL PROTEIN g/dL 6 7 6 6 6 1* 6 3* 6 1*   ALBUMIN g/dL 3 1* 3 0* 2 7* 2 8* 2 6*   TOTAL BILIRUBIN mg/dL 0 28 0 19* 0 12* 0 16* 0 14*     Results from last 7 days   Lab Units 06/01/21  1026 06/01/21  0806 06/01/21  0620 05/31/21 2122 05/31/21  1618 05/31/21  1035 05/31/21  0555 05/30/21  1921 05/30/21  1219 05/30/21  0357 05/29/21  2320 05/29/21  1608   POC GLUCOSE mg/dl 86 85 83 98 84 91 110 136 115 114 135 153*     Results from last 7 days   Lab Units 06/01/21  0847 05/31/21  1948 05/31/21  0743 05/30/21 1959 05/30/21  0736 05/29/21  1916 05/29/21  1239 05/29/21  0606 05/28/21  1418   GLUCOSE RANDOM mg/dL 87 119 100 145* 109 125 144* 126 87       Results from last 7 days   Lab Units 05/28/21  1619 05/28/21  1222   CLARITY UA   --  clear   COLOR UA   --  dark yellow/brian   GLUCOSE UA   --  -   KETONES UA   --  5   BLOOD UA   --  -   PROTEIN UA   --  30   NITRITE UA   --  -   BILIRUBIN UA POC   --  -   UROBILINOGEN UA   --  0 2   LEUKOCYTES UA   --  -   CREATININE UR mg/dL 335 5  --    PROTEIN UR mg/dL 53  --    PROT/CREAT RATIO UR  0 16*  --        Past Medical History:   Diagnosis Date    Diabetes mellitus ([place] [address] )     History of recurrent miscarriages     see OB hx    Hypertension      Present on Admission:   Maternal morbid obesity, antepartum ([facility] )   Chronic hypertension with superimposed preeclampsia   Maternal anemia, antepartum   Diet controlled gestational diabetes mellitus (GDM) in third trimester      Admitting Diagnosis: High blood pressure affecting pregnancy in third trimester, antepartum [O16 3]  Age/Sex: 38 y o  female  Admission Orders:  Scheduled Medications:  betamethasone acetate-betamethasone sodium phosphate, 12 mg, Intramuscular, Q24H  docusate sodium, 100 mg, Oral, BID  ferrous sulfate, 325 mg, Oral, Daily With Breakfast  labetalol, 400 mg, Oral, BID  Labetalol HCl, 80 mg, Intravenous, Once [email or web address]  magnesium sulfate, 4 g, Intravenous, [email or web address]  magnesium sulfate, 2 g, Intravenous, [email or web address]; 5/29 @0800  prenatal multivitamin, 1 tablet, Oral, Daily  iron sucrose (VENOFER) 300 mg in sodium chloride 0 9 % 250 mL IVPB   Dose: 300 mg  Freq: Once Route: IV  Last Dose: Stopped (05/29/21 0300)  Start: 05/28/21 2330 End: 05/29/21 0300    methylPREDNISolone sodium succinate (Solu-MEDROL) 500 mg in sodium chloride 0 9 % 250 mL IVPB   Dose: 500 mg  Freq: Once Route: IV  Last Dose: Stopped (05/29/21 0100)  Start: 05/28/21 2245 End: 05/29/21 0100  iron sucrose (VENOFER) 300 mg in sodium chloride 0 9 % 250 mL IVPB   Dose: 300 mg  Freq: Once Route: IV  Start: 05/30/21 0900 End: 05/30/21 1052    labetalol (NORMODYNE) tablet 300 mg   Dose: 300 mg  Freq: 2 times daily Route: PO  Indications of Use: HYPERTENSION  Start: 05/28/21 1430 End: 05/29/21 0624   (2 doses given on 5/28)    IV labetalol 20mg [email or web address]  IV labetalol 40mg [email or web address], 1535; [email or web address]  IV reglan x 1 5/28 @2302    Continuous IV Infusions:  sodium chloride 0 9 % bolus 1,000 mL   Dose: 1,000 mL  Freq:  Once Route: IV  Last Dose: 1,000 mL (05/28/21 1638)  Start: 05/28/21 1645 End: 05/28/21 1738  magnesium sulfate, 2 g/hr, Intravenous, Continuous  oxytocin, 1-30 adriane-units/min, Intravenous, Titrated  sodium chloride, 50 mL/hr, Intravenous, Continuous      PRN Meds:  acetaminophen, 975 mg, Oral, Q6H PRN x 2 on 5/28, x 1 5/29, x 2 5/30  calcium carbonate, 1,000 mg, Oral, Daily PRN  ondansetron, 4 mg, Intravenous, Q8H PRN  PO fioricet x 1 5/29, x 1 5/30    Gestational dm diet      Network Utilization Review Department  ATTENTION: Please call with any questions or concerns to [phone] and carefully listen to the prompts so that you are directed to the right person  All voicemails are confidential   Jame Giron all requests for admission clinical reviews, approved or denied determinations and any other requests to dedicated fax number below belonging to the campus where the patient is receiving treatment   List of dedicated fax numbers for the Facilities:  [address] DENIALS (Administrative/Medical Necessity) [phone]   [address] (Maternity/NICU/Pediatrics) [phone]   [address] [address] [place] [address] [address] [facility] [address] [facility] [phone]

## 2021-06-01 NOTE — L&D DELIVERY NOTE
DELIVERY NOTE  Laith Hidalgo 38 y o  female MRN: [id number]  Unit/Bed#: L&D 325-01 Encounter: [id number]    Obstetrician:    Dr Dionna Wright MD    Assistant:   Dr Baljit Gipson    Pre-Delivery Diagnosis:   Patient Active Problem List   Diagnosis    35 2 weeks gestation of pregnancy    Chronic hypertension with superimposed preeclampsia with severe features    Maternal morbid obesity, antepartum ([address] )    High-risk pregnancy, multigravida of advanced maternal age, antepartum    Diet controlled gestational diabetes mellitus (GDM) in third trimester    Maternal anemia, antepartum     Post-Delivery Diagnosis:   Same as above - Delivered  Viable male fetus  1st degree laceration    Procedure:  Spontaneous vaginal delivery  Repair 1st degree spontaneous laceration      Specimens:   Cord blood obtained   Placenta; normal appearing, central insertion, intact   Arterial and venous blood gases (below)     Gases:  Umbilical Cord Venous Blood Gas:  Results from last 7 days   Lab Units 06/01/21  1245   PH COV  7 398   PCO2 COV mm HG 42 3   HCO3 COV mmol/L 25 5   BASE EXC COV mmol/L 0 5*   O2 CT CD VB mL/dL 13 9   O2 HGB, VENOUS CORD % 25 1     Umbilical Cord Arterial Blood Gas:  Results from last 7 days   Lab Units 06/01/21  1245   PH COA  7 251   PCO2 COA  69 7*   PO2 COA mm HG 13 9   HCO3 COA mmol/L 29 9*   BASE EXC COA mmol/L 0 7*   O2 CONTENT CORD ART ml/dl 4 8   O2 HGB, ARTERIAL CORD % 23 9       Quantitative Blood Loss:   47 cc           Complications:    None Apparrent    Brief Description of Labor Course:  Laith Hidalgo is a [id number] now [id number] who had initially presented for induction of labor for chronic hypertension with superimposed pre-eclampsia with severe features  She was continued on labetalol and this dose was titrated during her induction  She was induced with cytotec and a mixon balloon was placed   She required multiple treatments with IV hypertensive agents and Magnesium was started during the beginning of the induction  She was AROM'd for large clear fluid and rapidly became complete and had the urge to push  Description of Delivery:   With the assistance of maternal expulsive efforts and gentle downward traction of the fetal head (notably ROT), the anterior (left) shoulder was delivered without difficulty, followed by the remainder of the infant's body and contralateral arm  Patient then delivered a viable male  at 60 [phone] 38 05 over perineum with a 1st degree spontaneous perineal laceration  A nuchal cord was noted x2 and was immediately loose and reduced upon presentation  After delivery of the , delayed clamping of the umbilical cord was undertaken for 30 seconds  The  was noted to have good tone and cry spontaneously  There was no apparent injury to the   The cord was then doubly clamped and cut and the  was passed off to  staff for routine care  Umbilical cord blood and umbilical artery and venous gases were collected  Placenta was delivered at 1229 with fundal massage and gentle traction on the cord with active management of the third stage of labor  Placenta delivered intact with a 3-vessel cord  Active management of the third stage of labor was undertaken with IV pitocin at 250milliunits/min  A bimanual exam was performed  Bleeding was noted to be under control   Outcome:  Living  with APGARS 9, 9 at 1 and 5 minutes, respectively   weight  6 lb 2 oz    Perineal Inspection/Laceration Repair  Inspection of the perineum, vagina, labia, cervix, and urethra revealed a 1st degree laceration which was then repaired in standard fashion with 2-0 Vicryl rapide  The lacerations showed good tissue reapproximation and hemostasis  Conclusion:  Mother and baby are currently recovering nicely in stable condition  Attending Supervision:   Dr Maylin Hicks MD was present for the entire procedure      Isma Lopez DO  PGY-4 OB/GYN   2021 12:49 PM      I agree with the operative report as dictated by Dr Sosa Mclaughlin and edited by me  I was present for and particiapted in the entire procedure      Topher Wheatley MD

## 2021-06-01 NOTE — OB LABOR/OXYTOCIN SAFETY PROGRESS
Labor Progress Note - Rafael Venegas 38 y o  female MRN: [id number]    Unit/Bed#: L&D 325-01 Encounter: [id number]       Contraction Frequency (minutes): n/a  Contraction Quality: Not applicable  Tachysystole: No   Cervical Dilation: 1        Cervical Effacement: 50  Fetal Station: -2  Baseline Rate: 125 bpm  Fetal Heart Rate: 120 BPM  FHR Category: Category I               Vital Signs:   Vitals:    06/01/21 0238   BP: 164/87   Pulse: 81   Resp:    Temp:    SpO2:            Notes/comments:      PROCEDURE:  MIXON BALLOON PLACEMENT    A 24F mixon with a 30cc balloon was selected, SVE was performed and cervix was located, mixon was introduced over sterile gloved hands  Balloon advanced through cervix beyond the internal cervical os  A small amount amount of sterile saline solution was instilled in the balloon to confirm placement  Placement was confirmed to be beyond the internal cervical os  A total of 60cc of sterile saline solution was placed into the balloon  Pt tolerated well  Instructions left with RN to place mixon to gravity with a 1L bag of IV fluid  Notify MD when mixon dislodged  Vaginal Cytotec placed  FHT category I       MD Kj Jacobson MD 6/1/2021 3:27 AM

## 2021-06-01 NOTE — UTILIZATION REVIEW
Inpatient Admission Authorization Request   NOTIFICATION OF INPATIENT ADMISSION/INPATIENT AUTHORIZATION REQUEST   SERVICING FACILITY:   [address] [place] [address], [place] [address] [address] [facility]  Tax ID: [id number]  NPI: [id number]  Place of Service: [facility]patient [facility]  [address]  Place of Service Code: 24     ATTENDING PROVIDER:  Attending Name and NPI#: Patti Tan, [address] Cristian Hansen [[id number]]  Address: [address] [place] [address], [place] [address] [address] [facility]  Phone: [phone]     UTILIZATION REVIEW CONTACT:  [facility] Utilization   Network Utilization Review Department  Phone: [phone]  Fax [phone]  Email: [email or web address]     PHYSICIAN ADVISORY SERVICES:  FOR PLEM-JO-EEPK REVIEW - MEDICAL NECESSITY DENIAL  Phone: [phone]  Fax: [phone]  Email: [email or web address]     TYPE OF REQUEST:  Inpatient Status     ADMISSION INFORMATION:  ADMISSION DATE/TIME: 5/29/21 1031  PATIENT DIAGNOSIS CODE/DESCRIPTION:  High blood pressure affecting pregnancy in third trimester, antepartum [O16 3]  DISCHARGE DATE/TIME: No discharge date for patient encounter  DISCHARGE DISPOSITION (IF DISCHARGED): Home/Self Care     IMPORTANT INFORMATION:  Please contact the [facility] directly with any questions or concerns regarding this request  Department voicemails are confidential     Send requests for admission clinical reviews, concurrent reviews, approvals, and administrative denials due to lack of clinical to fax [phone]

## 2021-06-02 ENCOUNTER — TELEPHONE (OUTPATIENT)
Dept: PERINATAL CARE | Facility: CLINIC | Age: 36
End: 2021-06-02

## 2021-06-02 LAB
ALBUMIN SERPL BCP-MCNC: 2.7 G/DL (ref 3.5–5)
ALP SERPL-CCNC: 86 U/L (ref 46–116)
ALT SERPL W P-5'-P-CCNC: 64 U/L (ref 12–78)
ANION GAP SERPL CALCULATED.3IONS-SCNC: 9 MMOL/L (ref 4–13)
AST SERPL W P-5'-P-CCNC: 40 U/L (ref 5–45)
BASOPHILS # BLD AUTO: 0.03 THOUSANDS/ΜL (ref 0–0.1)
BASOPHILS NFR BLD AUTO: 0 % (ref 0–1)
BILIRUB SERPL-MCNC: 0.22 MG/DL (ref 0.2–1)
BUN SERPL-MCNC: 4 MG/DL (ref 5–25)
CALCIUM ALBUM COR SERPL-MCNC: 8.6 MG/DL (ref 8.3–10.1)
CALCIUM SERPL-MCNC: 7.6 MG/DL (ref 8.3–10.1)
CHLORIDE SERPL-SCNC: 106 MMOL/L (ref 100–108)
CO2 SERPL-SCNC: 25 MMOL/L (ref 21–32)
CREAT SERPL-MCNC: 0.7 MG/DL (ref 0.6–1.3)
EOSINOPHIL # BLD AUTO: 0.19 THOUSAND/ΜL (ref 0–0.61)
EOSINOPHIL NFR BLD AUTO: 1 % (ref 0–6)
ERYTHROCYTE [DISTWIDTH] IN BLOOD BY AUTOMATED COUNT: 16.5 % (ref 11.6–15.1)
GFR SERPL CREATININE-BSD FRML MDRD: 129 ML/MIN/1.73SQ M
GLUCOSE SERPL-MCNC: 89 MG/DL (ref 65–140)
HCT VFR BLD AUTO: 30.3 % (ref 34.8–46.1)
HGB BLD-MCNC: 9 G/DL (ref 11.5–15.4)
IMM GRANULOCYTES # BLD AUTO: 0.09 THOUSAND/UL (ref 0–0.2)
IMM GRANULOCYTES NFR BLD AUTO: 1 % (ref 0–2)
LYMPHOCYTES # BLD AUTO: 2.07 THOUSANDS/ΜL (ref 0.6–4.47)
LYMPHOCYTES NFR BLD AUTO: 15 % (ref 14–44)
MCH RBC QN AUTO: 21.3 PG (ref 26.8–34.3)
MCHC RBC AUTO-ENTMCNC: 29.7 G/DL (ref 31.4–37.4)
MCV RBC AUTO: 72 FL (ref 82–98)
MONOCYTES # BLD AUTO: 0.83 THOUSAND/ΜL (ref 0.17–1.22)
MONOCYTES NFR BLD AUTO: 6 % (ref 4–12)
NEUTROPHILS # BLD AUTO: 11.09 THOUSANDS/ΜL (ref 1.85–7.62)
NEUTS SEG NFR BLD AUTO: 77 % (ref 43–75)
NRBC BLD AUTO-RTO: 0 /100 WBCS
PLATELET # BLD AUTO: 347 THOUSANDS/UL (ref 149–390)
PMV BLD AUTO: 11.7 FL (ref 8.9–12.7)
POTASSIUM SERPL-SCNC: 3.5 MMOL/L (ref 3.5–5.3)
PROT SERPL-MCNC: 6.2 G/DL (ref 6.4–8.2)
RBC # BLD AUTO: 4.23 MILLION/UL (ref 3.81–5.12)
SODIUM SERPL-SCNC: 140 MMOL/L (ref 136–145)
WBC # BLD AUTO: 14.3 THOUSAND/UL (ref 4.31–10.16)

## 2021-06-02 PROCEDURE — 99024 POSTOP FOLLOW-UP VISIT: CPT | Performed by: OBSTETRICS & GYNECOLOGY

## 2021-06-02 PROCEDURE — 80053 COMPREHEN METABOLIC PANEL: CPT | Performed by: OBSTETRICS & GYNECOLOGY

## 2021-06-02 PROCEDURE — 85025 COMPLETE CBC W/AUTO DIFF WBC: CPT | Performed by: OBSTETRICS & GYNECOLOGY

## 2021-06-02 RX ORDER — HYDROCHLOROTHIAZIDE 12.5 MG/1
12.5 TABLET ORAL DAILY
Status: DISCONTINUED | OUTPATIENT
Start: 2021-06-02 | End: 2021-06-04 | Stop reason: HOSPADM

## 2021-06-02 RX ORDER — LABETALOL 20 MG/4 ML (5 MG/ML) INTRAVENOUS SYRINGE
40 ONCE
Status: COMPLETED | OUTPATIENT
Start: 2021-06-02 | End: 2021-06-02

## 2021-06-02 RX ORDER — LABETALOL 20 MG/4 ML (5 MG/ML) INTRAVENOUS SYRINGE
80 ONCE
Status: COMPLETED | OUTPATIENT
Start: 2021-06-02 | End: 2021-06-02

## 2021-06-02 RX ORDER — LABETALOL 20 MG/4 ML (5 MG/ML) INTRAVENOUS SYRINGE
20 ONCE
Status: COMPLETED | OUTPATIENT
Start: 2021-06-02 | End: 2021-06-02

## 2021-06-02 RX ORDER — NIFEDIPINE 30 MG/1
60 TABLET, EXTENDED RELEASE ORAL DAILY
Status: DISCONTINUED | OUTPATIENT
Start: 2021-06-02 | End: 2021-06-02

## 2021-06-02 RX ORDER — LABETALOL 200 MG/1
400 TABLET, FILM COATED ORAL EVERY 8 HOURS SCHEDULED
Status: DISCONTINUED | OUTPATIENT
Start: 2021-06-02 | End: 2021-06-04 | Stop reason: HOSPADM

## 2021-06-02 RX ORDER — NIFEDIPINE 30 MG/1
60 TABLET, EXTENDED RELEASE ORAL 2 TIMES DAILY
Status: DISCONTINUED | OUTPATIENT
Start: 2021-06-02 | End: 2021-06-04 | Stop reason: HOSPADM

## 2021-06-02 RX ORDER — LABETALOL 200 MG/1
400 TABLET, FILM COATED ORAL EVERY 12 HOURS SCHEDULED
Status: DISCONTINUED | OUTPATIENT
Start: 2021-06-02 | End: 2021-06-02

## 2021-06-02 RX ADMIN — SODIUM CHLORIDE 50 ML/HR: 0.9 INJECTION, SOLUTION INTRAVENOUS at 04:59

## 2021-06-02 RX ADMIN — MAGNESIUM SULFATE HEPTAHYDRATE 2 G/HR: 40 INJECTION, SOLUTION INTRAVENOUS at 07:34

## 2021-06-02 RX ADMIN — DOCUSATE SODIUM 100 MG: 100 CAPSULE ORAL at 08:45

## 2021-06-02 RX ADMIN — LABETALOL HYDROCHLORIDE 400 MG: 200 TABLET, FILM COATED ORAL at 10:45

## 2021-06-02 RX ADMIN — LABETALOL HYDROCHLORIDE 400 MG: 200 TABLET, FILM COATED ORAL at 18:25

## 2021-06-02 RX ADMIN — LABETALOL 20 MG/4 ML (5 MG/ML) INTRAVENOUS SYRINGE 20 MG: at 07:49

## 2021-06-02 RX ADMIN — NIFEDIPINE 60 MG: 30 TABLET, FILM COATED, EXTENDED RELEASE ORAL at 17:30

## 2021-06-02 RX ADMIN — LABETALOL 20 MG/4 ML (5 MG/ML) INTRAVENOUS SYRINGE 40 MG: at 08:54

## 2021-06-02 RX ADMIN — DOCUSATE SODIUM 100 MG: 100 CAPSULE ORAL at 17:30

## 2021-06-02 RX ADMIN — NIFEDIPINE 60 MG: 30 TABLET, FILM COATED, EXTENDED RELEASE ORAL at 05:32

## 2021-06-02 RX ADMIN — LABETALOL 20 MG/4 ML (5 MG/ML) INTRAVENOUS SYRINGE 80 MG: at 14:05

## 2021-06-02 RX ADMIN — LABETALOL 20 MG/4 ML (5 MG/ML) INTRAVENOUS SYRINGE 80 MG: at 18:49

## 2021-06-02 RX ADMIN — FERROUS SULFATE TAB 325 MG (65 MG ELEMENTAL FE) 325 MG: 325 (65 FE) TAB at 08:45

## 2021-06-02 RX ADMIN — ACETAMINOPHEN 650 MG: 325 TABLET, FILM COATED ORAL at 01:35

## 2021-06-02 RX ADMIN — LABETALOL 20 MG/4 ML (5 MG/ML) INTRAVENOUS SYRINGE 80 MG: at 20:14

## 2021-06-02 RX ADMIN — HYDROCHLOROTHIAZIDE 12.5 MG: 12.5 TABLET ORAL at 20:27

## 2021-06-02 RX ADMIN — IBUPROFEN 600 MG: 600 TABLET, FILM COATED ORAL at 01:35

## 2021-06-02 NOTE — LACTATION NOTE
This note was copied from a baby's chart  Assisted mom with breastfeeding  Baby sleepy  I attempted to awaken him  He latched on and off for about 5 minutes  Mom hand expressed him some colostrum  I instructed mom to start pumping after every feeding to stimulate her milk production, until baby is latching better  I demo  use and cleaning of the pump and assisted mom with first pumping

## 2021-06-02 NOTE — NURSING NOTE
Discussed most recent blood pressures with Dr Kirk Rivera  As per Dr Kirk Rivera okay to take blood pressure Q1 hour  If a blood pressure is severe we will go back to monitoring Q15      Elijah Beal RN  1:58 AM

## 2021-06-02 NOTE — PROGRESS NOTES
Progress Note - OB/GYN   Rayo Capellan 38 y o  female MRN: [id number]  Unit/Bed#: L&D 328-01 Encounter: [id number]    Assessment:  Post partum Day #1 s/p , stable, baby in room with mother    Plan:  1  Post partum   - Continue routine post partum   - Encourage ambulation  - Encourage breastfeeding    2  cHTN with SI pre-eclampsia with severe features  - Systolic (49TAC), HIO:130 , Min:130 , CWW:353   - Diastolic (26NEN), LPI:48, Min:59, Max:96  - Last treated for SRBP @1929 on 21  - Procardia 60mg XL qD, given at 5am today, consider increasing to BID  - Asymptomatic  - Labs stable from prior, no further labs needed  - On Magnesium for seizure prophylaxis until 1230 today    3  Anemia  - S/p venofer 21  - Stable from prior at 9 0    4  Discharge planning  - A1GDM: follow up postpartum with 2h GTT  - Anticipate discharge when blood pressures stabilized        Subjective/Objective   Subjective: This morning, she has no complaints  She feels well overall      Pain: yes, some cramping, improved with meds  Tolerating PO: yes  Voiding: yes  Flatus: yes  BM: no  Ambulating: yes  Breastfeeding:  yes  Chest pain: no  Shortness of breath: no  Leg pain: no  Lochia: minimal    Objective:     Vitals: /65   Pulse 80   Temp 98 5 °F (36 9 °C) (Oral)   Resp 18   Ht 5' 4" (1 626 m)   Wt 115 kg (253 lb)   LMP 2020 (Exact Date)   SpO2 98%   Breastfeeding Yes   BMI 43 43 kg/m²     Vitals:    21 0506 21 0534 21 0537 21 0552   BP: 160/75 146/69 142/66 142/65   Pulse: 79 80 80 80   Resp:       Temp:       TempSrc:       SpO2:       Weight:       Height:               Intake/Output Summary (Last 24 hours) at 2021 0558  Last data filed at 2021 1434  Gross per 24 hour   Intake 1424 17 ml   Output 1072 ml   Net 352 17 ml       Lab Results   Component Value Date    WBC 14 30 (H) 2021    HGB 9 0 (L) 2021    HCT 30 3 (L) 2021    MCV 72 (L) 2021     06/02/2021     Lab Results   Component Value Date     04/28/2015    SODIUM 140 06/02/2021    K 3 5 06/02/2021     06/02/2021    CO2 25 06/02/2021    ANIONGAP 8 04/28/2015    AGAP 9 06/02/2021    BUN 4 (L) 06/02/2021    CREATININE 0 70 06/02/2021    GLUC 89 06/02/2021    GLUF 85 09/28/2017    CALCIUM 7 6 (L) 06/02/2021    AST 40 06/02/2021    ALT 64 06/02/2021    ALKPHOS 86 06/02/2021    PROT 6 4 04/28/2015    TP 6 2 (L) 06/02/2021    BILITOT 0 3 04/28/2015    TBILI 0 22 06/02/2021    EGFR 129 06/02/2021         Physical Exam:   Physical Exam  Constitutional:       General: She is not in acute distress  Appearance: Normal appearance  She is not ill-appearing or toxic-appearing  HENT:      Head: Normocephalic and atraumatic  Eyes:      General: No scleral icterus  Conjunctiva/sclera: Conjunctivae normal    Neck:      Musculoskeletal: Normal range of motion  Cardiovascular:      Rate and Rhythm: Normal rate and regular rhythm  Pulses: Normal pulses  Heart sounds: Normal heart sounds  No murmur  No friction rub  No gallop  Pulmonary:      Effort: Pulmonary effort is normal  No respiratory distress  Breath sounds: Normal breath sounds  No wheezing, rhonchi or rales  Abdominal:      General: There is no distension  Palpations: Abdomen is soft  Tenderness: There is no abdominal tenderness  There is no guarding  Comments: Uterine fundus firm, nontender, at 1cm below the umbilicus   Musculoskeletal: Normal range of motion  Right lower leg: No edema  Left lower leg: No edema  Skin:     General: Skin is warm and dry  Neurological:      General: No focal deficit present  Mental Status: She is alert  Mental status is at baseline     Psychiatric:         Mood and Affect: Mood normal          Behavior: Behavior normal          Eileen Cartwright MD  6/2/2021  5:58 AM

## 2021-06-02 NOTE — PROGRESS NOTES
RN informed me of 2 consecutive SRBP at 168/78 and 163/77  Patient is currently on magnesium since 5/29  She has been feeling dizzy, but she has felt that since the magnesium  She denies headache, chest pain, shortness of breath, RUQ/epigastric pain  Today she has required 20/40 of labetalol for acute treatment of SRBP  Her procardia was increased to 60mg BID and she was started on labetalol 400mg BID  She is urinating frequently  Her bleeding is stable after delivery  /77   Pulse 89   Temp 99 4 °F (37 4 °C) (Oral)   Resp 18   Ht 5' 4" (1 626 m)   Wt 115 kg (253 lb)   LMP 09/16/2020 (Exact Date)   SpO2 98%   Breastfeeding Yes   BMI 43 43 kg/m²     General Appearance: Awake, alert and not in acute distress  CV: RRR   Pulm: Lungs CTA, bilaterally   GI: Soft, non-tender to palpation in all four quadrants  Fundus U-1 and firm  Neuro: Reflexes +2 in UE/LE bilaterally   No ankle clonus present         Assessment and plan:   Patient is a 39 yo with severe hypertension requiring acute treatment     cHTN with SI preeclampsia with severe features   Discontinue magnesium therapy   80mg IV labetalol for severe consecutive severe range blood pressures   Increased labetalol 400mg to TID dosing  Continue Procardia 60mg XL BID    Discussed with Dr Vinh Rios MD  6/2/2021  2:11 PM

## 2021-06-02 NOTE — TELEPHONE ENCOUNTER
Nic Negron from [facility] called to inform the office of [address] [place] [address] delivering on 6/1  She was questioning if she still needs to have her echo on 6/9 ordered by you ?

## 2021-06-02 NOTE — PLAN OF CARE
Problem: Potential for Falls  Goal: Patient will remain free of falls  Description: INTERVENTIONS:  - Assess patient frequently for physical needs  -  Identify cognitive and physical deficits and behaviors that affect risk of falls    -  [place] fall precautions as indicated by assessment   - Educate patient/family on patient safety including physical limitations  - Instruct patient to call for assistance with activity based on assessment  - Modify environment to reduce risk of injury  - Consider OT/PT consult to assist with strengthening/mobility  Outcome: Progressing     Problem: PAIN - ADULT  Goal: Verbalizes/displays adequate comfort level or baseline comfort level  Description: Interventions:  - Encourage patient to monitor pain and request assistance  - Assess pain using appropriate pain scale  - Administer analgesics based on type and severity of pain and evaluate response  - Implement non-pharmacological measures as appropriate and evaluate response  - Consider cultural and social influences on pain and pain management  - Notify physician/advanced practitioner if interventions unsuccessful or patient reports new pain  Outcome: Progressing     Problem: INFECTION - ADULT  Goal: Absence or prevention of progression during hospitalization  Description: INTERVENTIONS:  - Assess and monitor for signs and symptoms of infection  - Monitor lab/diagnostic results  - Monitor all insertion sites, i e  indwelling lines, tubes, and drains  - Monitor endotracheal if appropriate and nasal secretions for changes in amount and color  - [place] appropriate cooling/warming therapies per order  - Administer medications as ordered  - Instruct and encourage patient and family to use good hand hygiene technique  - Identify and instruct in appropriate isolation precautions for identified infection/condition  Outcome: Progressing     Problem: SAFETY ADULT  Goal: Patient will remain free of falls  Description: INTERVENTIONS:  - Assess patient frequently for physical needs  -  Identify cognitive and physical deficits and behaviors that affect risk of falls    -  [place] fall precautions as indicated by assessment   - Educate patient/family on patient safety including physical limitations  - Instruct patient to call for assistance with activity based on assessment  - Modify environment to reduce risk of injury  - Consider OT/PT consult to assist with strengthening/mobility  Outcome: Progressing  Goal: Maintain or return to baseline ADL function  Description: INTERVENTIONS:  -  Assess patient's ability to carry out ADLs; assess patient's baseline for ADL function and identify physical deficits which impact ability to perform ADLs (bathing, care of mouth/teeth, toileting, grooming, dressing, etc )  - Assess/evaluate cause of self-care deficits   - Assess range of motion  - Assess patient's mobility; develop plan if impaired  - Assess patient's need for assistive devices and provide as appropriate  - Encourage maximum independence but intervene and supervise when necessary  - Involve family in performance of ADLs  - Assess for home care needs following discharge   - Consider OT consult to assist with ADL evaluation and planning for discharge  - Provide patient education as appropriate  Outcome: Progressing  Goal: Maintain or return mobility status to optimal level  Description: INTERVENTIONS:  - Assess patient's baseline mobility status (ambulation, transfers, stairs, etc )    - Identify cognitive and physical deficits and behaviors that affect mobility  - Identify mobility aids required to assist with transfers and/or ambulation (gait belt, sit-to-stand, lift, walker, cane, etc )  - [place] fall precautions as indicated by assessment  - Record patient progress and toleration of activity level on Mobility SBAR; progress patient to next Phase/Stage  - Instruct patient to call for assistance with activity based on assessment  - Consider rehabilitation consult to assist with strengthening/weightbearing, etc   Outcome: Progressing     Problem: POSTPARTUM  Goal: Experiences normal postpartum course  Description: INTERVENTIONS:  - Monitor maternal vital signs  - Assess uterine involution and lochia  Outcome: Progressing  Goal: Appropriate maternal -  bonding  Description: INTERVENTIONS:  - Identify family support  - Assess for appropriate maternal/infant bonding   -Encourage maternal/infant bonding opportunities  - Referral to  or  as needed  Outcome: Progressing  Goal: Establishment of infant feeding pattern  Description: INTERVENTIONS:  - Assess breast/bottle feeding  - Refer to lactation as needed  Outcome: Progressing  Goal: Incision(s), wounds(s) or drain site(s) healing without S/S of infection  Description: INTERVENTIONS  - Assess and document risk factors for skin impairment   - Assess and document dressing, incision, wound bed, drain sites and surrounding tissue  - Consider nutrition services referral as needed  - Oral mucous membranes remain intact  - Provide patient/ family education  Outcome: Progressing

## 2021-06-02 NOTE — DISCHARGE SUMMARY
Discharge Summary - Lavern Londono 38 y o  female MRN: [id number]    Unit/Bed#: L&D 328-01 Encounter: [id number]    Admission Date: 2021     Discharge Date: 2021     Admitting Diagnosis:   Patient Active Problem List   Diagnosis    Obesity, morbid, BMI 40 0-49 9 ([facility] [address] )    Maternal morbid obesity, antepartum ([facility] )    High-risk pregnancy, multigravida of advanced maternal age, antepartum    Previous recurrent miscarriages affecting pregnancy, antepartum    Chronic hypertension with superimposed preeclampsia    Maternal anemia, antepartum    Nipple discharge, bloody    Orthopnea    35 weeks gestation of pregnancy    Diet controlled gestational diabetes mellitus (GDM) in third trimester     (spontaneous vaginal delivery)     Discharge Diagnosis:   Same, delivered    Procedures:   spontaneous vaginal delivery    Admitting Attending: Dr Chloe Alvarado MD  Delivery Attending: Dr Fish Ceron  Discharge Attending: Dr Mateusz Serrano Course:     Lavern Lnodono is a 38 y o  [facility] now [phone] who was admitted at 34w5d with severely elevated blood pressures in the setting of chronic hypertension, found to meet criteria for superimposed pre-eclampsia with severe features by blood pressure and headache  She received betamethasone for fetal lung maturity, and her blood pressure regimen was titrated for appropriate control  She was induced with cytotec and a mixon balloon was placed  She required multiple treatments with IV hypertensive agents and Magnesium was started during the beginning of the induction  She was AROM'd for large clear fluid and rapidly became complete and had the urge to push  She then underwent an uncomplicated spontaneous vaginal delivery and delivered a viable male  at 65 on 21  APGARS were 9, 9 at 1 and 5 minutes, respectively   weighed 6lb 2oz   was then transferred to  nursery   Patient tolerated the procedure well and was transferred to postpartum in stable condition  Postpartum, she was maintained on Magnesium for seizure prophylaxis during the first 24 hours  She continued to require titration of blood pressure medications and IV treatment for severe range blood pressures  She was stabilized on a regimen of Labetalol 400mg TID, Procardia 60mg BID, and Hydrochlorothiazide 12 5mg daily  On day of discharge, she was ambulating and able to reasonably perform all ADLs  She was voiding and had appropriate bowel function  Pain was well controlled  She was discharged home on postpartum day #3 without complications  Patient was instructed to follow up with her OB as an outpatient and was given appropriate warnings to call provider if she develops signs of infection or uncontrolled pain  Condition at discharge:   good     Disposition:   Home    Planned Readmission:   No    Discharge Medications:   Please see after visit summary for full list of discharge medications  Discharge instructions :   -Do not place anything (no partner, tampons or douche) in your vagina for 6 weeks  -You may walk for exercise for the first 6 weeks then gradually return to your usual activities    -Please do not drive for 1 week if you have no stitches and for 2 weeks if you have stitches or underwent a  delivery     -You may take baths or shower per your preference    -Please look at your bust (breasts) in the mirror daily and call provider for redness or tenderness or increased warmth  - If you have had a  please look at your incision daily as well and call provider for increasing redness or steady drainage from the incision    -Please call your provider if temperature > 100 4*F or 38* C, worsening pain or a foul discharge

## 2021-06-03 PROCEDURE — 99024 POSTOP FOLLOW-UP VISIT: CPT | Performed by: OBSTETRICS & GYNECOLOGY

## 2021-06-03 RX ADMIN — NIFEDIPINE 60 MG: 30 TABLET, FILM COATED, EXTENDED RELEASE ORAL at 06:13

## 2021-06-03 RX ADMIN — ACETAMINOPHEN 650 MG: 325 TABLET, FILM COATED ORAL at 08:16

## 2021-06-03 RX ADMIN — DOCUSATE SODIUM 100 MG: 100 CAPSULE ORAL at 08:16

## 2021-06-03 RX ADMIN — ACETAMINOPHEN 650 MG: 325 TABLET, FILM COATED ORAL at 00:32

## 2021-06-03 RX ADMIN — LABETALOL HYDROCHLORIDE 400 MG: 200 TABLET, FILM COATED ORAL at 06:13

## 2021-06-03 RX ADMIN — HYDROCHLOROTHIAZIDE 12.5 MG: 12.5 TABLET ORAL at 08:16

## 2021-06-03 RX ADMIN — NIFEDIPINE 60 MG: 30 TABLET, FILM COATED, EXTENDED RELEASE ORAL at 17:53

## 2021-06-03 RX ADMIN — LABETALOL HYDROCHLORIDE 400 MG: 200 TABLET, FILM COATED ORAL at 22:35

## 2021-06-03 RX ADMIN — LABETALOL HYDROCHLORIDE 400 MG: 200 TABLET, FILM COATED ORAL at 13:51

## 2021-06-03 RX ADMIN — FERROUS SULFATE TAB 325 MG (65 MG ELEMENTAL FE) 325 MG: 325 (65 FE) TAB at 08:16

## 2021-06-03 RX ADMIN — DOCUSATE SODIUM 100 MG: 100 CAPSULE ORAL at 17:53

## 2021-06-03 NOTE — PROGRESS NOTES
Progress Note - OB/GYN   Akash Chin 38 y o  female MRN: [id number]  Unit/Bed#: L&D 328-01 Encounter: [id number]      Assessment:  Post partum Day #2 s/p  at 35 weeks for cHTN with SI PreE with severe features, blood pressures now stabilized, baby in room with mother     Plan:  1  Post partum   - Continue routine post partum   - Encourage ambulation  - Encourage breastfeeding     2  cHTN with SI pre-eclampsia with severe features  - Systolic (08IGG), WJB:326 , Min:123 , XYJ:705   - Diastolic (94IPB), LI, Min:57, Max:86  - Last treated for SRBP @2000 on 21, had reached maximum dose of IV labetalol on 21  - Procardia 60mg XL BID, Labetalol 300mg TID, HCTZ 12 5mg  - Asymptomatic  - Labs stable from prior, no further labs needed  - S/p magnesium     3  Anemia  - S/p venofer 21  - Stable from prior at 9 0     4  Discharge planning  - A1GDM: follow up postpartum with 2h GTT  - Anticipate discharge when blood pressures stabilized        Subjective/Objective   Subjective: This morning, she has no complaints   She reports feeling better and is happy that her blood pressures were improved overnight    Pain: yes, some cramping, improved with meds  Tolerating PO: yes  Voiding: yes  Flatus: yes  BM: no  Ambulating: yes  Breastfeeding:  yes  Chest pain: no  Shortness of breath: no  Leg pain: no  Lochia: minimal    Objective:     Vitals: /74   Pulse 78   Temp 98 7 °F (37 1 °C) (Oral)   Resp 18   Ht 5' 4" (1 626 m)   Wt 115 kg (253 lb)   LMP 2020 (Exact Date)   SpO2 98%   Breastfeeding Yes   BMI 43 43 kg/m²       Intake/Output Summary (Last 24 hours) at 6/3/2021 0615  Last data filed at 2021  Gross per 24 hour   Intake --   Output 1400 ml   Net -1400 ml       Lab Results   Component Value Date    WBC 14 30 (H) 2021    HGB 9 0 (L) 2021    HCT 30 3 (L) 2021    MCV 72 (L) 2021     2021       Physical Exam:   Physical Exam  Constitutional: General: She is not in acute distress  Appearance: Normal appearance  She is not ill-appearing or toxic-appearing  HENT:      Head: Normocephalic and atraumatic  Eyes:      General: No scleral icterus  Conjunctiva/sclera: Conjunctivae normal    Neck:      Musculoskeletal: Normal range of motion  Cardiovascular:      Rate and Rhythm: Normal rate and regular rhythm  Pulses: Normal pulses  Heart sounds: Normal heart sounds  No murmur  No gallop  Pulmonary:      Effort: Pulmonary effort is normal  No respiratory distress  Breath sounds: Normal breath sounds  No wheezing, rhonchi or rales  Abdominal:      General: There is no distension  Palpations: Abdomen is soft  Tenderness: There is no abdominal tenderness  There is no guarding or rebound  Comments: Uterine fundus firm, nontender, at 2cm below umbilicus   Musculoskeletal: Normal range of motion  Right lower leg: No edema  Left lower leg: No edema  Skin:     General: Skin is warm and dry  Neurological:      General: No focal deficit present  Mental Status: She is alert  Mental status is at baseline     Psychiatric:         Mood and Affect: Mood normal          Behavior: Behavior normal        Helen Pendleton MD  6/3/2021  6:15 AM [facility]

## 2021-06-03 NOTE — PROGRESS NOTES
Patient with diagnoses of chronic hypertension with superimposed preeclampsia with severe features currently post magnesium infusion  Patient has received several doses of IV labetalol today  At this point reaching a maximum dose of 300 mg daily IV  She is currently on labetalol 300 mg t i d  Since yesterday and Procardia 60 mg b i d  With persistent of severe range blood pressures  After discussion with MFM Dr Temo Tovar decision is to start hydrochlorothiazide 12 5 mg as pre pregnancy  Plan has been discussed with patient, she is agreeable to go to ICU if its necessary  At this time she desire to wait to max out hydralazine and see hydroclorotiazide response  If persistence of severe range BP case will be discussing with intensive care unit in order to consider diltiazem drip    All questions have been answered to her satisfaction    Patient discussed with Dr China Koroma MD

## 2021-06-03 NOTE — LACTATION NOTE
This note was copied from a baby's chart  Spoke with mom about how breastfeeding is going  She verb he was awake and trying more over night, but still not feeding too well  Mom started trying to give baby some formula with a bottle last night, but barely got anything into him  Mom denied donor milk  Peds was in room when I entered  He was fine with mom continuing to breastfeed and pump, said it was up to her if she wanted to give a little formula  I suggested to mom to start triple feeding, because baby has really been too sleepy to latch and when he does, he is mostly on just the nipple  Mom has large nipples and baby has a tongue tie  I discussed the potential problems the tongue tie could cause  I enc mom to make an appointment [facility] to have his tongue evaluated  I spoke with mom about alternate feeding methods and she was interested in learning syringe and finger feeding  I demo  to her how to syringe feed  Baby took 10 ml easily  I talked mom through how to finger feed and how she could use the syringe and feeding tube at the breast, if he latches well  I also enc mom to pump after every feeding now to stimulate her production, until breastfeeding is well established  Enc mom to call for asst as needed,phone# provided

## 2021-06-04 VITALS
DIASTOLIC BLOOD PRESSURE: 79 MMHG | BODY MASS INDEX: 43.19 KG/M2 | RESPIRATION RATE: 17 BRPM | SYSTOLIC BLOOD PRESSURE: 135 MMHG | OXYGEN SATURATION: 99 % | HEART RATE: 95 BPM | TEMPERATURE: 98.5 F | WEIGHT: 253 LBS | HEIGHT: 64 IN

## 2021-06-04 DIAGNOSIS — O11.9 CHRONIC HYPERTENSION WITH SUPERIMPOSED PREECLAMPSIA: ICD-10-CM

## 2021-06-04 PROCEDURE — 99024 POSTOP FOLLOW-UP VISIT: CPT | Performed by: OBSTETRICS & GYNECOLOGY

## 2021-06-04 RX ORDER — NIFEDIPINE 60 MG/1
TABLET, FILM COATED, EXTENDED RELEASE ORAL
Qty: 180 TABLET | Refills: 0 | Status: SHIPPED | OUTPATIENT
Start: 2021-06-04

## 2021-06-04 RX ORDER — HYDROCHLOROTHIAZIDE 12.5 MG/1
TABLET ORAL
Qty: 90 TABLET | Refills: 0 | Status: SHIPPED | OUTPATIENT
Start: 2021-06-04 | End: 2021-09-02

## 2021-06-04 RX ORDER — IBUPROFEN 200 MG
600 TABLET ORAL EVERY 6 HOURS PRN
Start: 2021-06-04

## 2021-06-04 RX ORDER — HYDROCHLOROTHIAZIDE 12.5 MG/1
12.5 TABLET ORAL DAILY
Qty: 60 TABLET | Refills: 0 | Status: SHIPPED | OUTPATIENT
Start: 2021-06-05 | End: 2021-06-04

## 2021-06-04 RX ORDER — LABETALOL 200 MG/1
400 TABLET, FILM COATED ORAL EVERY 8 HOURS SCHEDULED
Qty: 180 TABLET | Refills: 1 | Status: SHIPPED | OUTPATIENT
Start: 2021-06-04 | End: 2021-08-03

## 2021-06-04 RX ORDER — NIFEDIPINE 60 MG/1
60 TABLET, FILM COATED, EXTENDED RELEASE ORAL 2 TIMES DAILY
Qty: 120 TABLET | Refills: 0 | Status: SHIPPED | OUTPATIENT
Start: 2021-06-04 | End: 2021-06-04

## 2021-06-04 RX ADMIN — NIFEDIPINE 60 MG: 30 TABLET, FILM COATED, EXTENDED RELEASE ORAL at 06:03

## 2021-06-04 RX ADMIN — HYDROCHLOROTHIAZIDE 12.5 MG: 12.5 TABLET ORAL at 08:12

## 2021-06-04 RX ADMIN — FERROUS SULFATE TAB 325 MG (65 MG ELEMENTAL FE) 325 MG: 325 (65 FE) TAB at 08:12

## 2021-06-04 RX ADMIN — LABETALOL HYDROCHLORIDE 400 MG: 200 TABLET, FILM COATED ORAL at 06:03

## 2021-06-04 RX ADMIN — DOCUSATE SODIUM 100 MG: 100 CAPSULE ORAL at 08:12

## 2021-06-04 RX ADMIN — IBUPROFEN 600 MG: 600 TABLET, FILM COATED ORAL at 08:12

## 2021-06-04 NOTE — PLAN OF CARE
Problem: Potential for Falls  Goal: Patient will remain free of falls  Description: INTERVENTIONS:  - Assess patient frequently for physical needs  -  Identify cognitive and physical deficits and behaviors that affect risk of falls    -  [place] fall precautions as indicated by assessment   - Educate patient/family on patient safety including physical limitations  - Instruct patient to call for assistance with activity based on assessment  - Modify environment to reduce risk of injury  - Consider OT/PT consult to assist with strengthening/mobility  Outcome: Progressing     Problem: PAIN - ADULT  Goal: Verbalizes/displays adequate comfort level or baseline comfort level  Description: Interventions:  - Encourage patient to monitor pain and request assistance  - Assess pain using appropriate pain scale  - Administer analgesics based on type and severity of pain and evaluate response  - Implement non-pharmacological measures as appropriate and evaluate response  - Consider cultural and social influences on pain and pain management  - Notify physician/advanced practitioner if interventions unsuccessful or patient reports new pain  Outcome: Progressing     Problem: INFECTION - ADULT  Goal: Absence or prevention of progression during hospitalization  Description: INTERVENTIONS:  - Assess and monitor for signs and symptoms of infection  - Monitor lab/diagnostic results  - Monitor all insertion sites, i e  indwelling lines, tubes, and drains  - Monitor endotracheal if appropriate and nasal secretions for changes in amount and color  - [place] appropriate cooling/warming therapies per order  - Administer medications as ordered  - Instruct and encourage patient and family to use good hand hygiene technique  - Identify and instruct in appropriate isolation precautions for identified infection/condition  Outcome: Progressing     Problem: SAFETY ADULT  Goal: Patient will remain free of falls  Description: INTERVENTIONS:  - Assess patient frequently for physical needs  -  Identify cognitive and physical deficits and behaviors that affect risk of falls    -  [place] fall precautions as indicated by assessment   - Educate patient/family on patient safety including physical limitations  - Instruct patient to call for assistance with activity based on assessment  - Modify environment to reduce risk of injury  - Consider OT/PT consult to assist with strengthening/mobility  Outcome: Progressing  Goal: Maintain or return to baseline ADL function  Description: INTERVENTIONS:  -  Assess patient's ability to carry out ADLs; assess patient's baseline for ADL function and identify physical deficits which impact ability to perform ADLs (bathing, care of mouth/teeth, toileting, grooming, dressing, etc )  - Assess/evaluate cause of self-care deficits   - Assess range of motion  - Assess patient's mobility; develop plan if impaired  - Assess patient's need for assistive devices and provide as appropriate  - Encourage maximum independence but intervene and supervise when necessary  - Involve family in performance of ADLs  - Assess for home care needs following discharge   - Consider OT consult to assist with ADL evaluation and planning for discharge  - Provide patient education as appropriate  Outcome: Progressing  Goal: Maintain or return mobility status to optimal level  Description: INTERVENTIONS:  - Assess patient's baseline mobility status (ambulation, transfers, stairs, etc )    - Identify cognitive and physical deficits and behaviors that affect mobility  - Identify mobility aids required to assist with transfers and/or ambulation (gait belt, sit-to-stand, lift, walker, cane, etc )  - [place] fall precautions as indicated by assessment  - Record patient progress and toleration of activity level on Mobility SBAR; progress patient to next Phase/Stage  - Instruct patient to call for assistance with activity based on assessment  - Consider rehabilitation consult to assist with strengthening/weightbearing, etc   Outcome: Progressing     Problem: POSTPARTUM  Goal: Experiences normal postpartum course  Description: INTERVENTIONS:  - Monitor maternal vital signs  - Assess uterine involution and lochia  Outcome: Progressing  Goal: Appropriate maternal -  bonding  Description: INTERVENTIONS:  - Identify family support  - Assess for appropriate maternal/infant bonding   -Encourage maternal/infant bonding opportunities  - Referral to  or  as needed  Outcome: Progressing  Goal: Establishment of infant feeding pattern  Description: INTERVENTIONS:  - Assess breast/bottle feeding  - Refer to lactation as needed  Outcome: Progressing  Goal: Incision(s), wounds(s) or drain site(s) healing without S/S of infection  Description: INTERVENTIONS  - Assess and document risk factors for skin impairment   - Assess and document dressing, incision, wound bed, drain sites and surrounding tissue  - Consider nutrition services referral as needed  - Oral mucous membranes remain intact  - Provide patient/ family education  Outcome: Progressing

## 2021-06-04 NOTE — NURSING NOTE
Patient given discharge teaching at this time  Pt given packet and POST-BIRTH magnet  Pt encouraged to ask questions during the remainder of her hospital stay  Pt verbalizes understanding of teaching at this time

## 2021-06-04 NOTE — PLAN OF CARE
Problem: Potential for Falls  Goal: Patient will remain free of falls  Description: INTERVENTIONS:  - Assess patient frequently for physical needs  -  Identify cognitive and physical deficits and behaviors that affect risk of falls    -  [place] fall precautions as indicated by assessment   - Educate patient/family on patient safety including physical limitations  - Instruct patient to call for assistance with activity based on assessment  - Modify environment to reduce risk of injury  - Consider OT/PT consult to assist with strengthening/mobility  Outcome: Adequate for Discharge     Problem: PAIN - ADULT  Goal: Verbalizes/displays adequate comfort level or baseline comfort level  Description: Interventions:  - Encourage patient to monitor pain and request assistance  - Assess pain using appropriate pain scale  - Administer analgesics based on type and severity of pain and evaluate response  - Implement non-pharmacological measures as appropriate and evaluate response  - Consider cultural and social influences on pain and pain management  - Notify physician/advanced practitioner if interventions unsuccessful or patient reports new pain  Outcome: Adequate for Discharge     Problem: INFECTION - ADULT  Goal: Absence or prevention of progression during hospitalization  Description: INTERVENTIONS:  - Assess and monitor for signs and symptoms of infection  - Monitor lab/diagnostic results  - Monitor all insertion sites, i e  indwelling lines, tubes, and drains  - Monitor endotracheal if appropriate and nasal secretions for changes in amount and color  - [place] appropriate cooling/warming therapies per order  - Administer medications as ordered  - Instruct and encourage patient and family to use good hand hygiene technique  - Identify and instruct in appropriate isolation precautions for identified infection/condition  Outcome: Adequate for Discharge     Problem: SAFETY ADULT  Goal: Patient will remain free of falls  Description: INTERVENTIONS:  - Assess patient frequently for physical needs  -  Identify cognitive and physical deficits and behaviors that affect risk of falls    -  [place] fall precautions as indicated by assessment   - Educate patient/family on patient safety including physical limitations  - Instruct patient to call for assistance with activity based on assessment  - Modify environment to reduce risk of injury  - Consider OT/PT consult to assist with strengthening/mobility  Outcome: Adequate for Discharge  Goal: Maintain or return to baseline ADL function  Description: INTERVENTIONS:  -  Assess patient's ability to carry out ADLs; assess patient's baseline for ADL function and identify physical deficits which impact ability to perform ADLs (bathing, care of mouth/teeth, toileting, grooming, dressing, etc )  - Assess/evaluate cause of self-care deficits   - Assess range of motion  - Assess patient's mobility; develop plan if impaired  - Assess patient's need for assistive devices and provide as appropriate  - Encourage maximum independence but intervene and supervise when necessary  - Involve family in performance of ADLs  - Assess for home care needs following discharge   - Consider OT consult to assist with ADL evaluation and planning for discharge  - Provide patient education as appropriate  Outcome: Adequate for Discharge  Goal: Maintain or return mobility status to optimal level  Description: INTERVENTIONS:  - Assess patient's baseline mobility status (ambulation, transfers, stairs, etc )    - Identify cognitive and physical deficits and behaviors that affect mobility  - Identify mobility aids required to assist with transfers and/or ambulation (gait belt, sit-to-stand, lift, walker, cane, etc )  - [place] fall precautions as indicated by assessment  - Record patient progress and toleration of activity level on Mobility SBAR; progress patient to next Phase/Stage  - Instruct patient to call for assistance with activity based on assessment  - Consider rehabilitation consult to assist with strengthening/weightbearing, etc   Outcome: Adequate for Discharge     Problem: POSTPARTUM  Goal: Experiences normal postpartum course  Description: INTERVENTIONS:  - Monitor maternal vital signs  - Assess uterine involution and lochia  Outcome: Adequate for Discharge  Goal: Appropriate maternal -  bonding  Description: INTERVENTIONS:  - Identify family support  - Assess for appropriate maternal/infant bonding   -Encourage maternal/infant bonding opportunities  - Referral to  or  as needed  Outcome: Adequate for Discharge  Goal: Establishment of infant feeding pattern  Description: INTERVENTIONS:  - Assess breast/bottle feeding  - Refer to lactation as needed  Outcome: Adequate for Discharge  Goal: Incision(s), wounds(s) or drain site(s) healing without S/S of infection  Description: INTERVENTIONS  - Assess and document risk factors for skin impairment   - Assess and document dressing, incision, wound bed, drain sites and surrounding tissue  - Consider nutrition services referral as needed  - Oral mucous membranes remain intact  - Provide patient/ family education  Outcome: Adequate for Discharge

## 2021-06-04 NOTE — CASE MANAGEMENT
CM received consult pt requesting a donated breast pump  [facility]  Received a breast pump with 1st pregnancy however she no longer has it  CM called [employer] ([phone]), s/w Rosie, pt was approved for an [address] Breast Pump at no cost   Breast pump will be mailed to pt's home ([address])  Pt should receive the breast pump in 3-5 business days  CM notified the pt of this and provided her with [phone] phone number should she have any questions or concerns

## 2021-06-04 NOTE — UTILIZATION REVIEW
Notification of Discharge   This is a Notification of Discharge from our facility [address]  Please be advised that this patient has been discharge from our facility  Below you will find the admission and discharge date and time including the patients disposition  UTILIZATION REVIEW CONTACT:  Yael Ocampo  Utilization   Network Utilization Review Department  Phone: [phone] x carefully listen to the prompts  All voicemails are confidential   Email: [email or web address]     PHYSICIAN ADVISORY SERVICES:  FOR FANZ-CB-SUJU REVIEW - MEDICAL NECESSITY DENIAL  Phone: [phone]  Fax: [phone]  Email: [email or web address]     PRESENTATION DATE: 5/28/2021  1:41 PM  OBERVATION ADMISSION DATE:   INPATIENT ADMISSION DATE: 5/29/21 1031   DISCHARGE DATE: 6/4/2021  1:30 PM  DISPOSITION: Home/Self Care Home/Self Care      IMPORTANT INFORMATION:  Send all requests for admission clinical reviews, approved or denied determinations and any other requests to dedicated fax number below belonging to the campus where the patient is receiving treatment   List of dedicated fax numbers:  [address] DENIALS (Administrative/Medical Necessity) [phone]   [address] [place] [address] (Maternity/NICU/Pediatrics) [phone]   [facility] [phone]   [facility] [place] [phone]   [facility] [phone]   [address] [phone]   [facility]  [phone]   [address] [facility] [address]  [address] [facility] [address] [facility] [phone]

## 2021-06-04 NOTE — PROGRESS NOTES
Progress Note - OB/GYN   Terrence Talbert 38 y o  female MRN: [id number]  Unit/Bed#: L&D 328-01 Encounter: [id number]     Assessment:  Post partum Day #3 s/p  at 35 weeks for cHTN with SI PreE with severe features, blood pressures now stabilized, baby in room with mother     Plan:  1  Post partum   - Continue routine post partum   - Encourage ambulation  - Encourage breastfeeding     2  cHTN with SI pre-eclampsia with severe features  - Systolic (27XBN), DBH:499 , Min:134 , BQD:993   - Diastolic (62NYC), YYZ:09, Min:68, Max:75  - Last treated for SRBP @2000 on 21  - Stabilized on Procardia 60mg XL BID, Labetalol 300mg TID, HCTZ 12 5mg for past 24 hours  - Asymptomatic  - Labs stable from prior, no further labs needed  - S/p magnesium     3  Anemia  - S/p venofer 21  - Stable from prior at 9 0     4  Discharge planning  - A1GDM: follow up postpartum with 2h GTT  - Anticipate discharge today       Subjective/Objective   Subjective: This morning, she has no complaints       Pain: cramping improved with meds  Tolerating PO: yes  Voiding: yes  Flatus: yes  BM: yes  Ambulating: yes  Breastfeeding:  yes  Chest pain: no  Shortness of breath: no  Leg pain: no  Lochia: minimal    Objective:     Vitals: /70   Pulse 103   Temp 98 9 °F (37 2 °C) (Oral)   Resp 18   Ht 5' 4" (1 626 m)   Wt 115 kg (253 lb)   LMP 2020 (Exact Date)   SpO2 98%   Breastfeeding Yes   BMI 43 43 kg/m²     No intake or output data in the 24 hours ending 21 0607    Lab Results   Component Value Date    WBC 14 30 (H) 2021    HGB 9 0 (L) 2021    HCT 30 3 (L) 2021    MCV 72 (L) 2021     2021       Physical Exam:   Physical Exam  NAD  Breathing comfortably on room air  Abdomen soft, nontender, nondistended  Uterine fundus firm, nontender, at 2cm below the umbilicus  WWP, intact distal pulses    Deepti Maldonado MD  2021  6:07 AM

## 2021-06-04 NOTE — DISCHARGE INSTRUCTIONS
Self Care After Delivery   AMBULATORY CARE:   The postpartum period  is the period of time from delivery to about 6 weeks  During this time you may experience many physical and emotional changes  It is important to understand what is normal and when you need to call your healthcare provider  It is also important to know how to care for yourself during this time  Call your local emergency number (911 in the [address] [facility]) for any of the following:   · You see or hear things that are not there, or have thoughts of harming yourself or your baby  · You soak through 1 pad in 15 minutes, have blurry vision, clammy or pale skin, and feel faint  · You faint or lose consciousness  · You have trouble breathing  · You cough up blood  · Your  incision comes apart  Seek care immediately if:   · Your heart is beating faster than usual     · You have a bad headache or changes in your vision  · Your episiotomy or  incision is red, swollen, bleeding, or draining pus  · You have severe abdominal pain  Call your doctor or obstetrician if:   · Your leg is painful, red, and larger than usual     · You soak through 1 or more pads in an hour, or pass blood clots larger than a quarter from your vagina  · You have a fever  · You have new or worsening pain in your abdomen or vagina  · You continue to have depression 1 to 2 weeks after you deliver  · You have trouble sleeping  · You have foul-smelling discharge from your vagina  · You have pain or burning when you urinate  · You do not have a bowel movement for 3 days or more  · You have nausea or are vomiting  · You have hard lumps or red streaks over your breasts  · You have cracked nipples or bleed from your nipples  · You have questions or concerns about your condition or care  Physical changes:   The following are normal changes after you give birth:  · Pain in the area between your anus and vagina    · Breast pain    · Constipation or hemorrhoids    · Hot or cold flashes    · Vaginal bleeding or discharge    · Mild to moderate abdominal cramping    · Difficulty controlling bowel movements or urine    Emotional changes:  A drop in hormone levels after you deliver may cause changes in your emotions  You may feel irritable, sad, or anxious  You may cry easily or for no reason  You may also feel depressed  Depression that continues can be a sign of postpartum depression, a condition that can be treated  Treatment may include talk therapy, medicines, or both  Healthcare providers will ask how you are feeling and if you have any depression  These talks can happen during appointments for your medical care and for your baby's care, such as well child visits  Providers can help you find ways to care for yourself and your baby  Talk to your providers about the following:  · When emotional changes or depression started, and if it is getting worse over time    · Problems you are having with daily activities, sleep, or caring for your baby    · If anything makes you feel worse, or makes you feel better    · Feeling that you are not bonding with your baby the way you want    · Any problems your baby has with sleeping or feeding    · Your baby is fussy or cries a lot    · Support you have from friends, family, or others    Breast care for breastfeeding mothers: You may have sore breasts for 3 to 6 days after you give birth  This happens as your milk begins to fill your breasts  You may also have sore breasts if you do not breastfeed frequently  Do the following to care for your breasts:  · Apply a moist, warm, compress to your breast as directed  This may help soothe your breasts  Make sure the washcloth is not too hot before you apply it to your breast     · Nurse your baby or pump your milk frequently  This may prevent clogged milk ducts  Ask your healthcare provider how often to nurse or pump      · Massage your breasts as directed  This may help increase your milk flow  Gently rub your breasts in a circular motion before you breastfeed  You may need to gently squeeze your breast or nipple to help release milk  You can also use a breast pump to help release milk from your breast     · Wash your breasts with warm water only  Do not put soap on your nipples  Soap may cause your nipples to become dry  · Apply lanolin cream to your nipples as directed  Lanolin cream may add moisture to your skin and prevent nipple dryness  Always  wash off lanolin cream with warm water before you breastfeed  · Place pads in your bra  Your nipples may leak milk when you are not breastfeeding  You can place pads inside of your bra to help prevent leaking onto your clothing  Ask your healthcare provider where to purchase bra pads  · Get breastfeeding support if needed  Healthcare providers can answer questions about breastfeeding and provide you with support  Ask your healthcare provider who you can contact if you need breastfeeding support  Breast care for non-breastfeeding mothers:  Milk will fill your breasts even if you bottle feed your baby  Do the following to help stop your milk from filling your breasts and causing pain:  · Wear a bra with support at all times  A sports bra or a tight-fitting bra will help stop your milk from coming in  · Apply ice on each breast for 15 to 20 minutes every hour or as directed  Use an ice pack, or put crushed ice in a plastic bag  Cover it with a towel before you apply it to your breast  Ice helps your milk ducts shrink  · Keep your breasts away from warm water  Warm water will make it easier for milk to fill your breasts  Stand with your breasts away from warm water in the shower  · Limit how much you touch your breasts  This will prevent them from filling with milk  Perineum care: Your perineum is the area between your rectum and vagina   It is normal to have swelling and pain in this area after you give birth  If you had an episiotomy, your healthcare provider may give you special instructions  · Clean your perineum after you use the bathroom  This may prevent infection and help with healing  Use a spray bottle with warm water to clean your perineum  You may also gently spray warm water against your perineum when you urinate  Always wipe front to back  · Take a sitz bath as directed  A sitz bath may help relieve swelling and pain  Fill your bath tub or bucket with water up to your hips and sit in the water  Use cold water for 2 days after you deliver  Then use warm water  Ask your healthcare provider for more information about a sitz bath  · Apply ice packs for the first 24 hours or as directed  Use a plastic glove filled with ice or buy an ice pack  Wrap the ice pack or plastic glove in a small towel or wash cloth  Place the ice pack on your perineum for 20 minutes at a time  · Sit on a donut-shaped pillow  This may relieve pressure on your perineum when you sit  · Use wipes that contain medicine or take pills as directed  Your healthcare provider may tell you to use witch hazel pads  You can place witch hazel pads in the refrigerator before you apply them to your perineum  Your provider may also tell you to take NSAIDs  Ask him or her how often to take pills or use the wipes  · Do not go swimming or take tub baths for 4 to 6 weeks or as directed  This will help prevent an infection in your vagina or uterus  Bowel and bladder care: It may take 3 to 5 days to have a bowel movement after you deliver your baby  You can do the following to prevent or manage constipation, and get control of your bowel or bladder:  · Take stool softeners as directed  A stool softener is medicine that will make your bowel movements softer  This may prevent or relieve constipation  A stool softener may also make bowel movements less painful  · Drink plenty of liquids    Ask how much liquid to drink each day and which liquids are best for you  Liquids may help prevent constipation  · Eat foods high in fiber  Examples include fruits, vegetables, grains, beans, and lentils  Ask your healthcare provider how much fiber you need each day  Fiber may prevent constipation  · Do Kegel exercises as directed  Kegel exercises will help strengthen the muscles that control bowel movements and urination  Ask your healthcare provider for more information on Kegel exercises  · Apply cold compresses or medicine to hemorrhoids as directed  This may relieve swelling and pain  Your healthcare provider may tell you to apply ice or wipes that contain medicine to your hemorrhoids  He or she may also tell you to use a sitz bath  Ask your provider for more information on how to manage hemorrhoids  Nutrition:  Good nutrition is important in the postpartum period  It will help you return to a healthy weight, increase your energy levels, and prevent constipation  It will also help you get enough nutrients and calories if you are going to breastfeed your baby  · Eat a variety of healthy foods  Healthy foods include fruits, vegetables, whole-grain breads, low-fat dairy products, beans, lean meats, and fish  You may need 500 to 700 extra calories each day if you breastfeed your baby  You may also need extra protein  · Limit foods with added sugar and high amounts of fat  These foods are high in calories and low in healthy nutrients  Read food labels so you know how much sugar and fat is in the food you want to eat  · Drink 8 to 10 glasses of water per day  Water will help you make plenty of milk for your baby  It will also help prevent constipation  Drink a glass of water every time you breastfeed your baby  · Take vitamins as directed  Ask your healthcare provider what vitamins you need  · Limit caffeine and alcohol if you are breastfeeding    Caffeine and alcohol can get into your breast milk  Caffeine and alcohol can make your baby fussy  They can also interfere with your baby's sleep  Ask your healthcare provider if you can drink alcohol or caffeine  Rest and sleep: You may feel very tired in the postpartum period  Enough sleep will help you heal and give you energy to care for your baby  The following may help you get sleep and rest:  · Nap when your baby naps  Your baby may nap several times during the day  Get rest during this time  · Limit visitors  Many people may want to see you and your baby  Ask friends or family to visit on different days  This will give you time to rest     · Do not plan too much for one day  Put off household chores so that you have time to rest  Gradually do more each day  · Ask for help from family, friends, or neighbors  Ask them to help you with laundry, cleaning, or errands  Also ask someone to watch the baby while you take a nap or relax  Ask your partner to help with the care of your baby  Pump some of your breast milk so your partner can feed your baby during the night  Exercise after delivery:  Wait until your healthcare provider says it is okay to exercise  Exercise can help you lose weight, increase your energy levels, and manage your mood  It can also prevent constipation and blood clots  Start with gentle exercises such as walking  Do more as you have more energy  You may need to avoid abdominal exercises for 1 to 2 weeks after you deliver  Talk to your healthcare provider about an exercise plan that is right for you  Sexual activity after delivery:   · Do not have sex until your healthcare provider says it is okay  You may need to wait 4 to 6 weeks before you have sex  This may prevent infection and allow time to heal     · Your menstrual cycle may begin as soon as 3 weeks after you deliver  Your period may be delayed if you breastfeed your baby  You can become pregnant before you get your first postpartum period   Talk to your healthcare provider about birth control that is right for you  Some types of birth control are not safe during breastfeeding  For support and more information:  Join a support group for new mothers  Ask for help from family and friends with chores, errands, and care of your baby  · Office of Women's Health,  Department of Health and Human Services  [address], [address] [facility]   [place] [address]  [address], [address] [address] [place] [address]  Phone: 1- 272 [phone]  Web Address: www womenshealth gov  ·  [facility] [address] [facility] , [address] [facility]  [address] [facility] , [address] [facility]  Web Address: [email or web address]  Follow up with your doctor or obstetrician as directed: You will need to follow up within 2 to 6 weeks of delivery  Write down your questions so you remember to ask them at your visits  © Copyright [address] Information is for End User's use only and may not be sold, redistributed or otherwise used for commercial purposes  All illustrations and images included in CareNotes® are the copyrighted property of A D A M , Inc  or [address] [place] [address] [facility]  The above information is an  only  It is not intended as medical advice for individual conditions or treatments  Talk to your doctor, nurse or pharmacist before following any medical regimen to see if it is safe and effective for you  Postpartum Bleeding   WHAT YOU NEED TO KNOW:  Postpartum bleeding is vaginal bleeding after childbirth  This bleeding is normal, whether your baby was born vaginally or by   It contains blood and the tissue that lined the inside of your uterus when you were pregnant  DISCHARGE INSTRUCTIONS:   Call 911 for any of the following:   · You are suddenly short of breath and feel lightheaded  · You have sudden chest pain      Seek care immediately if:   · You are breathing faster than normal     · Your heart is beating faster than normal     Contact your healthcare provider if:   · Your bleeding increases, or you have heavy bleeding that soaks a pad in 1 hour for 2 hours in a row  · You pass large blood clots  · You feel dizzy  · You have a low back ache, abdominal pain or tenderness, or loss of appetite  · You are urinating less than usual, or not at all  · You have questions or concerns about your condition or care  What to expect with postpartum bleeding:  Postpartum bleeding usually lasts at least 10 days, and may last longer than 6 weeks  Your bleeding may range from light (barely staining a pad) to heavy (soaking a pad in 1 hour)  Usually, you have heavier bleeding right after childbirth, which slows over the next few weeks until it stops  The bleeding is red or dark brown with clots for the first 1 to 3 days  It then turns pink for several days, and then becomes a white or yellow discharge until it ends  Follow up with your obstetrician as directed:  Do not have sex until your obstetrician says it is okay  Write down your questions so you remember to ask them during your visits  © Copyright [address] [place] [address] Information is for End User's use only and may not be sold, redistributed or otherwise used for commercial purposes  All illustrations and images included in CareNotes® are the copyrighted property of A D A M , Inc  or [address] [place], [place] [address]davey Ibarra   The above information is an  only  It is not intended as medical advice for individual conditions or treatments  Talk to your doctor, nurse or pharmacist before following any medical regimen to see if it is safe and effective for you  Postpartum Perineal Care   WHAT YOU NEED TO KNOW:   Postpartum perineal care is care for your perineum after you have a baby  The perineum is the area between your vagina and anus     DISCHARGE INSTRUCTIONS:   Contact your healthcare provider if:   · You have large blood clots or bright red blood coming from your vagina  · You have abdominal pain, vomiting, and a fever  · You have heavy vaginal bleeding that fills 1 or more sanitary pads in 1 hour  · You have foul-smelling vaginal discharge  · You feel weak or lightheaded  · You have questions or concerns about your condition or care  Care for your perineum:  Healthcare providers will give you a small squirt bottle and show you how to use it  Do the following after you use the toilet and before you put on a new pad:  · Remove the soiled pad    · Use the squirt bottle to rinse your perineum from front to back while you sit on the toilet     · Pat the area dry from front to back with toilet paper or a cotton cloth     · Put on a fresh pad    · Wash your hands    Decrease pain:   · Take a sitz bath  Fill a bathtub with 4 to 6 inches of warm water  You may also use a sitz bath pan that fits over a toilet  Sit in the sitz bath for 20 minutes  Do this 2 to 3 times a day, or as directed  · Apply ice  on your perineal area for 15 to 20 minutes every hour or as directed  Use an ice pack, or put crushed ice in a plastic bag  Cover it with a towel  · Use medicine spray, wipes, or pads as directed  Healthcare providers may give you a medicine spray or wipes soaked with numbing medicine to decrease the pain  Pads that contain an herb called witch hazel may also help reduce pain  Use these after perineal care or a sitz bath  Follow up with your healthcare provider as directed:  Write down your questions so you remember to ask them during your visits  © Copyright [employer] 2021 Information is for End User's use only and may not be sold, redistributed or otherwise used for commercial purposes  All illustrations and images included in CareNotes® are the copyrighted property of [employer] Inc  or Citlaly Ibarra   The above information is an  only   It is not intended as medical advice for individual conditions or treatments  Talk to your doctor, nurse or pharmacist before following any medical regimen to see if it is safe and effective for you  Postpartum Depression   WHAT YOU NEED TO KNOW:   What is postpartum depression? Postpartum depression is a mood disorder that occurs after your baby is born  Your symptoms may last up to 12 months after delivery  Your symptoms may become serious and affect your daily activities and relationships  What are the symptoms of postpartum depression? · Feeling sad, anxious, tearful, discouraged, hopeless, or alone    · Thoughts that you are not a good mother    · Trouble completing daily tasks, concentrating, or remembering things    · Lack of appetite    · Lack interest in your baby    · Feeling restless, irritable, or withdrawn    · An overwhelmed feeling with your new baby and a belief that it will not get better    · Feeling unimportant or guilty most of the time    · Trouble sleeping, even after the baby is asleep    What causes postpartum depression? The exact cause is not known  Hormone levels that increased during pregnancy suddenly drop after your baby is born  This can cause your symptoms  A past episode of postpartum depression or a family history of depression may increase your risk  Postpartum depression may also be trigged by a lack of support at home, stress, or medical problems  How is postpartum depression diagnosed? Healthcare providers will talk to you about how you are feeling and ask if you have any depression  These talks can happen during appointments for your medical care and for your baby's care, such as well child visits   Talk to your providers about the following:  · When you started to feel depressed, and if it is getting worse over time    · Problems you are having with daily activities, sleep, or caring for your baby    · If anything makes your depression worse, or makes you feel better    · Feeling that you are not bonding with your baby the way you want    · Any problems your baby has with sleeping or feeding    · If your baby is fussy or cries a lot    · Support you have from friends, family, or others    How is postpartum depression treated? Treatment may include medicine, talk therapy, or both  · A therapist  can help you find ways to cope with your feelings  This can be done alone or in a group  · Antidepressants  help decrease or stop your symptoms  What can I do to feel better? You may feel better quickly, or if may take a few weeks to feel better  Be patient with yourself  Do the following to take care of yourself:  · Rest as needed  Take a nap or rest while your baby sleeps  Ask someone to watch your baby while you nap  · Get emotional support  Share your feelings with your partner, a friend, or another mother  Ask your partner, friends, or family to help with cooking or cleaning  Do only what is needed and let other things wait until later  · Exercise when you can  Even 5 or 10 minutes of exercise can help improve your mood  Walk around the block or do some stretching exercises  · Eat a variety of healthy foods  Healthy foods include fruits, vegetables, whole-grain breads, low-fat dairy products, beans, lean meats, and fish  Do not skip meals  · Take care of yourself  Shower and dress each day  Write in a journal  Celebrate small achievements, even if it is only 1 thing a day  Try to get out of the house a little each day  Meet a friend for coffee  Call your local emergency number (911 in the [address]) if:   · You think about hurting yourself or your baby  When should I seek immediate care? · Your feelings of depression or sadness are strong  Call your doctor if:   · Your symptoms last most of the day for days in a row  · Your symptoms last more than 1 week  · You have questions or concerns about your condition or care  CARE AGREEMENT:   You have the right to help plan your care   Learn about your health condition and how it may be treated  Discuss treatment options with your healthcare providers to decide what care you want to receive  You always have the right to refuse treatment  The above information is an  only  It is not intended as medical advice for individual conditions or treatments  Talk to your doctor, nurse or pharmacist before following any medical regimen to see if it is safe and effective for you  © Copyright [facility] 2021 Information is for End User's use only and may not be sold, redistributed or otherwise used for commercial purposes  All illustrations and images included in CareNotes® are the copyrighted property of A D A M , Inc  or [employer]           COVID-19 (Coronavirus Disease 2019)   WHAT YOU NEED TO KNOW:   What do I need to know about coronavirus disease 2019 (COVID-19)? COVID-19 is the disease caused by the novel (new) coronavirus first discovered in December 2019  Coronaviruses generally cause upper respiratory (nose, throat, and lung) infections, such as a cold  The new virus can also cause serious lower respiratory conditions, such as pneumonia or acute respiratory distress syndrome (ARDS)  Anyone can develop serious problems from the new virus, but your risk is higher if you are 65 or older  A weak immune system, diabetes, or a heart or lung condition can also increase your risk  What are the signs and symptoms of COVID-19? You may not develop any signs or symptoms  Signs and symptoms that do develop usually start about 5 days after infection but can take 2 to 14 days  Signs and symptoms range from mild to severe  You may feel like you have the flu or a bad cold  Information on COVID-19 is still being learned   Tell your healthcare provider if you think you were infected but develop signs or symptoms not listed below:  · A cough    · Shortness of breath or trouble breathing that may become severe    · A fever of at least 100 4°F, or 38°C (may be lower in adults 65 or older)    · Chills that might include shaking    · Muscle pain, body aches, or a headache    · A sore throat    · Suddenly not being able to taste or smell anything    · Feeling mentally and physically tired (fatigue)    · Congestion (stuffy head and nose), or a runny nose    · Diarrhea, nausea, or vomiting    How is COVID-19 diagnosed? If you think you have COVID-19, call your healthcare provider  In some areas, testing is only done if a person has severe symptoms or is hospitalized  Testing is done more widely in other places  Your provider will tell you what to do based on your symptoms and the rules in your area  In general, the following may be used:  · A viral test  shows if you have a current infection  Samples are taken from your nose and throat, usually with swabs  You may need to wait several days to get the test results  Your healthcare provider will tell you how to get your results  You will need to quarantine (stay physically away from others) until you get your results  If results show you have COVID-19, you will need to quarantine until you are well  Your provider or other health official may give you more directions  You will also need to prevent another infection until it is known if you can get COVID-19 again  · An antibody test  shows if you had a past infection  Blood samples are used for this test  Antibodies are made by your immune system to attack the virus that causes COVID-19  Antibodies will form 1 to 3 weeks after you are infected  It is not known if antibodies prevent a second infection, or for how long a person might be protected  If you have antibodies, you will still need to be careful around others until more is known  · CT scans or x-rays  may be used to check for signs of pneumonia  The 2019 coronavirus causes a specific kind of pneumonia, usually in both lungs  How is COVID-19 treated?   No medicine or specific treatment is currently approved for COVID-19  The following may be used to manage your symptoms or treat the effects of COVID-19:  · Mild symptoms  may get better on their own  If you do not need to be treated in a hospital, you will be given instructions to use at home  Your condition will be closely monitored  You will need to watch for worsening symptoms and seek immediate care if needed  Talk to your healthcare provider about the following:    ? Relieve your symptoms  To soothe a sore throat, gargle with warm salt water, or use throat lozenges or a throat spray  Your healthcare provider may recommend a cough medicine  Drink more liquids to thin and loosen mucus and to prevent dehydration  Use decongestants or saline drops as directed for nasal congestion  ? NSAIDs or acetaminophen  can help lower a fever and relieve body aches or a headache  Follow directions  If not taken correctly, NSAIDs can cause kidney damage and acetaminophen can cause liver damage  · Severe or life-threatening symptoms  are treated in the hospital  You may need a combination of the following:    ? Medicines  may be given to reduce inflammation or to fight the virus  You may also need blood thinners to prevent or treat blood clots  If you have a deep vein thrombosis (DVT) or pulmonary embolism (PE), you may need to keep using blood thinners for 3 months  ? Extra oxygen  may be given if you have respiratory failure  This means your lungs cannot get enough oxygen into your blood and out to your organs  Extra oxygen can help prevent organ failure  ? A ventilator  may be used to help you breathe  ? Convalescent plasma (part of blood)  from a patient who has recovered from COVID-19 may be used  The plasma contains antibodies that can help your body fight the infection  Convalescent plasma is only given to patients who have severe signs and symptoms  How does the 2019 coronavirus spread? The virus spreads quickly and easily   You can become infected if you are in contact with a large amount of the virus, even for a short time  You can also become infected by being around a small amount of virus for a long time  The following are ways the virus is thought to spread, but more information may be coming:  · Droplets are the most common way all coronaviruses spread  The virus can travel in droplets that form when a person talks, coughs, or sneezes  Anyone who breathes in the droplets or gets them in his or her eyes can become infected with the virus  Close personal contact with an infected person is thought to be the main way the virus spreads  Close personal contact means you are within 6 feet (2 meters) of the person  · Person-to-person contact can spread the virus  For example, a person with the virus on his or her hands can spread it by shaking hands with someone  At this time, it does not appear that the virus can be passed to a baby during pregnancy or delivery  The baby can be infected after he or she is born through person-to-person contact  The virus also does not appear to spread in breast milk  If you are pregnant or breastfeeding, talk to your healthcare provider or obstetrician about any concerns you have  · The virus can stay on objects and surfaces  A person can get the virus on his or her hands by touching the object or surface  Infection happens if the person then touches his or her eyes or mouth with unwashed hands  It is not yet known how long the virus can stay on an object or surface  That is why it is important to clean all surfaces that are used regularly  · An infected animal may be able to infect a person who touches it  This may happen at live markets or on a farm  How can everyone lower the risk for COVID-19? The best way to prevent infection is to avoid anyone who is infected, but this can be hard to do  An infected person can spread the virus before signs or symptoms begin, or even if signs or symptoms never develop   The following can help lower the risk for infection:      · Wash your hands often throughout the day  Use soap and water  Rub your soapy hands together, lacing your fingers  Wash the front and back of each hand, and in between your fingers  Use the fingers of one hand to scrub under the fingernails of the other hand  Wash for at least 20 seconds  Rinse with warm, running water for several seconds  Then dry your hands with a clean towel or paper towel  Use hand  that contains alcohol if soap and water are not available  Do not touch your eyes, nose, or mouth without washing your hands first  Teach children how to wash their hands and use hand   · Cover a sneeze or cough  This prevents droplets from traveling from you to others  Turn your face away and cover your mouth and nose with a tissue  Throw the tissue away  Use the bend of your arm if a tissue is not available  Then wash your hands well with soap and water or use hand   Turn and cover your face if you are around someone who is sneezing or coughing  Teach children how to cover a cough or sneeze  · Follow worldwide, national, and local social distancing guidelines  Social distancing means people avoid close physical contact so the virus cannot spread from one person to another  Keep at least 6 feet (2 meters) between you and others  Also keep this distance from anyone who comes to your home, such as someone making a delivery  · Make a habit of not touching your face  It is not known how long the virus can stay on objects and surfaces  If you get the virus on your hands, you can transfer it to your eyes, nose, or mouth and become infected  You can also transfer it to objects, surfaces, or people  Be aware of what you touch when you go out  Examples include handrails and elevator buttons  Try not to touch anything with bare hands unless it is necessary   Wash your hands before you leave your home and when you return  · Clean and disinfect high-touch surfaces and objects often  Use a disinfecting solution or wipes  You can make a solution by diluting 4 teaspoons of bleach in 1 quart (4 cups) of water  Clean and disinfect even if you think no one living in or coming to your home is infected with the virus  You can wipe items with a disinfecting cloth before you bring them into your home  Wash your hands after you handle anything you bring into your home  · Make your immune system as healthy as possible  A weakened immune system makes you more vulnerable to the new coronavirus  No COVID-19 vaccine is available yet  Vaccines such as the flu and pneumonia vaccines can help your immune system  Your healthcare provider can tell you which vaccines to get, and when to get them  Keep your immune system as strong as possible  Do not smoke  Eat healthy foods, exercise regularly, and try to manage stress  Go to bed and wake up at the same times each day  How do I follow social distancing guidelines to help lower the risk for COVID-19? National and local social distancing rules vary  Rules may change over time as restrictions are lifted  Restrictions may return if an outbreak happens where you live  It is important to know and follow all current social distancing rules in your area  The following are general guidelines:  · Limit trips out of your home  You may be able to have food, medicines, and other supplies delivered  If possible, have delivered items left at your door or other area  Try not to have someone hand you an item  You will be so close to the person that the virus can spread between you  · Do not have close physical contact with anyone who does not live in your home  Do not shake hands with, hug, or kiss a person as a greeting  Stand or walk as far from others as possible  If you must use public transportation (such as a bus or subway), try to sit or stand away from others   You can stay safely connected with others through phone calls, e-mail messages, social media websites, and video chats  Check in on anyone who may be having a hard time socially distancing, or who lives alone  Ask administrators at nursing homes or long-term care facilities how you can safely communicate with someone living there  · Wear a cloth face covering around others who do not live in your home  Face coverings help prevent the virus from spreading to others in droplets  You can use a clear face covering if someone needs to read your lips  This is a cloth covering that has plastic over the mouth area so your lips can be seen  Do not use coverings that have breathing valves or vents  The virus can travel out of the valve or vent and be spread to others  Do not take your covering off to talk, cough, or sneeze  Do not use coverings on children younger than 2 years or on anyone who has breathing problems or cannot remove it  · Only allow medical or other necessary professionals into your home  Wear your face covering, and remind professionals to wear a face covering  Remind them to wash their hands when they arrive and before they leave  Do not  let anyone who does not live in your home in, even if the person is not sick  A person can pass the virus to others before symptoms of COVID-19 begin  Some people never even develop symptoms  Children commonly have mild symptoms or no symptoms  It may be hard to tell a child not to hug or kiss you  Explain that this is how he or she can help you stay healthy  · Do not go to someone else's home unless it is necessary  Do not go over to visit, even if the person is lonely  Only go if you need to help him or her  Make sure you both wear face coverings while you are there  · Avoid large gatherings and crowds  Gatherings or crowds of 10 or more individuals can cause the virus to spread  Examples of gatherings include parties, sporting events, [employer] services, and conferences   Crowds may form at beaches, [place], and tourist attractions  Protect yourself by staying away from large gatherings and crowds  · Ask your healthcare provider for other ways to have appointments  You may be able to have appointments without having to go into the provider's office  Some providers offer phone, video, or other types of appointments  You may also be able to get prescriptions for a few months of your medicines at a time  · Stay safe if you must go out to work  You may have a job that can only be done outside your home  Keep physical distance between you and other workers as much as possible  Follow your employer's rules so everyone stays safe  What should I do if I have COVID-19 and am recovering at home? Healthcare providers will give you specific instructions to follow  The following are general guidelines to remind you how to keep others safe until you are well:  · Wash your hands often  Use soap and water as much as possible  You can use hand  that contains alcohol if soap and water are not available  Do not share towels with anyone  If you use paper towels, throw them away in a lined trash can kept in your room or area  Use a covered trash can, if possible  · Do not go out of your home unless it is necessary  You may have to go to your healthcare provider's office for check-ups or to get prescription refills  Do not arrive at the provider's office without an appointment  Providers have to make their offices safe for staff and other patients  · Do not have close physical contact with anyone unless it is necessary  Only have close physical contact with a person giving direct care, or a baby or child you must care for  Family members and friends should not visit you  If possible, stay in a separate area or room of your home if you live with others  No one should go into the area or room except to give you care   You can visit with others by phone, video chat, e-mail, or similar systems  It is important to stay connected with others in your life while you recover  · Wear a face covering while others are near you  This can help prevent droplets from spreading the virus when you talk, sneeze, or cough  Put the covering on before anyone comes into your room or area  Remind the person to cover his or her nose and mouth before going in to provide care for you  · Do not share items  Do not share dishes, towels, or other items with anyone  Items need to be washed after you use them  · Protect your baby  Wash your hands with soap and water often throughout the day  Wear a clean face covering while you breastfeed, or while you express or pump breast milk  If possible, ask someone who is well to care for your baby  You can put breast milk in bottles for the person to use, if needed  Talk to your healthcare provider if you have any questions or concerns about caring for or bonding with your baby  He or she will tell you when to bring your baby in for check-ups and vaccines  He or she will also tell you what to do if you think your baby was infected with the new virus  · Do not handle live animals  Until more is known, it is best not to touch, play with, or handle live animals  Some animals, including pets, have been infected with the new coronavirus  Do not handle or care for animals until you are well  Care includes feeding, petting, and cuddling your pet  Do not let your pet lick you or share your food  Ask someone who is not infected to take care of your pet, if possible  If you must care for a pet, wear a face covering  Wash your hands before and after you give care  · Follow directions from your healthcare provider for being around others after you recover  You will need to wait at least 10 days after symptoms first appeared  Then you will need to have no fever for 24 hours without fever medicine, and no other symptoms  A loss of taste or smell may continue for several months  It is considered okay to be around others if this is your only symptom  It is not known for sure if or for how long a recovered person can pass the virus to others  Your provider may give you instructions, such as continuing social distancing or wearing a face covering around others  How should I take care of someone who has COVID-19? If the person lives in another home, arrange for a time to give care  Remember to bring a few pairs of disposable gloves and a cloth face covering  The following are general guidelines to help you safely care for anyone who has COVID-19:  · Wash your hands often  Wash before and after you go into the person's home, area, or room  Throw paper towels away in a lined trash can that has a lid, if possible  · Do not allow others to go near the person  No one should come into the person's home unless it is necessary  If possible, the person should be in a separate area or room if he or she lives with others  Keep the room's door shut unless you need to go in or out  Have others call, video chat, or e-mail the person if he or she is feeling well enough  The person may feel lonely if he or she is kept separate for a long period of time  Safe communication can help him or her stay connected to family and friends  · Make sure the person's room has good air flow  You may be able to open the window if the weather allows  An air conditioner can also be turned on to help air move  · Contact the person before you go in to give care  Make sure the person is wearing a face covering  Remind him or her to wash his or her hands with soap and water  He or she can use hand  that contains alcohol if soap and water are not available  Put on a face covering before you go in to give care  · Wear gloves while you give care and clean  Clean items the person uses often  Clean countertops, cooking surfaces, and the fronts and insides of the microwave and refrigerator   Clean the shower, toilet, the area around the toilet, the sink, the area around the sink, and faucets  Gather used laundry or bedding  Wash and dry items on the warmest settings the fabric allows  Wash dishes and silverware in hot, soapy water or in a   · Anything you throw away needs to go into a lined trash can  When you need to empty the trash, close the open end of the lining and tie it closed  This helps prevent items the virus is on from spilling out of the trash  Remove your gloves and throw them away  Wash your hands  Where can I find more information? · Centers for Disease Control and Prevention  [address] , [address]  Phone: 6- 110 - [phone]  Web Address: [place]    What should I do if I think I or someone I know may be infected? Do the following to protect others:  · If emergency care is needed,  tell the  about the possible infection, or call ahead and tell the emergency department  · Call a healthcare provider  for instructions if symptoms are mild  Anyone who may be infected should not  arrive without calling first  The provider will need to protect staff members and other patients  · The person who may be infected needs to wear a face covering  while getting medical care  This will help lower the risk of infecting others  Coverings are not used for anyone who is younger than 2 years, has breathing problems, or cannot remove it  The provider can give you instructions for anyone who cannot wear a covering  Call your local emergency number (911 in the [address] [place] [address],[address]) or an emergency department if:   · You have trouble breathing or shortness of breath at rest     · You have chest pain or pressure that lasts longer than 5 minutes  · You become confused or hard to wake  · Your lips or face are blue  · You have a fever of 104°F (40°C) or higher  When should I call my doctor?    · You do not  have symptoms of COVID-19 but had close physical contact within 14 days with someone who tested positive  · You have questions or concerns about your condition or care  CARE AGREEMENT:   You have the right to help plan your care  Learn about your health condition and how it may be treated  Discuss treatment options with your healthcare providers to decide what care you want to receive  You always have the right to refuse treatment  The above information is an  only  It is not intended as medical advice for individual conditions or treatments  Talk to your doctor, nurse or pharmacist before following any medical regimen to see if it is safe and effective for you  © Copyright [address] Information is for End User's use only and may not be sold, redistributed or otherwise used for commercial purposes   All illustrations and images included in CareNotes® are the copyrighted property of [employer]  or [address] [place] [address]

## 2021-06-04 NOTE — UTILIZATION REVIEW
Continued Stay Review  Date: 2021                        Current Patient Class: inpt    Current Level of Care: med surg     HPI:35 y o  female initially admitted OBSERVATION  CONVERTED TO INPATIENT ADMISSION  DUE TO CONTINUED STAY REQUIRED TO CARE FOR PATIENT WITH  SEVERE PREECLAMPSIA W/PERSISTENT HTN AND HEADACHE - plan IOL   Assessment/Plan:   2021 1227 DELIVERY NOTE   Procedure:  Spontaneous vaginal delivery  Repair 1st degree spontaneous laceration    Rayo Capellan is a [id number] now [id number] who had initially presented for induction of labor for chronic hypertension with superimposed pre-eclampsia with severe features  She was continued on labetalol and this dose was titrated during her induction  She was induced with cytotec and a mixon balloon was placed  She required multiple treatments with IV hypertensive agents and Magnesium was started during the beginning of the induction  She was AROM'd for large clear fluid and rapidly became complete and had the urge to push     Patient  delivered a viable male  at 64 , apgars 9 &9; birthweight is 2778 g (6 lb 2 oz)     OB Ressident   cHTN with SI pre-eclampsia with severe features- On procardia 60mg XL qD; given at 5 AM; CONSIDER INCREASING TO bid & CONT ON iv mag FOR SEIZURE prophyl until 1230 today   Routine PP care; s/p Venofer on 5/30    6/3 OB Resident:   Alexsander Wu with SI pre-eclampsia with severe features  - Systolic (57YLN), QXI:966 , Min:123 , XFT:819   - Diastolic (79JRV), FBM:78, Min:57, Max:86  - Last treated for SRBP @ on 21, had reached maximum dose of IV labetalol on 21  - Procardia 60mg XL BID, Labetalol 300mg TID, HCTZ 12 5mg    2021 OB Resident:  Alexsander Wu with SI pre-eclampsia with severe features  - Systolic (23CAS), UMK:577 , Min:134 , OCQ:393   - Diastolic (09UCM), AVU:09, Min:68, Max:75  - Last treated for SRBP @ on 21  - Stabilized on Procardia 60mg XL BID, Labetalol 300mg TID, HCTZ 12 5mg for past 24 hours  - Asymptomatic  - Labs stable from prior, no further labs needed  - S/p magnesium   Routine PP care    Vital Signs:   Vital Signs:       Vitals:     06/01/21 0751   BP: (!) 173/96   Pulse: 89   Resp:     Temp:     SpO2:         06/02/21 1832  --  90  --  173/79Abnormal   --  --  --  --   06/02/21 1829  --  92  --  187/79Abnormal   --  --  --  --   06/02/21 1818  --  92  --  173/86Abnormal   --  --  --  --   06/02/21 1747  --  96  --  153/64  --  --  --  --   06/02/21 1737  --  85  --  163/74  --  --  --  --   06/02/21 1727  --  95  --  156/61  --  --  --  --   06/02/21 1718  --  91  --  165/84  --  --  --  --         6/3/2021:  Vitals: /74   Pulse 78   Temp 98 7 °F (37 1 °C) (Oral)   Resp 18   Ht 5' 4" (1 626 m)   Wt 115 kg (253 lb)   LMP 09/16/2020 (Exact Date)   SpO2 98%    6/4/2021  Vitals: /70   Pulse 103   Temp 98 9 °F (37 2 °C) (Oral)   Resp 18   Ht 5' 4" (1 626 m)   Wt 115 kg (253 lb)   LMP 09/16/2020 (Exact Date)   SpO2 98%    Pertinent Labs/Diagnostic Results:       Results from last 7 days   Lab Units 06/02/21 0459 06/01/21  0847 05/31/21  1948 05/31/21  0743 05/30/21 1959   WBC Thousand/uL 14 30* 12 20* 11 34* 12 57* 11 07*   HEMOGLOBIN g/dL 9 0* 9 5* 8 9* 8 3* 8 6*   HEMATOCRIT % 30 3* 32 0* 30 0* 27 8* 28 6*   PLATELETS Thousands/uL 347 354 333 309 314   NEUTROS ABS Thousands/µL 11 09*  --   --   --   --          Results from last 7 days   Lab Units 06/02/21  0459 06/01/21  0847 05/31/21 1948 05/31/21  0743 05/30/21 1959   SODIUM mmol/L 140 141 140 141 139   POTASSIUM mmol/L 3 5 3 4* 3 2* 3 7 3 6   CHLORIDE mmol/L 106 105 105 107 107   CO2 mmol/L 25 24 25 25 22   ANION GAP mmol/L 9 12 10 9 10   BUN mg/dL 4* 4* 5 6 7   CREATININE mg/dL 0 70 0 66 0 67 0 64 0 67   EGFR ml/min/1 73sq m 129 131 131 133 131   CALCIUM mg/dL 7 6* 7 9* 7 4* 7 5* 7 6*     Results from last 7 days   Lab Units 06/02/21  0459 06/01/21  0847 05/31/21 1948 05/31/21  3836 05/30/21 1959   AST U/L 40 38 36 32 29   ALT U/L 64 57 48 43 36   ALK PHOS U/L 86 93 88 82 81   TOTAL PROTEIN g/dL 6 2* 6 7 6 6 6 1* 6 3*   ALBUMIN g/dL 2 7* 3 1* 3 0* 2 7* 2 8*   TOTAL BILIRUBIN mg/dL 0 22 0 28 0 19* 0 12* 0 16*     Results from last 7 days   Lab Units 06/01/21  1403 06/01/21  1201 06/01/21  1026 06/01/21  0806 06/01/21  0620 05/31/21  2122 05/31/21  1618 05/31/21  1035 05/31/21  0555 05/30/21  1921 05/30/21  1219 05/30/21  0357   POC GLUCOSE mg/dl 92 88 86 85 83 98 84 91 110 136 115 114     Results from last 7 days   Lab Units 06/02/21  0459 06/01/21  0847 05/31/21  1948 05/31/21  0743 05/30/21  1959 05/30/21  0736 05/29/21  1916 05/29/21  1239 05/29/21  0606 05/28/21  1418   GLUCOSE RANDOM mg/dL 89 87 119 100 145* 109 125 144* 126 87           Results from last 7 days   Lab Units 05/28/21  1418   FERRITIN ng/mL 29                     Results from last 7 days   Lab Units 05/28/21  1619 05/28/21  1222   CLARITY UA   --  clear   COLOR UA   --  dark yellow/brian   GLUCOSE UA   --  -   KETONES UA   --  5   BLOOD UA   --  -   PROTEIN UA   --  30   NITRITE UA   --  -   BILIRUBIN UA POC   --  -   UROBILINOGEN UA   --  0 2   LEUKOCYTES UA   --  -   CREATININE UR mg/dL 335 5  --    PROTEIN UR mg/dL 53  --    PROT/CREAT RATIO UR  0 16*  --        Results from last 7 days   Lab Units 05/28/21  1223   URINE CULTURE  10,000-19,000 cfu/ml      Medications:   Scheduled Medications:  docusate sodium, 100 mg, Oral, BID  ferrous sulfate, 325 mg, Oral, Daily With Breakfast  hydrochlorothiazide, 12 5 mg, Oral, Daily  labetalol, 400 mg, Oral, Q8H ALICJA  NIFEdipine, 60 mg, Oral, BID  oxytocin, 62 5 adriane-units/min, Intravenous, Once    Continuous IV Infusions:  sodium chloride, 50 mL/hr, Intravenous, Continuous  IV  magnesium sulfate 20 g/500 mL infusion (premix)  5/29 0817- 6/2 1413 DC   Rate: 50 mL/hr Dose: 2 g/hr  Freq: Continuous Route: IV    IV  oxytocin (PITOCIN) 30 Units in lactated ringers 500 mL infusion 6/1- 2851 & DC 1301  Rate: 1-30 mL/hr Dose: 1-30 adriane-units/min  Freq: Titrated Route: IV  PRN Meds:  acetaminophen, 650 mg, Oral, Q6H PRN  benzocaine-menthol-lanolin-aloe, , Topical, 4x Daily PRN  bisacodyl, 10 mg, Rectal, Daily PRN  calcium carbonate, 1,000 mg, Oral, TID PRN  diphenhydrAMINE, 25 mg, Oral, Q6H PRN  hydrocortisone, 1 application, Topical, 4x Daily PRN  ibuprofen, 600 mg, Oral, Q6H PRN  ondansetron, 4 mg, Intravenous, Q6H PRN  simethicone, 80 mg, Oral, Q6H PRN  witch hazel-glycerin, 1 pad, Topical, Q2H PRN    Discharge Plan: d  Network Utilization Review Department  ATTENTION: Please call with any questions or concerns to [phone] and carefully listen to the prompts so that you are directed to the right person  All voicemails are confidential   Eneida Ramirez all requests for admission clinical reviews, approved or denied determinations and any other requests to dedicated fax number below belonging to the campus where the patient is receiving treatment   List of dedicated fax numbers for the Facilities:  [address] DENIALS (Administrative/Medical Necessity) [phone]   [address] (Maternity/NICU/Pediatrics) [phone]   [address] [place] [address] [address] [address] [place] [address] [address] [facility] [place] [address] [phone]

## 2021-06-04 NOTE — NURSING NOTE
Patient educated extensively on not giving baby pacifier in car  Pt verbalized understanding at this time

## 2021-06-07 PROBLEM — O09.529 HIGH-RISK PREGNANCY, MULTIGRAVIDA OF ADVANCED MATERNAL AGE, ANTEPARTUM: Status: RESOLVED | Noted: 2020-11-25 | Resolved: 2021-06-07

## 2021-06-07 PROBLEM — Z3A.35 35 WEEKS GESTATION OF PREGNANCY: Status: RESOLVED | Noted: 2021-03-24 | Resolved: 2021-06-07

## 2021-06-07 RX ORDER — LABETALOL 200 MG/1
TABLET, FILM COATED ORAL
Qty: 540 TABLET | OUTPATIENT
Start: 2021-06-07

## 2021-06-10 ENCOUNTER — OFFICE VISIT (OUTPATIENT)
Dept: POSTPARTUM | Facility: CLINIC | Age: 36
End: 2021-06-10
Payer: COMMERCIAL

## 2021-06-10 VITALS — DIASTOLIC BLOOD PRESSURE: 86 MMHG | SYSTOLIC BLOOD PRESSURE: 132 MMHG

## 2021-06-10 DIAGNOSIS — Z71.89 ENCOUNTER FOR BREAST FEEDING COUNSELING: Primary | ICD-10-CM

## 2021-06-10 PROCEDURE — 99211 OFF/OP EST MAY X REQ PHY/QHP: CPT | Performed by: PEDIATRICS

## 2021-06-10 NOTE — PATIENT INSTRUCTIONS
Continue to feed Blaine at least every 3 hours using paced bottle feeding technique  Pumping as often as as Blaine feeds, or at least 6-8 times a day, will help establish milk supply until he is able to breastfeed effectively  When pumping, cycle your pump through stimulation and expression mode several times in a session to stimulate several let downs  Use hands on pumping and hand expression to increase your output  Maintain your pump as recommended  Use flange that fits comfortably and allows the breast to empty effectively  Spend as much time as you can skin to skin with Blaine and allow him to latch for comfort sucking as desired  Follow up with Dr Liang Resides as scheduled  Please call with any questions or concerns

## 2021-06-11 ENCOUNTER — TELEPHONE (OUTPATIENT)
Dept: OBGYN CLINIC | Facility: CLINIC | Age: 36
End: 2021-06-11

## 2021-06-11 NOTE — TELEPHONE ENCOUNTER
FYI patient had appointment today that she had rescheduled to the 16th, states it's raining and it will be hard for her to get her with the baby   Went to baby and me yesterday BP was 135/72

## 2021-06-15 PROBLEM — O26.20 PREVIOUS RECURRENT MISCARRIAGES AFFECTING PREGNANCY, ANTEPARTUM: Status: RESOLVED | Noted: 2020-11-25 | Resolved: 2021-06-15

## 2021-06-15 PROBLEM — E66.01 MATERNAL MORBID OBESITY, ANTEPARTUM (HCC): Status: RESOLVED | Noted: 2020-11-25 | Resolved: 2021-06-15

## 2021-06-15 PROBLEM — O99.210 MATERNAL MORBID OBESITY, ANTEPARTUM (HCC): Status: RESOLVED | Noted: 2020-11-25 | Resolved: 2021-06-15

## 2021-06-16 ENCOUNTER — OFFICE VISIT (OUTPATIENT)
Dept: OBGYN CLINIC | Facility: CLINIC | Age: 36
End: 2021-06-16
Payer: COMMERCIAL

## 2021-06-16 ENCOUNTER — OFFICE VISIT (OUTPATIENT)
Dept: POSTPARTUM | Facility: CLINIC | Age: 36
End: 2021-06-16

## 2021-06-16 VITALS
TEMPERATURE: 97.7 F | BODY MASS INDEX: 39.47 KG/M2 | HEIGHT: 64 IN | SYSTOLIC BLOOD PRESSURE: 142 MMHG | WEIGHT: 231.2 LBS | DIASTOLIC BLOOD PRESSURE: 84 MMHG | HEART RATE: 74 BPM

## 2021-06-16 VITALS — DIASTOLIC BLOOD PRESSURE: 94 MMHG | SYSTOLIC BLOOD PRESSURE: 142 MMHG

## 2021-06-16 DIAGNOSIS — O99.019 ANEMIA DURING PREGNANCY: ICD-10-CM

## 2021-06-16 DIAGNOSIS — O11.9 CHRONIC HYPERTENSION WITH SUPERIMPOSED PREECLAMPSIA: Primary | ICD-10-CM

## 2021-06-16 DIAGNOSIS — O92.79 NURSING DIFFICULTY: ICD-10-CM

## 2021-06-16 DIAGNOSIS — O92.29 SORE NIPPLES DUE TO LACTATION: ICD-10-CM

## 2021-06-16 DIAGNOSIS — I10 BENIGN ESSENTIAL HYPERTENSION: ICD-10-CM

## 2021-06-16 PROCEDURE — 99214 OFFICE O/P EST MOD 30 MIN: CPT | Performed by: OBSTETRICS & GYNECOLOGY

## 2021-06-16 PROCEDURE — 0503F POSTPARTUM CARE VISIT: CPT | Performed by: OBSTETRICS & GYNECOLOGY

## 2021-06-16 RX ORDER — DOCUSATE SODIUM 100 MG/1
100 CAPSULE, LIQUID FILLED ORAL 2 TIMES DAILY
Qty: 60 CAPSULE | Refills: 6 | Status: SHIPPED | OUTPATIENT
Start: 2021-06-16 | End: 2021-12-14

## 2021-06-16 NOTE — PROGRESS NOTES
Subjective       Chief Complaint   Patient presents with    Follow-up     BP CHECK   21  Patient reports some abdominal pain and lower back pain       Damien Sterling is a 38 y o  female who presents for a postpartum visit  She is 2 weeks postpartum following a spontaneous vaginal delivery  I have fully reviewed the prenatal and intrapartum course  The delivery was at 35 2 gestational weeks  Outcome: spontaneous vaginal delivery  Anesthesia: none  Postpartum course has been wel  Baby's course has been well  Baby is feeding by bottle - Similac Neosure  Bleeding no bleeding  Bowel function is normal  Bladder function is normal  Patient is not sexually active  Contraception method is none  Postpartum depression screening: negative  Abdominal and low back pain    The following portions of the patient's history were reviewed and updated as appropriate: allergies, current medications, past family history, past medical history, past social history, past surgical history and problem list       Last PAP: 20  GDM:  yes      Review of Systems   Constitutional: Negative  HENT: Negative  Eyes: Negative  Respiratory: Negative  Cardiovascular: Negative  Gastrointestinal: Negative  Endocrine: Negative  Genitourinary:        As noted in HPI   Musculoskeletal: Negative  Skin: Negative  Allergic/Immunologic: Negative  Neurological: Negative  Hematological: Negative  Psychiatric/Behavioral: Negative  Objective     /84 (BP Location: Right arm, Patient Position: Sitting, Cuff Size: Standard)   Pulse 74   Temp 97 7 °F (36 5 °C) (Temporal)   Ht 5' 4" (1 626 m)   Wt 105 kg (231 lb 3 2 oz)   BMI 39 69 kg/m²    General:  alert and oriented, in no acute distress   Abdomen: soft, non-tender; bowel sounds normal; no masses,  no organomegaly   No CVA tenderness       Assessment/Plan     2 weeks postpartum exam      1  Contraception: vasectomy  2     Discussed with patient bridge prior to her  Partner having a vasectomy  Discussed with patient options for birth control  She is concerned about weight gain  Discussed with patient nonhormonal medications such as condoms or copper Intrauterine device  Discussed with patient copper Intrauterine device, risks benefits potential complications including abnormal bleeding or heavy periods  We will check benefits and insert Intrauterine device at 6-8 weeks postpartum  3  Follow up in: 4 weeks   For copper Intrauterine device or as needed  4   Patient was referred to nephrology for further management of her blood pressure outside of pregnancy  She should continue the same medications in the same dosage for now as her blood pressure is controlled today  Problem List Items Addressed This Visit        Cardiovascular and Mediastinum    Chronic hypertension with superimposed preeclampsia - Primary      Other Visit Diagnoses     Anemia during pregnancy        Relevant Medications    docusate sodium (COLACE) 100 mg capsule    Benign essential hypertension        Relevant Orders    Ambulatory referral to Nephrology        Counseling / Coordination of Care  Total time spent today25  minutes  Greater than 50% of total time was spent with the patient and / or family counseling and / or coordination of care

## 2021-06-16 NOTE — PATIENT INSTRUCTIONS
May pump after nursing if you feel full or like your breasts haven't been well emptied  Also, pump after nursing during the day if needed to provide supplement expressed breast milk instead of formula if you feel that Blaine needs continued supplement  Formula may be used until you have the needed breast milk

## 2021-06-16 NOTE — PATIENT INSTRUCTIONS
Intrauterine Device   AMBULATORY CARE:   An IUD  is a type of birth control that is inserted into your uterus  It is a small, flexible piece of plastic with a string on the end  It is inserted and removed by your healthcare provider  IUDs prevent sperm from reaching or fertilizing an egg  IUDs also prevent a fertilized egg from attaching to the uterus and developing into a fetus  Common types of IUDs:  Your healthcare provider will recommend the type of IUD that is right for you  This is based on your age and if you have had a child  If you have not had a child, a smaller IUD will be used  · A copper IUD  slowly releases a small amount of copper into your uterus  This IUD can remain in place for up to 10 years  · A hormone-releasing IUD  slowly releases a small amount of progesterone into your uterus  Progesterone is a hormone that is made by your body to help control your periods  This IUD can remain in place for 3 to 5 years  Seek care immediately if:   · You have severe pain or bleeding during your period  · You have a fever and severe abdominal pain  Call your doctor or gynecologist if:   · You think you are pregnant  · The IUD has come out  · You have bleeding from your vagina after you have sex, and it is not your period  · You have pain during sex  · You cannot feel the IUD string, the string feels longer, or you feel the plastic of the IUD itself  · You have vaginal discharge that is green, yellow, or has a foul odor  · You have questions or concerns about your condition or care  Advantages of an IUD:   · An IUD is 98% to 99% effective in preventing pregnancy  · The IUD can be removed by your healthcare provider if you decide to have a baby  You may be able to get pregnant as soon as the IUD is removed  · An IUD protects you from pregnancy right after it is inserted  · You do not have to stop sexual activity to insert it   You do not have to remember to take your birth control pill  · Copper IUDs are safer for some women than oral birth control pills  Examples include women who smoke or have a history of blood clots  · Hormone-releasing IUDs may decrease certain health problems  Examples include bleeding and cramping that happen with your monthly period  Disadvantages of an IUD:   · There is a small chance that you could get pregnant  Sometimes the IUD cannot be removed after you get pregnant  This increases your risk of a miscarriage or an ectopic pregnancy  Ectopic pregnancy is when the fertilized egg starts to grow somewhere other than your uterus  · An IUD does not protect you from sexually transmitted infections  · You may have cramps during the first weeks after you get the IUD  · A copper IUD may cause your monthly period to be heavier or more painful  This is more common within the first 3 months after you get the IUD  You may need to have your IUD removed if your bleeding or pain becomes severe  You may have spotting between periods  · There is a small risk of an infection within the first 20 days after the IUD is placed  Infection can lead to pelvic inflammatory disease  This can cause infertility  · Your uterus may tear when the IUD is inserted  The IUD may slip part or all of the way out of your uterus  Self-care:   · NSAIDs , such as ibuprofen, help decrease swelling, pain, and fever  This medicine is available with or without a doctor's order  NSAIDs can cause stomach bleeding or kidney problems in certain people  If you take blood thinner medicine, always ask if NSAIDs are safe for you  Always read the medicine label and follow directions  · Apply heat to relieve pain and cramping  Use a heating pad set on low  Apply heat to your lower abdomen for 20 minutes every hour, or as directed  · Return to activities as directed    Your healthcare provider will tell you when it is okay to return to work, school, or other activities  · Do not use a tampon or have sex  until your provider says it is okay  Make sure your IUD is in place: An IUD has a string that is made of plastic thread  One to 2 inches of this string hangs into your vagina  You cannot see this string, and it should not cause problems when you have sex  Check your IUD string every 3 days for the first 3 months that you have your IUD  After that, check the string after each monthly period  Do the following to check the placement of your IUD:  · Wash your hands with soap and warm water  Dry them with a clean towel  · Bend your knees and squat low to the ground  · Gently put your index finger inside your vagina  The cervix is at the top of the vagina and feels like the tip of your nose  Feel for the IUD string  Do not pull on the string  You should not be able to feel the firm plastic of the IUD itself  · Wash your hands after you check your IUD string  For more information:   · [facility] [address] , [address]  Phone: 9- 596 - 714-0349  Web Address: https://[employer]  org    Follow up with your healthcare provider as directed:  Write down your questions so you remember to ask them during your visits  © Copyright [employer] 2021 Information is for End User's use only and may not be sold, redistributed or otherwise used for commercial purposes  All illustrations and images included in CareNotes® are the copyrighted property of A D A M , Inc  or [facility] Aneta Ibarra   The above information is an  only  It is not intended as medical advice for individual conditions or treatments  Talk to your doctor, nurse or pharmacist before following any medical regimen to see if it is safe and effective for you

## 2021-06-16 NOTE — PROGRESS NOTES
BREAST FEEDING FOLLOW UP VISIT    Informant/Relationship: Suzan/mom and dad    Discussion of General Lactation Issues: Jayy Elias has been pumping and giving breast milk  She did start out giving Blaine formula due to weight loss concerns  When he does latch, it is painful  Jayy Elias was concerned that her nipples are much larger this time  Infant is 3weeks old today  Interval Breastfeeding History:    Frequency of breast feeding: offered with every feeding, about every 2-3 hours  Does mother feel breastfeeding is effective: If no, explain: he has been mostly bottle fed due to latch issues  Does infant appear satisfied after nursing:If no, explain: he has been mostly bottle fed due to latch issues  Stooling pattern normal:Yes  Urinating frequently:Yes  Using shield or shells:No    Alternative/Artificial Feedings:   Bottle: Yes, using pacing, all feeds  Cup: N/A  Syringe/Finger: N/A           Formula Type: Similac ProAdvance                     Amount: 2-3 oz            Breast Milk:                      Amount: 2-3 oz            Frequency Q 2-3 Hr between feedings  Elimination Problems: No      Equipment:  Nipple Shield             Type: n/a             Size: n/a             Frequency of Use: n/a  Pump            Type: Ameda Sugey            Frequency of Use: every 3-4 hours  Shells            Type: n/a            Frequency of use: n/a    Equipment Problems: no      Mom:  Breast: Large and pendulous  Nipple Assessment in General: Normal: elongated/eraser, no discoloration and no damage noted  Very broad  Mother's Awareness of Feeding Cues                 Recognizes:  Yes                  Verbalizes: Yes  Support System: FOB  History of Breastfeeding:  3 older children until 1 years without difficulty  Changes/Stressors/Violence: Difficulty getting baby latched and pain when he does  Concerns/Goals: Twila would like to nurse for 1 years without pain    Problems with Mom: Large nipples    Physical Exam  Constitutional:       Appearance: Normal appearance  She is well-developed  She is obese  HENT:      Head: Normocephalic and atraumatic  Eyes:      Extraocular Movements: Extraocular movements intact  Neck:      Thyroid: No thyromegaly  Cardiovascular:      Rate and Rhythm: Normal rate and regular rhythm  Pulses: Normal pulses  Heart sounds: Normal heart sounds  No murmur heard  Pulmonary:      Effort: Pulmonary effort is normal       Breath sounds: Normal breath sounds  Musculoskeletal:         General: No swelling or tenderness  Normal range of motion  Cervical back: Normal range of motion and neck supple  Right lower leg: No edema  Left lower leg: No edema  Lymphadenopathy:      Cervical: No cervical adenopathy  Upper Body:      Right upper body: No pectoral adenopathy  Left upper body: No pectoral adenopathy  Neurological:      General: No focal deficit present  Mental Status: She is alert and oriented to person, place, and time  Psychiatric:         Mood and Affect: Mood normal          Behavior: Behavior normal          Thought Content: Thought content normal          Judgment: Judgment normal    Vitals and nursing note reviewed  Infant:  Behaviors: Alert  Color: Healthy  Birth weight: 2 778 kg  Current weight: 2 705 kg    Problems with infant: Restricted tongue movement      General Appearance:  Alert, active, no distress                             Head:  Normocephalic, AFOF, sutures opposed                             Eyes:  Conjunctiva clear, no drainage                              Ears:  Normally placed, no anomolies                             Nose:  Septum intact, no drainage or erythema                           Mouth:  No lesions;  Tongue with limited lift, extension no further than to lower alveolar ridge, and limited lateralization; good cupping of examiner's finger but pulls back with each suck leading to biting with lower alveolar ridge; frenulum attaches 3 mm from tip of tongue and at top edge of lower alveolar ridge; mouth has limited opening                    Neck:  Supple, symmetrical, trachea midline, no adenopathy; thyroid: no enlargement, symmetric, no tenderness/mass/nodules                 Respiratory:  No grunting, flaring, retractions, breath sounds clear and equal            Cardiovascular:  Regular rate and rhythm  No murmur  Adequate perfusion/capillary refill  Femoral pulse present                    Abdomen:   Soft, non-tender, no masses, bowel sounds present, no HSM             Genitourinary:  Normal male, testes descended, no discharge, swelling, or pain, anus patent                          Spine:   No abnormalities noted        Musculoskeletal:  Full range of motion          Skin/Hair/Nails:   Skin warm, dry, and intact, no rashes or abnormal dyspigmentation or lesions                Neurologic:   No abnormal movement, tone appropriate for gestational age     Latch:  Efficiency:               Lips Flanged: Yes, after frenotomy              Depth of latch: Excellent, after frenotomy              Audible Swallow: Yes, after frenotomy              Visible Milk: Yes, after frenotomy              Wide Open/ Asymmetrical: Yes, after frenotomy              Suck Swallow Cycle: Breathing: Unlabored, Coordinated: Yes  Nipple Assessment after latch: Normal: elongated/eraser, no discoloration and no damage noted  Latch Problems: After frenotomy, Franciscojovani Bello is able to assist Blaine to open wide and latch deeply and asymmetrically to both nipple and breast  Franciscojovani Bello has little, if any, pain or discomfort with latch after frenotomy and Blaine quickly develops a sustained suck and swallow pattern, nursing until satisfied  Position:  Infant's Ergonomics/Body               Body Alignment: Yes               Head Supported: Yes               Close to Mom's body/ Lifted/ Supported:  Yes               Mom's Ergonomics/Body: Yes                           Supported: Yes                           Sitting Back: Yes                           Brings Baby to her breast: Yes  Positioning Problems: None       Education:  Reviewed Latch: Reviewed how to gently compress the breast as if offering a sandwich to facilitate a deeper latch  Reviewed Positioning for Dyad: Reviewed how to bring baby to the breast so that his lower lip and chin touch the breast with his nose just above the nipple to encourage a wider, more asymmetric latch  Reviewed Frequency/Supply & Demand: Recommended feeding on demand: when the baby gives hunger cues, when the breasts feel full, every 3 hours during the day and every 5 hours at night counting from the beginning of one feeding to the beginning of the next; whichever comes first    Reviewed Alternative/Artificial Feedings: Continue paced bottle feeding for any feedings not at the breast  Reviewed Mom/Breast care: Continue to pump when the breasts do not feel emptied well enough or after nursing as desired to provide breast milk for any continued supplement that you feel that Blaine needs  Plan:  Discussed history and physical exams with Twila  Support given for her commitment to providing breast milk for her baby  Discussed the findings on the baby's exam consistent with tongue tie and reviewed how this may be the cause of nipple trauma, nipple pain, nipple damage, poor milk transfer, blocked ducts, mastitis, and loss of milk production  Discussed the science that supports performing the frenotomy to improve latch  I have spent 40 minutes with Patient and family today in which greater than 50% of this time was spent in counseling/coordination of care regarding Prognosis, Risks and benefits of tx options, Intructions for management, Patient and family education and Impressions

## 2021-06-17 ENCOUNTER — TELEPHONE (OUTPATIENT)
Dept: OBGYN CLINIC | Facility: CLINIC | Age: 36
End: 2021-06-17

## 2021-06-17 NOTE — TELEPHONE ENCOUNTER
----- Message from Srikanth Kelly MD sent at 6/16/2021  3:26 PM EDT -----  Regarding: PARAGARD    Please check PARAGARD Intrauterine device benefits

## 2021-06-17 NOTE — TELEPHONE ENCOUNTER
Called patients insurance, Replaced by [facility], and spoke with Alexandria Love   She confirmed that the Paragard IUD is covered at 100% with no auth required for insertion and removal  Reference # for the call is Alexandria Love [id number]

## 2021-06-24 ENCOUNTER — HOSPITAL ENCOUNTER (EMERGENCY)
Facility: HOSPITAL | Age: 36
Discharge: HOME | End: 2021-06-24
Attending: EMERGENCY MEDICINE | Admitting: EMERGENCY MEDICINE
Payer: COMMERCIAL

## 2021-06-24 VITALS
BODY MASS INDEX: 39.73 KG/M2 | WEIGHT: 231.48 LBS | SYSTOLIC BLOOD PRESSURE: 139 MMHG | RESPIRATION RATE: 18 BRPM | OXYGEN SATURATION: 98 % | HEART RATE: 79 BPM | DIASTOLIC BLOOD PRESSURE: 86 MMHG | TEMPERATURE: 98.2 F

## 2021-06-24 DIAGNOSIS — R19.7 DIARRHEA, UNSPECIFIED TYPE: Primary | ICD-10-CM

## 2021-06-24 DIAGNOSIS — R10.9 ABDOMINAL PAIN, UNSPECIFIED ABDOMINAL LOCATION: ICD-10-CM

## 2021-06-24 DIAGNOSIS — R11.0 NAUSEA: ICD-10-CM

## 2021-06-24 DIAGNOSIS — E87.6 HYPOKALEMIA: ICD-10-CM

## 2021-06-24 LAB
ALBUMIN SERPL BCP-MCNC: 3.4 G/DL (ref 3.5–5)
ALP SERPL-CCNC: 80 U/L (ref 46–116)
ALT SERPL W P-5'-P-CCNC: 28 U/L (ref 12–78)
ANION GAP SERPL CALCULATED.3IONS-SCNC: 8 MMOL/L (ref 4–13)
AST SERPL W P-5'-P-CCNC: 14 U/L (ref 5–45)
BACTERIA UR QL AUTO: ABNORMAL /HPF
BASOPHILS # BLD AUTO: 0.02 THOUSANDS/ΜL (ref 0–0.1)
BASOPHILS NFR BLD AUTO: 0 % (ref 0–1)
BILIRUB DIRECT SERPL-MCNC: 0.07 MG/DL (ref 0–0.2)
BILIRUB SERPL-MCNC: 0.14 MG/DL (ref 0.2–1)
BILIRUB UR QL STRIP: ABNORMAL
BUN SERPL-MCNC: 10 MG/DL (ref 5–25)
CALCIUM SERPL-MCNC: 8.4 MG/DL (ref 8.3–10.1)
CHLORIDE SERPL-SCNC: 106 MMOL/L (ref 100–108)
CLARITY UR: ABNORMAL
CO2 SERPL-SCNC: 29 MMOL/L (ref 21–32)
COLOR UR: YELLOW
CREAT SERPL-MCNC: 1.02 MG/DL (ref 0.6–1.3)
CREAT UR-MCNC: 484.6 MG/DL
EOSINOPHIL # BLD AUTO: 0.22 THOUSAND/ΜL (ref 0–0.61)
EOSINOPHIL NFR BLD AUTO: 4 % (ref 0–6)
ERYTHROCYTE [DISTWIDTH] IN BLOOD BY AUTOMATED COUNT: 16.4 % (ref 11.6–15.1)
GFR SERPL CREATININE-BSD FRML MDRD: 82 ML/MIN/1.73SQ M
GLUCOSE SERPL-MCNC: 98 MG/DL (ref 65–140)
GLUCOSE UR STRIP-MCNC: NEGATIVE MG/DL
HCT VFR BLD AUTO: 35.4 % (ref 34.8–46.1)
HGB BLD-MCNC: 10.4 G/DL (ref 11.5–15.4)
HGB UR QL STRIP.AUTO: NEGATIVE
IMM GRANULOCYTES # BLD AUTO: 0.02 THOUSAND/UL (ref 0–0.2)
IMM GRANULOCYTES NFR BLD AUTO: 0 % (ref 0–2)
KETONES UR STRIP-MCNC: ABNORMAL MG/DL
LEUKOCYTE ESTERASE UR QL STRIP: NEGATIVE
LYMPHOCYTES # BLD AUTO: 1.47 THOUSANDS/ΜL (ref 0.6–4.47)
LYMPHOCYTES NFR BLD AUTO: 24 % (ref 14–44)
MCH RBC QN AUTO: 21.3 PG (ref 26.8–34.3)
MCHC RBC AUTO-ENTMCNC: 29.4 G/DL (ref 31.4–37.4)
MCV RBC AUTO: 72 FL (ref 82–98)
MONOCYTES # BLD AUTO: 0.32 THOUSAND/ΜL (ref 0.17–1.22)
MONOCYTES NFR BLD AUTO: 5 % (ref 4–12)
MUCOUS THREADS UR QL AUTO: ABNORMAL
NEUTROPHILS # BLD AUTO: 4.05 THOUSANDS/ΜL (ref 1.85–7.62)
NEUTS SEG NFR BLD AUTO: 67 % (ref 43–75)
NITRITE UR QL STRIP: NEGATIVE
NON-SQ EPI CELLS URNS QL MICRO: ABNORMAL /HPF
NRBC BLD AUTO-RTO: 0 /100 WBCS
PH UR STRIP.AUTO: 6 [PH] (ref 4.5–8)
PLATELET # BLD AUTO: 406 THOUSANDS/UL (ref 149–390)
PMV BLD AUTO: 11.5 FL (ref 8.9–12.7)
POTASSIUM SERPL-SCNC: 3 MMOL/L (ref 3.5–5.3)
PROT SERPL-MCNC: 6.6 G/DL (ref 6.4–8.2)
PROT UR STRIP-MCNC: ABNORMAL MG/DL
PROT UR-MCNC: 58 MG/DL
PROT/CREAT UR: 0.12 MG/G{CREAT} (ref 0–0.1)
RBC # BLD AUTO: 4.89 MILLION/UL (ref 3.81–5.12)
RBC #/AREA URNS AUTO: ABNORMAL /HPF
SODIUM SERPL-SCNC: 143 MMOL/L (ref 136–145)
SP GR UR STRIP.AUTO: >=1.03 (ref 1–1.03)
UROBILINOGEN UR QL STRIP.AUTO: 0.2 E.U./DL
WBC # BLD AUTO: 6.1 THOUSAND/UL (ref 4.31–10.16)
WBC #/AREA URNS AUTO: ABNORMAL /HPF

## 2021-06-24 PROCEDURE — 83625 ASSAY OF LDH ENZYMES: CPT | Performed by: PHYSICIAN ASSISTANT

## 2021-06-24 PROCEDURE — 80048 BASIC METABOLIC PNL TOTAL CA: CPT | Performed by: PHYSICIAN ASSISTANT

## 2021-06-24 PROCEDURE — 80076 HEPATIC FUNCTION PANEL: CPT | Performed by: PHYSICIAN ASSISTANT

## 2021-06-24 PROCEDURE — 84156 ASSAY OF PROTEIN URINE: CPT | Performed by: PHYSICIAN ASSISTANT

## 2021-06-24 PROCEDURE — 82570 ASSAY OF URINE CREATININE: CPT | Performed by: PHYSICIAN ASSISTANT

## 2021-06-24 PROCEDURE — 85025 COMPLETE CBC W/AUTO DIFF WBC: CPT | Performed by: PHYSICIAN ASSISTANT

## 2021-06-24 PROCEDURE — 83615 LACTATE (LD) (LDH) ENZYME: CPT | Performed by: PHYSICIAN ASSISTANT

## 2021-06-24 PROCEDURE — 99284 EMERGENCY DEPT VISIT MOD MDM: CPT

## 2021-06-24 PROCEDURE — 81001 URINALYSIS AUTO W/SCOPE: CPT

## 2021-06-24 PROCEDURE — 36415 COLL VENOUS BLD VENIPUNCTURE: CPT | Performed by: PHYSICIAN ASSISTANT

## 2021-06-24 PROCEDURE — NC001 PR NO CHARGE: Performed by: OBSTETRICS & GYNECOLOGY

## 2021-06-24 PROCEDURE — 99284 EMERGENCY DEPT VISIT MOD MDM: CPT | Performed by: PHYSICIAN ASSISTANT

## 2021-06-24 RX ORDER — ONDANSETRON 2 MG/ML
4 INJECTION INTRAMUSCULAR; INTRAVENOUS ONCE
Status: DISCONTINUED | OUTPATIENT
Start: 2021-06-24 | End: 2021-06-24

## 2021-06-24 RX ORDER — MAGNESIUM HYDROXIDE/ALUMINUM HYDROXICE/SIMETHICONE 120; 1200; 1200 MG/30ML; MG/30ML; MG/30ML
30 SUSPENSION ORAL ONCE
Status: COMPLETED | OUTPATIENT
Start: 2021-06-24 | End: 2021-06-24

## 2021-06-24 RX ORDER — OMEPRAZOLE 20 MG/1
20 CAPSULE, DELAYED RELEASE ORAL DAILY
Qty: 7 CAPSULE | Refills: 0 | Status: SHIPPED | OUTPATIENT
Start: 2021-06-24 | End: 2021-12-14

## 2021-06-24 RX ORDER — ONDANSETRON 4 MG/1
4 TABLET, ORALLY DISINTEGRATING ORAL EVERY 6 HOURS PRN
Qty: 20 TABLET | Refills: 0 | Status: SHIPPED | OUTPATIENT
Start: 2021-06-24 | End: 2021-12-14

## 2021-06-24 RX ORDER — ONDANSETRON 4 MG/1
4 TABLET, ORALLY DISINTEGRATING ORAL ONCE
Status: COMPLETED | OUTPATIENT
Start: 2021-06-24 | End: 2021-06-24

## 2021-06-24 RX ORDER — POTASSIUM CHLORIDE 20 MEQ/1
20 TABLET, EXTENDED RELEASE ORAL ONCE
Status: COMPLETED | OUTPATIENT
Start: 2021-06-24 | End: 2021-06-24

## 2021-06-24 RX ADMIN — ONDANSETRON 4 MG: 4 TABLET, ORALLY DISINTEGRATING ORAL at 11:51

## 2021-06-24 RX ADMIN — POTASSIUM CHLORIDE 20 MEQ: 1500 TABLET, EXTENDED RELEASE ORAL at 12:41

## 2021-06-24 RX ADMIN — ALUMINUM HYDROXIDE, MAGNESIUM HYDROXIDE, AND SIMETHICONE 30 ML: 200; 200; 20 SUSPENSION ORAL at 11:51

## 2021-06-24 NOTE — PROGRESS NOTES
I have reviewed the notes, assessments, and/or procedures performed by Brooks Victor RN, IBCLC, I concur with her/his documentation of Senait Up MD 06/24/21

## 2021-06-24 NOTE — ED PROVIDER NOTES
History  Chief Complaint   Patient presents with    Abdominal Pain     diarrhea,belching, around a family member with a GI bug, son has the same symptoms  38year old female [id number], who recently delivered a baby on 21 via  presents to the emergency department for evaluation of abdominal pain, diarrhea, dyspepsia x2 days  She presents with family member who has n/v/d  Her pregnancy was complicated by severely elevated blood pressures in the setting of chronic hypertension, found to meet criteria for superimposed pre-eclampsia with severe features by blood pressure and headache  She was admitted for 7 days and at the time of discharge, patient is taking labetolol, nifedipine, HCTZ  She denies any headache, RUQ pain, fever; states her lochia has resolved  She reports she has generalized abdominal pain, diarrhea and nausea that started yesterday  History provided by:  Medical records and patient   used: No    Abdominal Pain  Pain location:  Generalized  Pain quality: cramping    Pain radiates to:  Does not radiate  Pain severity:  Mild  Onset quality:  Gradual  Duration:  2 days  Chronicity:  New  Context: sick contacts and suspicious food intake    Context: not trauma    Relieved by:  Nothing  Ineffective treatments:  Liquids  Associated symptoms: belching, diarrhea and nausea    Associated symptoms: no anorexia, no chest pain, no chills, no cough, no dysuria, no fever, no hematuria, no shortness of breath, no vaginal bleeding, no vaginal discharge and no vomiting    Risk factors: has not had multiple surgeries        Prior to Admission Medications   Prescriptions Last Dose Informant Patient Reported? Taking?    Blood Glucose Monitoring Suppl (OneTouch Verio Flex System) w/Device KIT  Self No No   Sig: Test 4 times for 1 week   Patient not taking: Reported on 6/10/2021   NIFEdipine ER (ADALAT CC) 60 MG 24 hr tablet   No No   Sig: TAKE 1 TABLET(60 MG) BY MOUTH TWICE DAILY OneTouch Delica Lancets 95C MISC  Self No No   Sig: Test 4 times daily  Patient not taking: Reported on 2021   Prenatal Vit-Fe Fumarate-FA (PNV Folic Acid + Iron) 08-9 MG TABS  Self No No   Sig: Take 1 tablet by mouth daily   acetaminophen (TYLENOL) 500 mg tablet  Self Yes No   Sig: Take 500 mg by mouth every 6 (six) hours as needed for mild pain   aspirin (ECOTRIN LOW STRENGTH) 81 mg EC tablet  Self No No   Sig: Take 2 tablets (162 mg total) by mouth daily   benzocaine-menthol-lanolin-aloe (DERMOPLAST) 20-0 5 % topical spray   No No   Sig: Apply 1 application topically 4 (four) times a day as needed for irritation   Patient not taking: Reported on 6/10/2021   docusate sodium (COLACE) 100 mg capsule   No No   Sig: Take 1 capsule (100 mg total) by mouth 2 (two) times a day   ferrous sulfate 324 (65 Fe) mg  Self No No   Sig: Take 1 tablet (325 mg total) by mouth 2 (two) times a day before meals   hydrochlorothiazide (HYDRODIURIL) 12 5 mg tablet   No No   Sig: TAKE 1 TABLET(12 5 MG) BY MOUTH DAILY   ibuprofen (MOTRIN) 200 mg tablet   No No   Sig: Take 3 tablets (600 mg total) by mouth every 6 (six) hours as needed for moderate pain   labetalol (NORMODYNE) 200 mg tablet   No No   Sig: Take 2 tablets (400 mg total) by mouth every 8 (eight) hours      Facility-Administered Medications: None       Past Medical History:   Diagnosis Date    Diabetes mellitus ([address] )     Gestational diabetes     History of recurrent miscarriages     see OB hx    Hypertension        Past Surgical History:   Procedure Laterality Date    INDUCED       LA SURG RX MISSED ABORTN,1ST TRI N/A 2016    Procedure: DILATATION AND EVACUATION (D&E);   Surgeon: Chema Crockett MD;  Location: [facility];  Service: Gynecology    US GUIDANCE BREAST BIOPSY RIGHT EACH ADDITIONAL Right 2/10/2021    US GUIDED BREAST BIOPSY RIGHT COMPLETE Right 2/10/2021       Family History   Problem Relation Age of Onset    Hypertension Mother    [facility] Breast cancer Mother 58    BRCA1 Negative Mother     BRCA2 Negative Mother     Hypertension Father     No Known Problems Sister     No Known Problems Daughter     Stomach cancer Maternal Grandmother 80    Cancer Paternal Grandmother         unsure of type    No Known Problems Sister     No Known Problems Sister     No Known Problems Sister     No Known Problems Sister     No Known Problems Son     No Known Problems Son     Breast cancer Maternal Aunt 59    Breast cancer Maternal Aunt     Breast cancer Maternal Aunt     Breast cancer Maternal Aunt     Breast cancer Maternal Aunt     Breast cancer Maternal Aunt     No Known Problems Paternal Aunt      I have reviewed and agree with the history as documented  E-Cigarette/Vaping    E-Cigarette Use Never User      E-Cigarette/Vaping Substances     Social History     Tobacco Use    Smoking status: Former Smoker     Types: Cigarettes     Quit date:      Years since quittin 4    Smokeless tobacco: Never Used    Tobacco comment: Former smoker per [employer]   Vaping Use    Vaping Use: Never used   Substance Use Topics    Alcohol use: No    Drug use: No       Review of Systems   Constitutional: Negative for chills and fever  HENT: Negative for congestion  Respiratory: Negative for cough and shortness of breath  Cardiovascular: Negative for chest pain  Gastrointestinal: Positive for abdominal pain, diarrhea and nausea  Negative for anorexia and vomiting  Genitourinary: Negative for dysuria, hematuria, vaginal bleeding and vaginal discharge  Skin: Negative for rash and wound  All other systems reviewed and are negative  Physical Exam  Physical Exam  Vitals and nursing note reviewed  Constitutional:       General: She is not in acute distress  Appearance: She is well-developed  She is not ill-appearing or toxic-appearing  HENT:      Head: Normocephalic and atraumatic        Right Ear: Hearing and external ear normal       Left Ear: Hearing and external ear normal       Nose: Nose normal  No congestion  Mouth/Throat:      Lips: No lesions  Mouth: Mucous membranes are moist    Eyes:      Conjunctiva/sclera: Conjunctivae normal    Cardiovascular:      Rate and Rhythm: Normal rate and regular rhythm  Heart sounds: Normal heart sounds, S1 normal and S2 normal    Pulmonary:      Effort: Pulmonary effort is normal       Breath sounds: Normal breath sounds  No decreased breath sounds, wheezing, rhonchi or rales  Abdominal:      General: Bowel sounds are normal       Palpations: Abdomen is soft  Tenderness: There is abdominal tenderness in the periumbilical area  There is no guarding or rebound  Musculoskeletal:      Cervical back: Full passive range of motion without pain  Comments: Normal range of motion; no edema, tenderness, or deformities  Skin:     General: Skin is warm and dry  Findings: No rash  Neurological:      Mental Status: She is alert and oriented to person, place, and time  GCS: GCS eye subscore is 4  GCS verbal subscore is 5  GCS motor subscore is 6  Cranial Nerves: Cranial nerves are intact  Motor: Motor function is intact        Gait: Gait normal    Psychiatric:         Mood and Affect: Mood normal          Speech: Speech normal          Vital Signs  ED Triage Vitals [06/24/21 1043]   Temperature Pulse Respirations Blood Pressure SpO2   98 2 °F (36 8 °C) 80 18 (!) 176/97 99 %      Temp Source Heart Rate Source Patient Position - Orthostatic VS BP Location FiO2 (%)   Oral Monitor Sitting Right arm --      Pain Score       --           Vitals:    06/24/21 1043 06/24/21 1230 06/24/21 1440   BP: (!) 176/97 141/73 139/86   Pulse: 80  79   Patient Position - Orthostatic VS: Sitting Sitting Lying         Visual Acuity      ED Medications  Medications   aluminum-magnesium hydroxide-simethicone (MYLANTA) oral suspension 30 mL (30 mL Oral Given 6/24/21 1151) ondansetron (ZOFRAN-ODT) dispersible tablet 4 mg (4 mg Oral Given 6/24/21 1151)   potassium chloride (K-DUR,KLOR-CON) CR tablet 20 mEq (20 mEq Oral Given 6/24/21 1241)       Diagnostic Studies  Results Reviewed     Procedure Component Value Units Date/Time    Urine Microscopic [[id number]]  (Abnormal) Collected: 06/24/21 1345    Lab Status: Final result Specimen: Urine, Clean Catch Updated: 06/24/21 1527     RBC, UA None Seen /hpf      WBC, UA 1-2 /hpf      Epithelial Cells Occasional /hpf      Bacteria, UA Occasional /hpf      MUCUS THREADS Occasional    Protein / creatinine ratio, urine [[id number]]  (Abnormal) Collected: 06/24/21 1342    Lab Status: Final result Specimen: Urine, Catheter Updated: 06/24/21 1427     Creatinine, Ur 484 6 mg/dL      Protein Urine Random 58 mg/dL      Prot/Creat Ratio, Ur 0 12    Lactate dehydrogenase, isoenzymes [[id number]] Collected: 06/24/21 1356    Lab Status:  In process Specimen: Blood from Arm, Left Updated: 06/24/21 1402    Urine Macroscopic, POC [[id number]]  (Abnormal) Collected: 06/24/21 1345    Lab Status: Final result Specimen: Urine Updated: 06/24/21 1346     Color, UA Yellow     Clarity, UA Turbid     pH, UA 6 0     Leukocytes, UA Negative     Nitrite, UA Negative     Protein, UA 30 (1+) mg/dl      Glucose, UA Negative mg/dl      Ketones, UA Trace mg/dl      Urobilinogen, UA 0 2 E U /dl      Bilirubin, UA Interference- unable to analyze     Blood, UA Negative     Specific Gravity, UA >=1 030    Narrative:      CLINITEK RESULT    Hepatic function panel [[id number]]  (Abnormal) Collected: 06/24/21 1150    Lab Status: Final result Specimen: Blood from Arm, Left Updated: 06/24/21 1328     Total Bilirubin 0 14 mg/dL      Bilirubin, Direct 0 07 mg/dL      Alkaline Phosphatase 80 U/L      AST 14 U/L      ALT 28 U/L      Total Protein 6 6 g/dL      Albumin 3 4 g/dL     Basic metabolic panel [[id number]]  (Abnormal) Collected: 06/24/21 1150    Lab Status: Final result Specimen: Blood from Arm, Left Updated: 06/24/21 1215     Sodium 143 mmol/L      Potassium 3 0 mmol/L      Chloride 106 mmol/L      CO2 29 mmol/L      ANION GAP 8 mmol/L      BUN 10 mg/dL      Creatinine 1 02 mg/dL      Glucose 98 mg/dL      Calcium 8 4 mg/dL      eGFR 82 ml/min/1 73sq m     Narrative:      [place] guidelines for Chronic Kidney Disease (CKD):     Stage 1 with normal or high GFR (GFR > 90 mL/min/1 73 square meters)    Stage 2 Mild CKD (GFR = 60-89 mL/min/1 73 square meters)    Stage 3A Moderate CKD (GFR = 45-59 mL/min/1 73 square meters)    Stage 3B Moderate CKD (GFR = 30-44 mL/min/1 73 square meters)    Stage 4 Severe CKD (GFR = 15-29 mL/min/1 73 square meters)    Stage 5 End Stage CKD (GFR <15 mL/min/1 73 square meters)  Note: GFR calculation is accurate only with a steady state creatinine    CBC and differential [[id number]]  (Abnormal) Collected: 06/24/21 1150    Lab Status: Final result Specimen: Blood from Arm, Left Updated: 06/24/21 1159     WBC 6 10 Thousand/uL      RBC 4 89 Million/uL      Hemoglobin 10 4 g/dL      Hematocrit 35 4 %      MCV 72 fL      MCH 21 3 pg      MCHC 29 4 g/dL      RDW 16 4 %      MPV 11 5 fL      Platelets 444 Thousands/uL      nRBC 0 /100 WBCs      Neutrophils Relative 67 %      Immat GRANS % 0 %      Lymphocytes Relative 24 %      Monocytes Relative 5 %      Eosinophils Relative 4 %      Basophils Relative 0 %      Neutrophils Absolute 4 05 Thousands/µL      Immature Grans Absolute 0 02 Thousand/uL      Lymphocytes Absolute 1 47 Thousands/µL      Monocytes Absolute 0 32 Thousand/µL      Eosinophils Absolute 0 22 Thousand/µL      Basophils Absolute 0 02 Thousands/µL                  No orders to display              Procedures  Procedures         ED Course  ED Course as of Jun 25 1247   Thu Jun 24, 2021   1159 WBC: 6 10   1159 9 3 weeks ago   Hemoglobin(!): 10 4   1159 Platelet Count(!): 322   1216 Sodium: 143   1216 Potassium(!): 3 0   1216 Creatinine: 1 02   1231 Blood Pressure: 141/73   1250 Tiger text to Dr Vonda Lazo, OBGYN on call for complete women's care      [phone] Spoke to Dr Vonda Lazo, who recommended LFTs, LDH, protein/cr ratio  Recommend resident in house eval patient       [address] text to [facility] resident on call Dr Chapa Going(!): 0 14   1331 AST: 14   1331 ALT: 28   1338 Spoke to [facility] resident, Dr Radha Jiménez, will monitor BP, follow up on labs  Abdominal pain today not likely related to postpartum, elevated BP likely due to not having meds this morning  1357 Ketones, UA(!): Trace   1357 Ketones, UA(!): Trace   1429 Prot/Creat Ratio, Ur(!): 0 12   1446 Blood Pressure: 139/86   1446 Pulse: 79   1446 SpO2: 98 %   1446 Blood Pressure: 139/86   1447 [place] text to OB resident to update on labs  MDM  Number of Diagnoses or Management Options  Abdominal pain, unspecified abdominal location  Diarrhea, unspecified type  Hypokalemia  Nausea  Diagnosis management comments: 38year old female [id number], who recently delivered a baby on 21 via  presents to the emergency department for evaluation of abdominal pain, diarrhea, dyspepsia x2 days  Due to patient's recent postpartum status, OBGYN was consulted, who recommended labs, to evaluate for postpartum preeclampsia  There is no elevation of LFTs, platelets were 166, protein/creatinine ratio was only is minimally elevated  Patient denies any headache, visual changes, right upper quadrant pain dizziness  OBGYN resident felt she was safe to DC home  I suspect the cause of this patient's diarrhea is most likely a self-limiting syndrome, as patient also presents with son, who has similar symptoms  There are no significant GI risk factors  The patient was counseled regarding the worsening signs look out for, as well as the possibility that this might be early presentation of more significant pathology   The patient understood this and indicated they would be able to be seen for followup and return if there are worsening signs or symptoms  Amount and/or Complexity of Data Reviewed  Clinical lab tests: ordered and reviewed  Discuss the patient with other providers: yes (Dr Mcfadden Records  Dr Silver Braun, OBGYN resident)        Disposition  Final diagnoses:   Diarrhea, unspecified type   Hypokalemia   Abdominal pain, unspecified abdominal location   Nausea     Time reflects when diagnosis was documented in both MDM as applicable and the Disposition within this note     Time User Action Codes Description Comment    6/24/2021 12:16 PM Delmon Serjio Add [R19 7] Diarrhea, unspecified type     6/24/2021 12:17 PM Delmon Serjio Add [E87 6] Hypokalemia     6/24/2021  1:06 PM Delmon Serjio Add [R10 9] Abdominal pain, unspecified abdominal location     6/24/2021  3:02 PM Delmon Serjio Add [R11 0] Nausea       ED Disposition     ED Disposition Condition Date/Time Comment    Discharge Stable u Jun 24, 2021  3:02 PM Daniel Mcnulty discharge to home/self care              Follow-up Information     Follow up With Specialties Details Why Contact Info Additional [address] [facility] Emergency Department Emergency Medicine Go to  If symptoms worsen [facility] [id number]  [address] [facility] Emergency Department, [address] [place], [place], [address] Complete [facility] Obstetrics and Gynecology Schedule an appointment as soon as possible for a visit in 1 week  [address] [facility]  [address] [facility] [address] [facility], [address] [place] [address] Dr          Discharge Medication List as of 6/24/2021  3:03 PM      START taking these medications    Details   omeprazole (PriLOSEC) 20 mg delayed release capsule Take 1 capsule (20 mg total) by mouth daily for 7 days, Starting Thu 6/24/2021, Until Thu 7/1/2021, Normal      ondansetron (ZOFRAN-ODT) 4 mg disintegrating tablet Take 1 tablet (4 mg total) by mouth every 6 (six) hours as needed for nausea or vomiting, Starting Thu 6/24/2021, Normal         CONTINUE these medications which have NOT CHANGED    Details   acetaminophen (TYLENOL) 500 mg tablet Take 500 mg by mouth every 6 (six) hours as needed for mild pain, Historical Med      aspirin (ECOTRIN LOW STRENGTH) 81 mg EC tablet Take 2 tablets (162 mg total) by mouth daily, Starting Fri 12/4/2020, Until Wed 6/16/2021, Normal      benzocaine-menthol-lanolin-aloe (DERMOPLAST) 20-0 5 % topical spray Apply 1 application topically 4 (four) times a day as needed for irritation, Starting Fri 6/4/2021, No Print      Blood Glucose Monitoring Suppl (OneTouch Verio Flex System) w/Device KIT Test 4 times for 1 week, Normal      docusate sodium (COLACE) 100 mg capsule Take 1 capsule (100 mg total) by mouth 2 (two) times a day, Starting Wed 6/16/2021, Normal      ferrous sulfate 324 (65 Fe) mg Take 1 tablet (325 mg total) by mouth 2 (two) times a day before meals, Starting Fri 12/4/2020, Normal      hydrochlorothiazide (HYDRODIURIL) 12 5 mg tablet TAKE 1 TABLET(12 5 MG) BY MOUTH DAILY, Normal      ibuprofen (MOTRIN) 200 mg tablet Take 3 tablets (600 mg total) by mouth every 6 (six) hours as needed for moderate pain, Starting Fri 6/4/2021, No Print      labetalol (NORMODYNE) 200 mg tablet Take 2 tablets (400 mg total) by mouth every 8 (eight) hours, Starting Fri 6/4/2021, Normal      NIFEdipine ER (ADALAT CC) 60 MG 24 hr tablet TAKE 1 TABLET(60 MG) BY MOUTH TWICE DAILY, Normal      OneTouch Delica Lancets 65C MISC Test 4 times daily  , Normal      Prenatal Vit-Fe Fumarate-FA (PNV Folic Acid + Iron) 32-6 MG TABS Take 1 tablet by mouth daily, Starting Wed 11/4/2020, Normal           No discharge procedures on file      PDMP Review     None          ED Provider  Electronically Signed by           Sally Holly PA-C  06/25/21 1247

## 2021-06-24 NOTE — CONSULTS
Consultation - Gynecology  Lyndsay Ramon 38 y o  female MRN: [id number]  Unit/Bed#: ED 25 Encounter: [id number]      Consults      Chief Complaint   Patient presents with    Abdominal Pain     diarrhea,belching, around a family member with a GI bug, son has the same symptoms  History of Present Illness   Physician Requesting Consult: Madeleine Reyes DO  Reason for Consult / Principal Problem:  Elevated pressures  Subspeciality: General GYN  HPI: Lyndsay Ramon is a 38 y o  female who presents with elevated pressures 3 weeks after delivery was complicated by a chronic hypertension with superimposed preeclampsia with severe features  She received magnesium during her labor course and was discharged on 3 antihypertensive meds  She is currently in the emergency room with her child with a a gastroenteritis  She reports not having taken her blood pressure meds today because of dealing with her child symptoms  She denies any sequelae of preeclampsia at this time  She states that she has been having intermittent headaches but none today  She denies any visual changes or epigastric pain  She does endorse pelvic pain she has had since delivery and back pain  Review of Systems   Constitutional: Negative  HENT: Negative  Eyes: Negative  Respiratory: Negative  Cardiovascular: Negative  Gastrointestinal: Negative  Endocrine: Negative  Genitourinary: Positive for pelvic pain  Musculoskeletal: Positive for back pain  Skin: Negative  Neurological: Negative  Psychiatric/Behavioral: Negative  Historical Information   Past Medical History:   Diagnosis Date    Diabetes mellitus ([address] )     Gestational diabetes     History of recurrent miscarriages     see OB hx    Hypertension      Past Surgical History:   Procedure Laterality Date    INDUCED       [place] SURG RX MISSED ABORTN,1ST TRI N/A 2016    Procedure: DILATATION AND EVACUATION (D&E);   Surgeon: Leeann Spicer MD; Location: [facility];  Service: Gynecology    US GUIDANCE BREAST BIOPSY RIGHT EACH ADDITIONAL Right 2/10/2021    US GUIDED BREAST BIOPSY RIGHT COMPLETE Right 2/10/2021     OB History    Para Term  AB Living   8 4 3 1 4 4   SAB TAB Ectopic Multiple Live Births   1 3   0 4      # Outcome Date GA Lbr Harshad/2nd Weight Sex Delivery Anes PTL Lv   8  21 35w2d  2778 g (6 lb 2 oz) M Vag-Spont EPI N CASI   7 Term 18 38w2d 00:19 / 00:03 3544 g (7 lb 13 oz) M Vag-Spont None N CASI   6 SAB 16 7w0d          5 Term 04/27/15   3572 g (7 lb 14 oz) F Vag-Spont None  CASI      Complications: Gestational hypertension   4 TAB            3 Term 07 39w0d  2948 g (6 lb 8 oz) F Vag-Spont None N CASI   2 TAB            1 TAB              Family History   Problem Relation Age of Onset    Hypertension Mother     Breast cancer Mother 58    BRCA1 Negative Mother     BRCA2 Negative Mother     Hypertension Father     No Known Problems Sister     No Known Problems Daughter     Stomach cancer Maternal Grandmother 80    Cancer Paternal Grandmother         unsure of type    No Known Problems Sister     No Known Problems Sister     No Known Problems Sister     No Known Problems Sister     No Known Problems Son     No Known Problems Son     Breast cancer Maternal Aunt 59    Breast cancer Maternal Aunt     Breast cancer Maternal Aunt     Breast cancer Maternal Aunt     Breast cancer Maternal Aunt     Breast cancer Maternal Aunt     No Known Problems Paternal Aunt      Social History   Social History     Substance and Sexual Activity   Alcohol Use No     Social History     Substance and Sexual Activity   Drug Use No     Social History     Tobacco Use   Smoking Status Former Smoker    Types: Cigarettes    Quit date:     Years since quittin 4   Smokeless Tobacco Never Used   Tobacco Comment    Former smoker per [employer]       Meds/Allergies   No current facility-administered medications for this encounter  Allergies   Allergen Reactions    Other      bbq sauce    Tomato - Food Allergy        Objective   Vitals: Blood pressure 141/73, pulse 80, temperature 98 2 °F (36 8 °C), temperature source Oral, resp  rate 18, weight 105 kg (231 lb 7 7 oz), SpO2 99 %, currently breastfeeding  Body mass index is 39 73 kg/m²  Intake/Output Summary (Last 24 hours) at 6/24/2021 1344  Last data filed at 6/24/2021 1340  Gross per 24 hour   Intake --   Output 250 ml   Net -250 ml       Invasive Devices     None                 Physical Exam  Constitutional:       General: She is not in acute distress  Appearance: She is well-developed  She is not diaphoretic  HENT:      Head: Normocephalic and atraumatic  Eyes:      Pupils: Pupils are equal, round, and reactive to light  Neck:      Trachea: No tracheal deviation  Cardiovascular:      Rate and Rhythm: Normal rate and regular rhythm  Heart sounds: Normal heart sounds  No murmur heard  No friction rub  No gallop  Pulmonary:      Effort: Pulmonary effort is normal  No respiratory distress  Breath sounds: Normal breath sounds  No stridor  No wheezing or rales  Abdominal:      General: Bowel sounds are normal  There is no distension  Palpations: Abdomen is soft  There is no mass  Tenderness: There is abdominal tenderness (Mild deep palpation of lower abdomen)  There is no guarding or rebound  Musculoskeletal:         General: No tenderness or deformity  Normal range of motion  Cervical back: Normal range of motion  Skin:     General: Skin is warm and dry  Coloration: Skin is not pale  Findings: No erythema or rash  Neurological:      Mental Status: She is alert and oriented to person, place, and time        Coordination: Coordination normal       Deep Tendon Reflexes: Reflexes abnormal       Comments: No clonus elicited   Psychiatric:         Mood and Affect: Mood normal          Behavior: Behavior normal          Lab Results:   Recent Results (from the past 24 hour(s))   Basic metabolic panel    Collection Time: 06/24/21 11:50 AM   Result Value Ref Range    Sodium 143 136 - 145 mmol/L    Potassium 3 0 (L) 3 5 - 5 3 mmol/L    Chloride 106 100 - 108 mmol/L    CO2 29 21 - 32 mmol/L    ANION GAP 8 4 - 13 mmol/L    BUN 10 5 - 25 mg/dL    Creatinine 1 02 0 60 - 1 30 mg/dL    Glucose 98 65 - 140 mg/dL    Calcium 8 4 8 3 - 10 1 mg/dL    eGFR 82 ml/min/1 73sq m   CBC and differential    Collection Time: 06/24/21 11:50 AM   Result Value Ref Range    WBC 6 10 4 31 - 10 16 Thousand/uL    RBC 4 89 3 81 - 5 12 Million/uL    Hemoglobin 10 4 (L) 11 5 - 15 4 g/dL    Hematocrit 35 4 34 8 - 46 1 %    MCV 72 (L) 82 - 98 fL    MCH 21 3 (L) 26 8 - 34 3 pg    MCHC 29 4 (L) 31 4 - 37 4 g/dL    RDW 16 4 (H) 11 6 - 15 1 %    MPV 11 5 8 9 - 12 7 fL    Platelets 870 (H) 189 - 390 Thousands/uL    nRBC 0 /100 WBCs    Neutrophils Relative 67 43 - 75 %    Immat GRANS % 0 0 - 2 %    Lymphocytes Relative 24 14 - 44 %    Monocytes Relative 5 4 - 12 %    Eosinophils Relative 4 0 - 6 %    Basophils Relative 0 0 - 1 %    Neutrophils Absolute 4 05 1 85 - 7 62 Thousands/µL    Immature Grans Absolute 0 02 0 00 - 0 20 Thousand/uL    Lymphocytes Absolute 1 47 0 60 - 4 47 Thousands/µL    Monocytes Absolute 0 32 0 17 - 1 22 Thousand/µL    Eosinophils Absolute 0 22 0 00 - 0 61 Thousand/µL    Basophils Absolute 0 02 0 00 - 0 10 Thousands/µL   Hepatic function panel    Collection Time: 06/24/21 11:50 AM   Result Value Ref Range    Total Bilirubin 0 14 (L) 0 20 - 1 00 mg/dL    Bilirubin, Direct 0 07 0 00 - 0 20 mg/dL    Alkaline Phosphatase 80 46 - 116 U/L    AST 14 5 - 45 U/L    ALT 28 12 - 78 U/L    Total Protein 6 6 6 4 - 8 2 g/dL    Albumin 3 4 (L) 3 5 - 5 0 g/dL           Assessment/Plan     Assessment:  38year-old 3 weeks postpartum with labor complicated by chronic hypertension with superimposed preeclampsia with severe features now with elevated blood pressures in the setting of missed antihypertensive doses  Plan:  Monitor blood pressures while in ED  CBC, CMP, LDH, UA per Dr Marisela White  Follow-up in office    Code Status: Prior  Advance Directive and Living Will:      Power of :    POLST:      Counseling / Coordination of Care  Total floor / unit time spent today15 minutes  minutes  Greater than 50% of total time was spent with the patient and / or family counseling and / or coordination of care   A description of the counseling / coordination of care:  Counseling regarding antihypertensive adherence    Maureen Schilling MD  6/24/2021  1:44 PM

## 2021-06-26 LAB
LDH SERPL-CCNC: 156 IU/L (ref 119–226)
LDH1 CFR SERPL ELPH: 25 % (ref 17–32)
LDH2 CFR SERPL ELPH: 37 % (ref 25–40)
LDH3 CFR SERPL ELPH: 23 % (ref 17–27)
LDH4 CFR SERPL ELPH: 8 % (ref 5–13)
LDH5 CFR SERPL ELPH: 7 % (ref 4–20)

## 2021-06-28 ENCOUNTER — TELEPHONE (OUTPATIENT)
Dept: SURGICAL ONCOLOGY | Facility: CLINIC | Age: 36
End: 2021-06-28

## 2021-06-28 NOTE — TELEPHONE ENCOUNTER
Called at 2:37 pt regarding her 230 appt with Dr María Harris  Pt stated she was 3 min away  Told pt I will discuss with Dr María Harris  Told pt that we would have to reschedule due to it being 15min past appt and pt did not have completed paperwork  Pt understood

## 2021-07-02 ENCOUNTER — TELEPHONE (OUTPATIENT)
Dept: SURGICAL ONCOLOGY | Facility: CLINIC | Age: 36
End: 2021-07-02

## 2021-07-22 ENCOUNTER — TELEPHONE (OUTPATIENT)
Dept: OBGYN CLINIC | Facility: CLINIC | Age: 36
End: 2021-07-22

## 2021-07-22 NOTE — TELEPHONE ENCOUNTER
----- Message from Vincent Lui MD sent at 7/21/2021  3:02 PM EDT -----  Regarding: No show  Please call pt to reschedule appt

## 2021-07-23 ENCOUNTER — TELEPHONE (OUTPATIENT)
Dept: HEMATOLOGY ONCOLOGY | Facility: CLINIC | Age: 36
End: 2021-07-23

## 2021-07-23 NOTE — TELEPHONE ENCOUNTER
New eve would like to reschedule her consult appt, please call back at [facility]: [phone]

## 2021-07-26 NOTE — TELEPHONE ENCOUNTER
Pt no-showed for her 6/2021 appt with Dr Nesbitt and she is calling back to reschedule  I called her to reschedule her and had to leave a voice mail for her to call us back

## 2021-08-02 DIAGNOSIS — O11.9 CHRONIC HYPERTENSION WITH SUPERIMPOSED PREECLAMPSIA: ICD-10-CM

## 2021-08-03 RX ORDER — LABETALOL 200 MG/1
TABLET, FILM COATED ORAL
Qty: 540 TABLET | Refills: 0 | Status: SHIPPED | OUTPATIENT
Start: 2021-08-03 | End: 2022-04-06

## 2021-08-18 NOTE — LACTATION NOTE
Addended by: BESSY CUMMINGS on: 8/18/2021 12:13 PM     Modules accepted: Orders     This note was copied from a baby's chart  Met with mother  Provided mother with Ready, Set, Baby booklet  Gave LER handout on Breastfeeding your late  baby  Discussed Skin to Skin contact an benefits to mom and baby  Talked about the delay of the first bath until baby has adjusted  Spoke about the benefits of rooming in  Feeding on cue and what that means for recognizing infant's hunger  Avoidance of pacifiers for the first month discussed  Talked about exclusive breastfeeding for the first 6 months  Positioning and latch reviewed as well as showing images of other feeding positions  Discussed the properties of a good latch in any position  Reviewed hand/manual expression  Discussed s/s that baby is getting enough milk and some s/s that breastfeeding dyad may need further help  Gave information on common concerns, what to expect the first few weeks after delivery, preparing for other caregivers, and how partners can help  Resources for support also provided  Mother verbalized breastfeeding is not going too well yet, baby is sleepy  I assisted mom with a feeding  Baby sleepy  I demo  ways to awaken him  He woke up briefly  I demo  football hold, how to hand express and how to get a deep latch  Baby latched on and off for a few minutes, but just very sleepy  Mom having difficulty getting colostrum to express  I will probably start mom pumping after 24 hours, if baby does not start to improve  I discussed supplementation, in case it becomes necessary  Mom probably prefers formula, but I will give her info  on donor milk, so she can read more information  Enc to call for assistance as needed,phone # given

## 2021-11-02 PROBLEM — D24.1 INTRADUCTAL PAPILLOMA OF BREAST, RIGHT: Status: ACTIVE | Noted: 2021-11-02

## 2021-11-05 ENCOUNTER — CONSULT (OUTPATIENT)
Dept: SURGICAL ONCOLOGY | Facility: CLINIC | Age: 36
End: 2021-11-05
Payer: COMMERCIAL

## 2021-11-05 ENCOUNTER — TELEPHONE (OUTPATIENT)
Dept: HEMATOLOGY ONCOLOGY | Facility: CLINIC | Age: 36
End: 2021-11-05

## 2021-11-05 VITALS
BODY MASS INDEX: 41.83 KG/M2 | HEART RATE: 95 BPM | RESPIRATION RATE: 16 BRPM | OXYGEN SATURATION: 99 % | SYSTOLIC BLOOD PRESSURE: 146 MMHG | WEIGHT: 245 LBS | DIASTOLIC BLOOD PRESSURE: 100 MMHG | HEIGHT: 64 IN | TEMPERATURE: 97.1 F

## 2021-11-05 DIAGNOSIS — N64.52 NIPPLE DISCHARGE, BLOODY: ICD-10-CM

## 2021-11-05 DIAGNOSIS — D24.1 INTRADUCTAL PAPILLOMA OF BREAST, RIGHT: Primary | ICD-10-CM

## 2021-11-05 PROCEDURE — 99243 OFF/OP CNSLTJ NEW/EST LOW 30: CPT | Performed by: SURGERY

## 2021-11-24 ENCOUNTER — HOSPITAL ENCOUNTER (OUTPATIENT)
Dept: ULTRASOUND IMAGING | Facility: CLINIC | Age: 36
Discharge: HOME | End: 2021-11-24
Payer: COMMERCIAL

## 2021-11-24 ENCOUNTER — HOSPITAL ENCOUNTER (OUTPATIENT)
Dept: MAMMOGRAPHY | Facility: CLINIC | Age: 36
Discharge: HOME | End: 2021-11-24
Payer: COMMERCIAL

## 2021-11-24 VITALS — HEIGHT: 64 IN | BODY MASS INDEX: 41.83 KG/M2 | WEIGHT: 245 LBS

## 2021-11-24 DIAGNOSIS — D24.1 INTRADUCTAL PAPILLOMA OF BREAST, RIGHT: ICD-10-CM

## 2021-11-24 DIAGNOSIS — N64.52 NIPPLE DISCHARGE, BLOODY: ICD-10-CM

## 2021-11-24 PROCEDURE — 76642 ULTRASOUND BREAST LIMITED: CPT

## 2021-11-24 PROCEDURE — G0279 TOMOSYNTHESIS, MAMMO: HCPCS

## 2021-11-24 PROCEDURE — 77066 DX MAMMO INCL CAD BI: CPT

## 2021-12-03 ENCOUNTER — OFFICE VISIT (OUTPATIENT)
Dept: SURGICAL ONCOLOGY | Facility: CLINIC | Age: 36
End: 2021-12-03
Payer: COMMERCIAL

## 2021-12-03 VITALS
TEMPERATURE: 98.9 F | OXYGEN SATURATION: 98 % | DIASTOLIC BLOOD PRESSURE: 100 MMHG | BODY MASS INDEX: 42.68 KG/M2 | SYSTOLIC BLOOD PRESSURE: 170 MMHG | HEIGHT: 64 IN | RESPIRATION RATE: 18 BRPM | HEART RATE: 75 BPM | WEIGHT: 250 LBS

## 2021-12-03 DIAGNOSIS — N64.52 NIPPLE DISCHARGE, BLOODY: ICD-10-CM

## 2021-12-03 DIAGNOSIS — D24.1 INTRADUCTAL PAPILLOMA OF BREAST, RIGHT: Primary | ICD-10-CM

## 2021-12-03 PROCEDURE — 99214 OFFICE O/P EST MOD 30 MIN: CPT | Performed by: SURGERY

## 2021-12-10 ENCOUNTER — OFFICE VISIT (OUTPATIENT)
Dept: LAB | Facility: CLINIC | Age: 36
End: 2021-12-10
Payer: COMMERCIAL

## 2021-12-10 ENCOUNTER — ANCILLARY ORDERS (OUTPATIENT)
Dept: SURGICAL ONCOLOGY | Facility: CLINIC | Age: 36
End: 2021-12-10

## 2021-12-10 ENCOUNTER — OFFICE VISIT (OUTPATIENT)
Dept: SURGICAL ONCOLOGY | Facility: CLINIC | Age: 36
End: 2021-12-10
Payer: COMMERCIAL

## 2021-12-10 ENCOUNTER — APPOINTMENT (OUTPATIENT)
Dept: LAB | Facility: CLINIC | Age: 36
End: 2021-12-10
Payer: COMMERCIAL

## 2021-12-10 VITALS
HEIGHT: 64 IN | RESPIRATION RATE: 16 BRPM | BODY MASS INDEX: 43.02 KG/M2 | SYSTOLIC BLOOD PRESSURE: 134 MMHG | WEIGHT: 252 LBS | DIASTOLIC BLOOD PRESSURE: 82 MMHG

## 2021-12-10 DIAGNOSIS — D24.1 INTRADUCTAL PAPILLOMA OF BREAST, RIGHT: ICD-10-CM

## 2021-12-10 DIAGNOSIS — Z01.818 PRE-OP EXAMINATION: ICD-10-CM

## 2021-12-10 DIAGNOSIS — N64.52 NIPPLE DISCHARGE, BLOODY: ICD-10-CM

## 2021-12-10 DIAGNOSIS — D24.1 INTRADUCTAL PAPILLOMA OF BREAST, RIGHT: Primary | ICD-10-CM

## 2021-12-10 DIAGNOSIS — R93.89 ABNORMAL ULTRASOUND: ICD-10-CM

## 2021-12-10 LAB
ALBUMIN SERPL BCP-MCNC: 3.7 G/DL (ref 3.5–5)
ALP SERPL-CCNC: 76 U/L (ref 46–116)
ALT SERPL W P-5'-P-CCNC: 22 U/L (ref 12–78)
ANION GAP SERPL CALCULATED.3IONS-SCNC: 9 MMOL/L (ref 4–13)
AST SERPL W P-5'-P-CCNC: 15 U/L (ref 5–45)
BASOPHILS # BLD AUTO: 0.03 THOUSANDS/ΜL (ref 0–0.1)
BASOPHILS NFR BLD AUTO: 1 % (ref 0–1)
BILIRUB SERPL-MCNC: 0.5 MG/DL (ref 0.2–1)
BUN SERPL-MCNC: 9 MG/DL (ref 5–25)
CALCIUM SERPL-MCNC: 8.7 MG/DL (ref 8.3–10.1)
CHLORIDE SERPL-SCNC: 104 MMOL/L (ref 100–108)
CO2 SERPL-SCNC: 27 MMOL/L (ref 21–32)
CREAT SERPL-MCNC: 0.85 MG/DL (ref 0.6–1.3)
EOSINOPHIL # BLD AUTO: 0.24 THOUSAND/ΜL (ref 0–0.61)
EOSINOPHIL NFR BLD AUTO: 4 % (ref 0–6)
ERYTHROCYTE [DISTWIDTH] IN BLOOD BY AUTOMATED COUNT: 16.4 % (ref 11.6–15.1)
GFR SERPL CREATININE-BSD FRML MDRD: 102 ML/MIN/1.73SQ M
GLUCOSE SERPL-MCNC: 89 MG/DL (ref 65–140)
HCT VFR BLD AUTO: 38.5 % (ref 34.8–46.1)
HGB BLD-MCNC: 11.4 G/DL (ref 11.5–15.4)
IMM GRANULOCYTES # BLD AUTO: 0.02 THOUSAND/UL (ref 0–0.2)
IMM GRANULOCYTES NFR BLD AUTO: 0 % (ref 0–2)
LYMPHOCYTES # BLD AUTO: 1.84 THOUSANDS/ΜL (ref 0.6–4.47)
LYMPHOCYTES NFR BLD AUTO: 30 % (ref 14–44)
MCH RBC QN AUTO: 21.2 PG (ref 26.8–34.3)
MCHC RBC AUTO-ENTMCNC: 29.6 G/DL (ref 31.4–37.4)
MCV RBC AUTO: 71 FL (ref 82–98)
MONOCYTES # BLD AUTO: 0.47 THOUSAND/ΜL (ref 0.17–1.22)
MONOCYTES NFR BLD AUTO: 8 % (ref 4–12)
NEUTROPHILS # BLD AUTO: 3.64 THOUSANDS/ΜL (ref 1.85–7.62)
NEUTS SEG NFR BLD AUTO: 57 % (ref 43–75)
NRBC BLD AUTO-RTO: 0 /100 WBCS
PLATELET # BLD AUTO: 414 THOUSANDS/UL (ref 149–390)
PMV BLD AUTO: 10.9 FL (ref 8.9–12.7)
POTASSIUM SERPL-SCNC: 3.7 MMOL/L (ref 3.5–5.3)
PROT SERPL-MCNC: 7.3 G/DL (ref 6.4–8.2)
RBC # BLD AUTO: 5.39 MILLION/UL (ref 3.81–5.12)
SODIUM SERPL-SCNC: 140 MMOL/L (ref 136–145)
WBC # BLD AUTO: 6.24 THOUSAND/UL (ref 4.31–10.16)

## 2021-12-10 PROCEDURE — 99243 OFF/OP CNSLTJ NEW/EST LOW 30: CPT | Performed by: SURGERY

## 2021-12-10 PROCEDURE — 93005 ELECTROCARDIOGRAM TRACING: CPT

## 2021-12-10 PROCEDURE — 85025 COMPLETE CBC W/AUTO DIFF WBC: CPT

## 2021-12-10 PROCEDURE — 80053 COMPREHEN METABOLIC PANEL: CPT

## 2021-12-10 PROCEDURE — 36415 COLL VENOUS BLD VENIPUNCTURE: CPT

## 2021-12-10 RX ORDER — OXYCODONE HYDROCHLORIDE AND ACETAMINOPHEN 5; 325 MG/1; MG/1
1 TABLET ORAL EVERY 6 HOURS PRN
Qty: 10 TABLET | Refills: 0 | Status: SHIPPED | OUTPATIENT
Start: 2021-12-10

## 2021-12-10 NOTE — H&P (VIEW-ONLY)
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 mammogram ultrasound  She had 2 biopsies at  At 12:00 p m  and 6:00 a m  and consistent papilloma which she strong family history of breast cancer     She is here today to discuss this excision of this papillomatosis  Cancer History:     Oncology History    No history exists  Interval History:      Follow-up with right breast papilloma    Review of Systems:   Review of Systems   Constitutional: Negative for chills and fever  HENT: Negative for ear pain and sore throat  Eyes: Negative for pain and visual disturbance  Respiratory: Negative for cough and shortness of breath  Cardiovascular: Negative for chest pain and palpitations  Gastrointestinal: Negative for abdominal pain and vomiting  Genitourinary: Negative for dysuria and hematuria  Musculoskeletal: Negative for arthralgias and back pain  Skin: Negative for color change and rash  Neurological: Negative for seizures and syncope  All other systems reviewed and are negative        Past Medical History     Patient Active Problem List   Diagnosis    Obesity, morbid, BMI 40 0-49 9 ([address] )    Chronic hypertension with superimposed preeclampsia    Maternal anemia, antepartum    Nipple discharge, bloody    Orthopnea    Gestational diabetes mellitus (GDM), delivered     (spontaneous vaginal delivery)    Intraductal papilloma of breast, right     Past Medical History:   Diagnosis Date    Diabetes mellitus ([address] )     Gestational diabetes     History of recurrent miscarriages     see OB hx    Hypertension      Past Surgical History:   Procedure Laterality Date  INDUCED       [place] SURG RX MISSED ABORTN,1ST TRI N/A 2016    Procedure: DILATATION AND EVACUATION (D&E);   Surgeon: Lisa Vega MD;  Location: [facility];  Service: Gynecology    US GUIDANCE BREAST BIOPSY RIGHT EACH ADDITIONAL Right 2/10/2021    US GUIDED BREAST BIOPSY RIGHT COMPLETE Right 2/10/2021     Family History   Problem Relation Age of Onset    Hypertension Mother     Breast cancer Mother 58    BRCA1 Negative Mother     BRCA2 Negative Mother     Hypertension Father     No Known Problems Sister     No Known Problems Daughter     Stomach cancer Maternal Grandmother 80    No Known Problems Maternal Grandfather     Cancer Paternal Grandmother         unsure of type    No Known Problems Paternal Grandfather     No Known Problems Sister     No Known Problems Sister     No Known Problems Sister     No Known Problems Sister     No Known Problems Son     No Known Problems Son     Breast cancer Maternal Aunt 59    Breast cancer Maternal Aunt     Breast cancer Maternal Aunt     Breast cancer Maternal Aunt     Breast cancer Maternal Aunt     Breast cancer Maternal Aunt     No Known Problems Paternal Aunt      Social History     Socioeconomic History    Marital status: /Civil Union     Spouse name: Not on file    Number of children: 2    Years of education: Not on file    Highest education level: Not on file   Occupational History    Not on file   Tobacco Use    Smoking status: Former Smoker     Types: Cigarettes     Quit date:      Years since quittin 9    Smokeless tobacco: Never Used    Tobacco comment: Former smoker per [employer]   Vaping Use    Vaping Use: Never used   Substance and Sexual Activity    Alcohol use: No    Drug use: No    Sexual activity: Not Currently     Partners: Male   Other Topics Concern    Not on file   Social History Narrative    Caffeine use     Social Determinants of Health     Financial Resource Strain: Not on file Food Insecurity: Not on file   Transportation Needs: Not on file   Physical Activity: Not on file   Stress: Not on file   Social Connections: Not on file   Intimate Partner Violence: Not on file   Housing Stability: Not on file       Current Outpatient Medications:     acetaminophen (TYLENOL) 500 mg tablet, Take 500 mg by mouth every 6 (six) hours as needed for mild pain, Disp: , Rfl:     aspirin (ECOTRIN LOW STRENGTH) 81 mg EC tablet, Take 2 tablets (162 mg total) by mouth daily, Disp: 60 tablet, Rfl: 6    benzocaine-menthol-lanolin-aloe (DERMOPLAST) 20-0 5 % topical spray, Apply 1 application topically 4 (four) times a day as needed for irritation (Patient not taking: Reported on 6/10/2021), Disp: , Rfl: 0    Blood Glucose Monitoring Suppl (OneTouch Verio Flex System) w/Device KIT, Test 4 times for 1 week (Patient not taking: Reported on 6/10/2021), Disp: 1 kit, Rfl: 0    docusate sodium (COLACE) 100 mg capsule, Take 1 capsule (100 mg total) by mouth 2 (two) times a day (Patient not taking: Reported on 12/3/2021 ), Disp: 60 capsule, Rfl: 6    ferrous sulfate 324 (65 Fe) mg, Take 1 tablet (325 mg total) by mouth 2 (two) times a day before meals, Disp: 60 tablet, Rfl: 6    hydrochlorothiazide (HYDRODIURIL) 12 5 mg tablet, TAKE 1 TABLET(12 5 MG) BY MOUTH DAILY, Disp: 90 tablet, Rfl: 0    ibuprofen (MOTRIN) 200 mg tablet, Take 3 tablets (600 mg total) by mouth every 6 (six) hours as needed for moderate pain (Patient not taking: Reported on 12/3/2021 ), Disp: , Rfl:     labetalol (NORMODYNE) 200 mg tablet, TAKE 2 TABLETS(400 MG) BY MOUTH EVERY 8 HOURS, Disp: 540 tablet, Rfl: 0    NIFEdipine ER (ADALAT CC) 60 MG 24 hr tablet, TAKE 1 TABLET(60 MG) BY MOUTH TWICE DAILY (Patient not taking: Reported on 11/5/2021), Disp: 180 tablet, Rfl: 0    omeprazole (PriLOSEC) 20 mg delayed release capsule, Take 1 capsule (20 mg total) by mouth daily for 7 days, Disp: 7 capsule, Rfl: 0    ondansetron (ZOFRAN-ODT) 4 mg disintegrating tablet, Take 1 tablet (4 mg total) by mouth every 6 (six) hours as needed for nausea or vomiting (Patient not taking: Reported on 11/5/2021), Disp: 20 tablet, Rfl: 0    Prenatal Vit-Fe Fumarate-FA (PNV Folic Acid + Iron) 51-7 MG TABS, Take 1 tablet by mouth daily (Patient not taking: Reported on 11/5/2021), Disp: 30 tablet, Rfl: 8  Allergies   Allergen Reactions    Other      bbq sauce    Tomato - Food Allergy        Physical Exam:     Vitals:    12/10/21 1257   BP: 134/82   Resp: 16     Physical Exam  Constitutional:       Appearance: Normal appearance  HENT:      Head: Normocephalic and atraumatic  Nose: Nose normal       Mouth/Throat:      Mouth: Mucous membranes are moist    Eyes:      Pupils: Pupils are equal, round, and reactive to light  Cardiovascular:      Rate and Rhythm: Normal rate  Pulses: Normal pulses  Heart sounds: Normal heart sounds  Pulmonary:      Effort: Pulmonary effort is normal       Breath sounds: Normal breath sounds  Chest:      Comments: The bilateral Breast(s) were examined  There was not any sign of an inverted nipple, mass, nipple discharge, skin changes or tenderness  The bilateral  breast(s) were examined in the sitting and supine position  There are not any worrisome skin changes, tenderness, nipple changes, swelling ,bleeding or evidence of mass/s in all four quadrants  Jean survey demonstrated that there is not any evidence of any clinically suspicious axillary, pectoral or supraclavicular lymph nodes  Abdominal:      General: Bowel sounds are normal       Palpations: Abdomen is soft  Musculoskeletal:         General: Normal range of motion  Cervical back: Normal range of motion and neck supple  Skin:     General: Skin is warm  Neurological:      General: No focal deficit present  Mental Status: She is alert and oriented to person, place, and time     Psychiatric:         Mood and Affect: Mood normal          Behavior: Behavior normal          Thought Content: Thought content normal          Judgment: Judgment normal            Results & Discussion:   I did review   Again about for glioma  After extensive discussion last time she decided to undergo lumpectomy  We will plan for OLEGARIO directed lumpectomy to include a biopsy-proven papilloma as well as the residual papilloma  She understand  We can make some of  Some of the papillomas  She understands she can develop in the future additional papilloma mass as well  Surgical consent was obtained after explaining benefits, alternative, procedure and possible complications with regards above mentioned procedure  All her questions regarding the visit  as well as the  surgery were answered to  the patient's satisfaction  We will arrange OLEGARIO bracketed papilloma in the right breast     Advance Care Planning/Advance Directives: Salud Low MD discussed the disease status with Cheril Dakins  today 12/10/21  treatment plans and follow-up with the patient

## 2021-12-13 LAB
ATRIAL RATE: 85 BPM
P AXIS: 57 DEGREES
PR INTERVAL: 160 MS
QRS AXIS: 30 DEGREES
QRSD INTERVAL: 88 MS
QT INTERVAL: 400 MS
QTC INTERVAL: 476 MS
T WAVE AXIS: 16 DEGREES
VENTRICULAR RATE: 85 BPM

## 2021-12-13 PROCEDURE — 93010 ELECTROCARDIOGRAM REPORT: CPT | Performed by: INTERNAL MEDICINE

## 2021-12-14 NOTE — PRE-PROCEDURE INSTRUCTIONS
Pre-Surgery Instructions:   Medication Instructions    acetaminophen (TYLENOL) 500 mg tablet Instructed to take as needed including DOS with sips water    labetalol (NORMODYNE) 200 mg tablet Instructed to take per normal schedule including DOS with sips water     Have you had / have a sore throat? No  Have you had / have a cough less than 1 week? no  Have you had / have a fever greater than 100 0 - 100  4? no  Are you experiencing any shortness of breath? No  Not vaccinated  Reviewed with patient, in detail, instructions from "My Surgical Experience"  Instructed to avoid all ASA and OTC Vit/Supp 1 week prior to surgery and to avoid NSAIDs 7 days prior to surgery per anesthesia instructions  Tylenol ok to take prn  Advised patient that [facility] will call with surgery arrival time and hospital directions the business day prior to surgery  Advised patient nothing eat or drink after midnight  Patient verbalized understanding and knows to call surgeon's office with any additional questions prior to surgery  Pt  Verbalized understanding of current visitor restrictions  My Surgical Experience    The following information was developed to assist you to prepare for your operation  What do I need to do before coming to the hospital?   Arrange for a responsible person to drive you to and from the hospital    Arrange care for your children at home  Children are not allowed in the recovery areas of the hospital   Plan to wear clothing that is easy to put on and take off  If you are having shoulder surgery, wear a shirt that buttons or zippers in the front  Bathing  o Shower the evening before and the morning of your surgery with an antibacterial soap   Please refer to the Pre Op Showering Instructions for Surgery Patients Sheet   o Remove nail polish and all body piercing jewelry  o Do not shave any body part for at least 24 hours before surgery-this includes face, arms, legs and upper body  Food  o Nothing to eat or drink after midnight the night before your surgery  This includes candy and chewing gum  o Exception: If your surgery is after 12:00pm (noon), you may have clear liquids such as 7-Up®, ginger ale, apple or cranberry juice, Jell-O®, water, or clear broth until 8:00 am  o Do not drink milk or juice with pulp on the morning before surgery  o Do not drink alcohol 24 hours before surgery  Medicine  o Follow instructions you received from your surgeon about which medicines you may take on the day of surgery  o If instructed to take medicine on the morning of surgery, take pills with just a small sip of water  Call your prescribing doctor for specific information on what to do if you take insulin    What should I bring to the hospital?    Bring:  Maudine Grist or a walker, if you have them, for foot or knee surgery   A list of the daily medicines, vitamins, minerals, herbals and nutritional supplements you take  Include the dosages of medicines and the time you take them each day   Glasses, dentures or hearing aids   Minimal clothing; you will be wearing hospital sleepwear   Photo ID; required to verify your identity   If you have a Living Will or Power of , bring a copy of the documents   If you have an ostomy, bring an extra pouch and any supplies you use    Do not bring   Medicines or inhalers   Money, valuables or jewelry    What other information should I know about the day of surgery?  Notify your surgeons if you develop a cold, sore throat, cough, fever, rash or any other illness   Report to the Ambulatory Surgical/Same Day Surgery Unit   You will be instructed to stop at Registration only if you have not been pre-registered   Inform your  fi they do not stay that they will be asked by the staff to leave a phone number where they can be reached   Be available to be reached before surgery   In the event the operating room schedule changes, you may be asked to come in earlier or later than expected    *It is important to tell your doctor and others involved in your health care if you are taking or have been taking any non-prescription drugs, vitamins, minerals, herbals or other nutritional supplements   Any of these may interact with some food or medicines and cause a reaction

## 2021-12-16 ENCOUNTER — HOSPITAL ENCOUNTER (OUTPATIENT)
Dept: MAMMOGRAPHY | Facility: CLINIC | Age: 36
Discharge: HOME | End: 2021-12-16
Payer: COMMERCIAL

## 2021-12-16 ENCOUNTER — HOSPITAL ENCOUNTER (OUTPATIENT)
Dept: RADIOLOGY | Facility: HOSPITAL | Age: 36
Discharge: HOME | End: 2021-12-16
Attending: SURGERY
Payer: COMMERCIAL

## 2021-12-16 ENCOUNTER — APPOINTMENT (OUTPATIENT)
Dept: RADIOLOGY | Facility: MEDICAL CENTER | Age: 36
End: 2021-12-16
Payer: COMMERCIAL

## 2021-12-16 ENCOUNTER — HOSPITAL ENCOUNTER (OUTPATIENT)
Dept: ULTRASOUND IMAGING | Facility: CLINIC | Age: 36
Discharge: HOME | End: 2021-12-16
Payer: COMMERCIAL

## 2021-12-16 VITALS
SYSTOLIC BLOOD PRESSURE: 162 MMHG | HEIGHT: 64 IN | HEART RATE: 87 BPM | BODY MASS INDEX: 42.91 KG/M2 | WEIGHT: 251.32 LBS | DIASTOLIC BLOOD PRESSURE: 92 MMHG

## 2021-12-16 DIAGNOSIS — R93.89 ABNORMAL ULTRASOUND: ICD-10-CM

## 2021-12-16 DIAGNOSIS — D24.1 INTRADUCTAL PAPILLOMA OF BREAST, RIGHT: ICD-10-CM

## 2021-12-16 PROCEDURE — 19286 PERQ DEV BREAST ADD US IMAG: CPT

## 2021-12-16 PROCEDURE — 19285 PERQ DEV BREAST 1ST US IMAG: CPT

## 2021-12-16 PROCEDURE — A4648 IMPLANTABLE TISSUE MARKER: HCPCS

## 2021-12-16 PROCEDURE — 71046 X-RAY EXAM CHEST 2 VIEWS: CPT

## 2021-12-16 RX ORDER — LIDOCAINE HYDROCHLORIDE 10 MG/ML
5 INJECTION, SOLUTION EPIDURAL; INFILTRATION; INTRACAUDAL; PERINEURAL ONCE
Status: COMPLETED | OUTPATIENT
Start: 2021-12-16 | End: 2021-12-16

## 2021-12-16 RX ADMIN — LIDOCAINE HYDROCHLORIDE 5 ML: 10 INJECTION, SOLUTION EPIDURAL; INFILTRATION; INTRACAUDAL; PERINEURAL at 15:30

## 2021-12-16 RX ADMIN — LIDOCAINE HYDROCHLORIDE 5 ML: 10 INJECTION, SOLUTION EPIDURAL; INFILTRATION; INTRACAUDAL; PERINEURAL at 15:33

## 2021-12-20 ENCOUNTER — APPOINTMENT (OUTPATIENT)
Dept: MAMMOGRAPHY | Facility: CLINIC | Age: 36
End: 2021-12-20
Payer: COMMERCIAL

## 2021-12-20 ENCOUNTER — HOSPITAL ENCOUNTER (OUTPATIENT)
Facility: HOSPITAL | Age: 36
Setting detail: OUTPATIENT SURGERY
Discharge: HOME | End: 2021-12-20
Attending: SURGERY | Admitting: SURGERY
Payer: COMMERCIAL

## 2021-12-20 ENCOUNTER — ANESTHESIA EVENT (OUTPATIENT)
Dept: PERIOP | Facility: HOSPITAL | Age: 36
End: 2021-12-20
Payer: COMMERCIAL

## 2021-12-20 ENCOUNTER — ANESTHESIA (OUTPATIENT)
Dept: PERIOP | Facility: HOSPITAL | Age: 36
End: 2021-12-20
Payer: COMMERCIAL

## 2021-12-20 VITALS
BODY MASS INDEX: 42.64 KG/M2 | HEIGHT: 64 IN | TEMPERATURE: 97 F | OXYGEN SATURATION: 97 % | WEIGHT: 249.78 LBS | RESPIRATION RATE: 20 BRPM | DIASTOLIC BLOOD PRESSURE: 97 MMHG | HEART RATE: 86 BPM | SYSTOLIC BLOOD PRESSURE: 177 MMHG

## 2021-12-20 DIAGNOSIS — D24.1 INTRADUCTAL PAPILLOMA OF BREAST, RIGHT: ICD-10-CM

## 2021-12-20 DIAGNOSIS — N63.0 BREAST LUMP OR MASS: ICD-10-CM

## 2021-12-20 PROBLEM — D64.9 ANEMIA: Status: ACTIVE | Noted: 2020-11-30

## 2021-12-20 PROBLEM — I10 HTN (HYPERTENSION): Status: ACTIVE | Noted: 2020-11-25

## 2021-12-20 LAB
EXT PREGNANCY TEST URINE: NEGATIVE
EXT. CONTROL: NORMAL
GLUCOSE SERPL-MCNC: 74 MG/DL (ref 65–140)

## 2021-12-20 PROCEDURE — 19301 PARTIAL MASTECTOMY: CPT | Performed by: SURGERY

## 2021-12-20 PROCEDURE — 82948 REAGENT STRIP/BLOOD GLUCOSE: CPT

## 2021-12-20 PROCEDURE — 81025 URINE PREGNANCY TEST: CPT | Performed by: SURGERY

## 2021-12-20 PROCEDURE — 19301 PARTIAL MASTECTOMY: CPT | Performed by: CLINICAL NURSE SPECIALIST

## 2021-12-20 PROCEDURE — 88307 TISSUE EXAM BY PATHOLOGIST: CPT | Performed by: PATHOLOGY

## 2021-12-20 RX ORDER — LABETALOL 20 MG/4 ML (5 MG/ML) INTRAVENOUS SYRINGE
10
Status: COMPLETED | OUTPATIENT
Start: 2021-12-20 | End: 2021-12-20

## 2021-12-20 RX ORDER — PROPOFOL 10 MG/ML
INJECTION, EMULSION INTRAVENOUS AS NEEDED
Status: DISCONTINUED | OUTPATIENT
Start: 2021-12-20 | End: 2021-12-20

## 2021-12-20 RX ORDER — DEXMEDETOMIDINE HYDROCHLORIDE 100 UG/ML
INJECTION, SOLUTION INTRAVENOUS AS NEEDED
Status: DISCONTINUED | OUTPATIENT
Start: 2021-12-20 | End: 2021-12-20

## 2021-12-20 RX ORDER — LABETALOL 20 MG/4 ML (5 MG/ML) INTRAVENOUS SYRINGE
AS NEEDED
Status: DISCONTINUED | OUTPATIENT
Start: 2021-12-20 | End: 2021-12-20

## 2021-12-20 RX ORDER — PROMETHAZINE HYDROCHLORIDE 25 MG/ML
25 INJECTION, SOLUTION INTRAMUSCULAR; INTRAVENOUS ONCE AS NEEDED
Status: DISCONTINUED | OUTPATIENT
Start: 2021-12-20 | End: 2021-12-20 | Stop reason: HOSPADM

## 2021-12-20 RX ORDER — FENTANYL CITRATE 50 UG/ML
INJECTION, SOLUTION INTRAMUSCULAR; INTRAVENOUS AS NEEDED
Status: DISCONTINUED | OUTPATIENT
Start: 2021-12-20 | End: 2021-12-20

## 2021-12-20 RX ORDER — METOCLOPRAMIDE HYDROCHLORIDE 5 MG/ML
10 INJECTION INTRAMUSCULAR; INTRAVENOUS ONCE AS NEEDED
Status: DISCONTINUED | OUTPATIENT
Start: 2021-12-20 | End: 2021-12-20 | Stop reason: HOSPADM

## 2021-12-20 RX ORDER — HYDROMORPHONE HCL/PF 1 MG/ML
0.5 SYRINGE (ML) INJECTION
Status: DISCONTINUED | OUTPATIENT
Start: 2021-12-20 | End: 2021-12-20 | Stop reason: HOSPADM

## 2021-12-20 RX ORDER — HYDRALAZINE HYDROCHLORIDE 20 MG/ML
5 INJECTION INTRAMUSCULAR; INTRAVENOUS ONCE AS NEEDED
Status: DISCONTINUED | OUTPATIENT
Start: 2021-12-20 | End: 2021-12-20 | Stop reason: HOSPADM

## 2021-12-20 RX ORDER — OXYCODONE HYDROCHLORIDE AND ACETAMINOPHEN 5; 325 MG/1; MG/1
1 TABLET ORAL EVERY 4 HOURS PRN
Status: DISCONTINUED | OUTPATIENT
Start: 2021-12-20 | End: 2021-12-20 | Stop reason: HOSPADM

## 2021-12-20 RX ORDER — ONDANSETRON 2 MG/ML
INJECTION INTRAMUSCULAR; INTRAVENOUS AS NEEDED
Status: DISCONTINUED | OUTPATIENT
Start: 2021-12-20 | End: 2021-12-20

## 2021-12-20 RX ORDER — MAGNESIUM HYDROXIDE 1200 MG/15ML
LIQUID ORAL AS NEEDED
Status: DISCONTINUED | OUTPATIENT
Start: 2021-12-20 | End: 2021-12-20 | Stop reason: HOSPADM

## 2021-12-20 RX ORDER — DEXAMETHASONE SODIUM PHOSPHATE 10 MG/ML
INJECTION, SOLUTION INTRAMUSCULAR; INTRAVENOUS AS NEEDED
Status: DISCONTINUED | OUTPATIENT
Start: 2021-12-20 | End: 2021-12-20

## 2021-12-20 RX ORDER — MIDAZOLAM HYDROCHLORIDE 2 MG/2ML
INJECTION, SOLUTION INTRAMUSCULAR; INTRAVENOUS AS NEEDED
Status: DISCONTINUED | OUTPATIENT
Start: 2021-12-20 | End: 2021-12-20

## 2021-12-20 RX ORDER — SODIUM CHLORIDE, SODIUM LACTATE, POTASSIUM CHLORIDE, CALCIUM CHLORIDE 600; 310; 30; 20 MG/100ML; MG/100ML; MG/100ML; MG/100ML
125 INJECTION, SOLUTION INTRAVENOUS CONTINUOUS
Status: DISCONTINUED | OUTPATIENT
Start: 2021-12-20 | End: 2021-12-20 | Stop reason: HOSPADM

## 2021-12-20 RX ORDER — DIPHENHYDRAMINE HYDROCHLORIDE 50 MG/ML
12.5 INJECTION INTRAMUSCULAR; INTRAVENOUS ONCE AS NEEDED
Status: DISCONTINUED | OUTPATIENT
Start: 2021-12-20 | End: 2021-12-20 | Stop reason: HOSPADM

## 2021-12-20 RX ORDER — LIDOCAINE HYDROCHLORIDE 20 MG/ML
INJECTION, SOLUTION EPIDURAL; INFILTRATION; INTRACAUDAL; PERINEURAL AS NEEDED
Status: DISCONTINUED | OUTPATIENT
Start: 2021-12-20 | End: 2021-12-20

## 2021-12-20 RX ORDER — CEFAZOLIN SODIUM 2 G/50ML
2000 SOLUTION INTRAVENOUS ONCE
Status: COMPLETED | OUTPATIENT
Start: 2021-12-20 | End: 2021-12-20

## 2021-12-20 RX ORDER — KETOROLAC TROMETHAMINE 30 MG/ML
INJECTION, SOLUTION INTRAMUSCULAR; INTRAVENOUS AS NEEDED
Status: DISCONTINUED | OUTPATIENT
Start: 2021-12-20 | End: 2021-12-20

## 2021-12-20 RX ORDER — GLYCOPYRROLATE 0.2 MG/ML
INJECTION INTRAMUSCULAR; INTRAVENOUS AS NEEDED
Status: DISCONTINUED | OUTPATIENT
Start: 2021-12-20 | End: 2021-12-20

## 2021-12-20 RX ORDER — FENTANYL CITRATE/PF 50 MCG/ML
50 SYRINGE (ML) INJECTION
Status: DISCONTINUED | OUTPATIENT
Start: 2021-12-20 | End: 2021-12-20 | Stop reason: HOSPADM

## 2021-12-20 RX ADMIN — LABETALOL 20 MG/4 ML (5 MG/ML) INTRAVENOUS SYRINGE 20 MG: at 12:33

## 2021-12-20 RX ADMIN — LABETALOL HYDROCHLORIDE 10 MG: 5 INJECTION, SOLUTION INTRAVENOUS at 12:50

## 2021-12-20 RX ADMIN — DEXMEDETOMIDINE HCL 8 MCG: 100 INJECTION INTRAVENOUS at 12:06

## 2021-12-20 RX ADMIN — OXYCODONE HYDROCHLORIDE AND ACETAMINOPHEN 1 TABLET: 5; 325 TABLET ORAL at 14:31

## 2021-12-20 RX ADMIN — DEXMEDETOMIDINE HCL 4 MCG: 100 INJECTION INTRAVENOUS at 11:20

## 2021-12-20 RX ADMIN — GLYCOPYRROLATE 0.1 MG: 0.2 INJECTION, SOLUTION INTRAMUSCULAR; INTRAVENOUS at 11:10

## 2021-12-20 RX ADMIN — FENTANYL CITRATE 50 MCG: 50 INJECTION, SOLUTION INTRAMUSCULAR; INTRAVENOUS at 11:07

## 2021-12-20 RX ADMIN — CEFAZOLIN SODIUM 2000 MG: 2 SOLUTION INTRAVENOUS at 10:45

## 2021-12-20 RX ADMIN — KETOROLAC TROMETHAMINE 15 MG: 30 INJECTION, SOLUTION INTRAMUSCULAR at 12:05

## 2021-12-20 RX ADMIN — SODIUM CHLORIDE, SODIUM LACTATE, POTASSIUM CHLORIDE, AND CALCIUM CHLORIDE 125 ML/HR: .6; .31; .03; .02 INJECTION, SOLUTION INTRAVENOUS at 10:26

## 2021-12-20 RX ADMIN — LIDOCAINE HYDROCHLORIDE 80 MG: 20 INJECTION, SOLUTION EPIDURAL; INFILTRATION; INTRACAUDAL; PERINEURAL at 11:07

## 2021-12-20 RX ADMIN — FENTANYL CITRATE 25 MCG: 50 INJECTION, SOLUTION INTRAMUSCULAR; INTRAVENOUS at 11:14

## 2021-12-20 RX ADMIN — ONDANSETRON 4 MG: 2 INJECTION INTRAMUSCULAR; INTRAVENOUS at 11:15

## 2021-12-20 RX ADMIN — DEXMEDETOMIDINE HCL 4 MCG: 100 INJECTION INTRAVENOUS at 11:24

## 2021-12-20 RX ADMIN — GLYCOPYRROLATE 0.1 MG: 0.2 INJECTION, SOLUTION INTRAMUSCULAR; INTRAVENOUS at 11:23

## 2021-12-20 RX ADMIN — FENTANYL CITRATE 25 MCG: 50 INJECTION, SOLUTION INTRAMUSCULAR; INTRAVENOUS at 11:24

## 2021-12-20 RX ADMIN — LABETALOL 20 MG/4 ML (5 MG/ML) INTRAVENOUS SYRINGE 10 MG: at 12:26

## 2021-12-20 RX ADMIN — DEXAMETHASONE SODIUM PHOSPHATE 8 MG: 10 INJECTION, SOLUTION INTRAMUSCULAR; INTRAVENOUS at 11:10

## 2021-12-20 RX ADMIN — GLYCOPYRROLATE 0.1 MG: 0.2 INJECTION, SOLUTION INTRAMUSCULAR; INTRAVENOUS at 11:15

## 2021-12-20 RX ADMIN — PROPOFOL 200 MG: 10 INJECTION, EMULSION INTRAVENOUS at 11:07

## 2021-12-20 RX ADMIN — HYDROMORPHONE HYDROCHLORIDE 0.5 MG: 1 INJECTION, SOLUTION INTRAMUSCULAR; INTRAVENOUS; SUBCUTANEOUS at 13:22

## 2021-12-20 RX ADMIN — FENTANYL CITRATE 50 MCG: 50 INJECTION INTRAMUSCULAR; INTRAVENOUS at 13:00

## 2021-12-20 RX ADMIN — GLYCOPYRROLATE 0.1 MG: 0.2 INJECTION, SOLUTION INTRAMUSCULAR; INTRAVENOUS at 10:58

## 2021-12-20 RX ADMIN — LABETALOL 20 MG/4 ML (5 MG/ML) INTRAVENOUS SYRINGE 5 MG: at 12:20

## 2021-12-20 RX ADMIN — FENTANYL CITRATE 50 MCG: 50 INJECTION INTRAMUSCULAR; INTRAVENOUS at 13:12

## 2021-12-20 RX ADMIN — DEXMEDETOMIDINE HCL 8 MCG: 100 INJECTION INTRAVENOUS at 11:10

## 2021-12-20 RX ADMIN — LABETALOL HYDROCHLORIDE 10 MG: 5 INJECTION, SOLUTION INTRAVENOUS at 13:06

## 2021-12-20 RX ADMIN — DEXMEDETOMIDINE HCL 4 MCG: 100 INJECTION INTRAVENOUS at 11:37

## 2021-12-20 RX ADMIN — DEXMEDETOMIDINE HCL 8 MCG: 100 INJECTION INTRAVENOUS at 11:15

## 2021-12-20 RX ADMIN — PROPOFOL 50 MG: 10 INJECTION, EMULSION INTRAVENOUS at 12:30

## 2021-12-20 RX ADMIN — DEXMEDETOMIDINE HCL 4 MCG: 100 INJECTION INTRAVENOUS at 11:32

## 2021-12-20 RX ADMIN — LABETALOL 20 MG/4 ML (5 MG/ML) INTRAVENOUS SYRINGE 5 MG: at 12:24

## 2021-12-20 RX ADMIN — MIDAZOLAM HYDROCHLORIDE 2 MG: 1 INJECTION, SOLUTION INTRAMUSCULAR; INTRAVENOUS at 10:58

## 2021-12-20 NOTE — INTERVAL H&P NOTE
H&P reviewed  After examining the patient I find no changes in the patients condition since the H&P had been written      Vitals:    12/20/21 1005   BP: (!) 198/99   Pulse: 79   Resp: 18   Temp: 97 9 °F (36 6 °C)   SpO2: 98%

## 2021-12-20 NOTE — OP NOTE
PERATIVE REPORT  PATIENT NAME: Rayo Capellan    :  1985  MRN: [id number]  Pt Location: [facility]OM 04    SURGERY DATE: 2021    Surgeon(s) and Role:     * Skye Kearns MD - Primary     * Evelyne Cruz PA-C - Assisting    Preop Diagnosis:  Intraductal papilloma of breast, right [D24 1]    Post-Op Diagnosis Codes:     * Intraductal papilloma of breast, right [D24 1]    Procedure(s) (LRB):  OLEGARIO  DIRECTED LUMPECTOMY AT 12 0'CLOCK AND 6 O'CLOCK (Right)    Specimen(s):  ID Type Source Tests Collected by Time Destination   1 : RIGHT BREAST OLEGARIO DIRECTED LUMPECTOMY OLEGARIO CLIP 6 & 12 O'CLOCK  Tissue Breast, Right TISSUE EXAM Skye Kearns MD 2021 1154        Estimated Blood Loss:   10 mL    Drains:  * No LDAs found *    Anesthesia Type:   General    Operative Indications:   Multiple Intraductal papilloma of breast, right [D24 1]      Operative Findings:  Both OLEGARIO clips and biopsy clips of all are in one specimen  Complications:   None    Procedure and Technique:  I previously reviewed the post biopsy images  Lumpectomy  Rayo Capellan was brought to the operation room and placed under general anesthesia  Attention was paid to ensure appropriate padding and correct positioning  The OLEGARIO  detector wasthen used to locate the position of the OLEGARIO deflectors at 12 00 and 6 00 position and the skin was marked in this area  The  right breast was prepped and draped in a sterile fashion  I initiated a time-out, identifying the patient, the correct side and the above procedure  All parties agreed with the time out  The planned incision was then injected with 0 25% Marcaine for local anesthesia  The incision was sharply incised at nipple areola complex  The OLEGARIO dectector was used to guide the dissection  Superior, inferior, medial and lateral planes were developed around the OLEGARIO deflector and the specimen truncated with electrocautery beyond the deflector     The specimen was then sutured for orientation purposes  A specimen radiograph was taken in two dimensions  specimen was  sent to pathology for permanent analysis  Superficial bleeding controlled with electrocautery and the wound was extensively irrigated  The wound was closed with two layers, an inner layer of 3-0 vicryl and a subcuticular layer of 3-0 monocryl  prineo were applied  The counts were correct x 2     I was present for the entire procedure and A physician assistant was required during the procedure for retraction tissue handling,dissection and suturing    Patient Disposition:  PACU       SIGNATURE: Shala Thomas MD  DATE: December 20, 2021  TIME: 12:21 PM

## 2021-12-20 NOTE — DISCHARGE INSTRUCTIONS
Breast Conservation Therapy   WHAT YOU NEED TO KNOW:   Breast conservation therapy (BCT) is also called a lumpectomy  Only the part of the breast with cancer and some area around it are removed  Radiation therapy may be given at a later date to kill cancer cells that were not removed with surgery  DISCHARGE INSTRUCTIONS:   Medicines:   · Antibiotic medicine: This medicine is given to fight or prevent an infection caused by bacteria  Take them as directed  · Pain medicine: You may be given a prescription medicine to decrease pain  Do not wait until the pain is severe before you take this medicine  · Take your medicine as directed  Contact your healthcare provider if you think your medicine is not helping or if you have side effects  Tell him or her if you are allergic to any medicine  Keep a list of the medicines, vitamins, and herbs you take  Include the amounts, and when and why you take them  Bring the list or the pill bottles to follow-up visits  Carry your medicine list with you in case of an emergency  Follow up with your surgeon or breast specialist as directed: You may need to return to have your wound checked, drain taken out, or your stitches removed  Write down your questions so you remember to ask them during your visits  How to bathe with stitches: Follow instructions on when you can bathe  Carefully wash the wound with soap and water  Pat the area gently with a towel to dry the area  Put on new, clean bandages as directed  Change your bandages when they get wet or dirty  Radiation:  You may need to have radiation therapy after you have healed from your surgery  This is usually done 2 to 4 weeks after surgery  Radiation therapy may help kill cancer cells that were left and keep these cancer cells from spreading  Breast self-exams:  Do a monthly breast self-exam  If you are having monthly periods, do it 2 or 3 days after your period ends   If you have gone through menopause, check your breasts on the same day each month  You may also need to have a mammogram taken regularly  Ask for more information about how to do a breast self-exam and when to have a mammogram   Breast prosthesis:  A prosthesis (artificial breast) that can be worn in the bra may help add fullness and balance to your breasts  A breast prosthesis may be made of foam or polyester fiber  Ask for more information about breast prosthesis  For more information:   · [employer]  [address] , [place] [address]  Phone: 7- 180 - [phone]  Phone: 0- 247 - [phone]  Web Address: http://www  aafp org  Contact your surgeon or breast specialist if:   · You have a fever  · You have discharge or pain in the area where the drain was inserted  · You have nausea or vomiting  · Blood soaks through your bandage  · Your skin is itchy, swollen, or has a rash  · You have questions or concerns about your condition or care  Seek care immediately or call 911 if:   · You feel something is bulging out into your chest and not going back in     · You have pain in your chest or armpit that does not go away even after you take pain medicines  · You have trouble breathing all of a sudden  · You have blood, pus, or a foul odor coming from your incision  · Your shoulder, arm, or fingers are numb, tingly, cool, blue, or pale  © 2017 [address]  Information is for End User's use only and may not be sold, redistributed or otherwise used for commercial purposes  All illustrations and images included in CareNotes® are the copyrighted property of [employer]  or Davon Mar[employer]  The above information is an  only  It is not intended as medical advice for individual conditions or treatments  Talk to your doctor, nurse or pharmacist before following any medical regimen to see if it is safe and effective for you

## 2021-12-21 ENCOUNTER — TELEPHONE (OUTPATIENT)
Dept: GYNECOLOGIC ONCOLOGY | Facility: CLINIC | Age: 36
End: 2021-12-21

## 2022-01-07 PROBLEM — N60.81: Status: ACTIVE | Noted: 2022-01-07

## 2022-01-07 PROBLEM — N60.21 SCLEROSING ADENOSIS OF BREAST, RIGHT: Status: ACTIVE | Noted: 2022-01-07

## 2022-01-10 ENCOUNTER — OFFICE VISIT (OUTPATIENT)
Dept: SURGICAL ONCOLOGY | Facility: CLINIC | Age: 37
End: 2022-01-10

## 2022-01-10 VITALS
BODY MASS INDEX: 42.85 KG/M2 | HEIGHT: 64 IN | TEMPERATURE: 97.2 F | HEART RATE: 78 BPM | OXYGEN SATURATION: 98 % | DIASTOLIC BLOOD PRESSURE: 86 MMHG | WEIGHT: 251 LBS | SYSTOLIC BLOOD PRESSURE: 132 MMHG

## 2022-01-10 DIAGNOSIS — Z98.890 STATUS POST RIGHT BREAST LUMPECTOMY: ICD-10-CM

## 2022-01-10 DIAGNOSIS — N60.81 APOCRINE METAPLASIA OF BREAST, RIGHT: ICD-10-CM

## 2022-01-10 DIAGNOSIS — N60.21 SCLEROSING ADENOSIS OF BREAST, RIGHT: ICD-10-CM

## 2022-01-10 DIAGNOSIS — D24.1 INTRADUCTAL PAPILLOMA OF BREAST, RIGHT: Primary | ICD-10-CM

## 2022-01-10 PROCEDURE — 99024 POSTOP FOLLOW-UP VISIT: CPT | Performed by: SURGERY

## 2022-01-10 NOTE — PROGRESS NOTES
Surgical Oncology Follow Up  [facility] [address] [facility] [address] [facility]  [address] [address]    David Hawk  1985  [id number]      Chief Complaint   Patient presents with    Post-op     12/20        Assessment & Plan: This is a 42-year-old female with a multiple right breast papillomatosis strong family history of breast cancer  And the OLEGARIO seed  bracketed lumpectomy papilloma extending from 12:00 p m  all the way up to 6:00 a m  with multiple papilloma  Pathology demonstrated at least 30-32 papillomas with some atypia  The pathology has been sent for outside review and pending final report  Preliminary report was discussed with the patient and copy of the report was given to the patient well-healed surgical site  We will bring her to see her in 1 month time once we have the final pathology    Cancer History:     Oncology History    No history exists  Interval History:     Preliminary Diagnosis   A  Breast, Right,right breast OLEGARIO  directed lumpectomy OLEGARIO clip 6 & 12 o'clock  -Multiple sclerosed intraductal papilloma with florid hyperplasia   -Proliferative fibrocystic changes including apocrine metaplasia, microcysts formation, sclerosing adenosis and moderate intraductal hyperplasia, usual type  -Changes consistent with previous biopsy site   -Benign overlying skin     Comment  Histological sections show numerous ntraductal papillomas (30-32) with sclerosis and florid epithelial hyperplasia, ranging from 2 to 7 mm  Few benign papillomas present at inked superior, inferior and posterior inked margins  There are few foci suspicious for atypical hyperplasia involving some papillomas  Note:  This case will be send out to Dr Verito Galvez, [facility] for consultation and her opinion will be included in the final report            Review of Systems:   Review of Systems Constitutional: Negative for chills and fever  HENT: Negative for ear pain and sore throat  Eyes: Negative for pain and visual disturbance  Respiratory: Negative for cough and shortness of breath  Cardiovascular: Negative for chest pain and palpitations  Gastrointestinal: Negative for abdominal pain and vomiting  Genitourinary: Negative for dysuria and hematuria  Musculoskeletal: Negative for arthralgias and back pain  Skin: Negative for color change and rash  Neurological: Negative for seizures and syncope  All other systems reviewed and are negative  Past Medical History     Patient Active Problem List   Diagnosis    Obesity, morbid, BMI 40 0-49 9 ([address] )    Anemia affecting pregnancy in third trimester    HTN (hypertension)    Anemia    Nipple discharge, bloody    Orthopnea    Gestational diabetes mellitus (GDM), delivered     (spontaneous vaginal delivery)    Intraductal papilloma of breast, right    Breast lump or mass    Apocrine metaplasia of breast, right    Sclerosing adenosis of breast, right     Past Medical History:   Diagnosis Date    Gestational diabetes     History of recurrent miscarriages     see OB hx    Hypertension      Past Surgical History:   Procedure Laterality Date    BREAST BIOPSY Right     BREAST LUMPECTOMY Right 2021    Procedure: OLEGARIO  DIRECTED LUMPECTOMY AT 12 0'CLOCK AND 6 O'CLOCK;  Surgeon: Kishore Peraza MD;  Location: [facility];  Service: Surgical Oncology    INDUCED       ND SURG RX MISSED ABORTN,1ST TRI N/A 2016    Procedure: DILATATION AND EVACUATION (D&E);   Surgeon: Kim Escobar MD;  Location: [facility];  Service: Gynecology    US BREAST NEEDLE LOC RIGHT EACH ADDITIONAL Right 2021    US BREAST OLEGARIO  NEEDLE LOC RIGHT Right 2021    US GUIDANCE BREAST BIOPSY RIGHT EACH ADDITIONAL Right 2/10/2021    US GUIDED BREAST BIOPSY RIGHT COMPLETE Right 2/10/2021     Family History   Problem Relation Age of Onset    Hypertension Mother     Breast cancer Mother 58    BRCA1 Negative Mother     BRCA2 Negative Mother     Hypertension Father     No Known Problems Sister     No Known Problems Daughter     Stomach cancer Maternal Grandmother 80    No Known Problems Maternal Grandfather     Cancer Paternal Grandmother         unsure of type    No Known Problems Paternal Grandfather     No Known Problems Sister     No Known Problems Sister     No Known Problems Sister     No Known Problems Sister     No Known Problems Son     No Known Problems Son     Breast cancer Maternal Aunt 59    Breast cancer Maternal Aunt     Breast cancer Maternal Aunt     Breast cancer Maternal Aunt     Breast cancer Maternal Aunt     Breast cancer Maternal Aunt     No Known Problems Paternal Aunt      Social History     Socioeconomic History    Marital status: /Civil Union     Spouse name: Not on file    Number of children: 2    Years of education: Not on file    Highest education level: Not on file   Occupational History    Not on file   Tobacco Use    Smoking status: Former Smoker     Types: Cigarettes     Quit date:      Years since quittin 0    Smokeless tobacco: Never Used    Tobacco comment: Former smoker per [employer]   Vaping Use    Vaping Use: Never used   Substance and Sexual Activity    Alcohol use: Yes     Comment: twice a year    Drug use: No    Sexual activity: Not Currently     Partners: Male   Other Topics Concern    Not on file   Social History Narrative    Caffeine use     Social Determinants of Health     Financial Resource Strain: Not on file   Food Insecurity: Not on file   Transportation Needs: Not on file   Physical Activity: Not on file   Stress: Not on file   Social Connections: Not on file   Intimate Partner Violence: Not on file   Housing Stability: Not on file       Current Outpatient Medications:     acetaminophen (TYLENOL) 500 mg tablet, Take 500 mg by mouth every 6 (six) hours as needed for mild pain, Disp: , Rfl:     labetalol (NORMODYNE) 200 mg tablet, TAKE 2 TABLETS(400 MG) BY MOUTH EVERY 8 HOURS, Disp: 540 tablet, Rfl: 0    ferrous sulfate 324 (65 Fe) mg, Take 1 tablet (325 mg total) by mouth 2 (two) times a day before meals (Patient not taking: Reported on 12/14/2021 ), Disp: 60 tablet, Rfl: 6    hydrochlorothiazide (HYDRODIURIL) 12 5 mg tablet, TAKE 1 TABLET(12 5 MG) BY MOUTH DAILY (Patient not taking: Reported on 12/14/2021), Disp: 90 tablet, Rfl: 0    ibuprofen (MOTRIN) 200 mg tablet, Take 3 tablets (600 mg total) by mouth every 6 (six) hours as needed for moderate pain (Patient not taking: Reported on 12/3/2021 ), Disp: , Rfl:     NIFEdipine ER (ADALAT CC) 60 MG 24 hr tablet, TAKE 1 TABLET(60 MG) BY MOUTH TWICE DAILY (Patient not taking: Reported on 11/5/2021), Disp: 180 tablet, Rfl: 0    oxyCODONE-acetaminophen (Percocet) 5-325 mg per tablet, Take 1 tablet by mouth every 6 (six) hours as needed for moderate pain Max Daily Amount: 4 tablets (Patient not taking: Reported on 1/10/2022 ), Disp: 10 tablet, Rfl: 0  Allergies   Allergen Reactions    Other Hives     bbq sauce    Tomato - Food Allergy Hives       Physical Exam:     Vitals:    01/10/22 1131   BP: 132/86   Pulse: 78   Temp: (!) 97 2 °F (36 2 °C)   SpO2: 98%     Physical Exam  Constitutional:       Appearance: Normal appearance  HENT:      Head: Normocephalic and atraumatic  Nose: Nose normal       Mouth/Throat:      Mouth: Mucous membranes are moist    Eyes:      Pupils: Pupils are equal, round, and reactive to light  Cardiovascular:      Rate and Rhythm: Normal rate  Pulses: Normal pulses  Heart sounds: Normal heart sounds  Pulmonary:      Effort: Pulmonary effort is normal       Breath sounds: Normal breath sounds  Chest:          Comments: Well-healed surgical site well-healed surgical site    Abdominal:      General: Bowel sounds are normal       Palpations: Abdomen is soft  Musculoskeletal:         General: Normal range of motion  Cervical back: Normal range of motion and neck supple  Skin:     General: Skin is warm  Neurological:      General: No focal deficit present  Mental Status: She is alert and oriented to person, place, and time  Psychiatric:         Mood and Affect: Mood normal          Behavior: Behavior normal          Thought Content: Thought content normal          Judgment: Judgment normal            Results & Discussion:   I did review review pathology results as well as atypical papillomatosis with benign papillomas at the superior margin were also discussed  We will wait for the final pathology results  We will follow her closely given she has strong family history of breast cancer  she understands and  agrees   All patient questions were answered  Advance Care Planning/Advance Directives: Maynor Vaughan MD discussed the disease status with Idalmis Gustafson  today 01/10/22  treatment plans and follow-up with the patient

## 2022-01-14 DIAGNOSIS — Z11.59 SCREENING FOR VIRAL DISEASE: Primary | ICD-10-CM

## 2022-01-14 PROCEDURE — U0003 INFECTIOUS AGENT DETECTION BY NUCLEIC ACID (DNA OR RNA); SEVERE ACUTE RESPIRATORY SYNDROME CORONAVIRUS 2 (SARS-COV-2) (CORONAVIRUS DISEASE [COVID-19]), AMPLIFIED PROBE TECHNIQUE, MAKING USE OF HIGH THROUGHPUT TECHNOLOGIES AS DESCRIBED BY CMS-2020-01-R: HCPCS | Performed by: NURSE PRACTITIONER

## 2022-01-14 PROCEDURE — U0005 INFEC AGEN DETEC AMPLI PROBE: HCPCS | Performed by: NURSE PRACTITIONER

## 2022-02-11 ENCOUNTER — TELEPHONE (OUTPATIENT)
Dept: HEMATOLOGY ONCOLOGY | Facility: CLINIC | Age: 37
End: 2022-02-11

## 2022-02-11 NOTE — TELEPHONE ENCOUNTER
Reschedule Appointment     Who is calling in Patient    Doctor Appointment Scheduled with Dr Demetris Williamson date and time 02/11 at 3:20pm   New date and time    [facility]   Patient verbalized understanding    yes

## 2022-02-25 ENCOUNTER — TELEPHONE (OUTPATIENT)
Dept: HEMATOLOGY ONCOLOGY | Facility: CLINIC | Age: 37
End: 2022-02-25

## 2022-02-28 ENCOUNTER — TELEPHONE (OUTPATIENT)
Dept: HEMATOLOGY ONCOLOGY | Facility: CLINIC | Age: 37
End: 2022-02-28

## 2022-02-28 NOTE — TELEPHONE ENCOUNTER
Patient called to reschedule no show appt from 2-25-22   Patient is scheduled for 3-4-22 @ 1:15pm with Awa Martinez

## 2022-03-04 ENCOUNTER — OFFICE VISIT (OUTPATIENT)
Dept: SURGICAL ONCOLOGY | Facility: CLINIC | Age: 37
End: 2022-03-04
Payer: COMMERCIAL

## 2022-03-04 VITALS
DIASTOLIC BLOOD PRESSURE: 90 MMHG | TEMPERATURE: 98.3 F | HEIGHT: 64 IN | OXYGEN SATURATION: 98 % | RESPIRATION RATE: 15 BRPM | SYSTOLIC BLOOD PRESSURE: 132 MMHG | WEIGHT: 265.5 LBS | HEART RATE: 107 BPM | BODY MASS INDEX: 45.33 KG/M2

## 2022-03-04 DIAGNOSIS — N60.81 APOCRINE METAPLASIA OF BREAST, RIGHT: ICD-10-CM

## 2022-03-04 DIAGNOSIS — N60.21 SCLEROSING ADENOSIS OF BREAST, RIGHT: Primary | ICD-10-CM

## 2022-03-04 DIAGNOSIS — D24.1 INTRADUCTAL PAPILLOMA OF BREAST, RIGHT: ICD-10-CM

## 2022-03-04 PROCEDURE — 99214 OFFICE O/P EST MOD 30 MIN: CPT | Performed by: SURGERY

## 2022-03-04 NOTE — PROGRESS NOTES
Surgical Oncology Follow Up  [address]  [facility] [address] [place] [address]    David Hawk  1985  [id number]      Chief Complaint   Patient presents with    Follow-up        Assessment & Plan:   Year old female with a strong family history of breast cancer status post multiple right breast papillomas resection that she is doing overall well well he has cosmetic incision mild tongue numbness in the area otherwise she is doing well  Given her strong family history of breast cancer will follow her closely we will order her next mammogram and follow her  She was told to call us with any questions or concern in the interim she understand and agree to do    Cancer History:     Oncology History    No history exists  Interval History:   Follow-up with high risk breast cancer   Review of Systems:   Review of Systems   Constitutional: Negative for activity change, chills and fever  HENT: Negative for ear pain, sinus pain and sore throat  Eyes: Negative for pain and visual disturbance  Respiratory: Negative for apnea, cough, shortness of breath and wheezing  Cardiovascular: Negative for chest pain, palpitations and leg swelling  Gastrointestinal: Negative for abdominal pain, diarrhea, nausea and vomiting  Endocrine: Negative for cold intolerance, heat intolerance, polydipsia and polyphagia  Genitourinary: Negative for dysuria and hematuria  Musculoskeletal: Negative for arthralgias and back pain  Skin: Negative for color change and rash  Neurological: Negative for seizures and syncope  Hematological: Negative for adenopathy  Psychiatric/Behavioral: Negative for agitation and confusion  All other systems reviewed and are negative        Past Medical History     Patient Active Problem List   Diagnosis    Obesity, morbid, BMI 40 0-49 9 ([facility] [address] )    Anemia affecting pregnancy in third trimester    HTN (hypertension)  Anemia    Nipple discharge, bloody    Orthopnea    Gestational diabetes mellitus (GDM), delivered     (spontaneous vaginal delivery)    Intraductal papilloma of breast, right    Breast lump or mass    Apocrine metaplasia of breast, right    Sclerosing adenosis of breast, right     Past Medical History:   Diagnosis Date    Gestational diabetes     History of recurrent miscarriages     see OB hx    Hypertension      Past Surgical History:   Procedure Laterality Date    BREAST BIOPSY Right     BREAST LUMPECTOMY Right 2021    Procedure: OLEGARIO  DIRECTED LUMPECTOMY AT 12 0'CLOCK AND 6 O'CLOCK;  Surgeon: Will Nair MD;  Location: [facility];  Service: Surgical Oncology    INDUCED       WI SURG RX MISSED ABORTN,1ST TRI N/A 2016    Procedure: DILATATION AND EVACUATION (D&E);   Surgeon: Skye Rogers MD;  Location: [facility];  Service: Gynecology    US BREAST NEEDLE LOC RIGHT EACH ADDITIONAL Right 2021    US BREAST OLEGARIO  NEEDLE LOC RIGHT Right 2021    US GUIDANCE BREAST BIOPSY RIGHT EACH ADDITIONAL Right 2/10/2021    US GUIDED BREAST BIOPSY RIGHT COMPLETE Right 2/10/2021     Family History   Problem Relation Age of Onset    Hypertension Mother     Breast cancer Mother 58    BRCA1 Negative Mother     BRCA2 Negative Mother     Hypertension Father     No Known Problems Sister     No Known Problems Daughter     Stomach cancer Maternal Grandmother 80    No Known Problems Maternal Grandfather     Cancer Paternal Grandmother         unsure of type    No Known Problems Paternal Grandfather     No Known Problems Sister     No Known Problems Sister     No Known Problems Sister     No Known Problems Sister     No Known Problems Son     No Known Problems Son     Breast cancer Maternal Aunt 59    Breast cancer Maternal Aunt     Breast cancer Maternal Aunt     Breast cancer Maternal Aunt     Breast cancer Maternal Aunt     Breast cancer Maternal Aunt     No Known Problems Paternal Aunt      Social History     Socioeconomic History    Marital status: /Civil Union     Spouse name: Not on file    Number of children: 2    Years of education: Not on file    Highest education level: Not on file   Occupational History    Not on file   Tobacco Use    Smoking status: Former Smoker     Types: Cigarettes     Quit date:      Years since quittin 1    Smokeless tobacco: Never Used    Tobacco comment: Former smoker per [employer]   Vaping Use    Vaping Use: Never used   Substance and Sexual Activity    Alcohol use: Yes     Comment: twice a year    Drug use: No    Sexual activity: Not Currently     Partners: Male   Other Topics Concern    Not on file   Social History Narrative    Caffeine use     Social Determinants of Health     Financial Resource Strain: Not on file   Food Insecurity: Not on file   Transportation Needs: Not on file   Physical Activity: Not on file   Stress: Not on file   Social Connections: Not on file   Intimate Partner Violence: Not on file   Housing Stability: Not on file       Current Outpatient Medications:     acetaminophen (TYLENOL) 500 mg tablet, Take 500 mg by mouth every 6 (six) hours as needed for mild pain, Disp: , Rfl:     labetalol (NORMODYNE) 200 mg tablet, TAKE 2 TABLETS(400 MG) BY MOUTH EVERY 8 HOURS, Disp: 540 tablet, Rfl: 0    ferrous sulfate 324 (65 Fe) mg, Take 1 tablet (325 mg total) by mouth 2 (two) times a day before meals (Patient not taking: Reported on 2021 ), Disp: 60 tablet, Rfl: 6    hydrochlorothiazide (HYDRODIURIL) 12 5 mg tablet, TAKE 1 TABLET(12 5 MG) BY MOUTH DAILY (Patient not taking: Reported on 2021), Disp: 90 tablet, Rfl: 0    ibuprofen (MOTRIN) 200 mg tablet, Take 3 tablets (600 mg total) by mouth every 6 (six) hours as needed for moderate pain (Patient not taking: Reported on 12/3/2021 ), Disp: , Rfl:     NIFEdipine ER (ADALAT CC) 60 MG 24 hr tablet, TAKE 1 TABLET(60 MG) BY MOUTH TWICE DAILY (Patient not taking: Reported on 11/5/2021), Disp: 180 tablet, Rfl: 0    oxyCODONE-acetaminophen (Percocet) 5-325 mg per tablet, Take 1 tablet by mouth every 6 (six) hours as needed for moderate pain Max Daily Amount: 4 tablets (Patient not taking: Reported on 1/10/2022 ), Disp: 10 tablet, Rfl: 0  Allergies   Allergen Reactions    Other Hives     bbq sauce    Tomato - Food Allergy Hives       Physical Exam:     Vitals:    03/04/22 1304   BP: 132/90   Pulse: (!) 107   Resp: 15   Temp: 98 3 °F (36 8 °C)   SpO2: 98%     Physical Exam  Vitals and nursing note reviewed  Constitutional:       Appearance: Normal appearance  She is obese  HENT:      Head: Normocephalic and atraumatic  Nose: Nose normal  No congestion  Mouth/Throat:      Mouth: Mucous membranes are moist       Pharynx: Oropharynx is clear  Eyes:      Pupils: Pupils are equal, round, and reactive to light  Cardiovascular:      Rate and Rhythm: Normal rate  Pulses: Normal pulses  Heart sounds: Normal heart sounds  No murmur heard  Pulmonary:      Effort: Pulmonary effort is normal  No respiratory distress  Breath sounds: Normal breath sounds  No wheezing  Chest:          Comments: The bilateral Breast(s) were examined  There was not any sign of an inverted nipple, mass, nipple discharge, skin changes or tenderness  The bilateral  breast(s) were examined in the sitting and supine position  There are not any worrisome skin changes, tenderness, nipple changes, swelling ,bleeding or evidence of mass/s in all four quadrants  Jean survey demonstrated that there is not any evidence of any clinically suspicious axillary, pectoral or supraclavicular lymph nodes  Abdominal:      General: Abdomen is flat  Bowel sounds are normal  There is no distension  Palpations: Abdomen is soft  Tenderness: There is no right CVA tenderness, left CVA tenderness or guarding     Musculoskeletal: General: Normal range of motion  Cervical back: Normal range of motion and neck supple  No rigidity  Lymphadenopathy:      Cervical: No cervical adenopathy  Skin:     General: Skin is warm  Coloration: Skin is not jaundiced or pale  Neurological:      General: No focal deficit present  Mental Status: She is alert and oriented to person, place, and time  Cranial Nerves: No cranial nerve deficit  Sensory: No sensory deficit  Psychiatric:         Mood and Affect: Mood normal          Behavior: Behavior normal          Thought Content: Thought content normal          Judgment: Judgment normal            Results & Discussion:   I did review her need for close follow-up   she understands and  agrees   All patient questions were answered  Advance Care Planning/Advance Directives: Vick Vallecillo MD discussed the disease status with Bibi Alexandre  today 03/04/22  treatment plans and follow-up with the patient

## 2022-03-09 DIAGNOSIS — O99.019 ANEMIA DURING PREGNANCY: ICD-10-CM

## 2022-03-09 RX ORDER — FERROUS SULFATE TAB EC 324 MG (65 MG FE EQUIVALENT) 324 (65 FE) MG
TABLET DELAYED RESPONSE ORAL
Qty: 60 TABLET | Refills: 6 | Status: SHIPPED | OUTPATIENT
Start: 2022-03-09 | End: 2022-03-09

## 2022-03-09 RX ORDER — FERROUS SULFATE TAB EC 324 MG (65 MG FE EQUIVALENT) 324 (65 FE) MG
TABLET DELAYED RESPONSE ORAL
Qty: 180 TABLET | Refills: 2 | Status: SHIPPED | OUTPATIENT
Start: 2022-03-09

## 2022-03-21 ENCOUNTER — TELEPHONE (OUTPATIENT)
Dept: HEMATOLOGY ONCOLOGY | Facility: CLINIC | Age: 37
End: 2022-03-21

## 2022-03-21 NOTE — TELEPHONE ENCOUNTER
APPOINTMENT REQUEST REVIEW   Patient Details    NAME        Reason For Appointment    Who is Calling to schedule? Patient   Who is the referring doctor? Which doctor would they like to schedule with? Malik 145   Which location are you requesting? [facility]   Reason they are requesting appointment? Found two new lumps, left axially    I will send this information to Dr Lieberman______s team to have them review  Someone from his/her team will return a call to you within 24 hours to discuss next steps with you and if you are in need of an appointment, they will schedule this for you at that time  Did you relay this information to the patient?  yes

## 2022-03-22 ENCOUNTER — TELEPHONE (OUTPATIENT)
Dept: SURGICAL ONCOLOGY | Facility: CLINIC | Age: 37
End: 2022-03-22

## 2022-03-22 DIAGNOSIS — R22.32 LUMP OF AXILLA, LEFT: Primary | ICD-10-CM

## 2022-03-22 NOTE — TELEPHONE ENCOUNTER
I called patient and explained that Dr Krysten Li would like for her to get a diagnostic breast U/S for her new lumps  I asked her to please call me back so we can go over the U/S apt and schedule a follow up for her afterwards   Patient can be transferred to [facility]

## 2022-03-31 ENCOUNTER — TELEPHONE (OUTPATIENT)
Dept: SURGICAL ONCOLOGY | Facility: CLINIC | Age: 37
End: 2022-03-31

## 2022-03-31 NOTE — TELEPHONE ENCOUNTER
Called patient to reschedule appointment scheduled for tomorrow with Dr Ignacio Richards due to imaging not being preformed  Patients very young daughter picked up and stated "she's in the shower"  I asked that she have Twila call me back to discuss  She said "ok" and hung up the phone  Will try to call back and reschedule again

## 2022-04-11 ENCOUNTER — TELEPHONE (OUTPATIENT)
Dept: HEMATOLOGY ONCOLOGY | Facility: CLINIC | Age: 37
End: 2022-04-11

## 2022-04-11 NOTE — TELEPHONE ENCOUNTER
Reschedule Appointment     Who is calling in Patient   Doctor Appointment Scheduled with Shawn Lane date and time 4-15-22 @ 3:30pm   New date and time 4-22-22 @ 3:30pm    [facility]    Patient verbalized understanding

## 2022-04-15 ENCOUNTER — HOSPITAL ENCOUNTER (OUTPATIENT)
Dept: ULTRASOUND IMAGING | Facility: CLINIC | Age: 37
Discharge: HOME | End: 2022-04-15
Payer: COMMERCIAL

## 2022-04-15 DIAGNOSIS — R22.32 LUMP OF AXILLA, LEFT: ICD-10-CM

## 2022-04-15 PROCEDURE — 76642 ULTRASOUND BREAST LIMITED: CPT

## 2022-04-21 ENCOUNTER — TELEPHONE (OUTPATIENT)
Dept: SURGICAL ONCOLOGY | Facility: CLINIC | Age: 37
End: 2022-04-21

## 2022-04-21 NOTE — TELEPHONE ENCOUNTER
I called and left a message to see if patient would like to come in earlier in the day to see Dr Emy Inman

## 2022-04-22 ENCOUNTER — TELEPHONE (OUTPATIENT)
Dept: HEMATOLOGY ONCOLOGY | Facility: CLINIC | Age: 37
End: 2022-04-22

## 2022-04-22 NOTE — TELEPHONE ENCOUNTER
Appointment Cancellation Or Reschedule     Person calling in Patient    Provider Dr Fady Jorgensen   Office Visit Date and Time 4/22/22 at 3:30   Office Visit Location [facility]   Did patient want to reschedule their office appointment? If so, when was it scheduled to? 4/29/22 at 3:15   Is this patient calling to reschedule an infusion appointment? No   When is their next infusion appointment? Na   Is this patient a Chemo patient? No   Reason for Cancellation or Reschedule Schedule Conflict      If the patient is a treatment patient, please route this to the office nurse  If the patient is not on treatment, please route to the office MA

## 2022-04-29 ENCOUNTER — TELEPHONE (OUTPATIENT)
Dept: HEMATOLOGY ONCOLOGY | Facility: CLINIC | Age: 37
End: 2022-04-29

## 2022-04-29 NOTE — TELEPHONE ENCOUNTER
Appointment Cancellation Or Reschedule     Person calling in Patient    Provider Dr Sara Babb   Office Visit Date and Time 4/29/22 @ 3:15pm   Office Visit Location [facility]   Did patient want to reschedule their office appointment? If so, when was it scheduled to? no   Is this patient calling to reschedule an infusion appointment? no   When is their next infusion appointment? na   Is this patient a Chemo patient? no   Reason for Cancellation or Reschedule Unable to keep appt     If the patient is a treatment patient, please route this to the office nurse  If the patient is not on treatment, please route to the office MA

## 2022-07-06 NOTE — PROGRESS NOTES
INITIAL BREAST FEEDING EVALUATION    Informant/Relationship: Noel Obando and Tanmay    Discussion of General Lactation Issues: Blaine was born early at 27 weeks  He was readmitted to the hospital at [facility] #[facility] due to weight loss and dehydration  While on Peds, a SLP diagnosed with tongue tie  Noel Obando is still trying to latch at times but latch is very painful  She is pumping and feeding expressed milk primarily  For the first two days, the milk from the right breast appeared bloody but this symptom has resolved  Infant is 5days old today          History:  Fertility Problem:yes - history of recurrent pregnancy loss  Breast changes:yes - breasts were humphrey, areola were larger and darker  : yes - induced due to maternal preeclampsia  Full term:no 28 2/7 weeks   labor:no  First nursing/attempt < 1 hour after birth:yes - baby latched in the delivery room but it was painful  Skin to skin following delivery:yes - until after the first feeding attempt  Breast changes after delivery:yes - breasts were full and milk was in by day 3-4  Rooming in (infant in room with mother with exception of procedures, eg  Circumcision: yes - did not leave mother  Blood sugar issues:no  NICU stay:no  Jaundice:Yes, mild  Phototherapy:no  Supplement given: (list supplement and method used as well as reason(s):yes - formula via syringe because mother felt baby was still hungry    Past Medical History:   Diagnosis Date    Diabetes mellitus ([address] )     History of recurrent miscarriages     see OB hx    Hypertension          Current Outpatient Medications:     acetaminophen (TYLENOL) 500 mg tablet, Take 500 mg by mouth every 6 (six) hours as needed for mild pain, Disp: , Rfl:     aspirin (ECOTRIN LOW STRENGTH) 81 mg EC tablet, Take 2 tablets (162 mg total) by mouth daily, Disp: 60 tablet, Rfl: 6    benzocaine-menthol-lanolin-aloe (DERMOPLAST) 20-0 5 % topical spray, Apply 1 application topically 4 (four) times a day as needed for irritation, Disp: , Rfl: 0    Blood Glucose Monitoring Suppl (OneTouch Verio Flex System) w/Device KIT, Test 4 times for 1 week, Disp: 1 kit, Rfl: 0    docusate sodium (COLACE) 100 mg capsule, Take 1 capsule (100 mg total) by mouth 2 (two) times a day, Disp: 60 capsule, Rfl: 6    ferrous sulfate 324 (65 Fe) mg, Take 1 tablet (325 mg total) by mouth 2 (two) times a day before meals, Disp: 60 tablet, Rfl: 6    hydrochlorothiazide (HYDRODIURIL) 12 5 mg tablet, TAKE 1 TABLET(12 5 MG) BY MOUTH DAILY, Disp: 90 tablet, Rfl: 0    ibuprofen (MOTRIN) 200 mg tablet, Take 3 tablets (600 mg total) by mouth every 6 (six) hours as needed for moderate pain, Disp: , Rfl:     labetalol (NORMODYNE) 200 mg tablet, Take 2 tablets (400 mg total) by mouth every 8 (eight) hours, Disp: 180 tablet, Rfl: 1    NIFEdipine ER (ADALAT CC) 60 MG 24 hr tablet, TAKE 1 TABLET(60 MG) BY MOUTH TWICE DAILY, Disp: 180 tablet, Rfl: 0    OneTouch Delica Lancets 81N MISC, Test 4 times daily  , Disp: 100 each, Rfl: 4    Prenatal Vit-Fe Fumarate-FA (PNV Folic Acid + Iron) 99-1 MG TABS, Take 1 tablet by mouth daily, Disp: 30 tablet, Rfl: 8    Allergies   Allergen Reactions    Other      bbq sauce    Tomato - Food Allergy        Social History     Substance and Sexual Activity   Drug Use No       Social History Former Smoker    Interval Breastfeeding History:    Frequency of breast feeding: Attempting once a day  Does mother feel breastfeeding is effective: No  Does infant appear satisfied after nursing:No  Stooling pattern normal: Yes  Urinating frequently:Yes  Using shield or shells: No    Alternative/Artificial Feedings:   Bottle: Yes, for every feeding  Cup: No  Syringe/Finger: No           Formula Type: Similac Pro Advance                     Amount: rarely when expressed milk is not available              Breast Milk:                      Amount: 2 5-3 ounces            Frequency Q 2 5-3 Hr between feedings  Elimination Problems: No      Equipment:  Nipple Shield             Type: none             Size: n/a             Frequency of Use: n/a  Pump            Type: Ameda Sugey            Frequency of Use: 3 times a day currently  Expresses about 8-10 ounces per session  Shells            Type: none            Frequency of use: n/a    Equipment Problems: no    Mom:  Breast: Large pendulous breasts  Soft  Nipple Assessment in General: Long, broad nipples bilaterally  Mother's Awareness of Feeding Cues                 Recognizes: Yes                  Verbalizes: Yes  Support System: FOB, extended family  History of Breastfeeding:  three older children for 2 years each without any issues  Changes/Stressors/Violence: Sydnee Black has been stressed by [facility] feeding and weight gain issues  Concerns/Goals: Twila desires to EBF    Problems with Mom: None    Physical Exam  Constitutional:       Appearance: Normal appearance  HENT:      Head: Normocephalic and atraumatic  Neck:      Musculoskeletal: Normal range of motion and neck supple  Cardiovascular:      Rate and Rhythm: Normal rate and regular rhythm  Pulses: Normal pulses  Heart sounds: Normal heart sounds  Pulmonary:      Effort: Pulmonary effort is normal       Breath sounds: Normal breath sounds  Musculoskeletal: Normal range of motion  General: No swelling  Neurological:      Mental Status: She is alert and oriented to person, place, and time  Skin:     General: Skin is warm and dry  Psychiatric:         Mood and Affect: Mood normal          Behavior: Behavior normal          Thought Content:  Thought content normal          Judgment: Judgment normal          Infant:  Behaviors: Alert  Color: Pink  Birth weight: 2778gram  Current weight: 2545gram    Problems with infant: LPTI, weight loss, tongue tie      General Appearance:  Alert, active, no distress                             Head:  Normocephalic, AFOF, ridging of the coronal and lamdoid suture lines                             Eyes:  Conjunctiva clear, no drainage                              Ears:  Normally placed, no anomolies                             Nose:  no drainage or erythema                           Mouth:  No lesions  Tongue does not extend, lateralize or elevate  There is a deep groove down the center of the tongue  Lingual frenulum is very short and attached at the tip of the tongue   There was some cupping and peristalsis noted but Blaine struggled to maintain seal around my finger  Neck:  Supple, symmetrical, trachea midline                 Respiratory:  No grunting, flaring, retractions, breath sounds clear and equal            Cardiovascular:  Regular rate and rhythm  No murmur  Adequate perfusion/capillary refill  Femoral pulse present                    Abdomen:   Soft, non-tender, no masses, bowel sounds present, no HSM             Genitourinary:  Normal male, testes descended, no discharge, swelling, or pain, anus patent                          Spine:   No abnormalities noted        Musculoskeletal:  Full range of motion          Skin/Hair/Nails:   Skin warm, dry, and intact, no rashes or abnormal dyspigmentation or lesions                Neurologic:   No abnormal movement, tone appropriate for gestational age     Latch:  Efficiency:               Lips Flanged: Yes              Depth of latch: shallow, just on the nipple              Audible Swallow: No              Visible Milk: No              Wide Open/ Asymmetrical: No              Suck Swallow Cycle: Breathing: unlabored, Coordinated: yes  Nipple Assessment after latch: Normal: elongated/eraser, no discoloration and no damage noted  Latch Problems: Katyas mouth does not open wide enough to get a deep latch  He barely is able to fit the nipple into his mouth  When he does suck, it is non nutritive only      Position:  Infant's Ergonomics/Body               Body Alignment: Yes Head Supported: Yes               Close to Mom's body/ Lifted/ Supported: Yes               Mom's Ergonomics/Body: Yes                           Supported: Yes                           Sitting Back: Yes                           Brings Baby to her breast: Yes  Positioning Problems: None  Alen Gongora is a confident and experienced breastfeeding mother  Handouts:   Paced bottle feeding, Hands on pumping, Hand expression and Tongue Tie    Education:  Reviewed Equipment: Discussed the use and features of the Ameda Sugey and the elements of hands on pumping  Discussed that likely Blaine's prematurity, the tongue tie, and the size of Twila's nipples are likely all contributing to the breastfeeding challenges  Plan:    Reassurance and support given  Alen Gongora will continue to feed expressed milk via paced bottle feeding  I did suggest that she pump more frequently to protect supply and prevent engorgement  She will offer the breast for comfort and practice as desired  I encouraged lots of skin to skin  They have a follow up appointment scheduled with Dr Melissa Medrano later this month  I have spent 60 minutes with Patient and family today in which greater than 50% of this time was spent in counseling/coordination of care regarding Patient and family education  Patient expressed no known problems or needs

## 2022-11-29 ENCOUNTER — TELEPHONE (OUTPATIENT)
Dept: SURGICAL ONCOLOGY | Facility: CLINIC | Age: 37
End: 2022-11-29

## 2022-11-29 ENCOUNTER — TELEPHONE (OUTPATIENT)
Dept: HEMATOLOGY ONCOLOGY | Facility: CLINIC | Age: 37
End: 2022-11-29

## 2022-11-29 NOTE — TELEPHONE ENCOUNTER
Appointment Cancellation Or Reschedule     Person calling in Patient    If other than patient calling, are they listed on the communication consent form? n/a   Provider Dr Christina Russo   Office Visit Date and Time 12/2/22 345 PM   [facility] Visit [facility]   Did patient want to reschedule their office appointment? If so, when was it scheduled to? Yes  1/9/23 345 PM   [facility]   Did you have STAR scheduled for this appointment? n/a   Do you need STAR set up for your new appointment? If yes, please send to "PATIENT RIDESHARE" pool for STAR rescheduling n/a   If you are cancelling appointment, can we notify STAR to cancel ride? If yes, please send to "PATIENT RIDESHARE" pool for STAR to cancel service n/a   Is this patient calling to reschedule an infusion appointment? no   When is their next infusion appointment? n/a   Is this patient a Chemo patient? no   Reason for Cancellation or Reschedule Patient reschedule  A week after her scheduled US and Mammo  If the patient is a treatment patient, please route this to the office nurse  If the patient is not on treatment, please route to the office MA  If the patient is a surgical oncology patient, please route to surg/onc clinical pool

## 2022-11-29 NOTE — TELEPHONE ENCOUNTER
Called patient and left message to reschedule appt on 12/2/22 at [facility] with Dr Sara Babb to be at a different location at [place] [address] or [facility]  Oleg Troncoso will also need a mammogram before her follow up  I called patient again to make Oleg Troncoso aware of the mammogram needing to be scheduled first and she will call to have both appts made

## 2023-01-31 ENCOUNTER — TELEPHONE (OUTPATIENT)
Dept: SURGICAL ONCOLOGY | Facility: CLINIC | Age: 38
End: 2023-01-31

## 2023-01-31 ENCOUNTER — TELEPHONE (OUTPATIENT)
Dept: HEMATOLOGY ONCOLOGY | Facility: CLINIC | Age: 38
End: 2023-01-31

## 2023-01-31 NOTE — TELEPHONE ENCOUNTER
Scheduling Appointment SEND TO POOLS    Person calling in Patient     If other than patient calling, are they listed on the communication consent form? N/A   Doctor Dr Cait Hernandez   Location [facility]   Appointment date and time 2/6/2023 11:45 AM    Reason for scheduling appointment R/s after mammo and US 2/2/2023   Patient verbalized understanding?   Yes

## 2023-01-31 NOTE — TELEPHONE ENCOUNTER
Called patient and left message to reschedule appt on 2/1/23 to be a week after her mammogram and US for results for follow up with Dr Kings Ferrell

## 2023-02-02 ENCOUNTER — HOSPITAL ENCOUNTER (OUTPATIENT)
Dept: MAMMOGRAPHY | Facility: CLINIC | Age: 38
Discharge: HOME | End: 2023-02-02

## 2023-02-02 ENCOUNTER — HOSPITAL ENCOUNTER (OUTPATIENT)
Dept: ULTRASOUND IMAGING | Facility: CLINIC | Age: 38
Discharge: HOME | End: 2023-02-02

## 2023-02-02 VITALS — HEIGHT: 64 IN | BODY MASS INDEX: 45.17 KG/M2 | WEIGHT: 264.55 LBS

## 2023-02-02 DIAGNOSIS — N64.52 NIPPLE DISCHARGE: ICD-10-CM

## 2023-02-02 DIAGNOSIS — N60.21 SCLEROSING ADENOSIS OF BREAST, RIGHT: ICD-10-CM

## 2023-02-02 DIAGNOSIS — D24.1 INTRADUCTAL PAPILLOMA OF BREAST, RIGHT: ICD-10-CM

## 2023-02-02 DIAGNOSIS — N60.81 APOCRINE METAPLASIA OF BREAST, RIGHT: ICD-10-CM

## 2023-02-03 PROBLEM — Z98.890 HISTORY OF LUMPECTOMY OF RIGHT BREAST: Status: ACTIVE | Noted: 2023-02-03

## 2023-02-06 ENCOUNTER — TELEPHONE (OUTPATIENT)
Dept: HEMATOLOGY ONCOLOGY | Facility: CLINIC | Age: 38
End: 2023-02-06

## 2023-02-06 NOTE — TELEPHONE ENCOUNTER
Appointment Cancellation Or Reschedule     Person calling in Patient   If someone other than patient calling, are they listed on the communication consent form? No   Provider Dr Faviola Ramon   Office Visit Date and Time  2/6/23   Office Visit Location [facility]   Did patient want to reschedule their office appointment? If so, when was it scheduled to? 2/8/23   3:30pm   Did you have STAR scheduled for this appointment? No   Do you need STAR set up for your new appointment? If yes, please [facility] pool for STAR rescheduling No   Is this patient calling to reschedule an infusion appointment? No   When is their next infusion appointment? No   Is this patient a Chemo patient? No   Reason for Cancellation or Reschedule Patient  canceled on her   Was No show policy reviewed with patient  Yes     If the patient is cancelling an appointment and needs their STAR Transport cancelled, please route to [address]  If the patient is a treatment patient, please route this to the office nurse  If the patient is not on treatment, please route to the Clerical pool based on location  If the patient is a surgical oncology patient, please route to surg/onc clinical pool  Route message as high priority if same day cancellation

## 2023-02-08 ENCOUNTER — OFFICE VISIT (OUTPATIENT)
Dept: SURGICAL ONCOLOGY | Facility: CLINIC | Age: 38
End: 2023-02-08

## 2023-02-08 VITALS
WEIGHT: 265.5 LBS | TEMPERATURE: 97.9 F | SYSTOLIC BLOOD PRESSURE: 138 MMHG | OXYGEN SATURATION: 99 % | HEIGHT: 64 IN | DIASTOLIC BLOOD PRESSURE: 82 MMHG | BODY MASS INDEX: 45.33 KG/M2 | RESPIRATION RATE: 16 BRPM | HEART RATE: 90 BPM

## 2023-02-08 DIAGNOSIS — N60.21 SCLEROSING ADENOSIS OF BREAST, RIGHT: ICD-10-CM

## 2023-02-08 DIAGNOSIS — Z98.890 HISTORY OF LUMPECTOMY OF RIGHT BREAST: ICD-10-CM

## 2023-02-08 DIAGNOSIS — Z80.3 FAMILY HISTORY OF BREAST CANCER: ICD-10-CM

## 2023-02-08 DIAGNOSIS — D24.1 INTRADUCTAL PAPILLOMA OF BREAST, RIGHT: Primary | ICD-10-CM

## 2023-02-08 NOTE — PROGRESS NOTES
Surgical Oncology Follow Up  [facility]  [facility]  [address]  [place] [address] [facility]  1985  [id number]      Chief Complaint   Patient presents with   • Follow-up        Assessment & Plan:   Is a 42-year-old female with a strong family history of breast cancer she is here after her diagnostic mammogram and ultrasound  This is read as BI-RADS 3 and they states is stable and follow-up in 1 year with repeat studies  She denies of any breast pain nipple discharge nipple retraction or skin changes  Well-healed right breast incision  I have emphasized the breast cancer prevention including breast self-examination  She has several family members with a history of breast cancer therefore we will refer her to genetics counseling  She was told to call us with any questions or concerns in the interim we will follow her after her mammogram ultrasound in 1 year    Cancer History:     Oncology History    No history exists  Interval History:   Follow-up after mammogram and ultrasound with strong family history of breast    Review of Systems:   Review of Systems   Constitutional: Negative for chills and fever  HENT: Negative for ear pain and sore throat  Eyes: Negative for pain and visual disturbance  Respiratory: Negative for cough and shortness of breath  Cardiovascular: Negative for chest pain and palpitations  Gastrointestinal: Negative for abdominal pain and vomiting  Genitourinary: Negative for dysuria and hematuria  Musculoskeletal: Negative for arthralgias and back pain  Skin: Negative for color change and rash  Neurological: Negative for seizures and syncope  All other systems reviewed and are negative        Past Medical History     Patient Active Problem List   Diagnosis   • Obesity, morbid, BMI 40 0-49 9 ([address] )   • Anemia affecting pregnancy in third trimester   • HTN (hypertension)   • Anemia   • Nipple discharge, bloody   • Orthopnea   • Gestational diabetes mellitus (GDM), delivered   •  (spontaneous vaginal delivery)   • Intraductal papilloma of breast, right   • Breast lump or mass   • Apocrine metaplasia of breast, right   • Sclerosing adenosis of breast, right   • History of lumpectomy of right breast     Past Medical History:   Diagnosis Date   • Apocrine metaplasia of breast, right 2022   • Gestational diabetes    • History of recurrent miscarriages     see OB hx   • Hypertension    • Intraductal papilloma of breast, right 2021   • Sclerosing adenosis of breast, right 2022     Past Surgical History:   Procedure Laterality Date   • BREAST BIOPSY Right    • BREAST LUMPECTOMY Right 2021    Procedure: OLEGARIO  DIRECTED LUMPECTOMY AT 12 0'CLOCK AND 6 O'CLOCK;  Surgeon: Magno Moreira MD;  [facility];  Service: Surgical Oncology   • INDUCED      • ND TX MISSED  FIRST TRIMESTER SURGICAL N/A 2016    Procedure: DILATATION AND EVACUATION (D&E);   Surgeon: Bryant Davila MD;  Location: [facility];  Service: Gynecology   • US BREAST NEEDLE LOC RIGHT EACH ADDITIONAL Right 2021   • US BREAST OLEGARIO  NEEDLE LOC RIGHT Right 2021   • US GUIDANCE BREAST BIOPSY RIGHT EACH ADDITIONAL Right 02/10/2021   • US GUIDED BREAST BIOPSY RIGHT COMPLETE Right 02/10/2021     Family History   Problem Relation Age of Onset   • Hypertension Mother    • Breast cancer Mother 58   • BRCA1 Negative Mother    • BRCA2 Negative Mother    • Hypertension Father    • No Known Problems Sister    • No Known Problems Daughter    • Stomach cancer Maternal Grandmother 80   • No Known Problems Maternal Grandfather    • Cancer Paternal Grandmother         unsure of type   • No Known Problems Paternal Grandfather    • No Known Problems Sister    • No Known Problems Sister    • No Known Problems Sister    • No Known Problems Sister    • No Known Problems Son    • No Known Problems Son • Breast cancer Maternal Aunt 64   • Breast cancer Maternal Aunt    • Breast cancer Maternal Aunt    • Breast cancer Maternal Aunt    • Breast cancer Maternal Aunt    • Breast cancer Maternal Aunt    • No Known Problems Paternal Aunt      Social History     Socioeconomic History   • Marital status: /Civil Union     Spouse name: Not on file   • Number of children: 2   • Years of education: Not on file   • Highest education level: Not on file   Occupational History   • Not on file   Tobacco Use   • Smoking status: Former     Types: Cigarettes     Quit date:      Years since quittin 1   • Smokeless tobacco: Never   • Tobacco comments:     Former smoker per [employer]   Vaping Use   • Vaping Use: Never used   Substance and Sexual Activity   • Alcohol use: Yes     Comment: twice a year   • Drug use: No   • Sexual activity: Not Currently     Partners: Male   Other Topics Concern   • Not on file   Social History Narrative    Caffeine use     Social Determinants of Health     Financial Resource Strain: Not on file   Food Insecurity: Not on file   Transportation Needs: Not on file   Physical Activity: Not on file   Stress: Not on file   Social Connections: Not on file   Intimate Partner Violence: Not on file   Housing Stability: Not on file       Current Outpatient Medications:   •  acetaminophen (TYLENOL) 500 mg tablet, Take 500 mg by mouth every 6 (six) hours as needed for mild pain, Disp: , Rfl:   •  ferrous sulfate 324 (65 Fe) mg, TAKE 1 TABLET BY MOUTH TWICE DAILY BEFORE MEALS, Disp: 180 tablet, Rfl: 2  •  hydrochlorothiazide (HYDRODIURIL) 12 5 mg tablet, TAKE 1 TABLET(12 5 MG) BY MOUTH DAILY (Patient not taking: Reported on 2021), Disp: 90 tablet, Rfl: 0  •  ibuprofen (MOTRIN) 200 mg tablet, Take 3 tablets (600 mg total) by mouth every 6 (six) hours as needed for moderate pain (Patient not taking: Reported on 12/3/2021), Disp: , Rfl:   •  labetalol (NORMODYNE) 200 mg tablet, Take 2 tablets (400 mg total) by mouth every 8 (eight) hours, Disp: 180 tablet, Rfl: 0  •  NIFEdipine ER (ADALAT CC) 60 MG 24 hr tablet, TAKE 1 TABLET(60 MG) BY MOUTH TWICE DAILY (Patient not taking: No sig reported), Disp: 180 tablet, Rfl: 0  •  oxyCODONE-acetaminophen (Percocet) 5-325 mg per tablet, Take 1 tablet by mouth every 6 (six) hours as needed for moderate pain Max Daily Amount: 4 tablets (Patient not taking: Reported on 1/10/2022), Disp: 10 tablet, Rfl: 0  Allergies   Allergen Reactions   • Other Hives     bbq sauce   • Tomato - Food Allergy Hives       Physical Exam:     Vitals:    02/08/23 1519   BP: 138/82   Pulse: 90   Resp: 16   Temp: 97 9 °F (36 6 °C)   SpO2: 99%     Physical Exam  Constitutional:       Appearance: Normal appearance  HENT:      Head: Normocephalic and atraumatic  Nose: Nose normal       Mouth/Throat:      Mouth: Mucous membranes are moist    Eyes:      Pupils: Pupils are equal, round, and reactive to light  Cardiovascular:      Rate and Rhythm: Normal rate  Pulses: Normal pulses  Heart sounds: Normal heart sounds  Pulmonary:      Effort: Pulmonary effort is normal       Breath sounds: Normal breath sounds  Chest:      Comments: The bilateral Breast(s) were examined  There was not any sign of an inverted nipple, mass, nipple discharge, skin changes or tenderness  The bilateral  breast(s) were examined in the sitting and supine position  There are not any worrisome skin changes, tenderness, nipple changes, swelling ,bleeding or evidence of mass/s in all four quadrants  Jean survey demonstrated that there is not any evidence of any clinically suspicious axillary, pectoral or supraclavicular lymph nodes  Well-healed right breast incision  Abdominal:      General: Bowel sounds are normal       Palpations: Abdomen is soft  Musculoskeletal:         General: Normal range of motion  Cervical back: Normal range of motion and neck supple  Skin:     General: Skin is warm  Neurological:      General: No focal deficit present  Mental Status: She is alert and oriented to person, place, and time  Psychiatric:         Mood and Affect: Mood normal          Behavior: Behavior normal          Thought Content: Thought content normal          Judgment: Judgment normal            Results & Discussion:   I did review her mammogram and ultrasound and the report  I did discussed in detail nature of breast breast cancer and prevention  Self breast examination  she understands and  agrees   All patient questions were answered  Advance Care Planning/Advance Directives: Geno Real MD discussed the disease status with Warden Leach  today 02/08/23  treatment plans and follow-up with the patient

## 2023-02-10 ENCOUNTER — TELEPHONE (OUTPATIENT)
Dept: GENETICS | Facility: CLINIC | Age: 38
End: 2023-02-10

## 2023-02-10 NOTE — TELEPHONE ENCOUNTER
I returned Twila's message to schedule a new patient appointment with the Cancer Risk and Genetics Program       Outcome:  Genetics appointment scheduled for 6/27/2023 at 1:30PM    Personal/Family History Related to Appointment:  Family history of breast cancer and stomach cancer     History of Genetic Testing:  Patient reports that both her mother and aunt have received negative genetic testing  I encouraged her to send us a copy of these results if she is able to obtain them       Genetics Family History Questionnaire:  I confirmed the patient's e-mail on file as the best e-mail to send an invite link for our genetics family history intake

## 2023-02-10 NOTE — TELEPHONE ENCOUNTER
I called Jyoti Hurd to schedule a new patient appointment with the Cancer Risk and Genetics Program       Outcome:   I left a voice message and MyChart message encouraging the patient to call the genetics team at [phone] to schedule this appointment  Follow-up:   At this time the referral will be closed and we will wait to hear back from the patient regarding scheduling this appointment

## 2023-03-01 DIAGNOSIS — O11.9 CHRONIC HYPERTENSION WITH SUPERIMPOSED PREECLAMPSIA: ICD-10-CM

## 2023-03-02 DIAGNOSIS — O11.9 CHRONIC HYPERTENSION WITH SUPERIMPOSED PREECLAMPSIA: ICD-10-CM

## 2023-03-02 RX ORDER — LABETALOL 200 MG/1
TABLET, FILM COATED ORAL
Qty: 180 TABLET | Refills: 0 | Status: SHIPPED | OUTPATIENT
Start: 2023-03-02

## 2023-03-02 RX ORDER — LABETALOL 200 MG/1
TABLET, FILM COATED ORAL
Qty: 540 TABLET | OUTPATIENT
Start: 2023-03-02

## 2023-06-20 ENCOUNTER — TELEPHONE (OUTPATIENT)
Dept: GENETICS | Facility: CLINIC | Age: 38
End: 2023-06-20

## 2023-06-20 NOTE — TELEPHONE ENCOUNTER
Left a message for Dennis Fontaine to confirm upcoming appt  She was encouraged to call our office to confirm

## 2023-06-27 ENCOUNTER — DOCUMENTATION (OUTPATIENT)
Dept: GENETICS | Facility: CLINIC | Age: 38
End: 2023-06-27

## 2023-06-27 ENCOUNTER — CLINICAL SUPPORT (OUTPATIENT)
Dept: GENETICS | Facility: CLINIC | Age: 38
End: 2023-06-27
Payer: COMMERCIAL

## 2023-06-27 ENCOUNTER — TELEPHONE (OUTPATIENT)
Dept: GENETICS | Facility: CLINIC | Age: 38
End: 2023-06-27

## 2023-06-27 DIAGNOSIS — Z80.3 FAMILY HISTORY OF BREAST CANCER: ICD-10-CM

## 2023-06-27 DIAGNOSIS — Z80.0 FAMILY HISTORY OF MALIGNANT NEOPLASM OF DIGESTIVE ORGAN: ICD-10-CM

## 2023-06-27 DIAGNOSIS — D24.1 INTRADUCTAL PAPILLOMA OF BREAST, RIGHT: Primary | ICD-10-CM

## 2023-06-27 PROCEDURE — 36415 COLL VENOUS BLD VENIPUNCTURE: CPT

## 2023-06-27 NOTE — TELEPHONE ENCOUNTER
Danny Cardenas called to inform us that she is on her way to our office for her 1:30 appointment she is around the corner and wanted to confirm the [address] # of our office

## 2023-06-27 NOTE — PROGRESS NOTES
Pre-Test Genetic Counseling Consult Note    Patient Name: José Miguel Zapien   /Age: /89 y o  Referring Provider: REGINA Saldaña MD    Date of Service: 2023  Genetic Counselor: Loretta Lomeli MS, [address] [facility], Genetic Counseling  from [facility]   Interpretation Services: None  [facility] [address] was referred to the [address] [place] [address] and Genetic Assessment Program due to her family history of breast and stomach cancer  She presents today to discuss the possibility of a hereditary cancer syndrome, options for genetic testing, and implications for her and her family  Cancer History and Treatment:     Personal History: No personal history of cancer    Screening Hx:     Breast:  Breast Imaging: Bilateral diagnostic mammogram with 3d & cad on 23; recommends to repeat diagnostic mammogram in 1 year for both breasts and to consider MRI screening for both breasts based on Tyrer-Cuzick risk     [address] [facility] Diagnostic Mammogram:  I  1  Bilateral findings are probably benign  12-month follow-up imaging is recommended  The patient is aware of this recommendation  2  This patient has been identified as being at elevated risk for development of breast cancer based on the Tyrer-Cuzick model  She may benefit from supplemental screening  Given the increased estimated lifetime risk of malignancy, high rescreening with annual MRI should be considered  RISK ASSESSMENT:   5 Year Tyrer-Cuzick: 1 25 %  10 Year Tyrer-Cuzick: 3 29 %  Lifetime Tyrer-Cuzick: 30 03 %    Breast Biopsy:     2/10/21 Right breast biopsy  Final Diagnosis   A  Breast, right at 12:00 o'clock 5 cm from nipple, ultrasound-guided biopsy (3 passes):  -  Benign intraductal papilloma (up to 7mm) with partial sclerosis and usual ductal hyperplasia  -  Negative for atypia or malignancy    -  Immunohistochemical stains performed with appropriate controls show intact myoepithelial cells staining positively for calponin, p63, and CK5/6 with mosaic epithelial staining for CK5/6, supporting the diagnosis  B  Breast, right at 06:00 o'clock retroareolar, ultrasound guided biopsy (2 passes):   -  Benign intraductal papilloma (up to 4mm) with partial sclerosis  -  Negative for atypia or malignancy  -  Immunohistochemical stains performed with appropriate controls show intact myoepithelial cells staining positively for calponin, p63, and CK5/6 with mosaic epithelial staining for CK5/6, supporting the diagnosis  12/20/2021  Final Diagnosis   A  Breast, Right, right breast OLEGARIO  directed lumpectomy OLEGARIO clip 6 & 12 o'clock”  -Florid hyperplasia without atypia involving a partially sclerosed intraductal papilloma   -Proliferative fibrocystic changes including apocrine metaplasia, microcysts formation, sclerosing adenosis and moderate intraductal hyperplasia, usual type  -Changes consistent with previous biopsy site   -Benign overlying skin  Margins   Few benign papillomas present at inked superior, inferior and posterior inked margins       Note:  This case was send out to Dr Izabela Barajas, [facility] for consultation  The above mentioned diagnosis is exactly her consultation report diagnosis  A copy of this consultation report is on file at [facility] department of pathology  Please see her full consultation report in the notes section      Electronically signed by Oliverio Win MD on 1/10/2022 at  2:32 PM       Breast Density: Scattered fibroglandular density     Colon:  Colonoscopy: never screened    Gynecologic:  Ovaries and Uterus intact     Skin:  No current screening    Reproductive History  Age at menarche: 12  Age at first live birth: 24  Menopause: Premenopausal  Hormone replacement: No    Medical and Surgical History  Pertinent surgical history:   Past Surgical History:   Procedure Laterality Date   • BREAST "BIOPSY Right    • BREAST LUMPECTOMY Right 2021    Procedure: OLEGARIO  DIRECTED LUMPECTOMY AT 12 0'CLOCK AND 6 O'CLOCK;  Surgeon: Tanna Head MD;  Location: [facility];  Service: Surgical Oncology   • INDUCED      • MA TX MISSED  FIRST TRIMESTER SURGICAL N/A 2016    Procedure: DILATATION AND EVACUATION (D&E); Surgeon: Maya Oliveira MD;  Location[facility];  Service: Gynecology   • US BREAST NEEDLE LOC RIGHT EACH ADDITIONAL Right 2021   • US BREAST OLEGARIO  NEEDLE LOC RIGHT Right 2021   • US GUIDANCE BREAST BIOPSY RIGHT EACH ADDITIONAL Right 02/10/2021   • US GUIDED BREAST BIOPSY RIGHT COMPLETE Right 02/10/2021      Pertinent medical history:  Past Medical History:   Diagnosis Date   • Apocrine metaplasia of breast, right 2022   • Gestational diabetes    • History of recurrent miscarriages     see OB hx   • Hypertension    • Intraductal papilloma of breast, right 2021   • Sclerosing adenosis of breast, right 2022       Other History:  Height:   Ht Readings from Last 1 Encounters:   23 5' 4\" (1 626 m)     Weight:   Wt Readings from Last 1 Encounters:   23 120 kg (265 lb 8 oz)     Relevant Family History   Patient reports no Ashkenazi Sikh ancestry  Maternal Family History: Mother, diagnosed with bilateral breast cancer at age 61, now age 76; history of negative genetic test results (report unavailable at time of appointment)    [de-identified], fraternal twin of mother, diagnosed with breast cancer (patient reports 'rare type' of unspecified pathology) at age 59, now age 76; history of negative genetic test results (report unavailable at time of appointment)    [de-identified] aunt, diagnosed with breast cancer, possibly under age 48;   Grandmother, diagnosed with stomach cancer at age 80,  age 80  Great grandmother, mother of maternal grandmother, diagnosed with stomach cancer at unknown age,  age 80      Paternal Family " History:     No cancer history  Please refer to the scanned pedigree in the Media Tab for a complete family history     *All history is reported as provided by the patient; records are not available for review, except where indicated  Assessment:  We discussed sporadic, familial and hereditary cancer  We also discussed the many factors that influence our risk for cancer such as age, environmental exposures, lifestyle choices and family history  We reviewed the indications suggestive of a hereditary predisposition to cancer  Genetic testing is indicated for Twila based on the following criteria: Meets NCCN V2 2023 Testing Criteria for High-Penetrance Breast Cancer Susceptibility Genes: Affected first degree relative, mother, with two close relatives affected with breast cancer  The risks, benefits, and limitations of genetic testing were reviewed with the patient, as well as genetic discrimination laws, and possible test results (positive, negative, variants of uncertain significance) and their clinical implications  If positive for a mutation, options for managing cancer risk including increased surveillance, chemoprevention, and in some cases prophylactic surgery were discussed  Zari Stewart was informed that if a hereditary cancer syndrome was identified in her, first degree relatives (parents, siblings, and children) have a chance of also inheriting the condition  Genetic testing would allow for predictive genetic testing in other relatives, who may also be at risk depending on their degree of relation  Billing:  Most individuals pay <$100 for hereditary cancer genetic testing  If insurance covers the cost of the testing, individuals may still pay out of pocket secondary to co-pays, co-insurance, or deductibles  If the cost of the testing exceeds $100, the lab will reach out to the patient via phone or e-mail   The patient will then have the option to proceed with the testing, cancel the testing, or elect the self-pay option of $250  Twila verbalized understanding  Plan: Patient decided to proceed with testing and provided consent  Summary:     Sample Collection:  The patient's blood sample was drawn in the office on 6/27/23 by medical assistant Dee Allen    Genetic Testing Preformed: [employer]: Cancer® + RNAinsight® (61 genes): APC, IVIS, AXIN2, BAP1, BARD1, BMPR1A, BRCA1, BRCA2, BRIP1, CDH1, CDK4, CDKN1B, CDKN2A, CHEK2, CTNNA1, DICER1, EGLN1, EPCAM, FH, FLCN, GREM1, HOXB13, KIF1B, KIT, MAX, MEN1, MET, MITF, MLH1, MSH2, MSH3, MSH6, MUTYH, NBN, NF1, NTHL1, PALB2, PMS2, POLD1, POLE, POT1, PTEN, RAD50, RAD51C, RAD51D, RB1, RECQL, RET, SDHA, SDHAF2, SDHB, SDHC, SDHD, SMAD4, SMARCA4, STK11, LTSI118, TP53, TSC1, TSC2, VHL     Result Call Information:  In the event that we need to reach [place] via telephone:  I confirmed the patient's mobile number on file as the best number to call with results [phone]  I confirmed with the patient that we can leave a voicemail on the provided numbers    Results take approximately 2-3 weeks to complete once test is started  [place] will be notified via [employer] once results are available  Additional recommendations for surveillance/medical management will be made pending genetic test results

## 2024-01-10 ENCOUNTER — OFFICE VISIT (OUTPATIENT)
Dept: URGENT CARE | Facility: MEDICAL CENTER | Age: 39
End: 2024-01-10
Payer: COMMERCIAL

## 2024-01-10 VITALS
DIASTOLIC BLOOD PRESSURE: 80 MMHG | SYSTOLIC BLOOD PRESSURE: 148 MMHG | RESPIRATION RATE: 18 BRPM | HEART RATE: 105 BPM | TEMPERATURE: 97.6 F | OXYGEN SATURATION: 98 %

## 2024-01-10 DIAGNOSIS — R10.9 ABDOMINAL PAIN, VOMITING, AND DIARRHEA: Primary | ICD-10-CM

## 2024-01-10 DIAGNOSIS — R19.7 ABDOMINAL PAIN, VOMITING, AND DIARRHEA: Primary | ICD-10-CM

## 2024-01-10 DIAGNOSIS — R11.10 ABDOMINAL PAIN, VOMITING, AND DIARRHEA: Primary | ICD-10-CM

## 2024-01-10 PROCEDURE — 99213 OFFICE O/P EST LOW 20 MIN: CPT | Performed by: PHYSICIAN ASSISTANT

## 2024-01-10 RX ORDER — ACETAMINOPHEN 500 MG
500 TABLET ORAL EVERY 6 HOURS PRN
Qty: 90 TABLET | Refills: 0 | Status: SHIPPED | OUTPATIENT
Start: 2024-01-10

## 2024-01-10 RX ORDER — NAPROXEN 500 MG/1
500 TABLET ORAL 2 TIMES DAILY WITH MEALS
Qty: 60 TABLET | Refills: 0 | Status: SHIPPED | OUTPATIENT
Start: 2024-01-10

## 2024-01-10 NOTE — PROGRESS NOTES
[facility]        NAME: Twila Bolaños is a 38 y.o. female  : 1985    MRN: [id number]  DATE: January 10, 2024  TIME: 4:51 PM    Assessment and Plan   Abdominal pain, vomiting, and diarrhea [R10.9, R11.10, R19.7]  1. Abdominal pain, vomiting, and diarrhea  naproxen (NAPROSYN) 500 mg tablet    acetaminophen (TYLENOL) 500 mg tablet            Patient Instructions     Motrin or Tylenol as needed for any fevers or chills  Plenty fluids stay well-hydrated  May use Imodium if needed for diarrhea  Follow up with PCP in 3-5 days.  Proceed to  ER if symptoms worsen.    Chief Complaint     Chief Complaint   Patient presents with    Vomiting     Pt c/o nausea, vomiting, diarrhea, fever yesterday symptoms started 3 days ago,         History of Present Illness       38-year-old female presents with abdominal plain nausea vomiting diarrhea.  Symptoms started 3 days ago.  Patient reports last bout of vomiting was 2 days ago.  Patient continues to have some watery diarrhea.  2 bouts so far today.  Reports that symptoms have been improving.  No fevers or chills.  No chest pain shortness of breath or cough.  No sore throats or ear pain.  Reports multiple sick contacts at home with the same.    Abdominal Pain  This is a new problem. The current episode started in the past 7 days. The onset quality is sudden. The problem occurs constantly. The problem has been gradually improving. The pain is located in the epigastric region. The pain is mild. The quality of the pain is aching. The abdominal pain does not radiate. Associated symptoms include anorexia, diarrhea, nausea and vomiting. Pertinent negatives include no constipation, fever, frequency or hematochezia. The pain is relieved by Nothing. She has tried nothing for the symptoms. The treatment provided moderate relief.       Review of Systems   Review of Systems   Constitutional: Negative.  Negative for fever.   HENT: Negative.     Eyes: Negative.    Respiratory:  Negative.     Cardiovascular: Negative.    Gastrointestinal:  Positive for abdominal pain, anorexia, diarrhea, nausea and vomiting. Negative for constipation and hematochezia.   Genitourinary:  Negative for frequency.   Musculoskeletal: Negative.    Skin: Negative.    Neurological: Negative.          Current Medications       Current Outpatient Medications:     acetaminophen (TYLENOL) 500 mg tablet, Take 500 mg by mouth every 6 (six) hours as needed for mild pain, Disp: , Rfl:     acetaminophen (TYLENOL) 500 mg tablet, Take 1 tablet (500 mg total) by mouth every 6 (six) hours as needed for mild pain, Disp: 90 tablet, Rfl: 0    ferrous sulfate 324 (65 Fe) mg, TAKE 1 TABLET BY MOUTH TWICE DAILY BEFORE MEALS, Disp: 180 tablet, Rfl: 2    labetalol (NORMODYNE) 200 mg tablet, TAKE 2 TABLETS(400 MG) BY MOUTH EVERY 8 HOURS, Disp: 180 tablet, Rfl: 0    naproxen (NAPROSYN) 500 mg tablet, Take 1 tablet (500 mg total) by mouth 2 (two) times a day with meals, Disp: 60 tablet, Rfl: 0    hydrochlorothiazide (HYDRODIURIL) 12.5 mg tablet, TAKE 1 TABLET(12.5 MG) BY MOUTH DAILY (Patient not taking: Reported on 12/14/2021), Disp: 90 tablet, Rfl: 0    ibuprofen (MOTRIN) 200 mg tablet, Take 3 tablets (600 mg total) by mouth every 6 (six) hours as needed for moderate pain (Patient not taking: Reported on 12/3/2021), Disp: , Rfl:     NIFEdipine ER (ADALAT CC) 60 MG 24 hr tablet, TAKE 1 TABLET(60 MG) BY MOUTH TWICE DAILY (Patient not taking: No sig reported), Disp: 180 tablet, Rfl: 0    oxyCODONE-acetaminophen (Percocet) 5-325 mg per tablet, Take 1 tablet by mouth every 6 (six) hours as needed for moderate pain Max Daily Amount: 4 tablets (Patient not taking: Reported on 1/10/2022), Disp: 10 tablet, Rfl: 0    Current Allergies     Allergies as of 01/10/2024 - Reviewed 01/10/2024   Allergen Reaction Noted    Other Hives 02/22/2018    Tomato - food allergy Hives             The following portions of the patient's history were reviewed and  updated as appropriate: allergies, current medications, past family history, past medical history, past social history, past surgical history and problem list.     Past Medical History:   Diagnosis Date    Apocrine metaplasia of breast, right 2022    Gestational diabetes     History of recurrent miscarriages     see OB hx    Hypertension     Intraductal papilloma of breast, right 2021    Sclerosing adenosis of breast, right 2022       Past Surgical History:   Procedure Laterality Date    BREAST BIOPSY Right     BREAST LUMPECTOMY Right 2021    Procedure: OLEGARIO  DIRECTED LUMPECTOMY AT 12 0'CLOCK AND 6 O'CLOCK;  Surgeon: Guillermina Lieberman MD;  [facility];  Service: Surgical Oncology    INDUCED       [place] TX MISSED  FIRST TRIMESTER SURGICAL N/A 2016    Procedure: DILATATION AND EVACUATION (D&E);  Surgeon: Scarlet Clark MD;  Location: [facility];  Service: Gynecology    US BREAST NEEDLE LOC RIGHT EACH ADDITIONAL Right 2021    US BREAST OLEGARIO  NEEDLE LOC RIGHT Right 2021    US GUIDANCE BREAST BIOPSY RIGHT EACH ADDITIONAL Right 02/10/2021    US GUIDED BREAST BIOPSY RIGHT COMPLETE Right 02/10/2021       Family History   Problem Relation Age of Onset    Hypertension Mother     Breast cancer Mother 62    BRCA1 Negative Mother     BRCA2 Negative Mother     Hypertension Father     No Known Problems Sister     No Known Problems Daughter     Stomach cancer Maternal Grandmother 81    No Known Problems Maternal Grandfather     Cancer Paternal Grandmother         unsure of type    No Known Problems Paternal Grandfather     No Known Problems Sister     No Known Problems Sister     No Known Problems Sister     No Known Problems Sister     No Known Problems Son     No Known Problems Son     Breast cancer Maternal Aunt 64    Breast cancer Maternal Aunt     Breast cancer Maternal Aunt     Breast cancer Maternal Aunt     Breast cancer Maternal Aunt     Breast cancer  Maternal Aunt     No Known Problems Paternal Aunt          Medications have been verified.        Objective   /80   Pulse 105   Temp 97.6 °F (36.4 °C)   Resp 18   SpO2 98%   No LMP recorded.       Physical Exam     Physical Exam  Vitals and nursing note reviewed.   Constitutional:       General: She is not in acute distress.     Appearance: Normal appearance. She is well-developed.   HENT:      Head: Normocephalic and atraumatic.      Right Ear: Hearing, tympanic membrane, ear canal and external ear normal. There is no impacted cerumen.      Left Ear: Hearing, tympanic membrane, ear canal and external ear normal. There is no impacted cerumen.      Nose: Nose normal.      Mouth/Throat:      Pharynx: Uvula midline. No oropharyngeal exudate.   Eyes:      General:         Right eye: No discharge.         Left eye: No discharge.      Conjunctiva/sclera: Conjunctivae normal.   Cardiovascular:      Rate and Rhythm: Normal rate and regular rhythm.      Heart sounds: Normal heart sounds. No murmur heard.  Pulmonary:      Effort: Pulmonary effort is normal. No respiratory distress.      Breath sounds: Normal breath sounds. No wheezing or rales.   Abdominal:      General: Bowel sounds are normal.      Palpations: Abdomen is soft.      Tenderness: There is abdominal tenderness (Mild) in the epigastric area.   Musculoskeletal:         General: Normal range of motion.      Cervical back: Normal range of motion and neck supple.   Lymphadenopathy:      Cervical: No cervical adenopathy.   Skin:     General: Skin is warm and dry.   Neurological:      Mental Status: She is alert and oriented to person, place, and time.   Psychiatric:         Mood and Affect: Mood normal.

## 2024-02-06 ENCOUNTER — TELEPHONE (OUTPATIENT)
Dept: SURGICAL ONCOLOGY | Facility: CLINIC | Age: 39
End: 2024-02-06

## 2024-02-06 NOTE — TELEPHONE ENCOUNTER
Called patient and rescheduled appt on 2/12/24 with Dr. Lieberman to be after imaging on 2/29/24, patient is scheduled with Dr. Lieberman on 3/7/24. Patient states she is currently in pain.

## 2024-02-06 NOTE — TELEPHONE ENCOUNTER
Spoke to patient and she explained that she has had some lingering pain since her surgery in 2021. Dr. Lieberman is aware of it and patient is trying to track other factors that may be contributing such as her menstrual cycle. Patient states she will discuss this with Dr. Lieberman at her upcoming appt.

## 2024-02-29 ENCOUNTER — HOSPITAL ENCOUNTER (OUTPATIENT)
Dept: ULTRASOUND IMAGING | Facility: CLINIC | Age: 39
Discharge: HOME | End: 2024-02-29
Payer: COMMERCIAL

## 2024-02-29 ENCOUNTER — HOSPITAL ENCOUNTER (OUTPATIENT)
Dept: MAMMOGRAPHY | Facility: CLINIC | Age: 39
Discharge: HOME | End: 2024-02-29
Payer: COMMERCIAL

## 2024-02-29 DIAGNOSIS — Z09 FOLLOW-UP EXAM, 7 MONTHS TO 1 YEAR SINCE PREVIOUS EXAM: ICD-10-CM

## 2024-02-29 PROCEDURE — G0279 TOMOSYNTHESIS, MAMMO: HCPCS

## 2024-02-29 PROCEDURE — 76642 ULTRASOUND BREAST LIMITED: CPT

## 2024-02-29 PROCEDURE — 77066 DX MAMMO INCL CAD BI: CPT

## 2024-03-07 ENCOUNTER — TELEPHONE (OUTPATIENT)
Dept: HEMATOLOGY ONCOLOGY | Facility: CLINIC | Age: 39
End: 2024-03-07

## 2024-03-07 NOTE — TELEPHONE ENCOUNTER
Appointment Change  Cancel, Reschedule, Change to Virtual      Who are you speaking with? Patient   If it is not the patient, is the caller listed on the communication consent form? N/A   Which provider is the appointment scheduled with? Dr. Lieberman   When was the original appointment scheduled?    Please list date and time 03/07/2024 @ 1PM    At which location is the appointment scheduled to take place? [place]   Was the appointment rescheduled?     Was the appointment changed from an in person visit to a virtual visit?    If so, please list the details of the change. Yes, 03/08/2024 @ 1PM    What is the reason for the appointment change? No reason was given

## 2024-03-11 ENCOUNTER — TELEPHONE (OUTPATIENT)
Dept: HEMATOLOGY ONCOLOGY | Facility: CLINIC | Age: 39
End: 2024-03-11

## 2024-03-11 NOTE — TELEPHONE ENCOUNTER
Appointment Change  Cancel, Reschedule, Change to Virtual      Who are you speaking with? Patient   If it is not the patient, is the caller listed on the communication consent form? Yes   Which provider is the appointment scheduled with? Dr. Lieberman   When was the original appointment scheduled?    Please list date and time 3/11/24   At which location is the appointment scheduled to take place? [place] ([facility])   Was the appointment rescheduled?     Was the appointment changed from an in person visit to a virtual visit?    If so, please list the details of the change. 3/13/24   What is the reason for the appointment change? Appt conflict       Was STAR transport scheduled? No   Does STAR transport need to be scheduled for the new visit (if applicable) No   Does the patient need an infusion appointment rescheduled? No   Does the patient have an upcoming infusion appointment scheduled? If so, when? No   Is the patient undergoing chemotherapy? No   For appointments cancelled with less than 24 hours:  Was the no-show policy reviewed? Yes

## 2024-03-13 ENCOUNTER — OFFICE VISIT (OUTPATIENT)
Dept: SURGICAL ONCOLOGY | Facility: CLINIC | Age: 39
End: 2024-03-13
Payer: COMMERCIAL

## 2024-03-13 VITALS — HEART RATE: 83 BPM | BODY MASS INDEX: 42 KG/M2 | OXYGEN SATURATION: 98 % | WEIGHT: 246 LBS | HEIGHT: 64 IN

## 2024-03-13 DIAGNOSIS — Z98.890 HISTORY OF LUMPECTOMY OF RIGHT BREAST: ICD-10-CM

## 2024-03-13 DIAGNOSIS — D24.1 INTRADUCTAL PAPILLOMA OF BREAST, RIGHT: Primary | ICD-10-CM

## 2024-03-13 DIAGNOSIS — N60.21 SCLEROSING ADENOSIS OF BREAST, RIGHT: ICD-10-CM

## 2024-03-13 PROCEDURE — 99214 OFFICE O/P EST MOD 30 MIN: CPT | Performed by: SURGERY

## 2024-03-13 RX ORDER — HYDROCHLOROTHIAZIDE 25 MG/1
TABLET ORAL
COMMUNITY
Start: 2023-12-29

## 2024-03-13 RX ORDER — AMLODIPINE BESYLATE 5 MG/1
TABLET ORAL
COMMUNITY
Start: 2023-12-28

## 2024-03-13 NOTE — PROGRESS NOTES
Surgical Oncology Follow Up  [facility]  [facility] [place]  [address] [id number]    Twila Bolaños  1985  [id number]      No chief complaint on file.       Assessment & Plan:   This is a 38-year-old female with a Hosman with strong family history of breast cancer her mom and grandma mom's twin sister.  She has had right breast lumpectomy for multiple papillomas.  Further mammogram and ultrasound demonstrated additional numerous papillomas.  She also had a genetic testing which is negative for breast cancer related genes mutation.  She is her  here with to discuss further management.  We discussed obtaining MRI of the breast which we will order in 6 months.  She also inquired about prophylactic bilateral mastectomy with reconstruction.  Therefore we will refer her to plastic surgeon for consultation.  I will see her in 6 months.  She was told to call us with any questions or concerns in the interim she understand and agreed to do so.    Cancer History:     Oncology History    No history exists.         Interval History:   Follow-up with family history of breast cancer and numerous bilateral breast papillomas    Review of Systems:   Review of Systems   Constitutional:  Negative for chills and fever.   HENT:  Negative for ear pain and sore throat.    Eyes:  Negative for pain and visual disturbance.   Respiratory:  Negative for cough and shortness of breath.    Cardiovascular:  Negative for chest pain and palpitations.   Gastrointestinal:  Negative for abdominal pain and vomiting.   Genitourinary:  Negative for dysuria and hematuria.   Musculoskeletal:  Negative for arthralgias and back pain.   Skin:  Negative for color change and rash.   Neurological:  Negative for seizures and syncope.   All other systems reviewed and are negative.    Past Medical History     Patient Active Problem List   Diagnosis   • Obesity, morbid, BMI 40.0-49.9 ([facility])    • Anemia affecting pregnancy in third trimester   • HTN (hypertension)   • Anemia   • Nipple discharge, bloody   • Orthopnea   • Gestational diabetes mellitus (GDM), delivered   •  (spontaneous vaginal delivery)   • Intraductal papilloma of breast, right   • Breast lump or mass   • Apocrine metaplasia of breast, right   • Sclerosing adenosis of breast, right   • History of lumpectomy of right breast     Past Medical History:   Diagnosis Date   • Apocrine metaplasia of breast, right 2022   • Gestational diabetes    • History of recurrent miscarriages     see OB hx   • Hypertension    • Intraductal papilloma of breast, right 2021   • Sclerosing adenosis of breast, right 2022     Past Surgical History:   Procedure Laterality Date   • BREAST BIOPSY Right    • BREAST LUMPECTOMY Right 2021    Procedure: OLEGARIO  DIRECTED LUMPECTOMY AT 12 0'CLOCK AND 6 O'CLOCK;  Surgeon: Guillermina Lieberman MD;  [facility];  Service: Surgical Oncology   • INDUCED      • [place] TX MISSED  FIRST TRIMESTER SURGICAL N/A 2016    Procedure: DILATATION AND EVACUATION (D&E);  Surgeon: Scarlet Clark MD;  [facility];  Service: Gynecology   • US BREAST CLIP NEEDLE LOC RIGHT Right 2021   • US BREAST NEEDLE LOC RIGHT EACH ADDITIONAL Right 2021   • US GUIDANCE BREAST BIOPSY RIGHT EACH ADDITIONAL Right 02/10/2021   • US GUIDED BREAST BIOPSY RIGHT COMPLETE Right 02/10/2021     Family History   Problem Relation Age of Onset   • Hypertension Mother    • Breast cancer Mother 62   • BRCA1 Negative Mother    • BRCA2 Negative Mother    • Hypertension Father    • No Known Problems Sister    • No Known Problems Daughter    • Stomach cancer Maternal Grandmother 81   • No Known Problems Maternal Grandfather    • Cancer Paternal Grandmother         unsure of type   • No Known Problems Paternal Grandfather    • No Known Problems Sister    • No Known Problems Sister    • No Known Problems Sister     • No Known Problems Sister    • No Known Problems Son    • No Known Problems Son    • Breast cancer Maternal Aunt 64   • Breast cancer Maternal Aunt    • Breast cancer Maternal Aunt    • Breast cancer Maternal Aunt    • Breast cancer Maternal Aunt    • Breast cancer Maternal Aunt    • No Known Problems Paternal Aunt      Social History     Socioeconomic History   • Marital status: /Civil Union     Spouse name: Not on file   • Number of children: 2   • Years of education: Not on file   • Highest education level: Not on file   Occupational History   • Not on file   Tobacco Use   • Smoking status: Former     Current packs/day: 0.00     Types: Cigarettes     Quit date:      Years since quittin.2   • Smokeless tobacco: Never   • Tobacco comments:     Former smoker per [employer]   Vaping Use   • Vaping status: Never Used   Substance and Sexual Activity   • Alcohol use: Yes     Comment: twice a year   • Drug use: No   • Sexual activity: Not Currently     Partners: Male   Other Topics Concern   • Not on file   Social History Narrative    Caffeine use     Social Determinants of Health     Financial Resource Strain: Low Risk  (2023)    Received from [facility]    Overall Financial Resource Strain (CARDIA)    • Difficulty of Paying Living Expenses: Not hard at all   Food Insecurity: No Food Insecurity (2023)    Received from [facility]    Hunger Vital Sign    • Worried About Running Out of Food in the Last Year: Never true    • Ran Out of Food in the Last Year: Never true   Transportation Needs: No Transportation Needs (2023)    Received from [facility]    PRAPARE - Transportation    • Lack of Transportation (Medical): No    • Lack of Transportation (Non-Medical): No   Physical Activity: Not on file   Stress: No Stress Concern Present (2023)    Received from [facility]    [employer] -  Occupational Stress Questionnaire    • Feeling of Stress : Only a little   Social Connections: Moderately Integrated (5/23/2023)    Received from [facility]    Social Connection and Isolation Panel [NHANES]    • Frequency of Communication with Friends and Family: More than three times a week    • Frequency of Social Gatherings with Friends and Family: More than three times a week    • Attends Hindu Services: 1 to 4 times per year    • Active Member of Clubs or Organizations: No    • Attends Club or Organization Meetings: Never    • Marital Status:    Intimate Partner Violence: Not At Risk (5/23/2023)    Received from [facility]    Humiliation, Afraid, Rape, and Kick questionnaire    • Fear of Current or Ex-Partner: No    • Emotionally Abused: No    • Physically Abused: No    • Sexually Abused: No   Housing Stability: High Risk (5/23/2023)    Received from [facility]    Housing Stability Vital Sign    • Unable to Pay for Housing in the Last Year: Yes    • Number of Places Lived in the Last Year: 1    • Unstable Housing in the Last Year: No       Current Outpatient Medications:   •  acetaminophen (TYLENOL) 500 mg tablet, Take 500 mg by mouth every 6 (six) hours as needed for mild pain, Disp: , Rfl:   •  acetaminophen (TYLENOL) 500 mg tablet, Take 1 tablet (500 mg total) by mouth every 6 (six) hours as needed for mild pain, Disp: 90 tablet, Rfl: 0  •  ferrous sulfate 324 (65 Fe) mg, TAKE 1 TABLET BY MOUTH TWICE DAILY BEFORE MEALS, Disp: 180 tablet, Rfl: 2  •  hydrochlorothiazide (HYDRODIURIL) 12.5 mg tablet, TAKE 1 TABLET(12.5 MG) BY MOUTH DAILY (Patient not taking: Reported on 12/14/2021), Disp: 90 tablet, Rfl: 0  •  ibuprofen (MOTRIN) 200 mg tablet, Take 3 tablets (600 mg total) by mouth every 6 (six) hours as needed for moderate pain (Patient not taking: Reported on 12/3/2021), Disp: , Rfl:   •  labetalol (NORMODYNE) 200 mg tablet, TAKE 2 TABLETS(400  MG) BY MOUTH EVERY 8 HOURS, Disp: 180 tablet, Rfl: 0  •  naproxen (NAPROSYN) 500 mg tablet, Take 1 tablet (500 mg total) by mouth 2 (two) times a day with meals, Disp: 60 tablet, Rfl: 0  •  NIFEdipine ER (ADALAT CC) 60 MG 24 hr tablet, TAKE 1 TABLET(60 MG) BY MOUTH TWICE DAILY (Patient not taking: No sig reported), Disp: 180 tablet, Rfl: 0  •  oxyCODONE-acetaminophen (Percocet) 5-325 mg per tablet, Take 1 tablet by mouth every 6 (six) hours as needed for moderate pain Max Daily Amount: 4 tablets (Patient not taking: Reported on 1/10/2022), Disp: 10 tablet, Rfl: 0  Allergies   Allergen Reactions   • Other Hives     bbq sauce   • Tomato - Food Allergy Hives       Physical Exam:   There were no vitals filed for this visit.  Physical Exam  Constitutional:       Appearance: Normal appearance.   HENT:      Head: Normocephalic and atraumatic.      Nose: Nose normal.      Mouth/Throat:      Mouth: Mucous membranes are moist.   Eyes:      Pupils: Pupils are equal, round, and reactive to light.   Cardiovascular:      Rate and Rhythm: Normal rate.      Pulses: Normal pulses.      Heart sounds: Normal heart sounds.   Pulmonary:      Effort: Pulmonary effort is normal.      Breath sounds: Normal breath sounds.   Chest:          Comments: Bilateral breast examination no palpable mass masses nipple discharge nipple retraction or skin changes well-healed right breast incision.  Bilateral axilla and supraclavicular examination no palpable adenopathy.    Patient was examined seated as well as supine position.  Abdominal:      General: Bowel sounds are normal.      Palpations: Abdomen is soft.   Musculoskeletal:         General: Normal range of motion.      Cervical back: Normal range of motion and neck supple.   Skin:     General: Skin is warm.   Neurological:      General: No focal deficit present.      Mental Status: She is alert and oriented to person, place, and time.   Psychiatric:         Mood and Affect: Mood normal.          Behavior: Behavior normal.         Thought Content: Thought content normal.         Judgment: Judgment normal.       Results & Discussion:    Show images for Mammo diagnostic bilateral w 3d & cad  Study Result    Narrative & Impression   DIAGNOSIS: Follow-up exam, 7 months to 1 year since previous exam      TECHNIQUE:   Digital diagnostic mammography was performed. Computer Aided Detection (CAD) analyzed all applicable images.  Left breast ultrasound was performed.      COMPARISONS: Prior breast imaging dated: 02/02/2023, 02/02/2023, 04/15/2022, 12/20/2021, 12/16/2021, 12/16/2021, 12/16/2021, 11/24/2021, 11/24/2021, 02/10/2021, 02/10/2021, 02/10/2021, and 01/07/2021     RELEVANT HISTORY:    Family Breast Cancer History: History of breast cancer in Mother, Maternal Aunt, Maternal Aunt, Maternal Aunt, Maternal Aunt, Maternal Aunt, Maternal Aunt.  Family Medical History: Family medical history includes BRCA1 Negative in mother, BRCA2 Negative in mother, and breast cancer in 7 relatives (maternal aunt, maternal aunt, maternal aunt, maternal aunt, maternal aunt, maternal aunt, mother).   Personal History: No known relevant hormone history. Surgical history includes breast biopsy and lumpectomy. No known relevant medical history.     RISK ASSESSMENT:   5 Year Tyrer-Cuzick: 1.4%  10 Year Tyrer-Cuzick: 3.61%  Lifetime Tyrer-Cuzick: 29.9%     TISSUE DENSITY:   There are scattered areas of fibroglandular density.      INDICATION: Twila Bolaños is a 38 y.o. female presenting for 1 yr f/u.     FINDINGS:   Bilateral  Mammo diagnostic bilateral w 3d & cad  There are no suspicious masses, grouped microcalcifications or areas of unexplained architectural distortion.  Circumscribed lesions consistent with the patient's history of multiple papillomas identified.  The skin and nipple areolar complex are unremarkable.        Left  US breast left limited (diagnostic)  Targeted ultrasound demonstrates numerous cysts and well-circumscribed  lesions consistent with history of multiple papillomas.     Surgical consultation is recommended for papillomatosis.        IMPRESSION:   Multiple cyst and suspected papillomas.     This patient has been identified as being at elevated risk for development of breast cancer based on the Tyrer-Cuzick model. She may benefit from supplemental screening.     ASSESSMENT/BI-RADS CATEGORY:  Left: 2 - Benign  Right: 2 - Benign  Overall: 2 - Benign     RECOMMENDATION:       - Routine screening mammogram in 1 year for both breasts.       - Surgical consultation for the breast(s).     I did review the mammogram and ultrasound films and agree with the report.  She states her one of her aunt was diagnosed with papillomas in her young age and subsequently developed breast cancer.  We will obtain breast MRI.  I did discussed in detail nature of breast cancer family history.  Good news is her genetic test results are negative.  We will obtain an MRI of the breast.  We will also obtain a plastic surgery consult she understands and  agrees . All patient questions were answered.       Advance Care Planning/Advance Directives:  I Guillermina Lieberman MD discussed the disease status with Twila Bolaños  today 03/13/24  treatment plans and follow-up with the patient.

## 2024-03-19 ENCOUNTER — TELEPHONE (OUTPATIENT)
Dept: PLASTIC SURGERY | Facility: CLINIC | Age: 39
End: 2024-03-19

## 2024-03-19 NOTE — TELEPHONE ENCOUNTER
Irving for patient to call to offer her appointment with Dr. Magaña, ref Dr. Lieberman for right breast lumpectomy for multiple papillomas .

## 2024-03-22 ENCOUNTER — TELEPHONE (OUTPATIENT)
Dept: PLASTIC SURGERY | Facility: CLINIC | Age: 39
End: 2024-03-22

## 2024-03-22 NOTE — TELEPHONE ENCOUNTER
Returned patient's call and set up consultation with Dr. Magaña in [place], [address].  Emailed patient directions and gave my contact information.

## 2024-07-16 ENCOUNTER — CONSULT (OUTPATIENT)
Age: 39
End: 2024-07-16
Payer: COMMERCIAL

## 2024-07-16 VITALS
DIASTOLIC BLOOD PRESSURE: 80 MMHG | WEIGHT: 238 LBS | SYSTOLIC BLOOD PRESSURE: 140 MMHG | HEART RATE: 91 BPM | HEIGHT: 64 IN | OXYGEN SATURATION: 98 % | TEMPERATURE: 98.2 F | BODY MASS INDEX: 40.63 KG/M2

## 2024-07-16 DIAGNOSIS — D24.1 INTRADUCTAL PAPILLOMA OF BREAST, RIGHT: Primary | ICD-10-CM

## 2024-07-16 PROCEDURE — 99205 OFFICE O/P NEW HI 60 MIN: CPT | Performed by: PLASTIC SURGERY

## 2024-07-16 NOTE — PROGRESS NOTES
Assessment & Plan   37 yo female with papillomas of the breast considering bilateral mastectomy  1.) Patient would be an reasonable candidate for either autologous or implant-based reconstruction  2.)  I discussed with her that regardless of her final decision the process would start with tissue expander placement at time of mastectomy  3.)  All her questions were answered to her satisfaction  4.)  We will coordinate with breast surgery and make arrangements to have the procedure performed in the OR if and when patient decides on surgery  5.)  Patient would need extended Lovenox therapy    Discussion- Discussion--discussed with patient her options for reconstruction, discussed with the patient the options for autologous versus implant based reconstruction.  I discussed with her that breast reconstruction is typically performed in stages with the 1st step being placement of a tissue expander at the time of mastectomy.  I discussed with her the risks, benefits, alternatives of tissue expander placement including the risk of bleeding, infection, scarring, poor wound healing, demonstrating underlying structures, need for further surgery, need for multiple procedures, mastectomy flap necrosis, partial mastectomy flap necrosis, the off-label use of acellular dermal matrix, the risk of breast implant associated anaplastic large cell lymphoma, the need for the expansion process, the risk of seroma, the risk of DVT, risk of asymmetry, the need for multiple procedures, the need for revision, poor aesthetic result, asymmetry.  I discussed with her the use of drains, the use of darwin 90 minutes negative pressure wound therapy dressings, the expected recovery time, the expected hospital stay, I discussed with her the reconstructive expansion process.  I discussed with her that at the completion of expansion we would need at least 3 months after the completion of expansion no post mastectomy treatment is needed.  I discussed with  her the need for chemotherapy radiation with push that time when back.  I discussed with her the next step for reconstruction options include the use of implant based reconstruction, I discussed with her the risks, benefits, alternatives exchange procedure, the implants do not typically fair as well with radiation, I discussed with her the use of silicone implants, the risk of leak, the clinical significance of implant leak, I discussed with her the screening for MRI recommendations.  I discussed with her the option of autologous based reconstruction.  I discussed with her the nature of tram flap surgery versus HERIBERTO flap surgery, and discussion of complex nature of micro surgery, the risks, benefits and alternate is of abdominally based autologous reconstruction including the above with the additional risk of micro surgical failure, flap failure, need for ICU stay, need for multiple procedures, need for revision, we discussed the option of nipple reconstruction.  All the patient's questions were answered to her satisfaction.      Counseling dominated visit: Total counseling time 45  minutes, total visit time 60  minutes including documentation, review of her chart and coordination of care.    Subjective   Patient ID: Twila Bolaños is a 38 y.o. female.    Patient is a very pleasant 38-year-old female who is here today to discuss possible breast reconstruction options.  The patient has history of papillomas of the right breast and is considering bilateral mastectomy for risk reduction.  The patient has a history of hypertension, the patient is a non-smoker, she does not take steroids or blood thinners, she has a history of a miscarriage but no other abdominal surgeries, she is a non-smoker, she is currently on semaglutide for weight loss and is lost approximately 20 pounds in the last 2 months.      Vitals:    07/16/24 1428   BP: 140/80   Pulse: 91   Temp: 98.2 °F (36.8 °C)   SpO2: 98%     HPI    The following portions  of the patient's history were reviewed and updated as appropriate: allergies, current medications, past family history, past medical history, past social history, past surgical history, and problem list.    Review of Systems   All other systems reviewed and are negative.      Objective   Physical Exam   Constitutional  She appears well-developed and well-nourished.     Eyes  Pupils are equal, round, and reactive to light. System normal.     General -             Right: Right eye extraocular movements are normal.             Left: Left eye extraocular movements are normal.       Skin  Skin is warm.     Psychiatric  She has a normal mood and affect. Her behavior is normal. Judgment and thought content normal.     Bilateral breasts- large pendulous breasts, grade III ptosis, no palpable masses appreciated  Measurements- R- SN 40 NIMF 11 BW >16                             L- SN 39 NIMF11 BW >16 NN 26      Past Medical History:   Diagnosis Date    Apocrine metaplasia of breast, right 2022    Gestational diabetes     History of recurrent miscarriages     see OB hx    Hypertension     Intraductal papilloma of breast, right 2021    Sclerosing adenosis of breast, right 2022     Past Surgical History:   Procedure Laterality Date    BREAST BIOPSY Right     BREAST LUMPECTOMY Right 2021    Procedure: OLEGARIO  DIRECTED LUMPECTOMY AT 12 0'CLOCK AND 6 O'CLOCK;  Surgeon: Guillermina Lieberman MD;  [facility]: [facility];  Service: Surgical Oncology    INDUCED       [place] MISSED  FIRST TRIMESTER SURGICAL N/A 2016    Procedure: DILATATION AND EVACUATION (D&E);  Surgeon: Scarlet Clark MD;  Location[facility];  Service: Gynecology    US BREAST CLIP NEEDLE LOC RIGHT Right 2021    US BREAST NEEDLE LOC RIGHT EACH ADDITIONAL Right 2021    US GUIDANCE BREAST BIOPSY RIGHT EACH ADDITIONAL Right 02/10/2021    US GUIDED BREAST BIOPSY RIGHT COMPLETE Right 02/10/2021     Current Outpatient  Medications   Medication Instructions    acetaminophen (TYLENOL) 500 mg, Oral, Every 6 hours PRN    acetaminophen (TYLENOL) 500 mg, Oral, Every 6 hours PRN    amLODIPine (NORVASC) 5 mg tablet     betamethasone valerate (VALISONE) 0.1 % ointment APPLY TOPICALLY TO THE AFFECTED AREA TWICE DAILY    ferrous sulfate 324 (65 Fe) mg TAKE 1 TABLET BY MOUTH TWICE DAILY BEFORE MEALS    hydroCHLOROthiazide 25 mg tablet     ibuprofen (MOTRIN) 600 mg, Oral, Every 6 hours PRN    labetalol (NORMODYNE) 200 mg tablet TAKE 2 TABLETS(400 MG) BY MOUTH EVERY 8 HOURS    naproxen (NAPROSYN) 500 mg, Oral, 2 times daily with meals    NIFEdipine ER (ADALAT CC) 60 MG 24 hr tablet TAKE 1 TABLET(60 MG) BY MOUTH TWICE DAILY    oxyCODONE-acetaminophen (Percocet) 5-325 mg per tablet 1 tablet, Oral, Every 6 hours PRN       Social History     Social History Narrative    Caffeine use     Social History     Tobacco Use   Smoking Status Former    Current packs/day: 0.00    Types: Cigarettes    Quit date:     Years since quittin.5   Smokeless Tobacco Never   Tobacco Comments    Former smoker per [employer]

## 2024-09-04 ENCOUNTER — TELEPHONE (OUTPATIENT)
Dept: SURGICAL ONCOLOGY | Facility: CLINIC | Age: 39
End: 2024-09-04

## 2024-09-04 NOTE — TELEPHONE ENCOUNTER
Called patient and left a message to call the office back to reschedule her 9/19 appointment with Dr. Lieberman. Dr. Lieberman will now be in the OR.  Patient can be seen either at [place] or [place].

## 2024-10-01 ENCOUNTER — HOSPITAL ENCOUNTER (OUTPATIENT)
Dept: MRI IMAGING | Facility: HOSPITAL | Age: 39
Discharge: HOME | End: 2024-10-01
Payer: COMMERCIAL

## 2024-10-01 VITALS — BODY MASS INDEX: 39.78 KG/M2 | HEIGHT: 64 IN | WEIGHT: 233 LBS

## 2024-10-01 DIAGNOSIS — N60.21 SCLEROSING ADENOSIS OF BREAST, RIGHT: ICD-10-CM

## 2024-10-01 DIAGNOSIS — Z98.890 HISTORY OF LUMPECTOMY OF RIGHT BREAST: ICD-10-CM

## 2024-10-01 DIAGNOSIS — D24.1 INTRADUCTAL PAPILLOMA OF BREAST, RIGHT: ICD-10-CM

## 2024-10-01 PROCEDURE — C8937 CAD BREAST MRI: HCPCS

## 2024-10-01 PROCEDURE — A9585 GADOBUTROL INJECTION: HCPCS | Performed by: SURGERY

## 2024-10-01 PROCEDURE — C8908 MRI W/O FOL W/CONT, BREAST,: HCPCS

## 2024-10-01 RX ORDER — GADOBUTROL 604.72 MG/ML
10 INJECTION INTRAVENOUS
Status: COMPLETED | OUTPATIENT
Start: 2024-10-01 | End: 2024-10-01

## 2024-10-01 RX ADMIN — GADOBUTROL 10 ML: 604.72 INJECTION INTRAVENOUS at 14:26

## 2024-10-04 DIAGNOSIS — Z80.3 FAMILY HISTORY OF BREAST CANCER: Primary | ICD-10-CM

## 2024-10-08 PROBLEM — Z80.3 FAMILY HISTORY OF BREAST CANCER: Status: ACTIVE | Noted: 2024-10-08

## 2024-10-08 PROBLEM — Z91.89 AT HIGH RISK FOR BREAST CANCER: Status: ACTIVE | Noted: 2024-10-08

## 2024-10-09 ENCOUNTER — OFFICE VISIT (OUTPATIENT)
Dept: SURGICAL ONCOLOGY | Facility: CLINIC | Age: 39
End: 2024-10-09
Payer: COMMERCIAL

## 2024-10-09 VITALS
WEIGHT: 217 LBS | SYSTOLIC BLOOD PRESSURE: 185 MMHG | HEIGHT: 64 IN | OXYGEN SATURATION: 99 % | TEMPERATURE: 98 F | DIASTOLIC BLOOD PRESSURE: 124 MMHG | BODY MASS INDEX: 37.05 KG/M2 | HEART RATE: 80 BPM

## 2024-10-09 DIAGNOSIS — Z80.3 FAMILY HISTORY OF BREAST CANCER: ICD-10-CM

## 2024-10-09 DIAGNOSIS — D24.1 INTRADUCTAL PAPILLOMA OF BREAST, RIGHT: Primary | ICD-10-CM

## 2024-10-09 DIAGNOSIS — Z91.89 AT HIGH RISK FOR BREAST CANCER: ICD-10-CM

## 2024-10-09 DIAGNOSIS — Z98.890 HISTORY OF LUMPECTOMY OF RIGHT BREAST: ICD-10-CM

## 2024-10-09 DIAGNOSIS — N60.81 APOCRINE METAPLASIA OF BREAST, RIGHT: ICD-10-CM

## 2024-10-09 DIAGNOSIS — N60.21 SCLEROSING ADENOSIS OF BREAST, RIGHT: ICD-10-CM

## 2024-10-09 PROCEDURE — 99215 OFFICE O/P EST HI 40 MIN: CPT | Performed by: SURGERY

## 2024-10-09 NOTE — PROGRESS NOTES
Surgical Oncology Follow Up  [facility]  [facility] [place]  [address] [address]    Twila Bolaños  1985  [id number]      Chief Complaint   Patient presents with   • Follow-up     6 Month Follow Up with MRI        Assessment & Plan:   This is a 38-year-old female with strong family history of breast cancer with multiple breast papillomas consistent with papillomatosis.  She had a right breast lumpectomy in the past.  She had an MRI consistent and additional up concerning for papilloma send right and left breast biopsies were recommended.  She is here to discuss.  She is also considered given the strong family history of breast cancer and papillomatosis, discussing bilateral mastectomy with reconstruction.  I have emphasized she need to go for bilateral breast biopsy first to rule out any malignancy at this point    Cancer History:     Oncology History    No history exists.         Interval History:   Follow-up after breast MRI with strong family history of breast cancer    Review of Systems:   Review of Systems   Constitutional:  Negative for chills and fever.   HENT:  Negative for ear pain and sore throat.    Eyes:  Negative for pain and visual disturbance.   Respiratory:  Negative for cough and shortness of breath.    Cardiovascular:  Negative for chest pain and palpitations.   Gastrointestinal:  Negative for abdominal pain and vomiting.   Genitourinary:  Negative for dysuria and hematuria.   Musculoskeletal:  Negative for arthralgias and back pain.   Skin:  Negative for color change and rash.   Neurological:  Negative for seizures and syncope.   All other systems reviewed and are negative.    Past Medical History     Patient Active Problem List   Diagnosis   • Obesity, morbid, BMI 40.0-49.9 (HCC)   • Anemia affecting pregnancy in third trimester   • HTN (hypertension)   • Anemia   • Nipple discharge, bloody   • Orthopnea   • Gestational  diabetes mellitus (GDM), delivered   •  (spontaneous vaginal delivery)   • Intraductal papilloma of breast, right   • Breast lump or mass   • Apocrine metaplasia of breast, right   • Sclerosing adenosis of breast, right   • History of lumpectomy of right breast   • At high risk for breast cancer   • Family history of breast cancer     Past Medical History:   Diagnosis Date   • Apocrine metaplasia of breast, right 2022   • Gestational diabetes    • History of recurrent miscarriages     see OB hx   • Hypertension    • Intraductal papilloma of breast, right 2021   • Sclerosing adenosis of breast, right 2022     Past Surgical History:   Procedure Laterality Date   • BREAST BIOPSY Right    • BREAST LUMPECTOMY Right 2021    Procedure: OLEGARIO  DIRECTED LUMPECTOMY AT 12 0'CLOCK AND 6 O'CLOCK;  Surgeon: Guillermina Lieberman MD;  [facility];  Service: Surgical Oncology   • INDUCED      • [place] TX MISSED  FIRST TRIMESTER SURGICAL N/A 2016    Procedure: DILATATION AND EVACUATION (D&E);  Surgeon: Scarlet Clark MD;  [facility];  Service: Gynecology   • US BREAST CLIP NEEDLE LOC RIGHT Right 2021   • US BREAST NEEDLE LOC RIGHT EACH ADDITIONAL Right 2021   • US GUIDANCE BREAST BIOPSY RIGHT EACH ADDITIONAL Right 02/10/2021   • US GUIDED BREAST BIOPSY RIGHT COMPLETE Right 02/10/2021     Family History   Problem Relation Age of Onset   • Hypertension Mother    • Breast cancer Mother 62   • BRCA1 Negative Mother    • BRCA2 Negative Mother    • Hypertension Father    • No Known Problems Sister    • No Known Problems Daughter    • Stomach cancer Maternal Grandmother 81   • No Known Problems Maternal Grandfather    • Cancer Paternal Grandmother         unsure of type   • No Known Problems Paternal Grandfather    • No Known Problems Sister    • No Known Problems Sister    • No Known Problems Sister    • No Known Problems Sister    • No Known Problems Son    • No Known  Problems Son    • Breast cancer Maternal Aunt 64   • Breast cancer Maternal Aunt    • Breast cancer Maternal Aunt    • Breast cancer Maternal Aunt    • Breast cancer Maternal Aunt    • Breast cancer Maternal Aunt    • No Known Problems Paternal Aunt      Social History     Socioeconomic History   • Marital status: /Civil Union     Spouse name: Not on file   • Number of children: 2   • Years of education: Not on file   • Highest education level: Not on file   Occupational History   • Not on file   Tobacco Use   • Smoking status: Former     Current packs/day: 0.00     Types: Cigarettes     Quit date:      Years since quittin.7   • Smokeless tobacco: Never   • Tobacco comments:     Former smoker per [employer]   Vaping Use   • Vaping status: Never Used   Substance and Sexual Activity   • Alcohol use: Yes     Comment: twice a year   • Drug use: No   • Sexual activity: Not Currently     Partners: Male     Birth control/protection: Other   Other Topics Concern   • Not on file   Social History Narrative    Caffeine use     Social Determinants of Health     Financial Resource Strain: Low Risk  (2023)    [facility]    Overall Financial Resource Strain (CARDIA)    • Difficulty of Paying Living Expenses: Not hard at all   Food Insecurity: No Food Insecurity (2023)    [facility]    Hunger Vital Sign    • Worried About Running Out of Food in the Last Year: Never true    • Ran Out of Food in the Last Year: Never true   Transportation Needs: No Transportation Needs (2023)    [facility]    PRAPARE - Transportation    • Lack of Transportation (Medical): No    • Lack of Transportation (Non-Medical): No   Physical Activity: Not on file   Stress: No Stress Concern Present (2023)    [facility] from [facility], [facility]    [employer] [place] [employer] - Occupational Stress Questionnaire    • Feeling of Stress : Only a little   Social Connections: Moderately Integrated (5/23/2023)    Received from [facility], [facility]    Social Connection and Isolation Panel [NHANES]    • Frequency of Communication with Friends and Family: More than three times a week    • Frequency of Social Gatherings with Friends and Family: More than three times a week    • Attends Bahai Services: 1 to 4 times per year    • Active Member of Clubs or Organizations: No    • Attends Club or Organization Meetings: Never    • Marital Status:    Intimate Partner Violence: Not At Risk (5/23/2023)    [facility], [facility]    Humiliation, Afraid, Rape, and Kick questionnaire    • Fear of Current or Ex-Partner: No    • Emotionally Abused: No    • Physically Abused: No    • Sexually Abused: No   Housing Stability: High Risk (5/23/2023)    [facility] from [facility], [facility]    Housing Stability Vital Sign    • Unable to Pay for Housing in the Last Year: Yes    • Number of Places Lived in the Last Year: 1    • Unstable Housing in the Last Year: No       Current Outpatient Medications:   •  acetaminophen (TYLENOL) 500 mg tablet, Take 500 mg by mouth every 6 (six) hours as needed for mild pain, Disp: , Rfl:   •  amLODIPine (NORVASC) 5 mg tablet, , Disp: , Rfl:   •  betamethasone valerate (VALISONE) 0.1 % ointment, APPLY TOPICALLY TO THE AFFECTED AREA TWICE DAILY, Disp: , Rfl:   •  ferrous sulfate 324 (65 Fe) mg, TAKE 1 TABLET BY MOUTH TWICE DAILY BEFORE MEALS, Disp: 180 tablet, Rfl: 2  •  hydroCHLOROthiazide 25 mg tablet, , Disp: , Rfl:   •  ibuprofen (MOTRIN) 200 mg tablet, Take 3 tablets (600 mg total) by mouth every 6 (six) hours as needed for moderate pain, Disp: , Rfl:   •  labetalol  (NORMODYNE) 200 mg tablet, TAKE 2 TABLETS(400 MG) BY MOUTH EVERY 8 HOURS, Disp: 180 tablet, Rfl: 0  •  acetaminophen (TYLENOL) 500 mg tablet, Take 1 tablet (500 mg total) by mouth every 6 (six) hours as needed for mild pain, Disp: 90 tablet, Rfl: 0  •  naproxen (NAPROSYN) 500 mg tablet, Take 1 tablet (500 mg total) by mouth 2 (two) times a day with meals (Patient not taking: Reported on 7/16/2024), Disp: 60 tablet, Rfl: 0  •  NIFEdipine ER (ADALAT CC) 60 MG 24 hr tablet, TAKE 1 TABLET(60 MG) BY MOUTH TWICE DAILY (Patient not taking: No sig reported), Disp: 180 tablet, Rfl: 0  •  oxyCODONE-acetaminophen (Percocet) 5-325 mg per tablet, Take 1 tablet by mouth every 6 (six) hours as needed for moderate pain Max Daily Amount: 4 tablets (Patient not taking: Reported on 1/10/2022), Disp: 10 tablet, Rfl: 0  Allergies   Allergen Reactions   • Other Hives     bbq sauce   • Tomato - Food Allergy Hives       Physical Exam:     Vitals:    10/09/24 1539   BP: (!) 185/124   Pulse: 80   Temp: 98 °F (36.7 °C)   SpO2: 99%     Physical Exam  Constitutional:       Appearance: Normal appearance.   HENT:      Head: Normocephalic and atraumatic.      Nose: Nose normal.      Mouth/Throat:      Mouth: Mucous membranes are moist.   Eyes:      Pupils: Pupils are equal, round, and reactive to light.   Cardiovascular:      Rate and Rhythm: Normal rate.      Pulses: Normal pulses.      Heart sounds: Normal heart sounds.   Pulmonary:      Effort: Pulmonary effort is normal.      Breath sounds: Normal breath sounds.   Chest:          Comments: Well-healed right breast incision bilateral breast no palpable mass masses nipple discharge nipple retraction or skin changes bilateral axilla and supraclavicular examination no palpable adenopathy  Abdominal:      General: Bowel sounds are normal.      Palpations: Abdomen is soft.   Musculoskeletal:         General: Normal range of motion.      Cervical back: Normal range of motion and neck supple.    Skin:     General: Skin is warm.   Neurological:      General: No focal deficit present.      Mental Status: She is alert and oriented to person, place, and time.   Psychiatric:         Mood and Affect: Mood normal.         Behavior: Behavior normal.         Thought Content: Thought content normal.         Judgment: Judgment normal.       Results & Discussion:   DIAGNOSIS: Intraductal papilloma of breast, right; Sclerosing adenosis of breast, right; History of lumpectomy of right breast      TECHNIQUE:  MRI of both breasts was performed in axial planes utilizing a combination of localizer, axial T2 STIR, Axial T1 FSPGR in phase, axial dynamic 3D fat suppressed gradient-echo T1 sequences (VIBRANT) before and after contrast administration at multiple time points, and sagittal 3D fat suppressed gradient-echo T1 sequences (VIBRANT) post-contrast.     This exam was read in conjunction with [employer] software.     MEDICATIONS ADMINISTERED:  Gadobutrol injection (SINGLE-DOSE) SOLN 10 mL, Total Given: 10 mL (1 dose)  Intravenous contrast was administered at 2cc/sec, without documented contrast reaction.     COMPARISONS: Prior breast imaging dated: 02/29/2024, 02/29/2024, 02/02/2023, 02/02/2023, 04/15/2022, 12/20/2021, 12/16/2021, 12/16/2021, 12/16/2021, 11/24/2021, 11/24/2021, 02/10/2021, 02/10/2021, 02/10/2021, and 01/07/2021     RELEVANT HISTORY:   Family Breast Cancer History: History of breast cancer in Mother, Maternal Aunt, Maternal Aunt, Maternal Aunt, Maternal Aunt, Maternal Aunt, Maternal Aunt.  Family Medical History: Family medical history includes BRCA1 Negative in mother, BRCA2 Negative in mother, and breast cancer in 7 relatives (maternal aunt, maternal aunt, maternal aunt, maternal aunt, maternal aunt, maternal aunt, mother).   Personal History: No known relevant hormone history. Surgical history includes breast biopsy and lumpectomy. No known relevant medical history.     RISK ASSESSMENT:   5 Year  Tyrer-Cuzick: 1.4%  10 Year Tyrer-Cuzick: 3.61%  Lifetime Tyrer-Cuzick: 29.9%     TISSUE DENSITY:  FGT: The breasts have scattered fibroglandular tissue.  BPE: The background parenchymal enhancement is mild and symmetric.     INDICATION: Twila Bolaños is a 38 y.o. female presenting for Family history of breast cancer, intraductal papilloma of the breast.  Patient had biopsy in the past year for bloody right nipple discharge with 2 intraductal papillomas identified.  Patient has had surgical excision.  Additionally patient has a strong family history of breast cancer.      FINDINGS:   LEFT  1) MASS H: There is a 7 mm irregularly shaped mass with heterogeneous internal enhancement seen in the retroareolar region of the left breast at 12 o'clock, which is isointense on T2 weighted images.  This is best visualized on series 46599 image 114.      There are multiple additional well-circumscribed masses scattered throughout the left breast.  Multiple left breast cysts are also present.     There are mildly prominent left axillary lymph nodes.     RIGHT  2) MASS I: There is a 7 mm mass with heterogeneous internal enhancement seen in the right breast at 3 and 9 o'clock, which is isointense on T2 weighted images.  This is best visualized on series 70563 image 133.       There are additional well-circumscribed masses present in the right breast.  Additionally there are multiple right breast cysts.  There are postsurgical changes of the retroareolar right breast in this patient with previous surgical excisions of intraductal papillomas.     Mildly prominent right axillary lymph nodes.     IMPRESSION:   There are bilateral breast masses present in this patient with history of multiple papillomas.  Recommend further characterization with biopsy of the 12 o'clock position retroareolar left mass and outer right breast 9 o'clock position mass.  If biopsy returns benign results additional follow-up of bilateral well-circumscribed  masses with yearly screening mammogram alternating every 6 months with yearly screening MRI should be considered given history of multiple papillomas and family history/elevated lifetime risk of breast cancer.  Prominent bilateral axillary lymph nodes.  Further characterization with bilateral axilla ultrasound is recommended.     ASSESSMENT/BI-RADS CATEGORY:  Left: 0 - Incomplete: Needs Additional Imaging Evaluation  Right: 0 - Incomplete: Needs Additional Imaging Evaluation  Overall: 0 - Incomplete: Needs Additional Imaging Evaluation     RECOMMENDATION:       - Ultrasound at the current time for both breasts.     I did review MRI and agree with bilateral breast biopsy I did discussed in detail nature of breast papillomatosis with strong family history of breast cancer. she understands and  agrees . All patient questions were answered.       Advance Care Planning/Advance Directives:  I Guillermina Lieberman MD discussed the disease status with Twila Bolaños  today 10/09/24  treatment plans and follow-up with the patient.

## 2024-10-10 NOTE — PROGRESS NOTES
Call placed to patient regarding recommendation for;    __x___ RIGHT ___X___LEFT      __X___Ultrasound guided  ______Stereotactic breast biopsy.    Pt states that procedure was explained to her, additional questions answered at this time    __X___Verbalized understanding.    Reviewed clip placement with patient, pt states understanding: Yes: __x__ No: ____  Comments:    Blood thinners: No: ___x__ Yes: _____ What:     Biopsy teaching sheet given:  _____yes __X__no (All teaching points discussed during call, pt with no questions at this time, pt adv to arrive at 0830 for 0900 biopsy)    Pt given name/# for any further questions/needs    Pt agreeable to a post procedure call and states we can give her biopsy results to her over the phone

## 2024-10-21 PROBLEM — Z91.89 INCREASED RISK OF BREAST CANCER: Status: ACTIVE | Noted: 2024-10-21

## 2024-10-22 ENCOUNTER — HOSPITAL ENCOUNTER (OUTPATIENT)
Dept: ULTRASOUND IMAGING | Facility: CLINIC | Age: 39
Discharge: HOME | End: 2024-10-22
Payer: COMMERCIAL

## 2024-10-22 ENCOUNTER — HOSPITAL ENCOUNTER (OUTPATIENT)
Dept: MAMMOGRAPHY | Facility: CLINIC | Age: 39
Discharge: HOME | End: 2024-10-22
Payer: COMMERCIAL

## 2024-10-22 VITALS — BODY MASS INDEX: 37.05 KG/M2 | WEIGHT: 217 LBS | HEIGHT: 64 IN

## 2024-10-22 VITALS — SYSTOLIC BLOOD PRESSURE: 135 MMHG | DIASTOLIC BLOOD PRESSURE: 85 MMHG | HEART RATE: 73 BPM

## 2024-10-22 DIAGNOSIS — Z80.3 FAMILY HISTORY OF BREAST CANCER: ICD-10-CM

## 2024-10-22 DIAGNOSIS — R92.8 ABNORMAL MRI, BREAST: ICD-10-CM

## 2024-10-22 PROCEDURE — 88305 TISSUE EXAM BY PATHOLOGIST: CPT | Performed by: PATHOLOGY

## 2024-10-22 PROCEDURE — 88360 TUMOR IMMUNOHISTOCHEM/MANUAL: CPT | Performed by: PATHOLOGY

## 2024-10-22 PROCEDURE — 76642 ULTRASOUND BREAST LIMITED: CPT

## 2024-10-22 PROCEDURE — 88365 INSITU HYBRIDIZATION (FISH): CPT | Performed by: PATHOLOGY

## 2024-10-22 PROCEDURE — 88341 IMHCHEM/IMCYTCHM EA ADD ANTB: CPT | Performed by: PATHOLOGY

## 2024-10-22 PROCEDURE — 19083 BX BREAST 1ST LESION US IMAG: CPT

## 2024-10-22 PROCEDURE — 88342 IMHCHEM/IMCYTCHM 1ST ANTB: CPT | Performed by: PATHOLOGY

## 2024-10-22 PROCEDURE — 76942 ECHO GUIDE FOR BIOPSY: CPT

## 2024-10-22 PROCEDURE — 38505 NEEDLE BIOPSY LYMPH NODES: CPT

## 2024-10-22 PROCEDURE — 19084 BX BREAST ADD LESION US IMAG: CPT

## 2024-10-22 PROCEDURE — 88307 TISSUE EXAM BY PATHOLOGIST: CPT | Performed by: PATHOLOGY

## 2024-10-22 PROCEDURE — A4648 IMPLANTABLE TISSUE MARKER: HCPCS

## 2024-10-22 PROCEDURE — 88364 INSITU HYBRIDIZATION (FISH): CPT | Performed by: PATHOLOGY

## 2024-10-22 RX ORDER — LIDOCAINE HYDROCHLORIDE 10 MG/ML
5 INJECTION, SOLUTION EPIDURAL; INFILTRATION; INTRACAUDAL; PERINEURAL ONCE
Status: COMPLETED | OUTPATIENT
Start: 2024-10-22 | End: 2024-10-22

## 2024-10-22 RX ADMIN — LIDOCAINE HYDROCHLORIDE 5 ML: 10 INJECTION, SOLUTION EPIDURAL; INFILTRATION; INTRACAUDAL; PERINEURAL at 10:40

## 2024-10-22 RX ADMIN — LIDOCAINE HYDROCHLORIDE 5 ML: 10 INJECTION, SOLUTION EPIDURAL; INFILTRATION; INTRACAUDAL; PERINEURAL at 10:57

## 2024-10-22 RX ADMIN — LIDOCAINE HYDROCHLORIDE 5 ML: 10 INJECTION, SOLUTION EPIDURAL; INFILTRATION; INTRACAUDAL; PERINEURAL at 10:52

## 2024-10-22 NOTE — PROGRESS NOTES
Procedure type: SITE # 1    ___x__ultrasound guided _____stereotactic    Breast:    __x___Left _____Right    Location: 12 o'clock 4cmfn    Needle: 14G    # of passes:3    Clip: Dragonfly      Procedure type: SITE # 2    ___x__ultrasound guided _____stereotactic    Breast:    _____Left ____x_Right    Location: 9 o'clock 9cmfn    Needle: 14G    # of passes: 3    Clip:Butterfly      Procedure type: SITE # 3    ___x__ultrasound guided _____stereotactic    Breast:    _____Left ___x__Right    Location: AXilla    Needle: 14G    # of passes: 3    Clip: Open Coil      Performed by: Dr. Carpenter    Pressure held for 5 minutes by: Sarita Mata Strips:    ___X__yes _____no    Band aid:    __X___yes_____no    Tolerated procedure:    __X___yes _____no

## 2024-10-24 ENCOUNTER — TELEPHONE (OUTPATIENT)
Dept: SURGICAL ONCOLOGY | Facility: CLINIC | Age: 39
End: 2024-10-24

## 2024-10-24 PROCEDURE — 88342 IMHCHEM/IMCYTCHM 1ST ANTB: CPT | Performed by: PATHOLOGY

## 2024-10-24 PROCEDURE — 88360 TUMOR IMMUNOHISTOCHEM/MANUAL: CPT | Performed by: PATHOLOGY

## 2024-10-24 PROCEDURE — 88307 TISSUE EXAM BY PATHOLOGIST: CPT | Performed by: PATHOLOGY

## 2024-10-24 PROCEDURE — 88365 INSITU HYBRIDIZATION (FISH): CPT | Performed by: PATHOLOGY

## 2024-10-24 PROCEDURE — 88364 INSITU HYBRIDIZATION (FISH): CPT | Performed by: PATHOLOGY

## 2024-10-24 PROCEDURE — 88341 IMHCHEM/IMCYTCHM EA ADD ANTB: CPT | Performed by: PATHOLOGY

## 2024-10-24 PROCEDURE — 88305 TISSUE EXAM BY PATHOLOGIST: CPT | Performed by: PATHOLOGY

## 2024-10-24 NOTE — TELEPHONE ENCOUNTER
Called patient discussed biopsy results and concordance report. Pt asked if there was a sooner date to f/u w/ hollis to discuss surgical options. I offered tomorrow but d/t her Childrens halloween parties pt unable to make tomorrow work. She did state if something changes she will call us by eod today. All questions answered at this time.

## 2024-11-06 PROBLEM — D24.2 INTRADUCTAL PAPILLOMA OF BREAST, LEFT: Status: ACTIVE | Noted: 2024-11-06

## 2024-11-08 ENCOUNTER — TELEPHONE (OUTPATIENT)
Age: 39
End: 2024-11-08

## 2024-11-08 ENCOUNTER — OFFICE VISIT (OUTPATIENT)
Dept: SURGICAL ONCOLOGY | Facility: CLINIC | Age: 39
End: 2024-11-08
Payer: COMMERCIAL

## 2024-11-08 ENCOUNTER — TELEPHONE (OUTPATIENT)
Dept: SURGICAL ONCOLOGY | Facility: CLINIC | Age: 39
End: 2024-11-08

## 2024-11-08 VITALS
BODY MASS INDEX: 35.34 KG/M2 | HEIGHT: 64 IN | SYSTOLIC BLOOD PRESSURE: 156 MMHG | DIASTOLIC BLOOD PRESSURE: 96 MMHG | WEIGHT: 207 LBS | OXYGEN SATURATION: 99 % | HEART RATE: 87 BPM

## 2024-11-08 DIAGNOSIS — Z91.89 AT HIGH RISK FOR BREAST CANCER: ICD-10-CM

## 2024-11-08 DIAGNOSIS — Z01.818 PRE-OP EXAM: ICD-10-CM

## 2024-11-08 DIAGNOSIS — D24.2 INTRADUCTAL PAPILLOMA OF BREAST, LEFT: ICD-10-CM

## 2024-11-08 DIAGNOSIS — D24.1 INTRADUCTAL PAPILLOMA OF BREAST, RIGHT: Primary | ICD-10-CM

## 2024-11-08 DIAGNOSIS — N60.81 APOCRINE METAPLASIA OF BREAST, RIGHT: ICD-10-CM

## 2024-11-08 PROCEDURE — 99214 OFFICE O/P EST MOD 30 MIN: CPT | Performed by: SURGERY

## 2024-11-08 NOTE — PROGRESS NOTES
Surgical Oncology Follow Up  [address] [facility]  [facility]  [address]  [place] [address]    Twila Bolaños  1985  [id number]      Chief Complaint   Patient presents with   • Follow-up     1 Month Follow Up        Assessment & Plan:   38-year-old female with bilateral breast intraductal papillomatosis with personal history of resection of multiple papillomas in her breast.  She also has a strong family history of breast cancer.  She had recently biopsied bilateral breast and consistent with papilloma.  We did discuss with her close surveillance with a screening mammogram as well as alternatively MRI as recommended by radiologist.  However patient prefers bilateral mastectomy with immediate reconstruction.  She has already seen Dr. Magaña at [facility].  However she wants patient surgery to be done at [place].  I offered her to see my partner Dr. Cardarelli .  However patient request me to be her breast surgeon with plastic surgery consultation to be done at [facility].  Surgical consent was obtained for bilateral mastectomy after explaining the benefit procedure alternative and possible complications.  She understand she has an option to not to undergo the surgery we can do lumpectomies, follow-up closely with mammogram and MRI as recommended by radiologist as well.  We will obtain a second opinion with plastic surgery to discuss reconstruction options.    Cancer History:     Oncology History    No history exists.         Interval History:   Follow-up with bilateral breast papillomatosis with a strong family history of breast cancer.    Review of Systems:   Review of Systems   Constitutional:  Negative for chills and fever.   HENT:  Negative for ear pain and sore throat.    Eyes:  Negative for pain and visual disturbance.   Respiratory:  Negative for cough and shortness of breath.    Cardiovascular:  Negative for chest pain and palpitations.    Gastrointestinal:  Negative for abdominal pain and vomiting.   Genitourinary:  Negative for dysuria and hematuria.   Musculoskeletal:  Negative for arthralgias and back pain.   Skin:  Negative for color change and rash.   Neurological:  Negative for seizures and syncope.   All other systems reviewed and are negative.    Past Medical History     Patient Active Problem List   Diagnosis   • Obesity, morbid, BMI 40.0-49.9 (HCC)   • Anemia affecting pregnancy in third trimester   • HTN (hypertension)   • Anemia   • Nipple discharge, bloody   • Orthopnea   • Gestational diabetes mellitus (GDM), delivered   •  (spontaneous vaginal delivery)   • Intraductal papilloma of breast, right   • Apocrine metaplasia of breast, right   • Sclerosing adenosis of breast, right   • History of lumpectomy of right breast   • At high risk for breast cancer   • Family history of breast cancer   • Increased risk of breast cancer   • Intraductal papilloma of breast, left     Past Medical History:   Diagnosis Date   • Apocrine metaplasia of breast, right 2022   • Gestational diabetes    • History of recurrent miscarriages     see OB hx   • Hypertension    • Intraductal papilloma of breast, right 2021   • Sclerosing adenosis of breast, right 2022     Past Surgical History:   Procedure Laterality Date   • BREAST BIOPSY Right    • BREAST LUMPECTOMY Right 2021    Procedure: OLEGARIO  DIRECTED LUMPECTOMY AT 12 0'CLOCK AND 6 O'CLOCK;  Surgeon: Guillermina Lieberman MD;  [facility];  Service: Surgical Oncology   • INDUCED      • [place] MISSED  FIRST TRIMESTER SURGICAL N/A 2016    Procedure: DILATATION AND EVACUATION (D&E);  Surgeon: Scarlet Clark MD;  Location: [facility];  Service: Gynecology   • US BREAST CLIP NEEDLE LOC RIGHT Right 2021   • US BREAST NEEDLE LOC RIGHT EACH ADDITIONAL Right 2021   • US GUIDANCE BREAST BIOPSY LEFT EACH ADDITIONAL Left 10/22/2024   • US GUIDANCE BREAST  BIOPSY RIGHT EACH ADDITIONAL Right 02/10/2021   • US GUIDED BREAST BIOPSY RIGHT COMPLETE Right 02/10/2021   • US GUIDED BREAST BIOPSY RIGHT COMPLETE Right 10/22/2024   • US GUIDED BREAST LYMPH NODE BIOPSY RIGHT Right 10/22/2024     Family History   Problem Relation Age of Onset   • Hypertension Mother    • Breast cancer Mother 62   • BRCA1 Negative Mother    • BRCA2 Negative Mother    • Hypertension Father    • No Known Problems Sister    • No Known Problems Daughter    • Stomach cancer Maternal Grandmother 81   • No Known Problems Maternal Grandfather    • Cancer Paternal Grandmother         unsure of type   • No Known Problems Paternal Grandfather    • No Known Problems Sister    • No Known Problems Sister    • No Known Problems Sister    • No Known Problems Sister    • No Known Problems Son    • No Known Problems Son    • Breast cancer Maternal Aunt 64   • Breast cancer Maternal Aunt    • Breast cancer Maternal Aunt    • Breast cancer Maternal Aunt    • Breast cancer Maternal Aunt    • Breast cancer Maternal Aunt    • No Known Problems Paternal Aunt      Social History     Socioeconomic History   • Marital status: /Civil Union     Spouse name: Not on file   • Number of children: 2   • Years of education: Not on file   • Highest education level: Not on file   Occupational History   • Not on file   Tobacco Use   • Smoking status: Former     Current packs/day: 0.00     Types: Cigarettes     Quit date: 2010     Years since quittin.8   • Smokeless tobacco: Never   • Tobacco comments:     Former smoker per [employer]   Vaping Use   • Vaping status: Never Used   Substance and Sexual Activity   • Alcohol use: Yes     Comment: twice a year   • Drug use: No   • Sexual activity: Not Currently     Partners: Male     Birth control/protection: Other   Other Topics Concern   • Not on file   Social History Narrative    Caffeine use     Social Determinants of Health     Financial Resource Strain: Low Risk  (2023)     [facility]    Overall Financial Resource Strain (CARDIA)    • Difficulty of Paying Living Expenses: Not hard at all   Food Insecurity: No Food Insecurity (5/23/2023)    [facility]    Hunger Vital Sign    • Worried About Running Out of Food in the Last Year: Never true    • Ran Out of Food in the Last Year: Never true   Transportation Needs: No Transportation Needs (5/23/2023)    [facility]    PRAPARE - Transportation    • Lack of Transportation (Medical): No    • Lack of Transportation (Non-Medical): No   Physical Activity: Not on file   Stress: No Stress Concern Present (5/23/2023)    [facility]    [place] [place] [employer] - Occupational Stress Questionnaire    • Feeling of Stress : Only a little   Social Connections: Moderately Integrated (5/23/2023)    [facility]    Social Connection and Isolation Panel [NHANES]    • Frequency of Communication with Friends and Family: More than three times a week    • Frequency of Social Gatherings with Friends and Family: More than three times a week    • Attends Religion Services: 1 to 4 times per year    • Active Member of Clubs or Organizations: No    • Attends Club or Organization Meetings: Never    • Marital Status:    Intimate Partner Violence: Not At Risk (5/23/2023)    [facility]    Humiliation, Afraid, Rape, and Kick questionnaire    • Fear of Current or Ex-Partner: No    • Emotionally Abused: No    • Physically Abused: No    • Sexually Abused: No   Housing Stability: High Risk (5/23/2023)    [facility]    Housing  Stability Vital Sign    • Unable to Pay for Housing in the Last Year: Yes    • Number of Places Lived in the Last Year: 1    • Unstable Housing in the Last Year: No       Current Outpatient Medications:   •  acetaminophen (TYLENOL) 500 mg tablet, Take 500 mg by mouth every 6 (six) hours as needed for mild pain, Disp: , Rfl:   •  acetaminophen (TYLENOL) 500 mg tablet, Take 1 tablet (500 mg total) by mouth every 6 (six) hours as needed for mild pain, Disp: 90 tablet, Rfl: 0  •  amLODIPine (NORVASC) 5 mg tablet, , Disp: , Rfl:   •  betamethasone valerate (VALISONE) 0.1 % ointment, APPLY TOPICALLY TO THE AFFECTED AREA TWICE DAILY, Disp: , Rfl:   •  ferrous sulfate 324 (65 Fe) mg, TAKE 1 TABLET BY MOUTH TWICE DAILY BEFORE MEALS, Disp: 180 tablet, Rfl: 2  •  hydroCHLOROthiazide 25 mg tablet, , Disp: , Rfl:   •  ibuprofen (MOTRIN) 200 mg tablet, Take 3 tablets (600 mg total) by mouth every 6 (six) hours as needed for moderate pain, Disp: , Rfl:   •  labetalol (NORMODYNE) 200 mg tablet, TAKE 2 TABLETS(400 MG) BY MOUTH EVERY 8 HOURS, Disp: 180 tablet, Rfl: 0  •  naproxen (NAPROSYN) 500 mg tablet, Take 1 tablet (500 mg total) by mouth 2 (two) times a day with meals (Patient not taking: Reported on 7/16/2024), Disp: 60 tablet, Rfl: 0  •  NIFEdipine ER (ADALAT CC) 60 MG 24 hr tablet, TAKE 1 TABLET(60 MG) BY MOUTH TWICE DAILY (Patient not taking: No sig reported), Disp: 180 tablet, Rfl: 0  •  oxyCODONE-acetaminophen (Percocet) 5-325 mg per tablet, Take 1 tablet by mouth every 6 (six) hours as needed for moderate pain Max Daily Amount: 4 tablets (Patient not taking: Reported on 1/10/2022), Disp: 10 tablet, Rfl: 0  Allergies   Allergen Reactions   • Other Hives     bbq sauce   • Tomato - Food Allergy Hives       Physical Exam:     Vitals:    11/08/24 1129   BP: 156/96   Pulse: 87   SpO2: 99%     Physical Exam  Constitutional:       Appearance: Normal appearance.   HENT:      Head: Normocephalic and atraumatic.      Nose: Nose  normal.      Mouth/Throat:      Mouth: Mucous membranes are moist.   Eyes:      Pupils: Pupils are equal, round, and reactive to light.   Cardiovascular:      Rate and Rhythm: Normal rate.      Pulses: Normal pulses.      Heart sounds: Normal heart sounds.   Pulmonary:      Effort: Pulmonary effort is normal.      Breath sounds: Normal breath sounds.   Chest:      Comments: Bilateral breast examination no palpable mass masses nipple discharge nipple retraction or skin changes bilateral axillary and supraclavicular examination no palpable adenopathy.  Patient was examined seated as well as supine position.  Abdominal:      General: Bowel sounds are normal.      Palpations: Abdomen is soft.   Musculoskeletal:         General: Normal range of motion.      Cervical back: Normal range of motion and neck supple.   Skin:     General: Skin is warm.   Neurological:      General: No focal deficit present.      Mental Status: She is alert and oriented to person, place, and time.   Psychiatric:         Mood and Affect: Mood normal.         Behavior: Behavior normal.         Thought Content: Thought content normal.         Judgment: Judgment normal.       Results & Discussion:     A. Breast, Left, 12:00, 4 cm from nipple, Biopsy:  - Usual ductal hyperplasia involving sclerosed Intraductal papilloma.  - Negative for atypia or carcinoma.       B. Breast, Right, 9:00 ocl, 9 cm from nipple, Biopsy:  - Usual ductal hyperplasia involving sclerosed Intraductal papilloma.  - Negative for atypia or carcinoma.       C. Axillary lymph node, Right axilla inferior, Biopsy:  - Reactive lymph node.           I did review pathology results and benign nature of papillomas and follow-up with alternative mammogram and MRI were also reviewed and discussed specially given her strong family history of breast cancer.  However she prefers bilateral mastectomy with reconstruction and I understand her anxiety and stress given her strong family history of  breast cancer .I did discussed in detail nature of breast papillomatosis. Surgical consent was obtained after explaining benefits, alternative, procedure and possible complications with regards above mentioned procedure. All her questions regarding the visit  as well as the  surgery were answered to  the patient's satisfaction. she understands and  agrees . All patient questions were answered.       Advance Care Planning/Advance Directives:  I Guillermina Lieberman MD discussed the disease status with Twila Bolaños  today 11/08/24  treatment plans and follow-up with the patient.

## 2024-11-08 NOTE — TELEPHONE ENCOUNTER
I called and spoke with patient in regards to her request of wanting a different plastic surgeon for her mastectomy with Dr Lieberman. I let patient know I have been in contact with plastics and the only thing we can offer her at this time is surgery with [facility]ly[facility] in [place] on 2/11/25. Patient agreed to this date but asked to please let her know if there is any cancellations. We will see patient back on 1/10/25 to further discuss surgery and get her scheduled. Plastics is aware to reach out to patient for an apt with Dr Valadez.

## 2024-11-08 NOTE — TELEPHONE ENCOUNTER
Patient called in stating she is about 5 minutes away for her appointment with Dr Lieberman at 11:15

## 2025-01-09 PROBLEM — N60.81: Status: RESOLVED | Noted: 2022-01-07 | Resolved: 2025-01-09

## 2025-01-09 PROBLEM — Z98.890 HISTORY OF LUMPECTOMY OF RIGHT BREAST: Status: RESOLVED | Noted: 2023-02-03 | Resolved: 2025-01-09

## 2025-01-09 PROBLEM — N64.52 NIPPLE DISCHARGE, BLOODY: Status: RESOLVED | Noted: 2021-01-08 | Resolved: 2025-01-09

## 2025-01-09 PROBLEM — N60.21 SCLEROSING ADENOSIS OF BREAST, RIGHT: Status: RESOLVED | Noted: 2022-01-07 | Resolved: 2025-01-09

## 2025-01-10 ENCOUNTER — OFFICE VISIT (OUTPATIENT)
Dept: SURGICAL ONCOLOGY | Facility: CLINIC | Age: 40
End: 2025-01-10
Payer: COMMERCIAL

## 2025-01-10 VITALS — HEART RATE: 102 BPM | BODY MASS INDEX: 30.82 KG/M2 | OXYGEN SATURATION: 98 % | HEIGHT: 65 IN | WEIGHT: 185 LBS

## 2025-01-10 DIAGNOSIS — D24.1 INTRADUCTAL PAPILLOMA OF BREAST, RIGHT: ICD-10-CM

## 2025-01-10 DIAGNOSIS — Z01.818 PREOP EXAMINATION: ICD-10-CM

## 2025-01-10 DIAGNOSIS — Z80.3 FAMILY HISTORY OF BREAST CANCER: ICD-10-CM

## 2025-01-10 DIAGNOSIS — D24.2 INTRADUCTAL PAPILLOMA OF BREAST, LEFT: Primary | ICD-10-CM

## 2025-01-10 DIAGNOSIS — Z91.89 INCREASED RISK OF BREAST CANCER: ICD-10-CM

## 2025-01-10 PROCEDURE — 99215 OFFICE O/P EST HI 40 MIN: CPT | Performed by: SURGERY

## 2025-01-10 NOTE — PROGRESS NOTES
Surgical Oncology Follow Up  [address] [facility]  [facility]  [address]  [place] [address]    Twila Bolaños  1985  [id number]      Chief Complaint   Patient presents with    Pre-op Exam     Bilateral Mastectomy        Assessment & Plan:   This is a 39-year-old female with strong family history of breast cancer mother and has 7 sisters with ongoing bilateral breast intraductal papillomatosis.  Last lumpectomy had at least 30 to papillomas were removed removed.  He has been consented for bilateral mastectomy with expander reconstruction pending insurance authorization and plastic surgery consultation.  Surgery has been tentatively scheduled for  at [place].  Will proceed as scheduled.    Cancer History:     Oncology History    No history exists.         Interval History:   Follow-up with strong family history of breast cancer with intraductal papillomatosis    Review of Systems:   Review of Systems   Constitutional:  Negative for chills and fever.   HENT:  Negative for ear pain and sore throat.    Eyes:  Negative for pain and visual disturbance.   Respiratory:  Negative for cough and shortness of breath.    Cardiovascular:  Negative for chest pain and palpitations.   Gastrointestinal:  Negative for abdominal pain and vomiting.   Genitourinary:  Negative for dysuria and hematuria.   Musculoskeletal:  Negative for arthralgias and back pain.   Skin:  Negative for color change and rash.   Neurological:  Negative for seizures and syncope.   All other systems reviewed and are negative.      Past Medical History     Patient Active Problem List   Diagnosis    Obesity, morbid, BMI 40.0-49.9 (HCC)    Anemia affecting pregnancy in third trimester    HTN (hypertension)    Anemia    Orthopnea    Gestational diabetes mellitus (GDM), delivered     (spontaneous vaginal delivery)    Intraductal papilloma of breast, right    At high risk for breast cancer     Family history of breast cancer    Increased risk of breast cancer    Intraductal papilloma of breast, left     Past Medical History:   Diagnosis Date    Apocrine metaplasia of breast, right 2022    Gestational diabetes     History of recurrent miscarriages     see OB hx    Hypertension     Intraductal papilloma of breast, right 2021    Sclerosing adenosis of breast, right 2022     Past Surgical History:   Procedure Laterality Date    BREAST BIOPSY Right     BREAST LUMPECTOMY Right 2021    Procedure: OLEGARIO  DIRECTED LUMPECTOMY AT 12 0'CLOCK AND 6 O'CLOCK;  Surgeon: Guillermina Lieberman MD;  Location: [facility];  Service: Surgical Oncology    INDUCED       [place] MISSED  FIRST TRIMESTER SURGICAL N/A 2016    Procedure: DILATATION AND EVACUATION (D&E);  Surgeon: Scarlet Clark MD;  Location: [facility];  Service: Gynecology    US BREAST CLIP NEEDLE LOC RIGHT Right 2021    US BREAST NEEDLE LOC RIGHT EACH ADDITIONAL Right 2021    US GUIDANCE BREAST BIOPSY LEFT EACH ADDITIONAL Left 10/22/2024    US GUIDANCE BREAST BIOPSY RIGHT EACH ADDITIONAL Right 02/10/2021    US GUIDED BREAST BIOPSY RIGHT COMPLETE Right 02/10/2021    US GUIDED BREAST BIOPSY RIGHT COMPLETE Right 10/22/2024    US GUIDED BREAST LYMPH NODE BIOPSY RIGHT Right 10/22/2024     Family History   Problem Relation Age of Onset    Hypertension Mother     Breast cancer Mother 62    BRCA1 Negative Mother     BRCA2 Negative Mother     Hypertension Father     No Known Problems Sister     No Known Problems Daughter     Stomach cancer Maternal Grandmother 81    No Known Problems Maternal Grandfather     Cancer Paternal Grandmother         unsure of type    No Known Problems Paternal Grandfather     No Known Problems Sister     No Known Problems Sister     No Known Problems Sister     No Known Problems Sister     No Known Problems Son     No Known Problems Son     Breast cancer Maternal Aunt 64    Breast cancer Maternal  Aunt     Breast cancer Maternal Aunt     Breast cancer Maternal Aunt     Breast cancer Maternal Aunt     Breast cancer Maternal Aunt     No Known Problems Paternal Aunt      Social History     Socioeconomic History    Marital status: /Civil Union     Spouse name: Not on file    Number of children: 2    Years of education: Not on file    Highest education level: Not on file   Occupational History    Not on file   Tobacco Use    Smoking status: Former     Current packs/day: 0.00     Types: Cigarettes     Quit date: 2010     Years since quitting: 15.0    Smokeless tobacco: Never    Tobacco comments:     Former smoker per [employer]   Vaping Use    Vaping status: Never Used   Substance and Sexual Activity    Alcohol use: Yes     Comment: twice a year    Drug use: No    Sexual activity: Not Currently     Partners: Male     Birth control/protection: Other   Other Topics Concern    Not on file   Social History Narrative    Caffeine use     Social Drivers of Health     Financial Resource Strain: Low Risk  (11/18/2024)    Received from [facility]    Overall Financial Resource Strain (CARDIA)     Difficulty of Paying Living Expenses: Not hard at all   Food Insecurity: No Food Insecurity (11/18/2024)    Received from [facility]    Hunger Vital Sign     Worried About Running Out of Food in the Last Year: Never true     Ran Out of Food in the Last Year: Never true   Transportation Needs: No Transportation Needs (11/18/2024)    Received from [facility]    PRAPARE - Transportation     Lack of Transportation (Medical): No     Lack of Transportation (Non-Medical): No   Physical Activity: Not on file   Stress: No Stress Concern Present (11/18/2024)    Received from [facility]    [employer] [place] [employer] - Occupational Stress Questionnaire     Feeling of Stress : Only a little   Social Connections: Feeling Socially Integrated (11/18/2024)     Received from [facility]    [place]ASIS : Social Isolation     How often do you feel lonely or isolated from those around you?: Never   Intimate Partner Violence: Not At Risk (11/18/2024)    Received from [facility]    Humiliation, Afraid, Rape, and Kick questionnaire     Fear of Current or Ex-Partner: No     Emotionally Abused: No     Physically Abused: No     Sexually Abused: No   Housing Stability: Low Risk  (11/18/2024)    Received from [facility]    Housing Stability Vital Sign     Unable to Pay for Housing in the Last Year: No     Number of Times Moved in the Last Year: 0     Homeless in the Last Year: No       Current Outpatient Medications:     acetaminophen (TYLENOL) 500 mg tablet, Take 500 mg by mouth every 6 (six) hours as needed for mild pain, Disp: , Rfl:     acetaminophen (TYLENOL) 500 mg tablet, Take 1 tablet (500 mg total) by mouth every 6 (six) hours as needed for mild pain, Disp: 90 tablet, Rfl: 0    amLODIPine (NORVASC) 5 mg tablet, , Disp: , Rfl:     betamethasone valerate (VALISONE) 0.1 % ointment, APPLY TOPICALLY TO THE AFFECTED AREA TWICE DAILY, Disp: , Rfl:     ferrous sulfate 324 (65 Fe) mg, TAKE 1 TABLET BY MOUTH TWICE DAILY BEFORE MEALS, Disp: 180 tablet, Rfl: 2    hydroCHLOROthiazide 25 mg tablet, , Disp: , Rfl:     ibuprofen (MOTRIN) 200 mg tablet, Take 3 tablets (600 mg total) by mouth every 6 (six) hours as needed for moderate pain, Disp: , Rfl:     labetalol (NORMODYNE) 200 mg tablet, TAKE 2 TABLETS(400 MG) BY MOUTH EVERY 8 HOURS, Disp: 180 tablet, Rfl: 0    naproxen (NAPROSYN) 500 mg tablet, Take 1 tablet (500 mg total) by mouth 2 (two) times a day with meals (Patient not taking: Reported on 7/16/2024), Disp: 60 tablet, Rfl: 0    NIFEdipine ER (ADALAT CC) 60 MG 24 hr tablet, TAKE 1 TABLET(60 MG) BY MOUTH TWICE DAILY (Patient not taking: No sig reported), Disp: 180 tablet, Rfl: 0    oxyCODONE-acetaminophen (Percocet) 5-325 mg per  tablet, Take 1 tablet by mouth every 6 (six) hours as needed for moderate pain Max Daily Amount: 4 tablets (Patient not taking: Reported on 1/10/2022), Disp: 10 tablet, Rfl: 0  Allergies   Allergen Reactions    Other Hives     bbq sauce    Tomato - Food Allergy Hives       Physical Exam:     Vitals:    01/10/25 1249   Pulse: 102   SpO2: 98%     Physical Exam  Constitutional:       Appearance: Normal appearance.   HENT:      Head: Normocephalic and atraumatic.      Nose: Nose normal.      Mouth/Throat:      Mouth: Mucous membranes are moist.   Eyes:      Pupils: Pupils are equal, round, and reactive to light.   Cardiovascular:      Rate and Rhythm: Normal rate.      Pulses: Normal pulses.      Heart sounds: Normal heart sounds.   Pulmonary:      Effort: Pulmonary effort is normal.      Breath sounds: Normal breath sounds.   Chest:      Comments: Bilateral breast examination no palpable mass or masses.  No nipple discharge no nipple retraction or skin changes.  She has lost significant weight with plenty of redundant skin in both breast due to weight loss.  Bilateral axilla and supraclavicular examination no palpable adenopathy.  Abdominal:      General: Bowel sounds are normal.      Palpations: Abdomen is soft.   Musculoskeletal:         General: Normal range of motion.      Cervical back: Normal range of motion and neck supple.   Skin:     General: Skin is warm.   Neurological:      General: No focal deficit present.      Mental Status: She is alert and oriented to person, place, and time.   Psychiatric:         Mood and Affect: Mood normal.         Behavior: Behavior normal.         Thought Content: Thought content normal.         Judgment: Judgment normal.           Results & Discussion:   I did review discussed surgical consent for mastectomies.  I did discussed in detail nature of breast intraductal papillomas with strong family history of breast cancer and known papillomatosis.  Surgical consent has been  obtained.  Will wait for plastic surgery consult.  All patient questions were answered.       Advance Care Planning/Advance Directives:  I Guillermina Lieberman MD discussed the disease status with Twila Bolaños  today 01/10/25  treatment plans and follow-up with the patient.

## 2025-01-13 ENCOUNTER — TELEPHONE (OUTPATIENT)
Age: 40
End: 2025-01-13

## 2025-01-13 NOTE — TELEPHONE ENCOUNTER
LVM for pt confirm appt for 1/14 in our [place] office. Advised pt to callback to r/s or cancel this appt if needed.

## 2025-01-14 ENCOUNTER — CONSULT (OUTPATIENT)
Dept: PLASTIC SURGERY | Facility: CLINIC | Age: 40
End: 2025-01-14
Payer: COMMERCIAL

## 2025-01-14 VITALS
HEART RATE: 83 BPM | DIASTOLIC BLOOD PRESSURE: 110 MMHG | WEIGHT: 185 LBS | SYSTOLIC BLOOD PRESSURE: 150 MMHG | HEIGHT: 65 IN | BODY MASS INDEX: 30.82 KG/M2 | TEMPERATURE: 97.6 F

## 2025-01-14 DIAGNOSIS — D24.1 INTRADUCTAL PAPILLOMA OF BREAST, RIGHT: ICD-10-CM

## 2025-01-14 DIAGNOSIS — D24.2 INTRADUCTAL PAPILLOMA OF BREAST, LEFT: Primary | ICD-10-CM

## 2025-01-14 PROCEDURE — 99215 OFFICE O/P EST HI 40 MIN: CPT | Performed by: PLASTIC SURGERY

## 2025-01-14 RX ORDER — TIRZEPATIDE 7.5 MG/.5ML
7.5 INJECTION, SOLUTION SUBCUTANEOUS WEEKLY
COMMUNITY

## 2025-01-15 ENCOUNTER — TELEPHONE (OUTPATIENT)
Age: 40
End: 2025-01-15

## 2025-01-15 NOTE — TELEPHONE ENCOUNTER
Pt calling back (lost connection with previous call) with questions regarding scheduling surgery    Pt was given 2 options from Dr Wood, and would like to discuss with a surgical coordinator as soon as possible    Advised pt Cathie and Lázaro are both marked OOO, I messaged [facility] but she is busy with other pts. Advised pt I will be sure to have one of them call her asap    Please call pt back at [phone]

## 2025-01-15 NOTE — TELEPHONE ENCOUNTER
Received call from patient asking how far out are we [place] for surgeries.    She stated she was seen yesterday by Dr Wood    She stated she wants to use the fat from her stomach for her breast.    In the middle of her cll dropped.    Please return her call

## 2025-01-16 NOTE — TELEPHONE ENCOUNTER
Returned patient's call and made her aware I am currently working on a surgery date that works for both surgical oncology and plastics and will call her once we have that finalized.

## 2025-01-16 NOTE — TELEPHONE ENCOUNTER
Patient looking to schedule for surgery and discuss with a coordinator as soon as possible.      James is OOO currently

## 2025-01-17 ENCOUNTER — TELEPHONE (OUTPATIENT)
Dept: SURGICAL ONCOLOGY | Facility: CLINIC | Age: 40
End: 2025-01-17

## 2025-01-17 NOTE — TELEPHONE ENCOUNTER
I did speak to the patient and explained again papillomatosis with strong family history of breast cancer.  Given she has no biopsy-proven malignancy at this point, I recommended her again to undergo Sue  directed lumpectomy and follow her closely with mammogram and MRI alternatively every 6 months.  Patient is agreeable for this plan.  Patient will make an appointment in my office to discuss further management.  I will see her within next 3 to 4 weeks to discuss surgery.  Patient is agreeable with the plan.

## 2025-01-20 ENCOUNTER — TELEPHONE (OUTPATIENT)
Dept: HEMATOLOGY ONCOLOGY | Facility: CLINIC | Age: 40
End: 2025-01-20

## 2025-01-20 NOTE — TELEPHONE ENCOUNTER
----- Message from Kassy CONTRERAS sent at 1/20/2025  2:57 PM EST -----  Can you call her and get her in on 2/14? Jessica talked to her but now there is a patient scheduled at the time she was supposed to come in.

## 2025-01-21 ENCOUNTER — TELEPHONE (OUTPATIENT)
Dept: SURGICAL ONCOLOGY | Facility: CLINIC | Age: 40
End: 2025-01-21

## 2025-01-21 NOTE — TELEPHONE ENCOUNTER
I called and left a message for patient to call me back to get scheduled for an apt per Dr Lieberman to discuss surgery. Left my direct number for patient to reach me.

## 2025-01-28 NOTE — TELEPHONE ENCOUNTER
Called patient and left a message to call the office back to reschedule her appointment.   This is the 3rd attempt and will notify the nurse.

## 2025-01-29 NOTE — PROGRESS NOTES
Very patient to 1 today this is all [facility]   Plastic and Reconstructive Surgery   [address] [place] [address]     HISTORY & PHYSICAL      Assessment & Plan  Intraductal papilloma of breast, left    Intraductal papilloma of breast, right  Twila Bolaños is a 39 y.o. female who presents to discuss surgical options for breast reconstruction. Referred by Dr. Lieberman.  Patient has a history of bilateral breast papillomatosis who is deciding between bilateral mastectomies or breast conservation via lumpectomies for treatment. Patient is here to learn about options for breast reconstruction following bilateral mastectomies.    An extensive discussion was held with the patient. We reviewed why we perform breast reconstruction and the long term benefits, improved emotional well being, and sense of self among others. However, I did remind the patient that this is an elective part of her process and reconstruction is not required for her cancer care nor would it affect her cancer outcome.     We also discussed that breast reconstruction is a process. It was explained that no matter which reconstructive option the patient elects for, they will likely require additional surgeries or revisions in the future, as well as possible implant montoring and exchanges.     We began by reviewing the details of an aesthetic flat closure.  We discussed that the goal of aesthetic flat closure is the creation of a smooth, flat chest wall contour without significant skin redundancy. This surgery involves closure after mastectomy skin defect without recreation of the breast mound.  Often times additional skin is excised following the completion of the mastectomy to address redundancy.  Patients will have a transverse incision across each breast, and in some cases of large breasts or significant soft tissue, the two incisions may be combined into one long transverse incision across the chest. We discussed that the benefits of  aesthetic flat closure include quick recovery as this reconstruction is performed in a single stage surgery at the same time of her mastectomies.  Additionally, aesthetic flat closure avoids the additional risks associated with implants or flap based reconstruction.     We discussed the risks associated with aesthetic flat closure which include but are not limited to bleeding, infection, contour deformity, mastectomy skin flap necrosis, wound dehiscence, possible open wound, asymmetry, scarring, skin redundancy, seroma, possible need for revisions or additional procedures.    We then transitioned to a discussion regarding immediate versus delayed reconstruction. Immediate reconstruction is feasible and more advantageous in a majority of cases as it offers the patient a reconstruction at the time of surgery, and it is often easier to achieve the desired cosmetic outcome. I explained that there are certain instances in which we elect for delayed reconstruction. These include but are not limited to comorbidities that can or should be optimized prior to elective reconstruction (ex. obesity, smoking, uncontrolled diabetes), aggressive cancer staging requiring expedient treatment (ex. inflammatory breast cancer), or patient preference.    We then discussed implant based reconstruction. We explained that tissue expanders would be placed during the same surgery as the mastectomy/mastectomies after they are completed. We discussed pre-pectoral reconstruction and if the patient would be a candidate for this. This determination is sometimes made at the time of surgery and would require ADM. Discussed that this is an off-label use of ADM. These tissue expanders can also be placed completely under the muscle or partially under the muscle. If placed partially under the muscle, we would use an acellular dermal matrix (ADM) to cover the lower portion of the expander. The patient may also require ADM regardless of the placement of  the expander to gain better coverage if the muscles are thin, attenuated, or are of poor quality/thickness. We also discussed nipple sparing mastectomy and this determination would be between the oncologic surgeon and the patient. The surgery time for unilateral reconstruction is about 2 - 2.5 hours and for bilateral reconstruction is 3.5 - 4 hours. The patient would stay overnight in the hospital and go home the next day. They would have an incision across the breast as well as 1 to 2 drains on either side. If the patient underwent nipple sparing mastectomy, the incision would usually be in the breast fold or lateral to the nipple. The drains would stay in until outputs decrease to <25 mL per day for 2 days, and this is usually 2-3 weeks but can be longer. The patient would then return to clinic on a weekly basis to have the tissue expanders filled to the desired volume and then a subsequent 4 to 6 week recovery period, depending on need for adjuvant therapy. The expanders would then be exchanged to permanent implants in a second procedure as an outpatient.  This procedure typically take 1-2 hours and often does not require a drain. We also discussed that occasionally, a permanent implant can be placed at the time of mastectomy but this is a minority of cases. Advantages of implant based reconstruction are shorter surgery, shorter hospital stay, shorter recovery, no need for donor site, no risk of microvascular complications, and more customizable size. Risks were discussed including bleeding, infection (ranging from superficial skin infection to prosthetic infection requiring explantation), injury to surrounding structures, poor scarring, wound non-healing, mastectomy skin flap necrosis, need for prolonged wound care, delay of adjuvant therapy, implant exposure necessitating explantation, chronic pain/muscle spasms, numbness, need for further unanticipated surgery, seroma, capsular contracture, cosmetic deformity,  asymmetry, malposition, animation deformity, silent rupture (for silicone implants), device failure, breast implant associated anaplastic large cell lymphoma (DADA-ALCL), VTE, stroke, MI, and death. Each of these risks were discussed and explained to the patient. If the patient chooses silicone implants, we discussed that the FDA recommends imaging in the form of ultrasound/MRI 5-6 years following the initial placement of the implants and then every 2-3 years after that to assess for silent rupture.    We then had a discussion regarding autologous tissue reconstruction. The primary option would be to perform free tissue transfer from the abdomen in the way of HERIBERTO or muscle sparing free TRAM flaps. We explained that the lower abdominal tissue is used to reconstruct the breast mound. This involves identifying the blood supply of the tissue and tracing this through the abdominal wall muscles. Depending on the patient's vessel anatomy, a variable amount of muscle may have to be removed to preserve enough blood supply to the tissue. We discussed the nature of the microsurgical procedure, vessel dissection, recipient vessel dissection via removal of part of the rib cartilage, and the possibility of return to the OR for perfusion related issues immediately or in a delayed setting. It was also explained that in a very rare case, the tissue that was transferred cannot be salvaged and another reconstructive option will have to be pursued. We explained the nature of the donor site including a hip to hip and circumumbilical scar, as well as risk of bulge or hernia from vessel dissection. The patient may require intraoperative placement of abdominal mesh, either synthetic or biologic, to reduce the risk of hernia or bulge if fascia is harvested with the flap and cannot be closed without tension. The patient would have either circular scars, circular and vertical, or a full inverted T pattern on the breast depending on the excess  skin and the size of the flap. Again, nipple sparing mastectomy would be a determination between the oncologic surgeon and the patient.       When the abdominally based free flap is performed 1 to 2 drains will be placed in each breast and 2 drains will be placed in the abdomen. The surgical time for a unilateral reconstruction is about 6-8 hours and for a bilateral reconstruction is 11-12 hours. The patient would have a 3 to 4 day hospital stay and a total 8 week recovery period. Advantages are a more natural appearing breast, less maintenance of implants and less risk of implant related issues, longevity of the reconstruction, and more tolerance of radiation.  Risks were discussed including bleeding, infection, injury to surrounding structures (pleura, lung, intra-abdominal structures), poor scarring, wound non-healing, mastectomy skin flap necrosis, need for prolonged wound care, delay of adjuvant therapy, chronic pain/muscle spasms, numbness, need for further unanticipated surgery/return to OR for perfusion related issues, partial or total flap loss, cosmetic deformity at the breast or abdomen, asymmetry, malposition of breast or umbilicus, fat necrosis, seroma, bulge or hernia, loss of the umbilicus, sensory changes at the lower abdomen, VTE, stroke, MI, and death. We also discussed possible need for vein grafting if necessary. We also discussed the patient's desires if the flap cannot be transferred or salvaged. We also reviewed that since the surgery is much longer and the patient has a cancer diagnosis, the risks of VTE such as DVT and PE are increased. Also the risk of major bleeding that could be life threatening is higher than with implant based reconstruction.    The patient expressed understanding of the options discussed with her today.  Currently, I believe she is a candidate for autologous or alloplastic breast reconstruction.  The patient would like to think about the information we discussed today  and meet again to finalize a plan.    I have spent a total time of 40 minutes in caring for this patient on the day of the visit/encounter including Risks and benefits of tx options, Patient and family education, Documenting in the medical record, and Obtaining or reviewing history  .      History of Present Illness   Twila Bolaños is a 39 y.o. female who presents to discuss surgical options for breast reconstruction. Referred by Dr. Lieberman.  Patient is s/p right breast lumpectomy 2021 for treatment of multiple papillomas. She has a recent diagnoses of bilateral breast papillomas following an abnormal MRI on 10/2024.   Patient was seen by Dr. Lieberman of Surg Onc who has offered bilateral mastectomies or breast conservation via lumpectomies for treatment of recurrent papillomatosis. Pt was also seen by my colleague Dr. Magaña, plastic surgery, at the [facility] to discuss reconstruction options. However, the pt prefers to have any procedures at [facility], prompting today's referral.     Patient denies factors contributing to poor wound healing such as diabetes, tobacco use, chronic steroid use, immunotherapy/chemotherapy drug use and blood thinning mediations.  Patient has a history of hypertension, and is also currently on Zepbound for weight loss. She has no history of abdominal surgeries other than . Pt states that her mother and sisters have had breast cancer.  Her genetic testing has been negative.    Patient notes that she has lost approximately 60lbs over the last 4-5 months and is still losing weight.    She notes that she currently wears a size 40DD.  She would like to be smaller than a DD with reconstruction.    Patient is interested in discussing HERIBERTO flaps as well as implant-based reconstruction at today's visit.      Review of Systems   Constitutional:  Negative for activity change, appetite change, chills, fatigue, fever and unexpected weight change.   HENT: Negative.     Eyes:  Negative.    Respiratory:  Negative for cough, chest tightness and shortness of breath.    Cardiovascular:  Negative for chest pain and leg swelling.   Gastrointestinal:  Negative for abdominal pain.   Musculoskeletal: Negative.    Skin: Negative.    Neurological:  Negative for weakness.   Psychiatric/Behavioral:  Negative for confusion.        Past Medical History:   Diagnosis Date    Apocrine metaplasia of breast, right 2022    Gestational diabetes     History of recurrent miscarriages     see OB hx    Hypertension     Intraductal papilloma of breast, right 2021    Sclerosing adenosis of breast, right 2022        Past Surgical History:   Procedure Laterality Date    BREAST BIOPSY Right     BREAST LUMPECTOMY Right 2021    Procedure: OLEGARIO  DIRECTED LUMPECTOMY AT 12 0'CLOCK AND 6 O'CLOCK;  Surgeon: Guillermina Lieberman MD;  Location: [facility];  Service: Surgical Oncology    INDUCED       [place] TX MISSED  FIRST TRIMESTER SURGICAL N/A 2016    Procedure: DILATATION AND EVACUATION (D&E);  Surgeon: Scarlet Clark MD;  Location: [facility];  Service: Gynecology    US BREAST CLIP NEEDLE LOC RIGHT Right 2021    US BREAST NEEDLE LOC RIGHT EACH ADDITIONAL Right 2021    US GUIDANCE BREAST BIOPSY LEFT EACH ADDITIONAL Left 10/22/2024    US GUIDANCE BREAST BIOPSY RIGHT EACH ADDITIONAL Right 02/10/2021    US GUIDED BREAST BIOPSY RIGHT COMPLETE Right 02/10/2021    US GUIDED BREAST BIOPSY RIGHT COMPLETE Right 10/22/2024    US GUIDED BREAST LYMPH NODE BIOPSY RIGHT Right 10/22/2024       Current Outpatient Medications on File Prior to Visit   Medication Sig Dispense Refill    acetaminophen (TYLENOL) 500 mg tablet Take 500 mg by mouth every 6 (six) hours as needed for mild pain      amLODIPine (NORVASC) 5 mg tablet       betamethasone valerate (VALISONE) 0.1 % ointment APPLY TOPICALLY TO THE AFFECTED AREA TWICE DAILY      ferrous sulfate 324 (65 Fe) mg TAKE 1 TABLET BY MOUTH TWICE DAILY  BEFORE MEALS 180 tablet 2    hydroCHLOROthiazide 25 mg tablet       ibuprofen (MOTRIN) 200 mg tablet Take 3 tablets (600 mg total) by mouth every 6 (six) hours as needed for moderate pain      labetalol (NORMODYNE) 200 mg tablet TAKE 2 TABLETS(400 MG) BY MOUTH EVERY 8 HOURS 180 tablet 0    Multiple Vitamins-Minerals (HAIR SKIN AND NAILS FORMULA PO) Take by mouth      Multiple Vitamins-Minerals (ONE A DAY ENERGY PO) Take by mouth      Zepbound 7.5 MG/0.5ML auto-injector Inject 7.5 mg under the skin once a week      acetaminophen (TYLENOL) 500 mg tablet Take 1 tablet (500 mg total) by mouth every 6 (six) hours as needed for mild pain (Patient not taking: Reported on 1/14/2025) 90 tablet 0    naproxen (NAPROSYN) 500 mg tablet Take 1 tablet (500 mg total) by mouth 2 (two) times a day with meals (Patient not taking: Reported on 7/16/2024) 60 tablet 0    NIFEdipine ER (ADALAT CC) 60 MG 24 hr tablet TAKE 1 TABLET(60 MG) BY MOUTH TWICE DAILY (Patient not taking: No sig reported) 180 tablet 0    oxyCODONE-acetaminophen (Percocet) 5-325 mg per tablet Take 1 tablet by mouth every 6 (six) hours as needed for moderate pain Max Daily Amount: 4 tablets (Patient not taking: Reported on 1/10/2022) 10 tablet 0     No current facility-administered medications on file prior to visit.       Allergies   Allergen Reactions    Other Hives     bbq sauce    Tomato - Food Allergy Hives       Social History     Socioeconomic History    Marital status: /Civil Union     Spouse name: Not on file    Number of children: 2    Years of education: Not on file    Highest education level: Not on file   Occupational History    Not on file   Tobacco Use    Smoking status: Former     Current packs/day: 0.00     Types: Cigarettes     Quit date: 2010     Years since quitting: 15.0    Smokeless tobacco: Never    Tobacco comments:     Former smoker per [employer]   Vaping Use    Vaping status: Never Used   Substance and Sexual Activity    Alcohol use:  Yes     Comment: twice a year    Drug use: No    Sexual activity: Not Currently     Partners: Male     Birth control/protection: Other   Other Topics Concern    Not on file   Social History Narrative    Caffeine use     Social Drivers of Health     Financial Resource Strain: Low Risk  (11/18/2024)    Received from [facility]    Overall Financial Resource Strain (CARDIA)     Difficulty of Paying Living Expenses: Not hard at all   Food Insecurity: No Food Insecurity (11/18/2024)    Received from [facility]    Hunger Vital Sign     Worried About Running Out of Food in the Last Year: Never true     Ran Out of Food in the Last Year: Never true   Transportation Needs: No Transportation Needs (11/18/2024)    Received from [facility]    PRAPARE - Transportation     Lack of Transportation (Medical): No     Lack of Transportation (Non-Medical): No   Physical Activity: Not on file   Stress: No Stress Concern Present (11/18/2024)    Received from [facility]    [employer] - Occupational Stress Questionnaire     Feeling of Stress : Only a little   Social Connections: Feeling Socially Integrated (11/18/2024)    Received from [facility]    OASIS : Social Isolation     How often do you feel lonely or isolated from those around you?: Never   Intimate Partner Violence: Not At Risk (11/18/2024)    Received from [facility]    Humiliation, Afraid, Rape, and Kick questionnaire     Fear of Current or Ex-Partner: No     Emotionally Abused: No     Physically Abused: No     Sexually Abused: No   Housing Stability: Low Risk  (11/18/2024)    Received from [facility]    Housing Stability Vital Sign     Unable to Pay for Housing in the Last Year: No     Number of Times Moved in the Last Year: 0     Homeless in the Last Year: No         [unfilled]  Physical Exam  Constitutional:        General: She is not in acute distress.     Appearance: Normal appearance. She is not ill-appearing.   HENT:      Head: Normocephalic and atraumatic.   Eyes:      Extraocular Movements: Extraocular movements intact.      Conjunctiva/sclera: Conjunctivae normal.   Cardiovascular:      Rate and Rhythm: Normal rate.   Pulmonary:      Effort: Pulmonary effort is normal. No respiratory distress.   Chest:      Comments: Bilateral large pendulous breasts with numerous striae of the skin.  Grade III ptosis bilaterally.     RIGHT BREAST:  -Sternal notch to nipple = 36.5 cm  -Nipple to IMF = 11 cm  -Nipple to midline = 11.5 cm  -Base width = 14 cm    LEFT BREAST:  -Sternal notch to nipple = 36.5 cm  -Nipple to IMF = 11 cm  -Nipple to midline = 11.5 cm  -Base width = 14 cm    Nipple to Nipple = 23 cm    Abdominal:      General: There is no distension.      Palpations: Abdomen is soft.      Comments: Moderate adiposity of the upper and lower halves of the abdomen with accompanying excess skin.  Numerous striae.  Well healed epigastric laparoscopic port scar. No appreciable hernias or bulges   Skin:     General: Skin is warm.   Neurological:      General: No focal deficit present.      Mental Status: She is alert and oriented to person, place, and time.   Psychiatric:         Mood and Affect: Mood normal.         Behavior: Behavior normal.         Thought Content: Thought content normal.         Judgment: Judgment normal.

## 2025-02-22 NOTE — ASSESSMENT & PLAN NOTE
Twila Bolaños is a 39 y.o. female who presents to discuss surgical options for breast reconstruction. Referred by Dr. Lieberman.  Patient has a history of bilateral breast papillomatosis who is deciding between bilateral mastectomies or breast conservation via lumpectomies for treatment. Patient is here to learn about options for breast reconstruction following bilateral mastectomies.    An extensive discussion was held with the patient. We reviewed why we perform breast reconstruction and the long term benefits, improved emotional well being, and sense of self among others. However, I did remind the patient that this is an elective part of her process and reconstruction is not required for her cancer care nor would it affect her cancer outcome.     We also discussed that breast reconstruction is a process. It was explained that no matter which reconstructive option the patient elects for, they will likely require additional surgeries or revisions in the future, as well as possible implant montoring and exchanges.     We began by reviewing the details of an aesthetic flat closure.  We discussed that the goal of aesthetic flat closure is the creation of a smooth, flat chest wall contour without significant skin redundancy. This surgery involves closure after mastectomy skin defect without recreation of the breast mound.  Often times additional skin is excised following the completion of the mastectomy to address redundancy.  Patients will have a transverse incision across each breast, and in some cases of large breasts or significant soft tissue, the two incisions may be combined into one long transverse incision across the chest. We discussed that the benefits of aesthetic flat closure include quick recovery as this reconstruction is performed in a single stage surgery at the same time of her mastectomies.  Additionally, aesthetic flat closure avoids the additional risks associated with implants or flap based  reconstruction.     We discussed the risks associated with aesthetic flat closure which include but are not limited to bleeding, infection, contour deformity, mastectomy skin flap necrosis, wound dehiscence, possible open wound, asymmetry, scarring, skin redundancy, seroma, possible need for revisions or additional procedures.    We then transitioned to a discussion regarding immediate versus delayed reconstruction. Immediate reconstruction is feasible and more advantageous in a majority of cases as it offers the patient a reconstruction at the time of surgery, and it is often easier to achieve the desired cosmetic outcome. I explained that there are certain instances in which we elect for delayed reconstruction. These include but are not limited to comorbidities that can or should be optimized prior to elective reconstruction (ex. obesity, smoking, uncontrolled diabetes), aggressive cancer staging requiring expedient treatment (ex. inflammatory breast cancer), or patient preference.    We then discussed implant based reconstruction. We explained that tissue expanders would be placed during the same surgery as the mastectomy/mastectomies after they are completed. We discussed pre-pectoral reconstruction and if the patient would be a candidate for this. This determination is sometimes made at the time of surgery and would require ADM. Discussed that this is an off-label use of ADM. These tissue expanders can also be placed completely under the muscle or partially under the muscle. If placed partially under the muscle, we would use an acellular dermal matrix (ADM) to cover the lower portion of the expander. The patient may also require ADM regardless of the placement of the expander to gain better coverage if the muscles are thin, attenuated, or are of poor quality/thickness. We also discussed nipple sparing mastectomy and this determination would be between the oncologic surgeon and the patient. The surgery time for  unilateral reconstruction is about 2 - 2.5 hours and for bilateral reconstruction is 3.5 - 4 hours. The patient would stay overnight in the hospital and go home the next day. They would have an incision across the breast as well as 1 to 2 drains on either side. If the patient underwent nipple sparing mastectomy, the incision would usually be in the breast fold or lateral to the nipple. The drains would stay in until outputs decrease to <25 mL per day for 2 days, and this is usually 2-3 weeks but can be longer. The patient would then return to clinic on a weekly basis to have the tissue expanders filled to the desired volume and then a subsequent 4 to 6 week recovery period, depending on need for adjuvant therapy. The expanders would then be exchanged to permanent implants in a second procedure as an outpatient.  This procedure typically take 1-2 hours and often does not require a drain. We also discussed that occasionally, a permanent implant can be placed at the time of mastectomy but this is a minority of cases. Advantages of implant based reconstruction are shorter surgery, shorter hospital stay, shorter recovery, no need for donor site, no risk of microvascular complications, and more customizable size. Risks were discussed including bleeding, infection (ranging from superficial skin infection to prosthetic infection requiring explantation), injury to surrounding structures, poor scarring, wound non-healing, mastectomy skin flap necrosis, need for prolonged wound care, delay of adjuvant therapy, implant exposure necessitating explantation, chronic pain/muscle spasms, numbness, need for further unanticipated surgery, seroma, capsular contracture, cosmetic deformity, asymmetry, malposition, animation deformity, silent rupture (for silicone implants), device failure, breast implant associated anaplastic large cell lymphoma (DADA-ALCL), VTE, stroke, MI, and death. Each of these risks were discussed and explained to  the patient. If the patient chooses silicone implants, we discussed that the FDA recommends imaging in the form of ultrasound/MRI 5-6 years following the initial placement of the implants and then every 2-3 years after that to assess for silent rupture.    We then had a discussion regarding autologous tissue reconstruction. The primary option would be to perform free tissue transfer from the abdomen in the way of HERIBERTO or muscle sparing free TRAM flaps. We explained that the lower abdominal tissue is used to reconstruct the breast mound. This involves identifying the blood supply of the tissue and tracing this through the abdominal wall muscles. Depending on the patient's vessel anatomy, a variable amount of muscle may have to be removed to preserve enough blood supply to the tissue. We discussed the nature of the microsurgical procedure, vessel dissection, recipient vessel dissection via removal of part of the rib cartilage, and the possibility of return to the OR for perfusion related issues immediately or in a delayed setting. It was also explained that in a very rare case, the tissue that was transferred cannot be salvaged and another reconstructive option will have to be pursued. We explained the nature of the donor site including a hip to hip and circumumbilical scar, as well as risk of bulge or hernia from vessel dissection. The patient may require intraoperative placement of abdominal mesh, either synthetic or biologic, to reduce the risk of hernia or bulge if fascia is harvested with the flap and cannot be closed without tension. The patient would have either circular scars, circular and vertical, or a full inverted T pattern on the breast depending on the excess skin and the size of the flap. Again, nipple sparing mastectomy would be a determination between the oncologic surgeon and the patient.       When the abdominally based free flap is performed 1 to 2 drains will be placed in each breast and 2 drains  will be placed in the abdomen. The surgical time for a unilateral reconstruction is about 6-8 hours and for a bilateral reconstruction is 11-12 hours. The patient would have a 3 to 4 day hospital stay and a total 8 week recovery period. Advantages are a more natural appearing breast, less maintenance of implants and less risk of implant related issues, longevity of the reconstruction, and more tolerance of radiation.  Risks were discussed including bleeding, infection, injury to surrounding structures (pleura, lung, intra-abdominal structures), poor scarring, wound non-healing, mastectomy skin flap necrosis, need for prolonged wound care, delay of adjuvant therapy, chronic pain/muscle spasms, numbness, need for further unanticipated surgery/return to OR for perfusion related issues, partial or total flap loss, cosmetic deformity at the breast or abdomen, asymmetry, malposition of breast or umbilicus, fat necrosis, seroma, bulge or hernia, loss of the umbilicus, sensory changes at the lower abdomen, VTE, stroke, MI, and death. We also discussed possible need for vein grafting if necessary. We also discussed the patient's desires if the flap cannot be transferred or salvaged. We also reviewed that since the surgery is much longer and the patient has a cancer diagnosis, the risks of VTE such as DVT and PE are increased. Also the risk of major bleeding that could be life threatening is higher than with implant based reconstruction.    The patient expressed understanding of the options discussed with her today.  Currently, I believe she is a candidate for autologous or alloplastic breast reconstruction.  The patient would like to think about the information we discussed today and meet again to finalize a plan.

## 2025-05-05 ENCOUNTER — TELEPHONE (OUTPATIENT)
Dept: OBGYN CLINIC | Facility: CLINIC | Age: 40
End: 2025-05-05

## 2025-05-05 NOTE — TELEPHONE ENCOUNTER
Patient states son is in hospital and she cannot leave him for appointment. She is looking to reschedule. Please contact patient [phone]

## (undated) DEVICE — TELFA NON-ADHERENT ABSORBENT DRESSING: Brand: TELFA

## (undated) DEVICE — SUT SILK 2-0 SH 30 IN K833H

## (undated) DEVICE — GLOVE SRG BIOGEL ORTHOPEDIC 7.5

## (undated) DEVICE — UTILITY MARKER,BLACK WITH LABELS: Brand: DEVON

## (undated) DEVICE — SUT VICRYL 3-0 SH 27 IN J416H

## (undated) DEVICE — FINGER TUBING + CABLE MANAGER

## (undated) DEVICE — BETHLEHEM UNIVERSAL MINOR GEN: Brand: CARDINAL HEALTH

## (undated) DEVICE — INTENDED FOR TISSUE SEPARATION, AND OTHER PROCEDURES THAT REQUIRE A SHARP SURGICAL BLADE TO PUNCTURE OR CUT.: Brand: BARD-PARKER SAFETY BLADES SIZE 15, STERILE

## (undated) DEVICE — SUT MONOCRYL 4-0 PS-2 18 IN Y496G

## (undated) DEVICE — DRAPE PROBE NEO-PROBE/ULTRASOUND

## (undated) DEVICE — PENCIL ELECTROSURG E-Z CLEAN -0035H

## (undated) DEVICE — TUBING SUCTION 5MM X 12 FT

## (undated) DEVICE — DRAPE SHEET THREE QUARTER

## (undated) DEVICE — CHLORAPREP HI-LITE 26ML ORANGE

## (undated) DEVICE — CHEST/BREAST DRAPE: Brand: CONVERTORS

## (undated) DEVICE — SUT VICRYL 2-0 SH 27 IN UNDYED J417H

## (undated) DEVICE — DRAPE EQUIPMENT RF WAND

## (undated) DEVICE — LIGHT HANDLE COVER SLEEVE DISP BLUE STELLAR

## (undated) DEVICE — MAYO STAND COVER: Brand: CONVERTORS

## (undated) DEVICE — ELECTRODE BLADE MOD E-Z CLEAN  2.75IN 7CM -0012AM

## (undated) DEVICE — SYRINGE 3ML LL

## (undated) DEVICE — NEEDLE 25G X 1 1/2

## (undated) DEVICE — SMOKE EVACUATION TUBING WITH 8 IN INTEGRAL WAND AND SPONGE GUARD: Brand: BUFFALO FILTER

## (undated) DEVICE — ADHESIVE SKIN CLOSR DERMABOND PRINEO